# Patient Record
Sex: FEMALE | Race: WHITE | NOT HISPANIC OR LATINO | Employment: OTHER | URBAN - METROPOLITAN AREA
[De-identification: names, ages, dates, MRNs, and addresses within clinical notes are randomized per-mention and may not be internally consistent; named-entity substitution may affect disease eponyms.]

---

## 2017-01-10 ENCOUNTER — TELEPHONE (OUTPATIENT)
Dept: PHYSICAL MEDICINE AND REHAB | Facility: MEDICAL CENTER | Age: 42
End: 2017-01-10

## 2017-01-10 NOTE — TELEPHONE ENCOUNTER
"Peter left message she wants Dr. Arthur to give her different medication for constipation because she has been \"impacted\" for 2 weeks.   "

## 2017-01-10 NOTE — TELEPHONE ENCOUNTER
"Dr. Arthur wrote, \"For this severe constipation, I would recommend the patient have urgent care or primary care provider evaluation\"    I let Peter know above.   "

## 2017-01-30 ENCOUNTER — TELEPHONE (OUTPATIENT)
Dept: PHYSICAL MEDICINE AND REHAB | Facility: MEDICAL CENTER | Age: 42
End: 2017-01-30

## 2017-01-30 NOTE — TELEPHONE ENCOUNTER
Peter said she has left her fentanyl patch on for two weeks and wants to know if she should replace. She said she has not felt well for the last two months and has been not taking her medication like she should.

## 2017-01-30 NOTE — TELEPHONE ENCOUNTER
"Dr. Arthur wrote, \"If she does not need the fentanyl patch, she should not be using it. We can further discuss issues regarding pain medication at next office visit.\"   I was unable to leave message vm box full.   "

## 2017-03-03 ENCOUNTER — APPOINTMENT (OUTPATIENT)
Dept: PHYSICAL MEDICINE AND REHAB | Facility: MEDICAL CENTER | Age: 42
End: 2017-03-03
Payer: MEDICAID

## 2017-03-08 ENCOUNTER — OFFICE VISIT (OUTPATIENT)
Dept: PHYSICAL MEDICINE AND REHAB | Facility: MEDICAL CENTER | Age: 42
End: 2017-03-08
Payer: MEDICAID

## 2017-03-08 VITALS
WEIGHT: 171 LBS | BODY MASS INDEX: 23.94 KG/M2 | OXYGEN SATURATION: 96 % | HEART RATE: 76 BPM | SYSTOLIC BLOOD PRESSURE: 118 MMHG | HEIGHT: 71 IN | TEMPERATURE: 95.5 F | DIASTOLIC BLOOD PRESSURE: 78 MMHG

## 2017-03-08 DIAGNOSIS — G43.809 OTHER TYPE OF MIGRAINE: ICD-10-CM

## 2017-03-08 DIAGNOSIS — F41.9 ANXIETY: ICD-10-CM

## 2017-03-08 DIAGNOSIS — Z79.899 CONTROLLED SUBSTANCE AGREEMENT SIGNED: ICD-10-CM

## 2017-03-08 DIAGNOSIS — M25.50 ARTHRALGIA, UNSPECIFIED JOINT: ICD-10-CM

## 2017-03-08 DIAGNOSIS — M62.838 MUSCLE SPASM: ICD-10-CM

## 2017-03-08 DIAGNOSIS — R51.9 HEADACHE, UNSPECIFIED HEADACHE TYPE: ICD-10-CM

## 2017-03-08 DIAGNOSIS — M54.9 SPINAL PAIN: ICD-10-CM

## 2017-03-08 PROCEDURE — 99214 OFFICE O/P EST MOD 30 MIN: CPT | Performed by: PHYSICAL MEDICINE & REHABILITATION

## 2017-03-08 RX ORDER — BACLOFEN 10 MG/1
10 TABLET ORAL 3 TIMES DAILY PRN
Qty: 90 TAB | Refills: 1 | Status: SHIPPED | OUTPATIENT
Start: 2017-03-08 | End: 2017-05-23 | Stop reason: SDUPTHER

## 2017-03-08 RX ORDER — MORPHINE SULFATE 15 MG/1
TABLET ORAL
Qty: 90 TAB | Refills: 0 | Status: SHIPPED | OUTPATIENT
Start: 2017-03-08 | End: 2017-03-08 | Stop reason: SDUPTHER

## 2017-03-08 RX ORDER — BUTALBITAL, ACETAMINOPHEN AND CAFFEINE 50; 325; 40 MG/1; MG/1; MG/1
1 TABLET ORAL 2 TIMES DAILY PRN
Qty: 30 TAB | Refills: 0 | Status: SHIPPED | OUTPATIENT
Start: 2017-03-08 | End: 2017-05-23 | Stop reason: SDUPTHER

## 2017-03-08 RX ORDER — MORPHINE SULFATE 15 MG/1
TABLET ORAL
Qty: 90 TAB | Refills: 0 | Status: SHIPPED | OUTPATIENT
Start: 2017-03-08 | End: 2017-05-23 | Stop reason: SDUPTHER

## 2017-03-08 RX ORDER — HYDROXYZINE 50 MG/1
50 TABLET, FILM COATED ORAL 2 TIMES DAILY PRN
Qty: 60 TAB | Refills: 1 | Status: SHIPPED | OUTPATIENT
Start: 2017-03-08 | End: 2017-05-23 | Stop reason: SDUPTHER

## 2017-03-08 NOTE — MR AVS SNAPSHOT
"        Peter Trimble   3/8/2017 10:00 AM   Office Visit   MRN: [id number]    Department:  [facility]   Dept Phone:  [phone]    Description:  Female : 1975   Provider:  Daniel Arthur M.D.           Reason for Visit     Follow-Up           Allergies as of 3/8/2017     Allergen Noted Reactions    Compazine 2011       \"psychotic reaction\"    Imitrex [Sumatriptan Succinate] 2012       Had brain bleed    Inapsine [Droperidol] 2011       \"psychotic reaction\"    Maxalt [Rizatriptan Benzoate] 2012       Had brain bleed    Phenergan [Promethazine Hcl] 2011       \"psychotic reaction\"    Xanax [Alprazolam] 2014       Sores and dry mouth, many others pt cannot remember    Zantac 10/16/2013       Stomach pain    Apple Cider Vinegar 2016   Rash    Patient states she gets rash    Clarithromycin 2013   Vomiting, Palpitations    Dilaudid [Hydromorphone] 2016   Rash    Patient states she had rash, hallucinations, unable to urinate.     Doxycycline 2015       Effexor [Venlafaxine] 2014       \"Really depressed, like i wanted to hurt myself\"    Eucalyptus Oil 2015       Kiwi Extract 10/07/2011       Latex 2013   Rash    Lyrica [Pregabalin] 2011       \"depression, slept a lot\"    Minocycline 10/07/2011   Vomiting    \"any cyclines\"    Patchouli Oil 2015   Rash    Rash     Tea Tree Oil 2015   Rash    Break out in rash    Topiramate 2015   Nausea    Patient states made sick to stomach and dizzy.      You were diagnosed with     Controlled substance agreement signed   [551937]       Spinal pain   [558601]       Arthralgia, unspecified joint   [4663567]       Headache, unspecified headache type   [6316494]       Other type of migraine   [2743959]       Muscle spasm   [465423]       Anxiety   [491048]         Vital Signs     Blood Pressure Pulse Temperature Height Weight Body Mass Index    118/78 mmHg 76 35.3 °C " "(95.5 °F) 1.803 m (5' 11\") 77.565 kg (171 lb) 23.86 kg/m2    Oxygen Saturation Smoking Status                96% Never Smoker           Basic Information     Date Of Birth Sex Race Ethnicity Preferred Language    1975 Female White Non- English      Problem List              ICD-10-CM Priority Class Noted - Resolved    Depression F32.9 Medium  9/28/2011 - Present    Bipolar 2 disorder (CMS-HCC) F31.81 Medium  9/28/2011 - Present    Chronic migraine G43.709 Medium  9/28/2011 - Present    GERD (gastroesophageal reflux disease) K21.9 Low  9/28/2011 - Present    Asthma J45.909 Medium  9/28/2011 - Present    Cerebral vasculitis I67.7 High  10/4/2011 - Present    HTN (hypertension) I10 Medium  10/4/2011 - Present    Seizure disorder (CMS-HCC) G40.909 Medium  10/5/2011 - Present    Hypertrophy of breast N62   4/29/2013 - Present    AVA (obstructive sleep apnea), intolerant of CPAP G47.33 Medium  7/8/2013 - Present    Obese E66.9   7/8/2013 - Present    Oxygen dependent, 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   7/8/2013 - Present    Headache R51   2/19/2014 - Present    Neck pain M54.2   2/19/2014 - Present    Fibromyalgia M79.7   2/19/2014 - Present    Lump or mass in breast N63   5/29/2014 - Present    Hyperlipidemia E78.5 Low  8/18/2014 - Present    Chronic pain G89.29 Medium  8/18/2014 - Present    Benign hypertensive heart disease without heart failure I11.9   10/16/2014 - Present    Mixed hyperlipidemia E78.2   10/16/2014 - Present    Open scalp wound S01.00XA   10/16/2014 - Present    Morbid obesity (CMS-HCC) E66.01 Medium  6/25/2015 - Present    Closed fracture of one rib S22.39XA Medium  6/25/2015 - Present    Lumbar transverse process fracture (CMS-HCC) S32.008A Medium  6/25/2015 - Present    Rib fracture S22.39XA   6/25/2015 - Present    Controlled substance agreement signed Z79.899   2/19/2016 - Present    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   8/6/2016 - Present    Fracture of ankle, trimalleolar, closed S82.853A   8/6/2016 - " Present    Depression F32.9   8/7/2016 - Present    Chronic pain G89.29   8/7/2016 - Present    HTN (hypertension), benign I10   8/7/2016 - Present    PTSD (post-traumatic stress disorder) F43.10   10/2/2016 - Present    Chest pain R07.9   10/3/2016 - Present    Ankle fracture, right S82.891A   10/3/2016 - Present    Vaginal yeast infection B37.3   10/3/2016 - Present    Personality disorder F60.9   10/4/2016 - Present    Dystonia G24.9   10/4/2016 - Present      Health Maintenance        Date Due Completion Dates    IMM DTaP/Tdap/Td Vaccine (1 - Tdap) 4/6/1995 ---    PAP SMEAR 4/6/1997 ---    MAMMOGRAM 4/6/2016 ---            Current Immunizations     Influenza TIV (IM) 10/1/2013, 11/15/2012, 9/30/2011 12:45 PM    Influenza Vaccine Quad Inj (Pf) 10/2/2016  9:25 PM    Pneumococcal polysaccharide vaccine (PPSV-23) 9/30/2011 12:45 PM      Below and/or attached are the medications your provider expects you to take. Review all of your home medications and newly ordered medications with your provider and/or pharmacist. Follow medication instructions as directed by your provider and/or pharmacist. Please keep your medication list with you and share with your provider. Update the information when medications are discontinued, doses are changed, or new medications (including over-the-counter products) are added; and carry medication information at all times in the event of emergency situations     Allergies:  COMPAZINE - (reactions not documented)     IMITREX - (reactions not documented)     INAPSINE - (reactions not documented)     MAXALT - (reactions not documented)     PHENERGAN - (reactions not documented)     XANAX - (reactions not documented)     ZANTAC - (reactions not documented)     APPLE CIDER VINEGAR - Rash     CLARITHROMYCIN - Vomiting,Palpitations     DILAUDID - Rash     DOXYCYCLINE - (reactions not documented)     EFFEXOR - (reactions not documented)     EUCALYPTUS OIL - (reactions not documented)     KIWI  EXTRACT - (reactions not documented)     LATEX - Rash     LYRICA - (reactions not documented)     MINOCYCLINE - Vomiting     PATCHOULI OIL - Rash     TEA TREE OIL - Rash     TOPIRAMATE - Nausea               Medications  Valid as of: March 08, 2017 - 10:59 AM    Generic Name Brand Name Tablet Size Instructions for use    Albuterol Sulfate (Nebu Soln) PROVENTIL 2.5mg/0.5ml 2.5 mg by Nebulization route every 6 hours as needed for Shortness of Breath.        Baclofen (Tab) LIORESAL 10 MG Take 1 Tab by mouth 3 times a day as needed. as needed for muscle spasms        Butalbital-APAP-Caffeine (Tab) FIORICET -40 MG Take 1 Tab by mouth 2 times a day as needed for Headache.        Cetirizine HCl (Tab) ZYRTEC 10 MG Take 10 mg by mouth every day.        Divalproex Sodium (Tablet Delayed Response) DEPAKOTE 500 MG Take 2 Tabs by mouth every 12 hours.        Esomeprazole Magnesium (CAPSULE DELAYED RELEASE) NEXIUM 40 MG Take 40 mg by mouth every morning before breakfast.        Fenofibrate (Tab) TRICOR 145 MG Take 145 mg by mouth every morning.        Ferrous Sulfate (Tab) ferrous sulfate 325 (65 FE) MG Take 325 mg by mouth every day.        Fluticasone-Salmeterol (AEROSOL POWDER, BREATH ACTIVATED) ADVAIR 250-50 MCG/DOSE Inhale 1 Puff by mouth every evening.        HydrOXYzine HCl (Tab) ATARAX 50 MG Take 1 Tab by mouth 2 times a day as needed. for anxiety.        Montelukast Sodium (Tab) SINGULAIR 10 MG Take 10 mg by mouth every bedtime.        Morphine Sulfate (Tab) MS IR 15 MG Take 1/2  to 1 tablet by mouth every 8 hours as needed for pain.        PARoxetine HCl (Tab) PAXIL 30 MG Take 60 mg by mouth every day.        Propranolol HCl (Tab) INDERAL 20 MG Take 20 mg by mouth 2 times a day.        .                 Medicines prescribed today were sent to:     [facility] [place] - [address]    [address] [facility] [place] [address]    Phone: [phone] Fax: [phone]    Open 24  Hours?: No      Medication refill instructions:       If your prescription bottle indicates you have medication refills left, it is not necessary to call your provider’s office. Please contact your pharmacy and they will refill your medication.    If your prescription bottle indicates you do not have any refills left, you may request refills at any time through one of the following ways: The online [facility] system (except Urgent Care), by calling your provider’s office, or by asking your pharmacy to contact your provider’s office with a refill request. Medication refills are processed only during regular business hours and may not be available until the next business day. Your provider may request additional information or to have a follow-up visit with you prior to refilling your medication.   *Please Note: Medication refills are assigned a new Rx number when refilled electronically. Your pharmacy may indicate that no refills were authorized even though a new prescription for the same medication is available at the pharmacy. Please request the medicine by name with the pharmacy before contacting your provider for a refill.        Your To Do List     Future Labs/Procedures Complete By Expires    PAIN MANAGEMENT MARCELLO ULLOA/ RFLX TO QNT  As directed 3/8/2018    Comments:    Current Meds (name, sig, last dose):   Current outpatient prescriptions:   •  morphine, Take 1/2 to 1 tablet by mouth every 8 hours as needed for breakthrough pain., 3/8/2017  •  hydrOXYzine, 100 mg, Oral, TID, 3/8/2017  •  divalproex, 1,000 mg, Oral, Q12HRS, 3/8/2017  •  paroxetine, 60 mg, Oral, DAILY, 3/8/2017 at unknown  •  albuterol, 2.5 mg, Nebulization, Q6HRS PRN, 3/8/2017 at unknown  •  cetirizine, 10 mg, Oral, DAILY, 3/8/2017 at am  •  esomeprazole, 40 mg, Oral, QAM AC, 3/8/2017 at am  •  fenofibrate, 145 mg, Oral, QAM, 3/8/2017 at am  •  ferrous sulfate, 325 mg, Oral, DAILY, 3/8/2017 at am  •  montelukast, 10 mg, Oral, QHS, 3/8/2017 at  unknown  •  propranolol, 20 mg, Oral, BID, 12/7/2016 at am  •  fluticasone-salmeterol, 1 Puff, Inhalation, Q EVENING, prn at unknown            Referral     A referral request has been sent to our patient care coordination department. Please allow 3-5 business days for us to process this request and contact you either by phone or mail. If you do not hear from us by the 5th business day, please call us at [phone].           [employer] Access Code: [email or web address]  Expires: 3/22/2017  2:00 PM    Plaxica  A secure, online tool to manage your health information     [employer]’s Plaxica® is a secure, online tool that connects you to your personalized health information from the privacy of your home -- day or night - making it very easy for you to manage your healthcare. Once the activation process is completed, you can even access your medical information using the Plaxica mariaelena, which is available for free in the Apple Mariaelena store or Google Play store.     Plaxica provides the following levels of access (as shown below):   My Chart Features   [facility] Doctor [facility]  Specialists [facility]  Urgent  Care [facility]  Primary Care  Doctor   Email your healthcare team securely and privately 24/7 X X X    Manage appointments: schedule your next appointment; view details of past/upcoming appointments X      Request prescription refills. X      View recent personal medical records, including lab and immunizations X X X X   View health record, including health history, allergies, medications X X X X   Read reports about your outpatient visits, procedures, consult and ER notes X X X X   See your discharge summary, which is a recap of your hospital and/or ER visit that includes your diagnosis, lab results, and care plan. X X       How to register for [employer]:  1. Go to  [email or web address].  2. Click on the Sign Up Now box, which takes you to the New Member Sign Up page. You will need to provide the following  information:  a. Enter your [employer] [place] Access Code exactly as it appears at the top of this page. (You will not need to use this code after you’ve completed the sign-up process. If you do not sign up before the expiration date, you must request a new code.)   b. Enter your date of birth.   c. Enter your home email address.   d. Click Submit, and follow the next screen’s instructions.  3. Create a [employer] [place] ID. This will be your [employer] [place] login ID and cannot be changed, so think of one that is secure and easy to remember.  4. Create a [employer] [place] password. You can change your password at any time.  5. Enter your Password Reset Question and Answer. This can be used at a later time if you forget your password.   6. Enter your e-mail address. This allows you to receive e-mail notifications when new information is available in [employer] [place].  7. Click Sign Up. You can now view your health information.    For assistance activating your [employer] [place] account, call [phone]
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
## 2017-03-09 ENCOUNTER — TELEPHONE (OUTPATIENT)
Dept: PHYSICAL MEDICINE AND REHAB | Facility: MEDICAL CENTER | Age: 42
End: 2017-03-09

## 2017-03-09 NOTE — TELEPHONE ENCOUNTER
Peter left message she will not be able to get UDS done until tomorrow and hopes she is not jeopardizing her compliance with program.

## 2017-03-10 ENCOUNTER — HOSPITAL ENCOUNTER (OUTPATIENT)
Facility: MEDICAL CENTER | Age: 42
End: 2017-03-10
Attending: PHYSICAL MEDICINE & REHABILITATION
Payer: MEDICAID

## 2017-03-10 DIAGNOSIS — Z79.899 CONTROLLED SUBSTANCE AGREEMENT SIGNED: ICD-10-CM

## 2017-03-10 DIAGNOSIS — M25.50 ARTHRALGIA, UNSPECIFIED JOINT: ICD-10-CM

## 2017-03-10 DIAGNOSIS — M54.9 SPINAL PAIN: ICD-10-CM

## 2017-03-10 PROCEDURE — G0480 DRUG TEST DEF 1-7 CLASSES: HCPCS

## 2017-03-10 PROCEDURE — G0480 DRUG TEST DEF 1-7 CLASSES: HCPCS | Mod: 91

## 2017-03-10 PROCEDURE — 80307 DRUG TEST PRSMV CHEM ANLYZR: CPT

## 2017-03-12 LAB
AMPHET CTO UR CFM-MCNC: NEGATIVE NG/ML
BARBITURATES CTO UR CFM-MCNC: POSITIVE NG/ML
BENZODIAZ CTO UR CFM-MCNC: NEGATIVE NG/ML
BUPRENORPHINE UR-MCNC: NEGATIVE NG/ML
CANNABINOIDS CTO UR CFM-MCNC: NEGATIVE NG/ML
CARISOPRODOL UR-MCNC: NEGATIVE NG/ML
COCAINE CTO UR CFM-MCNC: NEGATIVE NG/ML
DRUG SCREEN COMMENT UR-IMP: NORMAL
ETHYL GLUCURONIDE UR QL SCN: NEGATIVE NG/ML
FENTANYL UR-MCNC: NEGATIVE NG/ML
MEPERIDINE CTO UR SCN-MCNC: NEGATIVE NG/ML
METHADONE CTO UR CFM-MCNC: NEGATIVE NG/ML
OPIATES UR QL SCN: POSITIVE NG/ML
OXYCDOXYM URSCRN 2005102: NEGATIVE NG/ML
PCP CTO UR CFM-MCNC: NEGATIVE NG/ML
PROPOXYPH CTO UR CFM-MCNC: NEGATIVE NG/ML
TAPENTADOL UR-MCNC: NEGATIVE NG/ML
TRAMADOL CTO UR SCN-MCNC: NEGATIVE NG/ML
ZOLPIDEM UR-MCNC: NEGATIVE NG/ML

## 2017-03-15 LAB
6MAM UR CFM-MCNC: <10 NG/ML
AMOBARBITAL UR-MCNC: <50 NG/ML
BUTALBITAL UR-MCNC: 370 NG/ML
CODEINE UR CFM-MCNC: <20 NG/ML
HYDROCODONE UR CFM-MCNC: <20 NG/ML
HYDROMORPHONE UR CFM-MCNC: <20 NG/ML
MORPHINE UR CFM-MCNC: 1226 NG/ML
NORHYDROCODONE UR CFM-MCNC: <20 NG/ML
NOROXYCODONE UR CFM-MCNC: <20 NG/ML
OPIATES UR NOROXYM Q0836: <20 NG/ML
OXYCODONE UR CFM-MCNC: <20 NG/ML
OXYMORPHONE UR CFM-MCNC: <20 NG/ML
PENTOBARB UR-MCNC: <50 NG/ML
PHENOBARB UR-MCNC: <50 NG/ML
SECOBARBITAL UR-MCNC: <50 NG/ML

## 2017-03-18 ENCOUNTER — OFFICE VISIT (OUTPATIENT)
Dept: URGENT CARE | Facility: PHYSICIAN GROUP | Age: 42
End: 2017-03-18
Payer: MEDICAID

## 2017-03-18 ENCOUNTER — APPOINTMENT (OUTPATIENT)
Dept: URGENT CARE | Facility: PHYSICIAN GROUP | Age: 42
End: 2017-03-18
Payer: MEDICAID

## 2017-03-18 VITALS
HEART RATE: 82 BPM | SYSTOLIC BLOOD PRESSURE: 122 MMHG | WEIGHT: 175 LBS | BODY MASS INDEX: 24.42 KG/M2 | RESPIRATION RATE: 16 BRPM | TEMPERATURE: 98.2 F | DIASTOLIC BLOOD PRESSURE: 74 MMHG | OXYGEN SATURATION: 96 %

## 2017-03-18 DIAGNOSIS — N39.0 ACUTE UTI (URINARY TRACT INFECTION): ICD-10-CM

## 2017-03-18 PROCEDURE — 99213 OFFICE O/P EST LOW 20 MIN: CPT | Performed by: NURSE PRACTITIONER

## 2017-03-18 RX ORDER — FLUCONAZOLE 100 MG/1
100 TABLET ORAL ONCE
Qty: 1 TAB | Refills: 0 | Status: SHIPPED | OUTPATIENT
Start: 2017-03-18 | End: 2017-03-18

## 2017-03-18 RX ORDER — LEVOFLOXACIN 250 MG/1
250 TABLET, FILM COATED ORAL DAILY
Qty: 5 TAB | Refills: 0 | Status: SHIPPED | OUTPATIENT
Start: 2017-03-18 | End: 2017-05-23

## 2017-03-18 ASSESSMENT — ENCOUNTER SYMPTOMS
BACK PAIN: 0
NAUSEA: 0
MYALGIAS: 0
CHILLS: 0
WEAKNESS: 0
ABDOMINAL PAIN: 0
SWEATS: 0
VOMITING: 0
FEVER: 0
HEADACHES: 0
FLANK PAIN: 0

## 2017-03-18 NOTE — PROGRESS NOTES
Subjective:      Peter Trimble is a 41 y.o. female who presents with Dysuria            HPI Comments: Medications, Allergies and Prior Medical Hx reviewed and updated in [facility].with patient/family today         Dysuria   This is a new problem. The current episode started in the past 7 days (5 days). The problem has been unchanged. The quality of the pain is described as burning. The pain is moderate. There has been no fever. Associated symptoms include a discharge, frequency, hematuria and urgency. Pertinent negatives include no chills, flank pain, nausea, possible pregnancy, sweats or vomiting. She has tried nothing for the symptoms.       Review of Systems   Constitutional: Negative for fever, chills and malaise/fatigue.   Gastrointestinal: Negative for nausea, vomiting and abdominal pain.   Genitourinary: Positive for dysuria, urgency, frequency and hematuria. Negative for flank pain.   Musculoskeletal: Negative for myalgias and back pain.   Neurological: Negative for weakness and headaches.          Objective:     /74 mmHg  Pulse 82  Temp(Src) 36.8 °C (98.2 °F)  Resp 16  Wt 79.379 kg (175 lb)  SpO2 96%     Physical Exam   Constitutional: She appears well-developed and well-nourished.   HENT:   Head: Normocephalic and atraumatic.   Eyes: Pupils are equal, round, and reactive to light.   Cardiovascular: Normal rate, regular rhythm and normal heart sounds.    Pulmonary/Chest: Effort normal and breath sounds normal. No respiratory distress.   Abdominal: Soft. She exhibits no distension. There is tenderness in the suprapubic area. There is CVA tenderness (bilateral).   Neurological: She is alert.   Awake, alert, answering questions appropriately, moving all extremeties   Skin: Skin is warm and dry.   Vitals reviewed.              Assessment/Plan:     1. Acute UTI (urinary tract infection)  levofloxacin (LEVAQUIN) 250 MG Tab    fluconazole (DIFLUCAN) 100 MG Tab         Poct UA positive for leuks and  blood, sx are compatible with uti  Pt states levaquin > 3 days is the only abx that works for her uti.     Drink fluids  Pt will go to the ER for worsening or changing symptoms as discusse, high fever, body aches, vomiting, flank pain  Follow-up with your primary care provider or return here if not improving in 5 days   Discharge instructions discussed with pt/family who verbalize understanding and agreement with poc

## 2017-03-18 NOTE — MR AVS SNAPSHOT
"        Peter Trimble   3/18/2017 2:45 PM   Office Visit   MRN: [id number]    Department:  [facility]   Dept Phone:  [phone]    Description:  Female : 1975   Provider:  MC Roberts           Reason for Visit     Dysuria x 1 month        Allergies as of 3/18/2017     Allergen Noted Reactions    Compazine 2011       \"psychotic reaction\"    Imitrex [Sumatriptan Succinate] 2012       Had brain bleed    Inapsine [Droperidol] 2011       \"psychotic reaction\"    Maxalt [Rizatriptan Benzoate] 2012       Had brain bleed    Phenergan [Promethazine Hcl] 2011       \"psychotic reaction\"    Xanax [Alprazolam] 2014       Sores and dry mouth, many others pt cannot remember    Zantac 10/16/2013       Stomach pain    Apple Cider Vinegar 2016   Rash    Patient states she gets rash    Clarithromycin 2013   Vomiting, Palpitations    Dilaudid [Hydromorphone] 2016   Rash    Patient states she had rash, hallucinations, unable to urinate.     Doxycycline 2015       Effexor [Venlafaxine] 2014       \"Really depressed, like i wanted to hurt myself\"    Eucalyptus Oil 2015       Kiwi Extract 10/07/2011       Latex 2013   Rash    Lyrica [Pregabalin] 2011       \"depression, slept a lot\"    Minocycline 10/07/2011   Vomiting    \"any cyclines\"    Patchouli Oil 2015   Rash    Rash     Tea Tree Oil 2015   Rash    Break out in rash    Topiramate 2015   Nausea    Patient states made sick to stomach and dizzy.      You were diagnosed with     Acute UTI (urinary tract infection)   [091446]         Vital Signs     Blood Pressure Pulse Temperature Respirations Weight Oxygen Saturation    122/74 mmHg 82 36.8 °C (98.2 °F) 16 79.379 kg (175 lb) 96%    Smoking Status                   Never Smoker            Basic Information     Date Of Birth Sex Race Ethnicity Preferred Language    1975 Female White Non- English   "      Problem List              ICD-10-CM Priority Class Noted - Resolved    Depression F32.9 Medium  9/28/2011 - Present    Bipolar 2 disorder (CMS-HCC) F31.81 Medium  9/28/2011 - Present    Chronic migraine G43.709 Medium  9/28/2011 - Present    GERD (gastroesophageal reflux disease) K21.9 Low  9/28/2011 - Present    Asthma J45.909 Medium  9/28/2011 - Present    Cerebral vasculitis I67.7 High  10/4/2011 - Present    HTN (hypertension) I10 Medium  10/4/2011 - Present    Seizure disorder (CMS-HCC) G40.909 Medium  10/5/2011 - Present    Hypertrophy of breast N62   4/29/2013 - Present    AVA (obstructive sleep apnea), intolerant of CPAP G47.33 Medium  7/8/2013 - Present    Obese E66.9   7/8/2013 - Present    Oxygen dependent, 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   7/8/2013 - Present    Headache R51   2/19/2014 - Present    Neck pain M54.2   2/19/2014 - Present    Fibromyalgia M79.7   2/19/2014 - Present    Lump or mass in breast N63   5/29/2014 - Present    Hyperlipidemia E78.5 Low  8/18/2014 - Present    Chronic pain G89.29 Medium  8/18/2014 - Present    Benign hypertensive heart disease without heart failure I11.9   10/16/2014 - Present    Mixed hyperlipidemia E78.2   10/16/2014 - Present    Open scalp wound S01.00XA   10/16/2014 - Present    Morbid obesity (CMS-HCC) E66.01 Medium  6/25/2015 - Present    Closed fracture of one rib S22.39XA Medium  6/25/2015 - Present    Lumbar transverse process fracture (CMS-HCC) S32.008A Medium  6/25/2015 - Present    Rib fracture S22.39XA   6/25/2015 - Present    Controlled substance agreement signed Z79.899   2/19/2016 - Present    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   8/6/2016 - Present    Fracture of ankle, trimalleolar, closed S82.853A   8/6/2016 - Present    Depression F32.9   8/7/2016 - Present    Chronic pain G89.29   8/7/2016 - Present    HTN (hypertension), benign I10   8/7/2016 - Present    PTSD (post-traumatic stress disorder) F43.10   10/2/2016 - Present    Chest pain R07.9   10/3/2016 - Present     Ankle fracture, right S82.891A   10/3/2016 - Present    Vaginal yeast infection B37.3   10/3/2016 - Present    Personality disorder F60.9   10/4/2016 - Present    Dystonia G24.9   10/4/2016 - Present      Health Maintenance        Date Due Completion Dates    IMM DTaP/Tdap/Td Vaccine (1 - Tdap) 4/6/1995 ---    PAP SMEAR 4/6/1997 ---    MAMMOGRAM 4/6/2016 ---            Current Immunizations     Influenza TIV (IM) 10/1/2013, 11/15/2012, 9/30/2011 12:45 PM    Influenza Vaccine Quad Inj (Pf) 10/2/2016  9:25 PM    Pneumococcal polysaccharide vaccine (PPSV-23) 9/30/2011 12:45 PM      Below and/or attached are the medications your provider expects you to take. Review all of your home medications and newly ordered medications with your provider and/or pharmacist. Follow medication instructions as directed by your provider and/or pharmacist. Please keep your medication list with you and share with your provider. Update the information when medications are discontinued, doses are changed, or new medications (including over-the-counter products) are added; and carry medication information at all times in the event of emergency situations     Allergies:  COMPAZINE - (reactions not documented)     IMITREX - (reactions not documented)     INAPSINE - (reactions not documented)     MAXALT - (reactions not documented)     PHENERGAN - (reactions not documented)     XANAX - (reactions not documented)     ZANTAC - (reactions not documented)     APPLE CIDER VINEGAR - Rash     CLARITHROMYCIN - Vomiting,Palpitations     DILAUDID - Rash     DOXYCYCLINE - (reactions not documented)     EFFEXOR - (reactions not documented)     EUCALYPTUS OIL - (reactions not documented)     KIWI EXTRACT - (reactions not documented)     LATEX - Rash     LYRICA - (reactions not documented)     MINOCYCLINE - Vomiting     PATCHOULI OIL - Rash     TEA TREE OIL - Rash     TOPIRAMATE - Nausea               Medications  Valid as of: March 18, 2017 -  4:01 PM     Generic Name Brand Name Tablet Size Instructions for use    Albuterol Sulfate (Nebu Soln) PROVENTIL 2.5mg/0.5ml 2.5 mg by Nebulization route every 6 hours as needed for Shortness of Breath.        Baclofen (Tab) LIORESAL 10 MG Take 1 Tab by mouth 3 times a day as needed. as needed for muscle spasms        Butalbital-APAP-Caffeine (Tab) FIORICET -40 MG Take 1 Tab by mouth 2 times a day as needed for Headache.        Cetirizine HCl (Tab) ZYRTEC 10 MG Take 10 mg by mouth every day.        Divalproex Sodium (Tablet Delayed Response) DEPAKOTE 500 MG Take 2 Tabs by mouth every 12 hours.        Esomeprazole Magnesium (CAPSULE DELAYED RELEASE) NEXIUM 40 MG Take 40 mg by mouth every morning before breakfast.        Fenofibrate (Tab) TRICOR 145 MG Take 145 mg by mouth every morning.        Ferrous Sulfate (Tab) ferrous sulfate 325 (65 FE) MG Take 325 mg by mouth every day.        Fluconazole (Tab) DIFLUCAN 100 MG Take 1 Tab by mouth Once for 1 dose.        Fluticasone-Salmeterol (AEROSOL POWDER, BREATH ACTIVATED) ADVAIR 250-50 MCG/DOSE Inhale 1 Puff by mouth every evening.        HydrOXYzine HCl (Tab) ATARAX 50 MG Take 1 Tab by mouth 2 times a day as needed. for anxiety.        LevoFLOXacin (Tab) LEVAQUIN 250 MG Take 1 Tab by mouth every day.        Montelukast Sodium (Tab) SINGULAIR 10 MG Take 10 mg by mouth every bedtime.        Morphine Sulfate (Tab) MS IR 15 MG Take 1/2  to 1 tablet by mouth every 8 hours as needed for pain.        PARoxetine HCl (Tab) PAXIL 30 MG Take 60 mg by mouth every day.        Propranolol HCl (Tab) INDERAL 20 MG Take 20 mg by mouth 2 times a day.        .                 Medicines prescribed today were sent to:     [facility] [place] - [address]    [address] [facility] [place] [facility] [address]    Phone: [phone] Fax: [phone]    Open 24 Hours?: No    [id number] [place] - [address]    [address] [facility] [address]    Phone:  [phone] Fax: [phone]    Open 24 Hours?: No      Medication refill instructions:       If your prescription bottle indicates you have medication refills left, it is not necessary to call your provider’s office. Please contact your pharmacy and they will refill your medication.    If your prescription bottle indicates you do not have any refills left, you may request refills at any time through one of the following ways: The online RemitPro system (except Urgent Care), by calling your provider’s office, or by asking your pharmacy to contact your provider’s office with a refill request. Medication refills are processed only during regular business hours and may not be available until the next business day. Your provider may request additional information or to have a follow-up visit with you prior to refilling your medication.   *Please Note: Medication refills are assigned a new Rx number when refilled electronically. Your pharmacy may indicate that no refills were authorized even though a new prescription for the same medication is available at the pharmacy. Please request the medicine by name with the pharmacy before contacting your provider for a refill.           RemitPro Access Code: [email or web address]  Expires: 3/22/2017  3:00 PM    RemitPro  A secure, online tool to manage your health information     [employer]’s RemitPro® is a secure, online tool that connects you to your personalized health information from the privacy of your home -- day or night - making it very easy for you to manage your healthcare. Once the activation process is completed, you can even access your medical information using the RemitPro mariaelena, which is available for free in the Apple Mariaelena store or Google Play store.     RemitPro provides the following levels of access (as shown below):   My Chart Features   [facility] Doctor [facility]  Specialists [facility]  Urgent  Care Non-[facility]  Primary Care  Doctor   Email your healthcare team  securely and privately 24/7 X X X    Manage appointments: schedule your next appointment; view details of past/upcoming appointments X      Request prescription refills. X      View recent personal medical records, including lab and immunizations X X X X   View health record, including health history, allergies, medications X X X X   Read reports about your outpatient visits, procedures, consult and ER notes X X X X   See your discharge summary, which is a recap of your hospital and/or ER visit that includes your diagnosis, lab results, and care plan. X X       How to register for [employer]:  1. Go to  [email or web address].  2. Click on the Sign Up Now box, which takes you to the New Member Sign Up page. You will need to provide the following information:  a. Enter your [employer] Access Code exactly as it appears at the top of this page. (You will not need to use this code after you’ve completed the sign-up process. If you do not sign up before the expiration date, you must request a new code.)   b. Enter your date of birth.   c. Enter your home email address.   d. Click Submit, and follow the next screen’s instructions.  3. Create a [employer] ID. This will be your [employer] login ID and cannot be changed, so think of one that is secure and easy to remember.  4. Create a [employer] password. You can change your password at any time.  5. Enter your Password Reset Question and Answer. This can be used at a later time if you forget your password.   6. Enter your e-mail address. This allows you to receive e-mail notifications when new information is available in [employer].  7. Click Sign Up. You can now view your health information.    For assistance activating your [employer] account, call [phone]

## 2017-05-05 ENCOUNTER — OFFICE VISIT (OUTPATIENT)
Dept: URGENT CARE | Facility: PHYSICIAN GROUP | Age: 42
End: 2017-05-05
Payer: MEDICAID

## 2017-05-05 VITALS
RESPIRATION RATE: 16 BRPM | OXYGEN SATURATION: 95 % | WEIGHT: 175 LBS | HEART RATE: 84 BPM | TEMPERATURE: 97.1 F | BODY MASS INDEX: 24.42 KG/M2 | SYSTOLIC BLOOD PRESSURE: 124 MMHG | DIASTOLIC BLOOD PRESSURE: 82 MMHG

## 2017-05-05 DIAGNOSIS — J30.9 ALLERGIC RHINITIS, UNSPECIFIED ALLERGIC RHINITIS TRIGGER, UNSPECIFIED RHINITIS SEASONALITY: ICD-10-CM

## 2017-05-05 PROCEDURE — 99214 OFFICE O/P EST MOD 30 MIN: CPT | Performed by: PHYSICIAN ASSISTANT

## 2017-05-05 RX ORDER — CETIRIZINE HYDROCHLORIDE, PSEUDOEPHEDRINE HYDROCHLORIDE 5; 120 MG/1; MG/1
1 TABLET, FILM COATED, EXTENDED RELEASE ORAL 2 TIMES DAILY
Qty: 60 TAB | Refills: 0 | Status: SHIPPED | OUTPATIENT
Start: 2017-05-05 | End: 2017-05-23

## 2017-05-05 RX ORDER — METHYLPREDNISOLONE 4 MG/1
4 TABLET ORAL SEE ADMIN INSTRUCTIONS
Qty: 21 TAB | Refills: 0 | Status: SHIPPED | OUTPATIENT
Start: 2017-05-05 | End: 2017-05-23

## 2017-05-05 NOTE — PROGRESS NOTES
Chief Complaint   Patient presents with   • Pharyngitis       HISTORY OF PRESENT ILLNESS: Patient is a 42 y.o. female who presents today because she has a one-week history of sore throat, bilateral ear plugging popping sensation, ear pain that radiates into her neck, nasal congestion and a cough. She states that she cannot use nasal steroid sprays because they give her nasal polyps. She has a list of allergies to medications that she states that she cannot take. She is also on chronic pain medications, among multiple other medications related to depression and bipolar, among other chronic illnesses. Nonetheless, she denies any fevers, chills, nausea, vomiting or diarrhea.    Patient Active Problem List    Diagnosis Date Noted   • Cerebral vasculitis 10/04/2011     Priority: High   • Morbid obesity (CMS-HCC) 06/25/2015     Priority: Medium   • Closed fracture of one rib 06/25/2015     Priority: Medium   • Lumbar transverse process fracture (CMS-HCC) 06/25/2015     Priority: Medium   • Chronic pain 08/18/2014     Priority: Medium   • AVA (obstructive sleep apnea), intolerant of CPAP 07/08/2013     Priority: Medium   • Seizure disorder (CMS-HCC) 10/05/2011     Priority: Medium   • HTN (hypertension) 10/04/2011     Priority: Medium   • Depression 09/28/2011     Priority: Medium   • Bipolar 2 disorder (CMS-HCC) 09/28/2011     Priority: Medium   • Chronic migraine 09/28/2011     Priority: Medium   • Asthma 09/28/2011     Priority: Medium   • Hyperlipidemia 08/18/2014     Priority: Low   • GERD (gastroesophageal reflux disease) 09/28/2011     Priority: Low   • Personality disorder 10/04/2016   • Dystonia 10/04/2016   • Chest pain 10/03/2016   • Ankle fracture, right 10/03/2016   • Vaginal yeast infection 10/03/2016   • PTSD (post-traumatic stress disorder) 10/02/2016   • Depression 08/07/2016   • Chronic pain 08/07/2016   • HTN (hypertension), benign 08/07/2016   • Pseudoseizure (HCC) 08/06/2016   • Fracture of ankle,  trimalleolar, closed 08/06/2016   • Controlled substance agreement signed 02/19/2016   • Rib fracture 06/25/2015   • Benign hypertensive heart disease without heart failure 10/16/2014   • Mixed hyperlipidemia 10/16/2014   • Open scalp wound 10/16/2014   • Lump or mass in breast 05/29/2014   • Headache 02/19/2014   • Neck pain 02/19/2014   • Fibromyalgia 02/19/2014   • Obese 07/08/2013   • Oxygen dependent, since 2011 07/08/2013   • Hypertrophy of breast 04/29/2013       Allergies:Compazine; Imitrex; Inapsine; Maxalt; Phenergan; Xanax; Zantac; Apple cider vinegar; Clarithromycin; Dilaudid; Doxycycline; Effexor; Eucalyptus oil; Kiwi extract; Latex; Lyrica; Minocycline; Patchouli oil; Tea tree oil; and Topiramate    Current Outpatient Prescriptions Ordered in [facility]   Medication Sig Dispense Refill   • MethylPREDNISolone (MEDROL DOSEPAK) 4 MG Tablet Therapy Pack Take 1 Tab by mouth See Admin Instructions. 21 Tab 0   • cetirizine-psuedoephedrine (ZYRTEC-D ALLERGY & CONGESTION) 5-120 MG per tablet Take 1 Tab by mouth 2 times a day. 60 Tab 0   • levofloxacin (LEVAQUIN) 250 MG Tab Take 1 Tab by mouth every day. 5 Tab 0   • acetaminophen/caffeine/butalbital 325-40-50 mg (FIORICET) -40 MG Tab Take 1 Tab by mouth 2 times a day as needed for Headache. 30 Tab 0   • baclofen (LIORESAL) 10 MG Tab Take 1 Tab by mouth 3 times a day as needed. as needed for muscle spasms 90 Tab 1   • hydrOXYzine (ATARAX) 50 MG Tab Take 1 Tab by mouth 2 times a day as needed. for anxiety. 60 Tab 1   • morphine (MS IR) 15 MG tablet Take 1/2  to 1 tablet by mouth every 8 hours as needed for pain. 90 Tab 0   • divalproex (DEPAKOTE) 500 MG Tablet Delayed Response Take 2 Tabs by mouth every 12 hours. 120 Tab 3   • paroxetine (PAXIL) 30 MG Tab Take 60 mg by mouth every day.     • propranolol (INDERAL) 20 MG Tab Take 20 mg by mouth 2 times a day.     • albuterol (PROVENTIL) 2.5mg/0.5ml Nebu Soln 2.5 mg by Nebulization route every 6 hours as needed for  "Shortness of Breath.     • cetirizine (ZYRTEC) 10 MG Tab Take 10 mg by mouth every day.     • esomeprazole (NEXIUM) 40 MG delayed-release capsule Take 40 mg by mouth every morning before breakfast.     • fenofibrate (TRICOR) 145 MG Tab Take 145 mg by mouth every morning.     • ferrous sulfate 325 (65 FE) MG tablet Take 325 mg by mouth every day.     • fluticasone-salmeterol (ADVAIR) 250-50 MCG/DOSE AEROSOL POWDER, BREATH ACTIVATED Inhale 1 Puff by mouth every evening.     • montelukast (SINGULAIR) 10 MG Tab Take 10 mg by mouth every bedtime.       No current Epic-ordered facility-administered medications on file.       Past Medical History   Diagnosis Date   • Hypertension    • Fibromyalgia    • PTSD (post-traumatic stress disorder)    • Migraines    • Anxiety    • Bipolar affective (CMS-HCC)    • Personality disorder    • Kidney stones    • Arthritis      osteoarthritis since 15yo.   • Seizure (CMS-HCC) 15     last    • Stroke (CMS-HCC)       reports strokes x5 , and a \"brain bleed\"   • Depression    • GERD (gastroesophageal reflux disease)    • Hyperlipidemia 14     \"off medication\"   • Environmental allergies    • ASTHMA      O2 3liters at HS and prn   • Pain 15     \"my body hurts all the time\", -6/10   • Unspecified urinary incontinence    • Unspecified hemorrhagic conditions      nose-bleeds, bruises easily   • Urinary bladder disorder    • Pneumonia    • Cold    • Breath shortness    • Sleep apnea      has had a sleep study, use O2 at HS   • Snoring    • Anemia    • Hx MRSA infection    • Heart murmur      she denies this hx   • Essential hypertension, malignant 2011   • Acute blood loss anemia 2013     -resolved, postop     • Acute renal failure (ARF) (CMS-HCC) 10/5/2011     -resolved (post op )    • Scalp wound 2014   • Subarachnoid hemorrhage (CMS-HCC) 2011     -small,  (2nd to HTN)    • History of pregnancy 10/16/2014         • " Eclampsia complicating pregnancy        Social History   Substance Use Topics   • Smoking status: Never Smoker    • Smokeless tobacco: Never Used   • Alcohol Use: No       Family Status   Relation Status Death Age   • Mother Alive    • Father Alive    • Brother Alive    • Maternal Aunt Alive    • Maternal Uncle Alive    • Paternal Aunt     • Paternal Uncle     • Maternal Grandmother Alive    • Maternal Grandfather     • Paternal Grandmother     • Paternal Grandfather       Family History   Problem Relation Age of Onset   • Hypertension Mother    • Hyperlipidemia Mother    • Hypertension Father    • Hyperlipidemia Father    • Heart Disease Father    • Psychiatry Father    • Alcohol/Drug Father    • Alcohol/Drug Brother    • Arthritis Maternal Uncle    • Psychiatry Maternal Uncle    • Heart Disease Maternal Uncle    • Hypertension Maternal Uncle    • Hyperlipidemia Maternal Uncle    • Genetic Paternal Aunt    • Psychiatry Paternal Uncle    • Arthritis Maternal Grandmother    • Heart Disease Maternal Grandmother    • Alcohol/Drug Maternal Grandfather    • Stroke Maternal Grandfather    • Psychiatry Maternal Grandfather    • Arthritis Paternal Grandmother    • Alcohol/Drug Paternal Grandfather        ROS:  Review of Systems   Constitutional: Negative for fever, chills, weight loss and malaise/fatigue.   HENT: Positive bilateral ear pain, no nosebleeds, positive for nasal and sinus congestion, sore throat and no neck pain.    Eyes: Negative for blurred vision.   Respiratory: Positive for cough, no sputum production, shortness of breath and wheezing.    Cardiovascular: Negative for chest pain, palpitations, orthopnea and leg swelling.   Gastrointestinal: Negative for heartburn, nausea, vomiting and abdominal pain.   Genitourinary: Negative for dysuria, urgency and frequency.     Exam:  Blood pressure 124/82, pulse 84, temperature 36.2 °C (97.1 °F), resp. rate 16, weight 79.379 kg  (175 lb), SpO2 95 %.  General:  Well nourished, well developed female in NAD  Head:Normocephalic, atraumatic  Eyes: PERRLA, EOM within normal limits, no conjunctival injection, no scleral icterus, visual fields and acuity grossly intact.  Ears: Normal shape and symmetry, no tenderness, no discharge. External canals are without any significant edema or erythema. Tympanic membranes are without any inflammation, no effusion. Gross auditory acuity is intact  Nose: Symmetrical without tenderness, no discharge. Nasal mucosa is pale and edematous bilaterally  Mouth: reasonable hygiene, no erythema exudates or tonsillar enlargement.  Neck: no masses, range of motion within normal limits, no tracheal deviation. No obvious thyroid enlargement.  Pulmonary: chest is symmetrical with respiration, no wheezes, crackles, or rhonchi.  Cardiovascular: regular rate and rhythm without murmurs, rubs, or gallops.  Extremities: no clubbing, cyanosis, or edema.    Please note that this dictation was created using voice recognition software. I have made every reasonable attempt to correct obvious errors, but I expect that there are errors of grammar and possibly content that I did not discover before finalizing the note.    Assessment/Plan:  1. Allergic rhinitis, unspecified allergic rhinitis trigger, unspecified rhinitis seasonality  MethylPREDNISolone (MEDROL DOSEPAK) 4 MG Tablet Therapy Pack    cetirizine-psuedoephedrine (ZYRTEC-D ALLERGY & CONGESTION) 5-120 MG per tablet     patient is quite adamant that she has a sinus infection, I reassured the patient that there is no evidence of bacterial sinus infection, follow-up with primary care.    Followup with primary care in the next 7-10 days if not significantly improving, return to the urgent care or go to the emergency room sooner for any worsening of symptoms.

## 2017-05-05 NOTE — MR AVS SNAPSHOT
"        Peter Trimble   2017 3:20 PM   Office Visit   MRN: [id number]    Department:  [facility]   Dept Phone:  [phone]    Description:  Female : 1975   Provider:  Tyrell Pacheco PA-C           Reason for Visit     Pharyngitis           Allergies as of 2017     Allergen Noted Reactions    Compazine 2011       \"psychotic reaction\"    Imitrex [Sumatriptan Succinate] 2012       Had brain bleed    Inapsine [Droperidol] 2011       \"psychotic reaction\"    Maxalt [Rizatriptan Benzoate] 2012       Had brain bleed    Phenergan [Promethazine Hcl] 2011       \"psychotic reaction\"    Xanax [Alprazolam] 2014       Sores and dry mouth, many others pt cannot remember    Zantac 10/16/2013       Stomach pain    Apple Cider Vinegar 2016   Rash    Patient states she gets rash    Clarithromycin 2013   Vomiting, Palpitations    Dilaudid [Hydromorphone] 2016   Rash    Patient states she had rash, hallucinations, unable to urinate.     Doxycycline 2015       Effexor [Venlafaxine] 2014       \"Really depressed, like i wanted to hurt myself\"    Eucalyptus Oil 2015       Kiwi Extract 10/07/2011       Latex 2013   Rash    Lyrica [Pregabalin] 2011       \"depression, slept a lot\"    Minocycline 10/07/2011   Vomiting    \"any cyclines\"    Patchouli Oil 2015   Rash    Rash     Tea Tree Oil 2015   Rash    Break out in rash    Topiramate 2015   Nausea    Patient states made sick to stomach and dizzy.      You were diagnosed with     Allergic rhinitis, unspecified allergic rhinitis trigger, unspecified rhinitis seasonality   [[id number]]         Vital Signs     Blood Pressure Pulse Temperature Respirations Weight Oxygen Saturation    124/82 mmHg 84 36.2 °C (97.1 °F) 16 79.379 kg (175 lb) 95%    Smoking Status                   Never Smoker            Basic Information     Date Of Birth Sex Race Ethnicity Preferred " Language    1975 Female White Non- English      Your appointments     May 22, 2017  2:30 PM   Follow Up Visit with Daniel Arthur M.D.   [facility] (--)    [address]  [facility] [address] [phone]           You will be receiving a confirmation call a few days before your appointment from our automated call confirmation system.              Problem List              ICD-10-CM Priority Class Noted - Resolved    Depression F32.9 Medium  9/28/2011 - Present    Bipolar 2 disorder (CMS-HCC) F31.81 Medium  9/28/2011 - Present    Chronic migraine G43.709 Medium  9/28/2011 - Present    GERD (gastroesophageal reflux disease) K21.9 Low  9/28/2011 - Present    Asthma J45.909 Medium  9/28/2011 - Present    Cerebral vasculitis I67.7 High  10/4/2011 - Present    HTN (hypertension) I10 Medium  10/4/2011 - Present    Seizure disorder (CMS-HCC) G40.909 Medium  10/5/2011 - Present    Hypertrophy of breast N62   4/29/2013 - Present    AVA (obstructive sleep apnea), intolerant of CPAP G47.33 Medium  7/8/2013 - Present    Obese E66.9   7/8/2013 - Present    Oxygen dependent, 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   7/8/2013 - Present    Headache R51   2/19/2014 - Present    Neck pain M54.2   2/19/2014 - Present    Fibromyalgia M79.7   2/19/2014 - Present    Lump or mass in breast N63   5/29/2014 - Present    Hyperlipidemia E78.5 Low  8/18/2014 - Present    Chronic pain G89.29 Medium  8/18/2014 - Present    Benign hypertensive heart disease without heart failure I11.9   10/16/2014 - Present    Mixed hyperlipidemia E78.2   10/16/2014 - Present    Open scalp wound S01.00XA   10/16/2014 - Present    Morbid obesity (CMS-HCC) E66.01 Medium  6/25/2015 - Present    Closed fracture of one rib S22.39XA Medium  6/25/2015 - Present    Lumbar transverse process fracture (CMS-HCC) S32.008A Medium  6/25/2015 - Present    Rib fracture S22.39XA   6/25/2015 - Present    Controlled substance agreement signed Z79.899    2/19/2016 - Present    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   8/6/2016 - Present    Fracture of ankle, trimalleolar, closed S82.853A   8/6/2016 - Present    Depression F32.9   8/7/2016 - Present    Chronic pain G89.29   8/7/2016 - Present    HTN (hypertension), benign I10   8/7/2016 - Present    PTSD (post-traumatic stress disorder) F43.10   10/2/2016 - Present    Chest pain R07.9   10/3/2016 - Present    Ankle fracture, right S82.891A   10/3/2016 - Present    Vaginal yeast infection B37.3   10/3/2016 - Present    Personality disorder F60.9   10/4/2016 - Present    Dystonia G24.9   10/4/2016 - Present      Health Maintenance        Date Due Completion Dates    IMM DTaP/Tdap/Td Vaccine (1 - Tdap) 4/6/1995 ---    PAP SMEAR 4/6/1997 ---    MAMMOGRAM 4/6/2016 ---            Current Immunizations     Influenza TIV (IM) 10/1/2013, 11/15/2012, 9/30/2011 12:45 PM    Influenza Vaccine Quad Inj (Pf) 10/2/2016  9:25 PM    Pneumococcal polysaccharide vaccine (PPSV-23) 9/30/2011 12:45 PM      Below and/or attached are the medications your provider expects you to take. Review all of your home medications and newly ordered medications with your provider and/or pharmacist. Follow medication instructions as directed by your provider and/or pharmacist. Please keep your medication list with you and share with your provider. Update the information when medications are discontinued, doses are changed, or new medications (including over-the-counter products) are added; and carry medication information at all times in the event of emergency situations     Allergies:  COMPAZINE - (reactions not documented)     IMITREX - (reactions not documented)     INAPSINE - (reactions not documented)     MAXALT - (reactions not documented)     PHENERGAN - (reactions not documented)     XANAX - (reactions not documented)     ZANTAC - (reactions not documented)     APPLE CIDER VINEGAR - Rash     CLARITHROMYCIN - Vomiting,Palpitations     DILAUDID - Rash      DOXYCYCLINE - (reactions not documented)     EFFEXOR - (reactions not documented)     EUCALYPTUS OIL - (reactions not documented)     KIWI EXTRACT - (reactions not documented)     LATEX - Rash     LYRICA - (reactions not documented)     MINOCYCLINE - Vomiting     PATCHOULI OIL - Rash     TEA TREE OIL - Rash     TOPIRAMATE - Nausea               Medications  Valid as of: May 05, 2017 -  3:57 PM    Generic Name Brand Name Tablet Size Instructions for use    Albuterol Sulfate (Nebu Soln) PROVENTIL 2.5mg/0.5ml 2.5 mg by Nebulization route every 6 hours as needed for Shortness of Breath.        Baclofen (Tab) LIORESAL 10 MG Take 1 Tab by mouth 3 times a day as needed. as needed for muscle spasms        Butalbital-APAP-Caffeine (Tab) FIORICET -40 MG Take 1 Tab by mouth 2 times a day as needed for Headache.        Cetirizine HCl (Tab) ZYRTEC 10 MG Take 10 mg by mouth every day.        Cetirizine-Pseudoephedrine (TABLET SR 12 HR) ZYRTEC-D 5-120 MG Take 1 Tab by mouth 2 times a day.        Divalproex Sodium (Tablet Delayed Response) DEPAKOTE 500 MG Take 2 Tabs by mouth every 12 hours.        Esomeprazole Magnesium (CAPSULE DELAYED RELEASE) NEXIUM 40 MG Take 40 mg by mouth every morning before breakfast.        Fenofibrate (Tab) TRICOR 145 MG Take 145 mg by mouth every morning.        Ferrous Sulfate (Tab) ferrous sulfate 325 (65 FE) MG Take 325 mg by mouth every day.        Fluticasone-Salmeterol (AEROSOL POWDER, BREATH ACTIVATED) ADVAIR 250-50 MCG/DOSE Inhale 1 Puff by mouth every evening.        HydrOXYzine HCl (Tab) ATARAX 50 MG Take 1 Tab by mouth 2 times a day as needed. for anxiety.        LevoFLOXacin (Tab) LEVAQUIN 250 MG Take 1 Tab by mouth every day.        MethylPREDNISolone (Tablet Therapy Pack) MEDROL DOSEPAK 4 MG Take 1 Tab by mouth See Admin Instructions.        Montelukast Sodium (Tab) SINGULAIR 10 MG Take 10 mg by mouth every bedtime.        Morphine Sulfate (Tab) MS IR 15 MG Take 1/2  to 1 tablet by  mouth every 8 hours as needed for pain.        PARoxetine HCl (Tab) PAXIL 30 MG Take 60 mg by mouth every day.        Propranolol HCl (Tab) INDERAL 20 MG Take 20 mg by mouth 2 times a day.        .                 Medicines prescribed today were sent to:     [facility], [place] - [address]    [address] [facility] [address]    Phone: [phone] Fax: [phone]    Open 24 Hours?: No    [id number] - [place], [place] - [address]    [address] [place] [address]    Phone: [phone] Fax: [phone]    Open 24 Hours?: No      Medication refill instructions:       If your prescription bottle indicates you have medication refills left, it is not necessary to call your provider’s office. Please contact your pharmacy and they will refill your medication.    If your prescription bottle indicates you do not have any refills left, you may request refills at any time through one of the following ways: The online [employer] innovate | mentor | invest system (except Urgent Care), by calling your provider’s office, or by asking your pharmacy to contact your provider’s office with a refill request. Medication refills are processed only during regular business hours and may not be available until the next business day. Your provider may request additional information or to have a follow-up visit with you prior to refilling your medication.   *Please Note: Medication refills are assigned a new Rx number when refilled electronically. Your pharmacy may indicate that no refills were authorized even though a new prescription for the same medication is available at the pharmacy. Please request the medicine by name with the pharmacy before contacting your provider for a refill.           MyChart Status: Patient Declined

## 2017-05-06 ENCOUNTER — TELEPHONE (OUTPATIENT)
Dept: URGENT CARE | Facility: PHYSICIAN GROUP | Age: 42
End: 2017-05-06

## 2017-05-06 NOTE — TELEPHONE ENCOUNTER
Called Pt and stated that she is already taking just Zyrtec and would like a different Rx called in to the [facility] pharmacy as insurance wont cover OCT. I also advised her that if she shows that she has an Rx for the medication when doing her drug screen she shouldn't have any issues.

## 2017-05-06 NOTE — TELEPHONE ENCOUNTER
1. Caller Name: Pharmacy                      Call Back Number:     2. Message: Pt's pharmacy called in yesterday stating the pt has a concern about taking Zyrtec D as she feels it may interfere with her Urine Drug screen. Please advise.    3. Patient approves office to leave a detailed voicemail/MyChart message: N\A

## 2017-05-23 ENCOUNTER — OFFICE VISIT (OUTPATIENT)
Dept: PHYSICAL MEDICINE AND REHAB | Facility: MEDICAL CENTER | Age: 42
End: 2017-05-23
Payer: MEDICAID

## 2017-05-23 VITALS
DIASTOLIC BLOOD PRESSURE: 61 MMHG | SYSTOLIC BLOOD PRESSURE: 110 MMHG | BODY MASS INDEX: 27.72 KG/M2 | TEMPERATURE: 97 F | WEIGHT: 198 LBS | HEIGHT: 71 IN | OXYGEN SATURATION: 100 % | HEART RATE: 96 BPM

## 2017-05-23 DIAGNOSIS — M79.18 MYOFASCIAL PAIN: ICD-10-CM

## 2017-05-23 DIAGNOSIS — M62.838 MUSCLE SPASM: ICD-10-CM

## 2017-05-23 DIAGNOSIS — M54.50 LUMBOSACRAL PAIN: ICD-10-CM

## 2017-05-23 DIAGNOSIS — M54.9 SPINAL PAIN: ICD-10-CM

## 2017-05-23 DIAGNOSIS — Z79.899 CONTROLLED SUBSTANCE AGREEMENT SIGNED: ICD-10-CM

## 2017-05-23 DIAGNOSIS — M79.18 MUSCULOSKELETAL PAIN: ICD-10-CM

## 2017-05-23 DIAGNOSIS — M25.50 ARTHRALGIA, UNSPECIFIED JOINT: ICD-10-CM

## 2017-05-23 DIAGNOSIS — M54.2 NECK PAIN: ICD-10-CM

## 2017-05-23 DIAGNOSIS — R51.9 HEADACHE, UNSPECIFIED HEADACHE TYPE: ICD-10-CM

## 2017-05-23 DIAGNOSIS — M47.812 SPONDYLOSIS OF CERVICAL REGION WITHOUT MYELOPATHY OR RADICULOPATHY: ICD-10-CM

## 2017-05-23 DIAGNOSIS — F41.9 ANXIETY: ICD-10-CM

## 2017-05-23 DIAGNOSIS — M50.30 DDD (DEGENERATIVE DISC DISEASE), CERVICAL: ICD-10-CM

## 2017-05-23 PROCEDURE — 99214 OFFICE O/P EST MOD 30 MIN: CPT | Performed by: PHYSICAL MEDICINE & REHABILITATION

## 2017-05-23 RX ORDER — MORPHINE SULFATE 15 MG/1
TABLET ORAL
Qty: 80 TAB | Refills: 0 | Status: SHIPPED | OUTPATIENT
Start: 2017-05-23 | End: 2017-05-23 | Stop reason: SDUPTHER

## 2017-05-23 RX ORDER — HYDROXYZINE 50 MG/1
50 TABLET, FILM COATED ORAL 2 TIMES DAILY PRN
Qty: 60 TAB | Refills: 2 | Status: SHIPPED | OUTPATIENT
Start: 2017-05-23 | End: 2019-09-10

## 2017-05-23 RX ORDER — MORPHINE SULFATE 15 MG/1
TABLET ORAL
Qty: 80 TAB | Refills: 0 | Status: SHIPPED | OUTPATIENT
Start: 2017-05-23 | End: 2018-05-12

## 2017-05-23 RX ORDER — BACLOFEN 10 MG/1
10 TABLET ORAL 3 TIMES DAILY PRN
Qty: 90 TAB | Refills: 2 | Status: SHIPPED | OUTPATIENT
Start: 2017-05-23 | End: 2019-10-24

## 2017-05-23 RX ORDER — BUTALBITAL, ACETAMINOPHEN AND CAFFEINE 50; 325; 40 MG/1; MG/1; MG/1
1 TABLET ORAL 2 TIMES DAILY PRN
Qty: 30 TAB | Refills: 0 | Status: SHIPPED | OUTPATIENT
Start: 2017-05-23 | End: 2017-06-19 | Stop reason: SDUPTHER

## 2017-05-23 NOTE — MR AVS SNAPSHOT
"        Peter Trimble   2017 10:00 AM   Office Visit   MRN: [id number]    Department:  [facility]   Dept Phone:  [phone]    Description:  Female : 1975   Provider:  Daniel Arthur M.D.           Reason for Visit     Follow-Up           Allergies as of 2017     Allergen Noted Reactions    Compazine 2011       \"psychotic reaction\"    Imitrex [Sumatriptan Succinate] 2012       Had brain bleed    Inapsine [Droperidol] 2011       \"psychotic reaction\"    Maxalt [Rizatriptan Benzoate] 2012       Had brain bleed    Phenergan [Promethazine Hcl] 2011       \"psychotic reaction\"    Xanax [Alprazolam] 2014       Sores and dry mouth, many others pt cannot remember    Zantac 10/16/2013       Stomach pain    Apple Cider Vinegar 2016   Rash    Patient states she gets rash    Clarithromycin 2013   Vomiting, Palpitations    Dilaudid [Hydromorphone] 2016   Rash    Patient states she had rash, hallucinations, unable to urinate.     Doxycycline 2015       Effexor [Venlafaxine] 2014       \"Really depressed, like i wanted to hurt myself\"    Eucalyptus Oil 2015       Kiwi Extract 10/07/2011       Latex 2013   Rash    Lyrica [Pregabalin] 2011       \"depression, slept a lot\"    Minocycline 10/07/2011   Vomiting    \"any cyclines\"    Patchouli Oil 2015   Rash    Rash     Tea Tree Oil 2015   Rash    Break out in rash    Topiramate 2015   Nausea    Patient states made sick to stomach and dizzy.      You were diagnosed with     Muscle spasm   [133075]       Anxiety   [502124]       Controlled substance agreement signed   [[id number]]       Spinal pain   [546010]       Arthralgia, unspecified joint   [3884180]       Headache, unspecified headache type   [1577331]       Musculoskeletal pain   [718275]       Lumbosacral pain   [878973]         Vital Signs     Blood Pressure Pulse Temperature Height Weight Body Mass " "Index    110/61 mmHg 96 36.1 °C (97 °F) 1.803 m (5' 11\") 89.812 kg (198 lb) 27.63 kg/m2    Oxygen Saturation Smoking Status                100% Never Smoker           Basic Information     Date Of Birth Sex Race Ethnicity Preferred Language    1975 Female White Non- English      Problem List              ICD-10-CM Priority Class Noted - Resolved    Depression F32.9 Medium  9/28/2011 - Present    Bipolar 2 disorder (CMS-HCC) F31.81 Medium  9/28/2011 - Present    Chronic migraine G43.709 Medium  9/28/2011 - Present    GERD (gastroesophageal reflux disease) K21.9 Low  9/28/2011 - Present    Asthma J45.909 Medium  9/28/2011 - Present    Cerebral vasculitis I67.7 High  10/4/2011 - Present    HTN (hypertension) I10 Medium  10/4/2011 - Present    Seizure disorder (CMS-HCC) G40.909 Medium  10/5/2011 - Present    Hypertrophy of breast N62   4/29/2013 - Present    AVA (obstructive sleep apnea), intolerant of CPAP G47.33 Medium  7/8/2013 - Present    Obese E66.9   7/8/2013 - Present    Oxygen dependent, 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   7/8/2013 - Present    Headache R51   2/19/2014 - Present    Neck pain M54.2   2/19/2014 - Present    Fibromyalgia M79.7   2/19/2014 - Present    Lump or mass in breast N63   5/29/2014 - Present    Hyperlipidemia E78.5 Low  8/18/2014 - Present    Chronic pain G89.29 Medium  8/18/2014 - Present    Benign hypertensive heart disease without heart failure I11.9   10/16/2014 - Present    Mixed hyperlipidemia E78.2   10/16/2014 - Present    Open scalp wound S01.00XA   10/16/2014 - Present    Morbid obesity (CMS-HCC) E66.01 Medium  6/25/2015 - Present    Closed fracture of one rib S22.39XA Medium  6/25/2015 - Present    Lumbar transverse process fracture (CMS-HCC) S32.008A Medium  6/25/2015 - Present    Rib fracture S22.39XA   6/25/2015 - Present    Controlled substance agreement signed Z79.899   2/19/2016 - Present    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   8/6/2016 - Present    Fracture of ankle, trimalleolar, " closed S82.853A   8/6/2016 - Present    Depression F32.9   8/7/2016 - Present    Chronic pain G89.29   8/7/2016 - Present    HTN (hypertension), benign I10   8/7/2016 - Present    PTSD (post-traumatic stress disorder) F43.10   10/2/2016 - Present    Chest pain R07.9   10/3/2016 - Present    Ankle fracture, right S82.891A   10/3/2016 - Present    Vaginal yeast infection B37.3   10/3/2016 - Present    Personality disorder F60.9   10/4/2016 - Present    Dystonia G24.9   10/4/2016 - Present      Health Maintenance        Date Due Completion Dates    IMM DTaP/Tdap/Td Vaccine (1 - Tdap) 4/6/1995 ---    PAP SMEAR 4/6/1997 ---    MAMMOGRAM 4/6/2016 ---            Current Immunizations     Influenza TIV (IM) 10/1/2013, 11/15/2012, 9/30/2011 12:45 PM    Influenza Vaccine Quad Inj (Pf) 10/2/2016  9:25 PM    Pneumococcal polysaccharide vaccine (PPSV-23) 9/30/2011 12:45 PM      Below and/or attached are the medications your provider expects you to take. Review all of your home medications and newly ordered medications with your provider and/or pharmacist. Follow medication instructions as directed by your provider and/or pharmacist. Please keep your medication list with you and share with your provider. Update the information when medications are discontinued, doses are changed, or new medications (including over-the-counter products) are added; and carry medication information at all times in the event of emergency situations     Allergies:  COMPAZINE - (reactions not documented)     IMITREX - (reactions not documented)     INAPSINE - (reactions not documented)     MAXALT - (reactions not documented)     PHENERGAN - (reactions not documented)     XANAX - (reactions not documented)     ZANTAC - (reactions not documented)     APPLE CIDER VINEGAR - Rash     CLARITHROMYCIN - Vomiting,Palpitations     DILAUDID - Rash     DOXYCYCLINE - (reactions not documented)     EFFEXOR - (reactions not documented)     EUCALYPTUS OIL - (reactions  not documented)     KIWI EXTRACT - (reactions not documented)     LATEX - Rash     LYRICA - (reactions not documented)     MINOCYCLINE - Vomiting     PATCHOULI OIL - Rash     TEA TREE OIL - Rash     TOPIRAMATE - Nausea               Medications  Valid as of: May 23, 2017 - 10:30 AM    Generic Name Brand Name Tablet Size Instructions for use    Albuterol Sulfate (Nebu Soln) PROVENTIL 2.5mg/0.5ml 2.5 mg by Nebulization route every 6 hours as needed for Shortness of Breath.        Baclofen (Tab) LIORESAL 10 MG Take 1 Tab by mouth 3 times a day as needed. as needed for muscle spasms        Butalbital-APAP-Caffeine (Tab) FIORICET -40 MG Take 1 Tab by mouth 2 times a day as needed for Headache.        Cetirizine HCl (Tab) ZYRTEC 10 MG Take 10 mg by mouth every day.        Divalproex Sodium (Tablet Delayed Response) DEPAKOTE 500 MG Take 2 Tabs by mouth every 12 hours.        Esomeprazole Magnesium (CAPSULE DELAYED RELEASE) NEXIUM 40 MG Take 40 mg by mouth every morning before breakfast.        Fenofibrate (Tab) TRICOR 145 MG Take 145 mg by mouth every morning.        Ferrous Sulfate (Tab) ferrous sulfate 325 (65 FE) MG Take 325 mg by mouth every day.        Fluticasone-Salmeterol (AEROSOL POWDER, BREATH ACTIVATED) ADVAIR 250-50 MCG/DOSE Inhale 1 Puff by mouth every evening.        HydrOXYzine HCl (Tab) ATARAX 50 MG Take 1 Tab by mouth 2 times a day as needed. for anxiety.        Montelukast Sodium (Tab) SINGULAIR 10 MG Take 10 mg by mouth every bedtime.        Morphine Sulfate (Tab) MS IR 15 MG Take 1/2  to 1 tablet by mouth every 8 hours as needed for pain. One month supply.        PARoxetine HCl (Tab) PAXIL 30 MG Take 60 mg by mouth every day.        Propranolol HCl (Tab) INDERAL 20 MG Take 20 mg by mouth 2 times a day.        .                 Medicines prescribed today were sent to:     [facility], [place] - [address]    [address] [facility] [place] [address]    Phone:  [phone] Fax: [phone]    Open 24 Hours?: No      Medication refill instructions:       If your prescription bottle indicates you have medication refills left, it is not necessary to call your provider’s office. Please contact your pharmacy and they will refill your medication.    If your prescription bottle indicates you do not have any refills left, you may request refills at any time through one of the following ways: The online [employer] system (except Urgent Care), by calling your provider’s office, or by asking your pharmacy to contact your provider’s office with a refill request. Medication refills are processed only during regular business hours and may not be available until the next business day. Your provider may request additional information or to have a follow-up visit with you prior to refilling your medication.   *Please Note: Medication refills are assigned a new Rx number when refilled electronically. Your pharmacy may indicate that no refills were authorized even though a new prescription for the same medication is available at the pharmacy. Please request the medicine by name with the pharmacy before contacting your provider for a refill.        Your To Do List     Future Labs/Procedures Complete By Expires    ANTI-NUCLEAR ANTIBODY SERUM  As directed 5/23/2018    CBC WITH DIFFERENTIAL  As directed 5/23/2018    COMP METABOLIC PANEL  As directed 5/23/2018    CREATINE KINASE  As directed 5/23/2018    CRP HIGH SENSITIVE (CARDIAC)  As directed 5/23/2018    FREE THYROXINE  As directed 5/23/2018    TSH  As directed 5/23/2018    WESTERGREN SED RATE  As directed 5/23/2018         [facility] Status: Patient Declined
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
## 2017-06-08 ENCOUNTER — TELEPHONE (OUTPATIENT)
Dept: PHYSICAL MEDICINE AND REHAB | Facility: MEDICAL CENTER | Age: 42
End: 2017-06-08

## 2017-06-08 NOTE — TELEPHONE ENCOUNTER
Peter said she is in a lot of pain and wants to know if she can take fentanyl patch again or hydromorphone that she still has on hand. I let her know she would need to be seen for evaluation for medication. She wanted me to send note anyway.

## 2017-06-08 NOTE — TELEPHONE ENCOUNTER
The patient would need an office evaluation to review adjustments with pain medication      I left message of above.

## 2017-06-19 DIAGNOSIS — Z79.899 CONTROLLED SUBSTANCE AGREEMENT SIGNED: ICD-10-CM

## 2017-06-19 DIAGNOSIS — R51.9 HEADACHE, UNSPECIFIED HEADACHE TYPE: ICD-10-CM

## 2017-06-19 RX ORDER — BUTALBITAL, ACETAMINOPHEN AND CAFFEINE 50; 325; 40 MG/1; MG/1; MG/1
1 TABLET ORAL 2 TIMES DAILY PRN
Qty: 30 TAB | Refills: 0 | Status: SHIPPED | OUTPATIENT
Start: 2017-06-19 | End: 2017-07-17 | Stop reason: SDUPTHER

## 2017-06-19 RX ORDER — BUTALBITAL, ACETAMINOPHEN AND CAFFEINE 50; 325; 40 MG/1; MG/1; MG/1
TABLET ORAL
Refills: 0 | OUTPATIENT
Start: 2017-06-19

## 2017-06-19 NOTE — PROGRESS NOTES
Received refill requests for Fioricet. The patient was last seen in 5/2017, records reviewed. Reviewed  profile 6/19/2017. New prescription for Fioricet written.

## 2017-07-17 DIAGNOSIS — R51.9 HEADACHE, UNSPECIFIED HEADACHE TYPE: ICD-10-CM

## 2017-07-17 DIAGNOSIS — Z79.899 CONTROLLED SUBSTANCE AGREEMENT SIGNED: ICD-10-CM

## 2017-07-17 RX ORDER — BUTALBITAL, ACETAMINOPHEN AND CAFFEINE 50; 325; 40 MG/1; MG/1; MG/1
1 TABLET ORAL 2 TIMES DAILY PRN
Qty: 30 TAB | Refills: 0 | Status: SHIPPED | OUTPATIENT
Start: 2017-07-17 | End: 2019-09-10

## 2017-07-17 NOTE — PROGRESS NOTES
Received refill requests for Fioricet. The patient was last seen in 5/2017, records reviewed. Reviewed  profile 7/17/2017. New prescription for Fioricet written.

## 2017-07-28 ENCOUNTER — HOSPITAL ENCOUNTER (OUTPATIENT)
Dept: RADIOLOGY | Facility: MEDICAL CENTER | Age: 42
End: 2017-07-28

## 2017-08-07 ENCOUNTER — OFFICE VISIT (OUTPATIENT)
Dept: URGENT CARE | Facility: PHYSICIAN GROUP | Age: 42
End: 2017-08-07
Payer: MEDICAID

## 2017-08-07 ENCOUNTER — HOSPITAL ENCOUNTER (OUTPATIENT)
Facility: MEDICAL CENTER | Age: 42
End: 2017-08-07
Attending: PHYSICIAN ASSISTANT
Payer: MEDICAID

## 2017-08-07 VITALS
TEMPERATURE: 99.3 F | SYSTOLIC BLOOD PRESSURE: 120 MMHG | RESPIRATION RATE: 18 BRPM | HEART RATE: 88 BPM | OXYGEN SATURATION: 97 % | WEIGHT: 197 LBS | HEIGHT: 71 IN | BODY MASS INDEX: 27.58 KG/M2 | DIASTOLIC BLOOD PRESSURE: 78 MMHG

## 2017-08-07 DIAGNOSIS — Z86.19 HISTORY OF CANDIDIASIS: ICD-10-CM

## 2017-08-07 DIAGNOSIS — N30.00 ACUTE CYSTITIS WITHOUT HEMATURIA: ICD-10-CM

## 2017-08-07 LAB
APPEARANCE UR: NORMAL
BILIRUB UR STRIP-MCNC: NORMAL MG/DL
COLOR UR AUTO: NORMAL
GLUCOSE UR STRIP.AUTO-MCNC: NORMAL MG/DL
KETONES UR STRIP.AUTO-MCNC: NORMAL MG/DL
LEUKOCYTE ESTERASE UR QL STRIP.AUTO: NORMAL
NITRITE UR QL STRIP.AUTO: NORMAL
PH UR STRIP.AUTO: 5 [PH] (ref 5–8)
PROT UR QL STRIP: NORMAL MG/DL
RBC UR QL AUTO: NORMAL
SP GR UR STRIP.AUTO: 1.02
UROBILINOGEN UR STRIP-MCNC: NORMAL MG/DL

## 2017-08-07 PROCEDURE — 99214 OFFICE O/P EST MOD 30 MIN: CPT | Mod: 25 | Performed by: PHYSICIAN ASSISTANT

## 2017-08-07 PROCEDURE — 87086 URINE CULTURE/COLONY COUNT: CPT

## 2017-08-07 PROCEDURE — 81002 URINALYSIS NONAUTO W/O SCOPE: CPT | Performed by: PHYSICIAN ASSISTANT

## 2017-08-07 RX ORDER — NITROFURANTOIN 25; 75 MG/1; MG/1
100 CAPSULE ORAL EVERY 12 HOURS
Qty: 10 CAP | Refills: 0 | Status: SHIPPED | OUTPATIENT
Start: 2017-08-07 | End: 2017-08-12

## 2017-08-07 RX ORDER — FLUCONAZOLE 150 MG/1
TABLET ORAL
Qty: 2 TAB | Refills: 1 | Status: SHIPPED | OUTPATIENT
Start: 2017-08-07 | End: 2017-11-24

## 2017-08-07 ASSESSMENT — ENCOUNTER SYMPTOMS
VOMITING: 0
NAUSEA: 0
ABDOMINAL PAIN: 0
FLANK PAIN: 0
SWEATS: 0
CHILLS: 1
FEVER: 0

## 2017-08-07 NOTE — PATIENT INSTRUCTIONS
Urinary Tract Infection  Urinary tract infections (UTIs) can develop anywhere along your urinary tract. Your urinary tract is your body's drainage system for removing wastes and extra water. Your urinary tract includes two kidneys, two ureters, a bladder, and a urethra. Your kidneys are a pair of bean-shaped organs. Each kidney is about the size of your fist. They are located below your ribs, one on each side of your spine.  CAUSES  Infections are caused by microbes, which are microscopic organisms, including fungi, viruses, and bacteria. These organisms are so small that they can only be seen through a microscope. Bacteria are the microbes that most commonly cause UTIs.  SYMPTOMS   Symptoms of UTIs may vary by age and gender of the patient and by the location of the infection. Symptoms in young women typically include a frequent and intense urge to urinate and a painful, burning feeling in the bladder or urethra during urination. Older women and men are more likely to be tired, shaky, and weak and have muscle aches and abdominal pain. A fever may mean the infection is in your kidneys. Other symptoms of a kidney infection include pain in your back or sides below the ribs, nausea, and vomiting.  DIAGNOSIS  To diagnose a UTI, your caregiver will ask you about your symptoms. Your caregiver also will ask to provide a urine sample. The urine sample will be tested for bacteria and white blood cells. White blood cells are made by your body to help fight infection.  TREATMENT   Typically, UTIs can be treated with medication. Because most UTIs are caused by a bacterial infection, they usually can be treated with the use of antibiotics. The choice of antibiotic and length of treatment depend on your symptoms and the type of bacteria causing your infection.  HOME CARE INSTRUCTIONS  · If you were prescribed antibiotics, take them exactly as your caregiver instructs you. Finish the medication even if you feel better after you  have only taken some of the medication.  · Drink enough water and fluids to keep your urine clear or pale yellow.  · Avoid caffeine, tea, and carbonated beverages. They tend to irritate your bladder.  · Empty your bladder often. Avoid holding urine for long periods of time.  · Empty your bladder before and after sexual intercourse.  · After a bowel movement, women should cleanse from front to back. Use each tissue only once.  SEEK MEDICAL CARE IF:   · You have back pain.  · You develop a fever.  · Your symptoms do not begin to resolve within 3 days.  SEEK IMMEDIATE MEDICAL CARE IF:   · You have severe back pain or lower abdominal pain.  · You develop chills.  · You have nausea or vomiting.  · You have continued burning or discomfort with urination.  MAKE SURE YOU:   · Understand these instructions.  · Will watch your condition.  · Will get help right away if you are not doing well or get worse.     This information is not intended to replace advice given to you by your health care provider. Make sure you discuss any questions you have with your health care provider.     Document Released: 09/27/2006 Document Revised: 01/08/2016 Document Reviewed: 01/25/2013  [employer] Interactive Patient Education ©2016 [employer]

## 2017-08-07 NOTE — MR AVS SNAPSHOT
"        Peter Trimble   2017 2:35 PM   Office Visit   MRN: [id number]    Department:  [facility]   Dept Phone:  [phone]    Description:  Female : 1975   Provider:  VANDA Thornton           Reason for Visit     Dysuria back pain started this morning      Allergies as of 2017     Allergen Noted Reactions    Compazine 2011       \"psychotic reaction\"    Imitrex [Sumatriptan Succinate] 2012       Had brain bleed    Inapsine [Droperidol] 2011       \"psychotic reaction\"    Maxalt [Rizatriptan Benzoate] 2012       Had brain bleed    Phenergan [Promethazine Hcl] 2011       \"psychotic reaction\"    Xanax [Alprazolam] 2014       Sores and dry mouth, many others pt cannot remember    Zantac 10/16/2013       Stomach pain    Apple Cider Vinegar 2016   Rash    Patient states she gets rash    Clarithromycin 2013   Vomiting, Palpitations    Dilaudid [Hydromorphone] 2016   Rash    Patient states she had rash, hallucinations, unable to urinate.     Doxycycline 2015       Effexor [Venlafaxine] 2014       \"Really depressed, like i wanted to hurt myself\"    Eucalyptus Oil 2015       Kiwi Extract 10/07/2011       Latex 2013   Rash    Lyrica [Pregabalin] 2011       \"depression, slept a lot\"    Minocycline 10/07/2011   Vomiting    \"any cyclines\"    Patchouli Oil 2015   Rash    Rash     Tea Tree Oil 2015   Rash    Break out in rash    Topiramate 2015   Nausea    Patient states made sick to stomach and dizzy.      You were diagnosed with     Acute cystitis without hematuria   [197103]   Urinalysis difficult to assess due to Azo use. Symptoms consistent with UTI. Start Macrobid. Culture urine. Follow-up with PCP    History of candidiasis   [804941]   History of these infections after antibiotics. Contingent prescription for Diflucan given.      Vital Signs     Blood Pressure Pulse Temperature Respirations " "Height Weight    120/78 mmHg 88 37.4 °C (99.3 °F) 18 1.803 m (5' 10.98\") 89.359 kg (197 lb)    Body Mass Index Oxygen Saturation Breastfeeding? Smoking Status          27.49 kg/m2 97% No Never Smoker         Basic Information     Date Of Birth Sex Race Ethnicity Preferred Language    1975 Female White Non- English      Problem List              ICD-10-CM Priority Class Noted - Resolved    Depression F32.9 Medium  9/28/2011 - Present    Bipolar 2 disorder (CMS-HCC) F31.81 Medium  9/28/2011 - Present    Chronic migraine G43.709 Medium  9/28/2011 - Present    GERD (gastroesophageal reflux disease) K21.9 Low  9/28/2011 - Present    Asthma J45.909 Medium  9/28/2011 - Present    Cerebral vasculitis I67.7 High  10/4/2011 - Present    HTN (hypertension) I10 Medium  10/4/2011 - Present    Seizure disorder (CMS-HCC) G40.909 Medium  10/5/2011 - Present    Hypertrophy of breast N62   4/29/2013 - Present    AVA (obstructive sleep apnea), intolerant of CPAP G47.33 Medium  7/8/2013 - Present    Obese E66.9   7/8/2013 - Present    Oxygen dependent, 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   7/8/2013 - Present    Headache R51   2/19/2014 - Present    Neck pain M54.2   2/19/2014 - Present    Fibromyalgia M79.7   2/19/2014 - Present    Lump or mass in breast N63   5/29/2014 - Present    Hyperlipidemia E78.5 Low  8/18/2014 - Present    Chronic pain G89.29 Medium  8/18/2014 - Present    Benign hypertensive heart disease without heart failure I11.9   10/16/2014 - Present    Mixed hyperlipidemia E78.2   10/16/2014 - Present    Open scalp wound S01.00XA   10/16/2014 - Present    Morbid obesity (CMS-HCC) E66.01 Medium  6/25/2015 - Present    Closed fracture of one rib S22.39XA Medium  6/25/2015 - Present    Lumbar transverse process fracture (CMS-HCC) S32.008A Medium  6/25/2015 - Present    Rib fracture S22.39XA   6/25/2015 - Present    Controlled substance agreement signed Z79.899   2/19/2016 - Present    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   8/6/2016 - " Present    Fracture of ankle, trimalleolar, closed S82.853A   8/6/2016 - Present    Depression F32.9   8/7/2016 - Present    Chronic pain G89.29   8/7/2016 - Present    HTN (hypertension), benign I10   8/7/2016 - Present    PTSD (post-traumatic stress disorder) F43.10   10/2/2016 - Present    Chest pain R07.9   10/3/2016 - Present    Ankle fracture, right S82.891A   10/3/2016 - Present    Vaginal yeast infection B37.3   10/3/2016 - Present    Personality disorder F60.9   10/4/2016 - Present    Dystonia G24.9   10/4/2016 - Present      Health Maintenance        Date Due Completion Dates    IMM DTaP/Tdap/Td Vaccine (1 - Tdap) 4/6/1995 ---    PAP SMEAR 4/6/1997 ---    MAMMOGRAM 4/6/2016 ---    IMM INFLUENZA (1) 9/1/2017 10/2/2016, 10/1/2013, 11/15/2012, 9/30/2011            Results     POCT Urinalysis      Component Value Standard Range & Units    POC Color red Negative    POC Appearance cloudy Negative    POC Leukocyte Esterase  Negative    POC Nitrites  Negative    POC Urobiligen  Negative (0.2) mg/dL    POC Protein  Negative mg/dL    POC Urine PH 5.0 5.0 - 8.0    POC Blood tr Negative    POC Specific Gravity 1.020 <1.005 - >1.030    POC Ketones  Negative mg/dL    POC Biliruben  Negative mg/dL    POC Glucose neg Negative mg/dL                        Current Immunizations     Influenza TIV (IM) 10/1/2013, 11/15/2012, 9/30/2011 12:45 PM    Influenza Vaccine Quad Inj (Pf) 10/2/2016  9:25 PM    Pneumococcal polysaccharide vaccine (PPSV-23) 9/30/2011 12:45 PM      Below and/or attached are the medications your provider expects you to take. Review all of your home medications and newly ordered medications with your provider and/or pharmacist. Follow medication instructions as directed by your provider and/or pharmacist. Please keep your medication list with you and share with your provider. Update the information when medications are discontinued, doses are changed, or new medications (including over-the-counter products)  are added; and carry medication information at all times in the event of emergency situations     Allergies:  COMPAZINE - (reactions not documented)     IMITREX - (reactions not documented)     INAPSINE - (reactions not documented)     MAXALT - (reactions not documented)     PHENERGAN - (reactions not documented)     XANAX - (reactions not documented)     ZANTAC - (reactions not documented)     APPLE CIDER VINEGAR - Rash     CLARITHROMYCIN - Vomiting,Palpitations     DILAUDID - Rash     DOXYCYCLINE - (reactions not documented)     EFFEXOR - (reactions not documented)     EUCALYPTUS OIL - (reactions not documented)     KIWI EXTRACT - (reactions not documented)     LATEX - Rash     LYRICA - (reactions not documented)     MINOCYCLINE - Vomiting     PATCHOULI OIL - Rash     TEA TREE OIL - Rash     TOPIRAMATE - Nausea               Medications  Valid as of: August 07, 2017 -  3:59 PM    Generic Name Brand Name Tablet Size Instructions for use    Albuterol Sulfate (Nebu Soln) PROVENTIL 2.5mg/0.5ml 2.5 mg by Nebulization route every 6 hours as needed for Shortness of Breath.        Baclofen (Tab) LIORESAL 10 MG Take 1 Tab by mouth 3 times a day as needed. as needed for muscle spasms        Butalbital-APAP-Caffeine (Tab) FIORICET -40 MG Take 1 Tab by mouth 2 times a day as needed. for refractory headache. Maximum use is 4 days per week. One month supply.        Cetirizine HCl (Tab) ZYRTEC 10 MG Take 10 mg by mouth every day.        Divalproex Sodium (Tablet Delayed Response) DEPAKOTE 500 MG Take 2 Tabs by mouth every 12 hours.        Esomeprazole Magnesium (CAPSULE DELAYED RELEASE) NEXIUM 40 MG Take 40 mg by mouth every morning before breakfast.        Fenofibrate (Tab) TRICOR 145 MG Take 145 mg by mouth every morning.        Ferrous Sulfate (Tab) ferrous sulfate 325 (65 FE) MG Take 325 mg by mouth every day.        Fluconazole (Tab) DIFLUCAN 150 MG Take 1 tab now and one in 72 hours if not resolved.         Fluticasone-Salmeterol (AEROSOL POWDER, BREATH ACTIVATED) ADVAIR 250-50 MCG/DOSE Inhale 1 Puff by mouth every evening.        HydrOXYzine HCl (Tab) ATARAX 50 MG Take 1 Tab by mouth 2 times a day as needed. for anxiety.        Montelukast Sodium (Tab) SINGULAIR 10 MG Take 10 mg by mouth every bedtime.        Morphine Sulfate (Tab) MS IR 15 MG Take 1/2  to 1 tablet by mouth every 8 hours as needed for pain. One month supply.        Nitrofurantoin Monohyd Macro (Cap) MACROBID 100 MG Take 1 Cap by mouth every 12 hours for 5 days.        PARoxetine HCl (Tab) PAXIL 30 MG Take 60 mg by mouth every day.        Propranolol HCl (Tab) INDERAL 20 MG Take 20 mg by mouth 2 times a day.        .                 Medicines prescribed today were sent to:     [facility] [place] - [address]    [address] [place] [id number]    Phone: [phone] Fax: [phone]    Open 24 Hours?: No      Medication refill instructions:       If your prescription bottle indicates you have medication refills left, it is not necessary to call your provider’s office. Please contact your pharmacy and they will refill your medication.    If your prescription bottle indicates you do not have any refills left, you may request refills at any time through one of the following ways: The online Fiiiling system (except Urgent Care), by calling your provider’s office, or by asking your pharmacy to contact your provider’s office with a refill request. Medication refills are processed only during regular business hours and may not be available until the next business day. Your provider may request additional information or to have a follow-up visit with you prior to refilling your medication.   *Please Note: Medication refills are assigned a new Rx number when refilled electronically. Your pharmacy may indicate that no refills were authorized even though a new prescription for the same medication is available at the  pharmacy. Please request the medicine by name with the pharmacy before contacting your provider for a refill.        Your To Do List     Future Labs/Procedures Complete By Expires    Urine Culture  As directed 8/7/2018      Instructions    Urinary Tract Infection  Urinary tract infections (UTIs) can develop anywhere along your urinary tract. Your urinary tract is your body's drainage system for removing wastes and extra water. Your urinary tract includes two kidneys, two ureters, a bladder, and a urethra. Your kidneys are a pair of bean-shaped organs. Each kidney is about the size of your fist. They are located below your ribs, one on each side of your spine.  CAUSES  Infections are caused by microbes, which are microscopic organisms, including fungi, viruses, and bacteria. These organisms are so small that they can only be seen through a microscope. Bacteria are the microbes that most commonly cause UTIs.  SYMPTOMS   Symptoms of UTIs may vary by age and gender of the patient and by the location of the infection. Symptoms in young women typically include a frequent and intense urge to urinate and a painful, burning feeling in the bladder or urethra during urination. Older women and men are more likely to be tired, shaky, and weak and have muscle aches and abdominal pain. A fever may mean the infection is in your kidneys. Other symptoms of a kidney infection include pain in your back or sides below the ribs, nausea, and vomiting.  DIAGNOSIS  To diagnose a UTI, your caregiver will ask you about your symptoms. Your caregiver also will ask to provide a urine sample. The urine sample will be tested for bacteria and white blood cells. White blood cells are made by your body to help fight infection.  TREATMENT   Typically, UTIs can be treated with medication. Because most UTIs are caused by a bacterial infection, they usually can be treated with the use of antibiotics. The choice of antibiotic and length of treatment  depend on your symptoms and the type of bacteria causing your infection.  HOME CARE INSTRUCTIONS  · If you were prescribed antibiotics, take them exactly as your caregiver instructs you. Finish the medication even if you feel better after you have only taken some of the medication.  · Drink enough water and fluids to keep your urine clear or pale yellow.  · Avoid caffeine, tea, and carbonated beverages. They tend to irritate your bladder.  · Empty your bladder often. Avoid holding urine for long periods of time.  · Empty your bladder before and after sexual intercourse.  · After a bowel movement, women should cleanse from front to back. Use each tissue only once.  SEEK MEDICAL CARE IF:   · You have back pain.  · You develop a fever.  · Your symptoms do not begin to resolve within 3 days.  SEEK IMMEDIATE MEDICAL CARE IF:   · You have severe back pain or lower abdominal pain.  · You develop chills.  · You have nausea or vomiting.  · You have continued burning or discomfort with urination.  MAKE SURE YOU:   · Understand these instructions.  · Will watch your condition.  · Will get help right away if you are not doing well or get worse.     This information is not intended to replace advice given to you by your health care provider. Make sure you discuss any questions you have with your health care provider.     Document Released: 09/27/2006 Document Revised: 01/08/2016 Document Reviewed: 01/25/2013  [employer] Interactive Patient Education ©2016 [employer]            MyChart Status: Patient Declined

## 2017-08-07 NOTE — PROGRESS NOTES
Subjective:      Peter Trimble is a 42 y.o. female who presents with Dysuria            HPI Comments: Dysuria which started this morning and seems to be worsening. History of UTIs with similar presentation. She has been taking Pyridium without a significant improvement in symptoms.    Dysuria   This is a new problem. The current episode started today. The problem occurs every urination. The problem has been gradually worsening. The quality of the pain is described as burning. The pain is moderate. She is not sexually active. There is a history of pyelonephritis. Associated symptoms include chills and urgency. Pertinent negatives include no discharge, flank pain, frequency, hematuria, hesitancy, nausea, possible pregnancy, sweats or vomiting. Treatments tried: Pyridium. The treatment provided no relief. Her past medical history is significant for recurrent UTIs.       Review of Systems   Constitutional: Positive for chills. Negative for fever.   Gastrointestinal: Negative for nausea, vomiting and abdominal pain.   Genitourinary: Positive for dysuria and urgency. Negative for hesitancy, frequency, hematuria and flank pain.     Allergies:Compazine; Imitrex; Inapsine; Maxalt; Phenergan; Xanax; Zantac; Apple cider vinegar; Clarithromycin; Dilaudid; Doxycycline; Effexor; Eucalyptus oil; Kiwi extract; Latex; Lyrica; Minocycline; Patchouli oil; Tea tree oil; and Topiramate    Current Outpatient Prescriptions Ordered in [facility]   Medication Sig Dispense Refill   • nitrofurantoin monohydr macro (MACROBID) 100 MG Cap Take 1 Cap by mouth every 12 hours for 5 days. 10 Cap 0   • fluconazole (DIFLUCAN) 150 MG tablet Take 1 tab now and one in 72 hours if not resolved. 2 Tab 1   • acetaminophen/caffeine/butalbital 325-40-50 mg (FIORICET) -40 MG Tab Take 1 Tab by mouth 2 times a day as needed. for refractory headache. Maximum use is 4 days per week. One month supply. 30 Tab 0   • baclofen (LIORESAL) 10 MG Tab Take 1 Tab by  "mouth 3 times a day as needed. as needed for muscle spasms 90 Tab 2   • hydrOXYzine (ATARAX) 50 MG Tab Take 1 Tab by mouth 2 times a day as needed. for anxiety. 60 Tab 2   • morphine (MS IR) 15 MG tablet Take 1/2  to 1 tablet by mouth every 8 hours as needed for pain. One month supply. 80 Tab 0   • divalproex (DEPAKOTE) 500 MG Tablet Delayed Response Take 2 Tabs by mouth every 12 hours. 120 Tab 3   • paroxetine (PAXIL) 30 MG Tab Take 60 mg by mouth every day.     • propranolol (INDERAL) 20 MG Tab Take 20 mg by mouth 2 times a day.     • albuterol (PROVENTIL) 2.5mg/0.5ml Nebu Soln 2.5 mg by Nebulization route every 6 hours as needed for Shortness of Breath.     • cetirizine (ZYRTEC) 10 MG Tab Take 10 mg by mouth every day.     • esomeprazole (NEXIUM) 40 MG delayed-release capsule Take 40 mg by mouth every morning before breakfast.     • fenofibrate (TRICOR) 145 MG Tab Take 145 mg by mouth every morning.     • ferrous sulfate 325 (65 FE) MG tablet Take 325 mg by mouth every day.     • fluticasone-salmeterol (ADVAIR) 250-50 MCG/DOSE AEROSOL POWDER, BREATH ACTIVATED Inhale 1 Puff by mouth every evening.     • montelukast (SINGULAIR) 10 MG Tab Take 10 mg by mouth every bedtime.       No current Epic-ordered facility-administered medications on file.       Past Medical History   Diagnosis Date   • Hypertension    • Fibromyalgia    • PTSD (post-traumatic stress disorder)    • Migraines    • Anxiety    • Bipolar affective (CMS-HCC)    • Personality disorder    • Kidney stones    • Arthritis      osteoarthritis since 17yo.   • Seizure (CMS-HCC) 05-27-14     last 2011   • Stroke (CMS-HCC) 2011      reports strokes x5 , and a \"brain bleed\"   • Depression    • GERD (gastroesophageal reflux disease)    • Hyperlipidemia 04-26-13     \"off medication\"   • Environmental allergies    • ASTHMA      O2 3liters at HS and prn   • Pain 05-27-14     \"my body hurts all the time\", 5-6/10   • Unspecified urinary incontinence    • Unspecified " hemorrhagic conditions      nose-bleeds, bruises easily   • Urinary bladder disorder    • Pneumonia    • Cold    • Breath shortness    • Sleep apnea      has had a sleep study, use O2 at HS   • Snoring    • Anemia    • Hx MRSA infection    • Heart murmur      she denies this hx   • Essential hypertension, malignant 2011   • Acute blood loss anemia 2013     -resolved, postop     • Acute renal failure (ARF) (CMS-HCC) 10/5/2011     -resolved (post op )    • Scalp wound 2014   • Subarachnoid hemorrhage (CMS-HCC) 2011     -small,  (2nd to HTN)    • History of pregnancy 10/16/2014         • Eclampsia complicating pregnancy        Social History   Substance Use Topics   • Smoking status: Never Smoker    • Smokeless tobacco: Never Used   • Alcohol Use: No       Family Status   Relation Status Death Age   • Mother Alive    • Father Alive    • Brother Alive    • Maternal Aunt Alive    • Maternal Uncle Alive    • Paternal Aunt     • Paternal Uncle     • Maternal Grandmother Alive    • Maternal Grandfather     • Paternal Grandmother     • Paternal Grandfather       Family History   Problem Relation Age of Onset   • Hypertension Mother    • Hyperlipidemia Mother    • Hypertension Father    • Hyperlipidemia Father    • Heart Disease Father    • Psychiatry Father    • Alcohol/Drug Father    • Alcohol/Drug Brother    • Arthritis Maternal Uncle    • Psychiatry Maternal Uncle    • Heart Disease Maternal Uncle    • Hypertension Maternal Uncle    • Hyperlipidemia Maternal Uncle    • Genetic Paternal Aunt    • Psychiatry Paternal Uncle    • Arthritis Maternal Grandmother    • Heart Disease Maternal Grandmother    • Alcohol/Drug Maternal Grandfather    • Stroke Maternal Grandfather    • Psychiatry Maternal Grandfather    • Arthritis Paternal Grandmother    • Alcohol/Drug Paternal Grandfather           Objective:     /78 mmHg  Pulse 88  Temp(Src)  "37.4 °C (99.3 °F)  Resp 18  Ht 1.803 m (5' 10.98\")  Wt 89.359 kg (197 lb)  BMI 27.49 kg/m2  SpO2 97%  Breastfeeding? No     Physical Exam   Constitutional: She is oriented to person, place, and time. She appears well-developed and well-nourished. No distress.   HENT:   Head: Normocephalic and atraumatic.   Eyes: Right eye exhibits no discharge. Left eye exhibits no discharge.   Neck: Normal range of motion. Neck supple.   Cardiovascular: Normal rate and regular rhythm.    Pulmonary/Chest: Effort normal and breath sounds normal. She has no wheezes. She has no rales.   Abdominal: Soft. Bowel sounds are normal. She exhibits no distension and no mass. There is tenderness (mild, suprapubic). There is no rebound and no guarding.   Neurological: She is alert and oriented to person, place, and time.   Skin: Skin is warm and dry. She is not diaphoretic.   Psychiatric: She has a normal mood and affect. Her behavior is normal. Judgment and thought content normal.   Nursing note and vitals reviewed.    Labs: Urinalysis difficult to assess due to Pyridium use. Positive for blood. Unable to assess leukocytes or nitrates. Culture pending          Assessment/Plan:     1. Acute cystitis without hematuria [N30.00]  POCT Urinalysis    Urine Culture    nitrofurantoin monohydr macro (MACROBID) 100 MG Cap    Urinalysis difficult to assess due to Azo use. Symptoms consistent with UTI. Start Macrobid. Culture urine. Follow-up with PCP   2. History of candidiasis  fluconazole (DIFLUCAN) 150 MG tablet    History of these infections after antibiotics. Contingent prescription for Diflucan given.       [employer] Interactive Patient Education given: UTI    Please note that this dictation was created using voice recognition software. I have made every reasonable attempt to correct obvious errors, but I expect that there are errors of grammar and possibly content that I did not discover before finalizing the note.      "

## 2017-08-08 DIAGNOSIS — N30.00 ACUTE CYSTITIS WITHOUT HEMATURIA: ICD-10-CM

## 2017-08-10 LAB
BACTERIA UR CULT: NORMAL
SIGNIFICANT IND 70042: NORMAL
SOURCE SOURCE: NORMAL

## 2017-09-23 ENCOUNTER — HOSPITAL ENCOUNTER (OUTPATIENT)
Facility: MEDICAL CENTER | Age: 42
End: 2017-09-23
Attending: PHYSICIAN ASSISTANT
Payer: MEDICAID

## 2017-09-23 ENCOUNTER — OFFICE VISIT (OUTPATIENT)
Dept: URGENT CARE | Facility: PHYSICIAN GROUP | Age: 42
End: 2017-09-23
Payer: MEDICAID

## 2017-09-23 VITALS
DIASTOLIC BLOOD PRESSURE: 86 MMHG | RESPIRATION RATE: 16 BRPM | HEIGHT: 71 IN | BODY MASS INDEX: 28.7 KG/M2 | TEMPERATURE: 97.6 F | HEART RATE: 84 BPM | WEIGHT: 205 LBS | OXYGEN SATURATION: 94 % | SYSTOLIC BLOOD PRESSURE: 120 MMHG

## 2017-09-23 DIAGNOSIS — R30.0 DYSURIA: ICD-10-CM

## 2017-09-23 DIAGNOSIS — N30.01 ACUTE CYSTITIS WITH HEMATURIA: ICD-10-CM

## 2017-09-23 DIAGNOSIS — B37.9 ANTIBIOTIC-INDUCED YEAST INFECTION: ICD-10-CM

## 2017-09-23 DIAGNOSIS — T36.95XA ANTIBIOTIC-INDUCED YEAST INFECTION: ICD-10-CM

## 2017-09-23 DIAGNOSIS — R35.0 FREQUENCY OF URINATION: ICD-10-CM

## 2017-09-23 LAB
APPEARANCE UR: CLEAR
BILIRUB UR STRIP-MCNC: ABNORMAL MG/DL
COLOR UR AUTO: ABNORMAL
GLUCOSE UR STRIP.AUTO-MCNC: ABNORMAL MG/DL
KETONES UR STRIP.AUTO-MCNC: ABNORMAL MG/DL
LEUKOCYTE ESTERASE UR QL STRIP.AUTO: ABNORMAL
NITRITE UR QL STRIP.AUTO: POSITIVE
PH UR STRIP.AUTO: 7.5 [PH] (ref 5–8)
PROT UR QL STRIP: 30 MG/DL
RBC UR QL AUTO: ABNORMAL
SP GR UR STRIP.AUTO: 1.01
UROBILINOGEN UR STRIP-MCNC: ABNORMAL MG/DL

## 2017-09-23 PROCEDURE — 99214 OFFICE O/P EST MOD 30 MIN: CPT | Mod: 25 | Performed by: PHYSICIAN ASSISTANT

## 2017-09-23 PROCEDURE — 87086 URINE CULTURE/COLONY COUNT: CPT

## 2017-09-23 PROCEDURE — 81002 URINALYSIS NONAUTO W/O SCOPE: CPT | Performed by: PHYSICIAN ASSISTANT

## 2017-09-23 PROCEDURE — 87186 SC STD MICRODIL/AGAR DIL: CPT

## 2017-09-23 PROCEDURE — 87077 CULTURE AEROBIC IDENTIFY: CPT

## 2017-09-23 RX ORDER — PHENAZOPYRIDINE HYDROCHLORIDE 200 MG/1
200 TABLET, FILM COATED ORAL 3 TIMES DAILY
Qty: 6 TAB | Refills: 0 | Status: SHIPPED | OUTPATIENT
Start: 2017-09-23 | End: 2017-09-25

## 2017-09-23 RX ORDER — FLUCONAZOLE 150 MG/1
150 TABLET ORAL DAILY
Qty: 1 TAB | Refills: 0 | Status: SHIPPED | OUTPATIENT
Start: 2017-09-23 | End: 2017-11-24

## 2017-09-23 RX ORDER — CIPROFLOXACIN 500 MG/1
500 TABLET, FILM COATED ORAL EVERY 12 HOURS
Qty: 14 TAB | Refills: 0 | Status: SHIPPED | OUTPATIENT
Start: 2017-09-23 | End: 2017-09-30

## 2017-09-23 RX ORDER — GUAIFENESIN 600 MG/1
600 TABLET, EXTENDED RELEASE ORAL EVERY 12 HOURS
COMMUNITY
End: 2022-04-12

## 2017-09-23 NOTE — PROGRESS NOTES
Chief Complaint   Patient presents with   • Urinary Frequency   • Tremors       HISTORY OF PRESENT ILLNESS: Patient is a 42 y.o. female who presents today because she has a one-day history of increased urinary urgency, frequency, dysuria. Denies any fevers, chills, nausea, vomiting or diarrhea. Reports that she gets yeast infections with antibiotic treatment.has not tried any medication specifically for this symptom. The    Patient Active Problem List    Diagnosis Date Noted   • Cerebral vasculitis 10/04/2011     Priority: High   • Morbid obesity (CMS-HCC) 06/25/2015     Priority: Medium   • Closed fracture of one rib 06/25/2015     Priority: Medium   • Lumbar transverse process fracture (CMS-HCC) 06/25/2015     Priority: Medium   • Chronic pain 08/18/2014     Priority: Medium   • AVA (obstructive sleep apnea), intolerant of CPAP 07/08/2013     Priority: Medium   • Seizure disorder (CMS-HCC) 10/05/2011     Priority: Medium   • HTN (hypertension) 10/04/2011     Priority: Medium   • Depression 09/28/2011     Priority: Medium   • Bipolar 2 disorder (CMS-HCC) 09/28/2011     Priority: Medium   • Chronic migraine 09/28/2011     Priority: Medium   • Asthma 09/28/2011     Priority: Medium   • Hyperlipidemia 08/18/2014     Priority: Low   • GERD (gastroesophageal reflux disease) 09/28/2011     Priority: Low   • Personality disorder 10/04/2016   • Dystonia 10/04/2016   • Chest pain 10/03/2016   • Ankle fracture, right 10/03/2016   • Vaginal yeast infection 10/03/2016   • PTSD (post-traumatic stress disorder) 10/02/2016   • Depression 08/07/2016   • Chronic pain 08/07/2016   • HTN (hypertension), benign 08/07/2016   • Pseudoseizure (HCC) 08/06/2016   • Fracture of ankle, trimalleolar, closed 08/06/2016   • Controlled substance agreement signed 02/19/2016   • Rib fracture 06/25/2015   • Benign hypertensive heart disease without heart failure 10/16/2014   • Mixed hyperlipidemia 10/16/2014   • Open scalp wound 10/16/2014   • Lump  or mass in breast 05/29/2014   • Headache 02/19/2014   • Neck pain 02/19/2014   • Fibromyalgia 02/19/2014   • Obese 07/08/2013   • Oxygen dependent, since 2011 07/08/2013   • Hypertrophy of breast 04/29/2013       Allergies:Compazine; Imitrex [sumatriptan succinate]; Inapsine [droperidol]; Maxalt [rizatriptan benzoate]; Phenergan [promethazine hcl]; Xanax [alprazolam]; Zantac; Apple cider vinegar; Clarithromycin; Dilaudid [hydromorphone]; Doxycycline; Effexor [venlafaxine]; Eucalyptus oil; Kiwi extract; Latex; Lyrica [pregabalin]; Minocycline; Patchouli oil; Tea tree oil; and Topiramate    Current Outpatient Prescriptions Ordered in [facility]   Medication Sig Dispense Refill   • BUSPIRONE HCL PO Take  by mouth.     • guaifenesin LA (MUCINEX) 600 MG TABLET SR 12 HR Take 600 mg by mouth every 12 hours.     • Ketorolac Tromethamine (TORADOL ORAL PO) Take  by mouth.     • phenazopyridine (PYRIDIUM) 200 MG Tab Take 1 Tab by mouth 3 times a day for 2 days. 6 Tab 0   • fluconazole (DIFLUCAN) 150 MG tablet Take 1 Tab by mouth every day. 1 Tab 0   • ciprofloxacin (CIPRO) 500 MG Tab Take 1 Tab by mouth every 12 hours for 7 days. 14 Tab 0   • acetaminophen/caffeine/butalbital 325-40-50 mg (FIORICET) -40 MG Tab Take 1 Tab by mouth 2 times a day as needed. for refractory headache. Maximum use is 4 days per week. One month supply. 30 Tab 0   • baclofen (LIORESAL) 10 MG Tab Take 1 Tab by mouth 3 times a day as needed. as needed for muscle spasms 90 Tab 2   • morphine (MS IR) 15 MG tablet Take 1/2  to 1 tablet by mouth every 8 hours as needed for pain. One month supply. 80 Tab 0   • divalproex (DEPAKOTE) 500 MG Tablet Delayed Response Take 2 Tabs by mouth every 12 hours. 120 Tab 3   • paroxetine (PAXIL) 30 MG Tab Take 60 mg by mouth every day.     • propranolol (INDERAL) 20 MG Tab Take 20 mg by mouth 2 times a day.     • albuterol (PROVENTIL) 2.5mg/0.5ml Nebu Soln 2.5 mg by Nebulization route every 6 hours as needed for Shortness  "of Breath.     • cetirizine (ZYRTEC) 10 MG Tab Take 10 mg by mouth every day.     • esomeprazole (NEXIUM) 40 MG delayed-release capsule Take 40 mg by mouth every morning before breakfast.     • fenofibrate (TRICOR) 145 MG Tab Take 145 mg by mouth every morning.     • ferrous sulfate 325 (65 FE) MG tablet Take 325 mg by mouth every day.     • montelukast (SINGULAIR) 10 MG Tab Take 10 mg by mouth every bedtime.     • fluconazole (DIFLUCAN) 150 MG tablet Take 1 tab now and one in 72 hours if not resolved. 2 Tab 1   • hydrOXYzine (ATARAX) 50 MG Tab Take 1 Tab by mouth 2 times a day as needed. for anxiety. 60 Tab 2   • fluticasone-salmeterol (ADVAIR) 250-50 MCG/DOSE AEROSOL POWDER, BREATH ACTIVATED Inhale 1 Puff by mouth every evening.       No current Epic-ordered facility-administered medications on file.        Past Medical History:   Diagnosis Date   • History of pregnancy 10/16/2014        • Scalp wound 2014   • Seizure (CMS-HCC) 14    last    • Pain 14    \"my body hurts all the time\", 5-6/10   • Cold    • Acute blood loss anemia 2013    -resolved, postop     • Hyperlipidemia 13    \"off medication\"   • Pneumonia    • Acute renal failure (ARF) (CMS-HCC) 10/5/2011    -resolved (post op )    • Essential hypertension, malignant 2011   • Subarachnoid hemorrhage (CMS-HCC) 2011    -small,  (2nd to HTN)    • Stroke (CMS-HCC)      reports strokes x5 , and a \"brain bleed\"   • Anemia    • Anxiety    • Arthritis     osteoarthritis since 17yo.   • ASTHMA     O2 3liters at HS and prn   • Bipolar affective (CMS-HCC)    • Breath shortness    • Depression    • Eclampsia complicating pregnancy    • Environmental allergies    • Fibromyalgia    • GERD (gastroesophageal reflux disease)    • Heart murmur     she denies this hx   • Hx MRSA infection    • Hypertension    • Kidney stones    • Migraines    • Personality disorder    • PTSD (post-traumatic stress disorder) "    • Sleep apnea     has had a sleep study, use O2 at HS   • Snoring    • Unspecified hemorrhagic conditions     nose-bleeds, bruises easily   • Unspecified urinary incontinence    • Urinary bladder disorder        Social History   Substance Use Topics   • Smoking status: Never Smoker   • Smokeless tobacco: Never Used   • Alcohol use No       Family Status   Relation Status   • Mother Alive   • Father Alive   • Brother Alive   • Maternal Aunt Alive   • Maternal Uncle Alive   • Paternal Aunt    • Paternal Uncle    • Maternal Grandmother Alive   • Maternal Grandfather    • Paternal Grandmother    • Paternal Grandfather      Family History   Problem Relation Age of Onset   • Hypertension Mother    • Hyperlipidemia Mother    • Hypertension Father    • Hyperlipidemia Father    • Heart Disease Father    • Psychiatry Father    • Alcohol/Drug Father    • Alcohol/Drug Brother    • Arthritis Maternal Uncle    • Psychiatry Maternal Uncle    • Heart Disease Maternal Uncle    • Hypertension Maternal Uncle    • Hyperlipidemia Maternal Uncle    • Genetic Paternal Aunt    • Psychiatry Paternal Uncle    • Arthritis Maternal Grandmother    • Heart Disease Maternal Grandmother    • Alcohol/Drug Maternal Grandfather    • Stroke Maternal Grandfather    • Psychiatry Maternal Grandfather    • Arthritis Paternal Grandmother    • Alcohol/Drug Paternal Grandfather        ROS:  Review of Systems   Constitutional: Negative for fever, chills, weight loss and malaise/fatigue.   HENT: Negative for ear pain, nosebleeds, congestion, sore throat and neck pain.    Eyes: Negative for blurred vision.   Respiratory: Negative for cough, sputum production, shortness of breath and wheezing.    Cardiovascular: Negative for chest pain, palpitations, orthopnea and leg swelling.   Gastrointestinal: Negative for heartburn, nausea, vomiting and abdominal pain.   Genitourinary: positive for dysuria, urgency and frequency.  "    Exam:  Blood pressure 120/86, pulse 84, temperature 36.4 °C (97.6 °F), resp. rate 16, height 1.803 m (5' 11\"), weight 93 kg (205 lb), SpO2 94 %.  General:  Well nourished, well developed female in NAD  Head:Normocephalic, atraumatic  Eyes: PERRLA, EOM within normal limits, no conjunctival injection, no scleral icterus, visual fields and acuity grossly intact.  Pulmonary: chest is symmetrical with respiration, no wheezes, crackles, or rhonchi.  Cardiovascular: regular rate and rhythm without murmurs, rubs, or gallops.  Extremities: no clubbing, cyanosis, or edema.    Please note that this dictation was created using voice recognition software. I have made every reasonable attempt to correct obvious errors, but I expect that there are errors of grammar and possibly content that I did not discover before finalizing the note.    Assessment/Plan:  1. Frequency of urination  POCT Urinalysis   2. Acute cystitis with hematuria  Urine Culture    ciprofloxacin (CIPRO) 500 MG Tab   3. Dysuria  phenazopyridine (PYRIDIUM) 200 MG Tab   4. Antibiotic-induced yeast infection  fluconazole (DIFLUCAN) 150 MG tablet       Followup with primary care in the next 7-10 days if not significantly improving, return to the urgent care or go to the emergency room sooner for any worsening of symptoms.       "

## 2017-09-25 LAB
BACTERIA UR CULT: ABNORMAL
SIGNIFICANT IND 70042: ABNORMAL
SOURCE SOURCE: ABNORMAL

## 2017-11-24 ENCOUNTER — OFFICE VISIT (OUTPATIENT)
Dept: URGENT CARE | Facility: PHYSICIAN GROUP | Age: 42
End: 2017-11-24
Payer: MEDICAID

## 2017-11-24 VITALS
DIASTOLIC BLOOD PRESSURE: 88 MMHG | BODY MASS INDEX: 28.84 KG/M2 | HEART RATE: 67 BPM | HEIGHT: 71 IN | RESPIRATION RATE: 20 BRPM | OXYGEN SATURATION: 96 % | WEIGHT: 206 LBS | SYSTOLIC BLOOD PRESSURE: 134 MMHG | TEMPERATURE: 98 F

## 2017-11-24 DIAGNOSIS — N89.8 VAGINAL IRRITATION: ICD-10-CM

## 2017-11-24 LAB
APPEARANCE UR: CLEAR
BILIRUB UR STRIP-MCNC: NEGATIVE MG/DL
COLOR UR AUTO: YELLOW
GLUCOSE UR STRIP.AUTO-MCNC: NEGATIVE MG/DL
KETONES UR STRIP.AUTO-MCNC: NEGATIVE MG/DL
LEUKOCYTE ESTERASE UR QL STRIP.AUTO: NEGATIVE
NITRITE UR QL STRIP.AUTO: NEGATIVE
PH UR STRIP.AUTO: 7 [PH] (ref 5–8)
PROT UR QL STRIP: NEGATIVE MG/DL
RBC UR QL AUTO: NORMAL
SP GR UR STRIP.AUTO: 1.01
UROBILINOGEN UR STRIP-MCNC: NEGATIVE MG/DL

## 2017-11-24 PROCEDURE — 81002 URINALYSIS NONAUTO W/O SCOPE: CPT | Performed by: PHYSICIAN ASSISTANT

## 2017-11-24 PROCEDURE — 99214 OFFICE O/P EST MOD 30 MIN: CPT | Performed by: PHYSICIAN ASSISTANT

## 2017-11-24 RX ORDER — FLUCONAZOLE 150 MG/1
TABLET ORAL
Qty: 2 TAB | Refills: 1 | Status: SHIPPED | OUTPATIENT
Start: 2017-11-24 | End: 2018-05-12

## 2017-11-24 ASSESSMENT — ENCOUNTER SYMPTOMS
CHILLS: 0
FLANK PAIN: 0
FEVER: 0
ABDOMINAL PAIN: 0

## 2017-11-24 NOTE — PATIENT INSTRUCTIONS
Monilial Vaginitis  Vaginitis in a soreness, swelling and redness (inflammation) of the vagina and vulva. Monilial vaginitis is not a sexually transmitted infection.  CAUSES   Yeast vaginitis is caused by yeast (candida) that is normally found in your vagina. With a yeast infection, the candida has overgrown in number to a point that upsets the chemical balance.  SYMPTOMS   · White, thick vaginal discharge.  · Swelling, itching, redness and irritation of the vagina and possibly the lips of the vagina (vulva).  · Burning or painful urination.  · Painful intercourse.  DIAGNOSIS   Things that may contribute to monilial vaginitis are:  · Postmenopausal and virginal states.  · Pregnancy.  · Infections.  · Being tired, sick or stressed, especially if you had monilial vaginitis in the past.  · Diabetes. Good control will help lower the chance.  · Birth control pills.  · Tight fitting garments.  · Using bubble bath, feminine sprays, douches or deodorant tampons.  · Taking certain medications that kill germs (antibiotics).  · Sporadic recurrence can occur if you become ill.  TREATMENT   Your caregiver will give you medication.  · There are several kinds of anti monilial vaginal creams and suppositories specific for monilial vaginitis. For recurrent yeast infections, use a suppository or cream in the vagina 2 times a week, or as directed.  · Anti-monilial or steroid cream for the itching or irritation of the vulva may also be used. Get your caregiver's permission.  · Painting the vagina with methylene blue solution may help if the monilial cream does not work.  · Eating yogurt may help prevent monilial vaginitis.  HOME CARE INSTRUCTIONS   · Finish all medication as prescribed.  · Do not have sex until treatment is completed or after your caregiver tells you it is okay.  · Take warm sitz baths.  · Do not douche.  · Do not use tampons, especially scented ones.  · Wear cotton underwear.  · Avoid tight pants and panty  hose.  · Tell your sexual partner that you have a yeast infection. They should go to their caregiver if they have symptoms such as mild rash or itching.  · Your sexual partner should be treated as well if your infection is difficult to eliminate.  · Practice safer sex. Use condoms.  · Some vaginal medications cause latex condoms to fail. Vaginal medications that harm condoms are:  ¨ Cleocin cream.  ¨ Butoconazole (Femstat®).  ¨ Terconazole (Terazol®) vaginal suppository.  ¨ Miconazole (Monistat®) (may be purchased over the counter).  SEEK MEDICAL CARE IF:   · You have a temperature by mouth above 102° F (38.9° C).  · The infection is getting worse after 2 days of treatment.  · The infection is not getting better after 3 days of treatment.  · You develop blisters in or around your vagina.  · You develop vaginal bleeding, and it is not your menstrual period.  · You have pain when you urinate.  · You develop intestinal problems.  · You have pain with sexual intercourse.     This information is not intended to replace advice given to you by your health care provider. Make sure you discuss any questions you have with your health care provider.     Document Released: 09/27/2006 Document Revised: 03/11/2013 Document Reviewed: 06/11/2010  [employer] Interactive Patient Education ©2016 [employer]

## 2017-11-24 NOTE — PROGRESS NOTES
Subjective:      Peter Trimble is a 42 y.o. female who presents with UTI (vaginal bleeding)            Patient presents today with vaginal irritation which she believes is a yeast infection. She has tried topical therapies, but reports that the topical treatments seemed to cause more pain and bleeding. She would like some Diflucan. She has had yeast infections in the past.      Female  Problem   The patient's primary symptoms include genital itching and vaginal discharge. This is a recurrent problem. The problem occurs constantly. The problem has been waxing and waning. The pain is mild. The problem affects both sides. She is not pregnant. Pertinent negatives include no abdominal pain, chills, dysuria, fever, flank pain, frequency, hematuria or urgency. The vaginal bleeding is lighter than menses. She has not been passing clots. She has not been passing tissue. The symptoms are aggravated by tactile pressure. She has tried antifungals for the symptoms. The treatment provided no relief.       Review of Systems   Constitutional: Negative for chills and fever.   Gastrointestinal: Negative for abdominal pain.   Genitourinary: Positive for vaginal discharge. Negative for dysuria, flank pain, frequency, hematuria and urgency.     Allergies:Compazine; Imitrex [sumatriptan succinate]; Inapsine [droperidol]; Maxalt [rizatriptan benzoate]; Phenergan [promethazine hcl]; Xanax [alprazolam]; Zantac; Apple cider vinegar; Clarithromycin; Dilaudid [hydromorphone]; Doxycycline; Effexor [venlafaxine]; Eucalyptus oil; Kiwi extract; Latex; Lyrica [pregabalin]; Minocycline; Patchouli oil; Tea tree oil; and Topiramate    Current Outpatient Prescriptions Ordered in [facility]   Medication Sig Dispense Refill   • fluconazole (DIFLUCAN) 150 MG tablet Take 1 tab now and one in 72 hours if not resolved. 2 Tab 1   • BUSPIRONE HCL PO Take  by mouth.     • guaifenesin LA (MUCINEX) 600 MG TABLET SR 12 HR Take 600 mg by mouth every 12 hours.      • Ketorolac Tromethamine (TORADOL ORAL PO) Take  by mouth.     • acetaminophen/caffeine/butalbital 325-40-50 mg (FIORICET) -40 MG Tab Take 1 Tab by mouth 2 times a day as needed. for refractory headache. Maximum use is 4 days per week. One month supply. 30 Tab 0   • baclofen (LIORESAL) 10 MG Tab Take 1 Tab by mouth 3 times a day as needed. as needed for muscle spasms 90 Tab 2   • hydrOXYzine (ATARAX) 50 MG Tab Take 1 Tab by mouth 2 times a day as needed. for anxiety. 60 Tab 2   • morphine (MS IR) 15 MG tablet Take 1/2  to 1 tablet by mouth every 8 hours as needed for pain. One month supply. 80 Tab 0   • divalproex (DEPAKOTE) 500 MG Tablet Delayed Response Take 2 Tabs by mouth every 12 hours. 120 Tab 3   • paroxetine (PAXIL) 30 MG Tab Take 60 mg by mouth every day.     • propranolol (INDERAL) 20 MG Tab Take 20 mg by mouth 2 times a day.     • albuterol (PROVENTIL) 2.5mg/0.5ml Nebu Soln 2.5 mg by Nebulization route every 6 hours as needed for Shortness of Breath.     • cetirizine (ZYRTEC) 10 MG Tab Take 10 mg by mouth every day.     • esomeprazole (NEXIUM) 40 MG delayed-release capsule Take 40 mg by mouth every morning before breakfast.     • fenofibrate (TRICOR) 145 MG Tab Take 145 mg by mouth every morning.     • ferrous sulfate 325 (65 FE) MG tablet Take 325 mg by mouth every day.     • fluticasone-salmeterol (ADVAIR) 250-50 MCG/DOSE AEROSOL POWDER, BREATH ACTIVATED Inhale 1 Puff by mouth every evening.     • montelukast (SINGULAIR) 10 MG Tab Take 10 mg by mouth every bedtime.       No current Epic-ordered facility-administered medications on file.        Past Medical History:   Diagnosis Date   • Acute blood loss anemia 5/2/2013    -resolved, postop 2013    • Acute renal failure (ARF) (CMS-[facility]) 10/5/2011    -resolved (post op 2013)    • Anemia    • Anxiety    • Arthritis     osteoarthritis since 15yo.   • ASTHMA     O2 3liters at HS and prn   • Bipolar affective (CMS-[facility])    • Breath shortness    • Cold  "   • Depression    • Eclampsia complicating pregnancy    • Environmental allergies    • Essential hypertension, malignant 2011   • Fibromyalgia    • GERD (gastroesophageal reflux disease)    • Heart murmur     she denies this hx   • History of pregnancy 10/16/2014        • Hx MRSA infection    • Hyperlipidemia 13    \"off medication\"   • Hypertension    • Kidney stones    • Migraines    • Pain 15    \"my body hurts all the time\", 5-6/10   • Personality disorder    • Pneumonia    • PTSD (post-traumatic stress disorder)    • Scalp wound 2014   • Seizure (CMS-HCC) 14    last    • Sleep apnea     has had a sleep study, use O2 at HS   • Snoring    • Stroke (CMS-HCC)      reports strokes x5 , and a \"brain bleed\"   • Subarachnoid hemorrhage (CMS-HCC) 2011    -small, 2011 (2nd to HTN)    • Unspecified hemorrhagic conditions     nose-bleeds, bruises easily   • Unspecified urinary incontinence    • Urinary bladder disorder        Social History   Substance Use Topics   • Smoking status: Never Smoker   • Smokeless tobacco: Never Used   • Alcohol use No       Family Status   Relation Status   • Mother Alive   • Father Alive   • Brother Alive   • Maternal Aunt Alive   • Maternal Uncle Alive   • Paternal Aunt    • Paternal Uncle    • Maternal Grandmother Alive   • Maternal Grandfather    • Paternal Grandmother    • Paternal Grandfather      Family History   Problem Relation Age of Onset   • Hypertension Mother    • Hyperlipidemia Mother    • Hypertension Father    • Hyperlipidemia Father    • Heart Disease Father    • Psychiatry Father    • Alcohol/Drug Father    • Alcohol/Drug Brother    • Arthritis Maternal Uncle    • Psychiatry Maternal Uncle    • Heart Disease Maternal Uncle    • Hypertension Maternal Uncle    • Hyperlipidemia Maternal Uncle    • Genetic Paternal Aunt    • Psychiatry Paternal Uncle    • Arthritis Maternal Grandmother " "   • Heart Disease Maternal Grandmother    • Alcohol/Drug Maternal Grandfather    • Stroke Maternal Grandfather    • Psychiatry Maternal Grandfather    • Arthritis Paternal Grandmother    • Alcohol/Drug Paternal Grandfather           Objective:     /88   Pulse 67   Temp 36.7 °C (98 °F)   Resp 20   Ht 1.803 m (5' 11\")   Wt 93.4 kg (206 lb)   SpO2 96%   BMI 28.73 kg/m²      Physical Exam   Constitutional: She appears well-developed and well-nourished. No distress.   HENT:   Head: Normocephalic and atraumatic.   Neck: Normal range of motion. Neck supple.   Cardiovascular: Normal rate.    Pulmonary/Chest: Effort normal.   Genitourinary:   Genitourinary Comments: Exam deferred   Skin: Skin is warm and dry. She is not diaphoretic.   Psychiatric: She has a normal mood and affect. Her behavior is normal. Judgment and thought content normal.   Nursing note and vitals reviewed.    Labs: Urinalysis positive for blood. Otherwise unremarkable          Assessment/Plan:     1. Vaginal irritation  fluconazole (DIFLUCAN) 150 MG tablet    Likely yeast infection which is not responding to OTC medications. Given Diflucan per request. Follow-up with PCP       [employer] Patient Education given: vaginal infection    Please note that this dictation was created using voice recognition software. I have made every reasonable attempt to correct obvious errors, but I expect that there are errors of grammar and possibly content that I did not discover before finalizing the note.      "

## 2018-01-05 ENCOUNTER — HOSPITAL ENCOUNTER (OUTPATIENT)
Dept: LAB | Facility: MEDICAL CENTER | Age: 43
End: 2018-01-05
Attending: INTERNAL MEDICINE
Payer: MEDICAID

## 2018-01-05 ENCOUNTER — OFFICE VISIT (OUTPATIENT)
Dept: URGENT CARE | Facility: PHYSICIAN GROUP | Age: 43
End: 2018-01-05
Payer: MEDICAID

## 2018-01-05 ENCOUNTER — HOSPITAL ENCOUNTER (OUTPATIENT)
Facility: MEDICAL CENTER | Age: 43
End: 2018-01-05
Attending: PHYSICIAN ASSISTANT
Payer: MEDICAID

## 2018-01-05 VITALS
HEART RATE: 68 BPM | BODY MASS INDEX: 31.08 KG/M2 | OXYGEN SATURATION: 96 % | RESPIRATION RATE: 16 BRPM | SYSTOLIC BLOOD PRESSURE: 132 MMHG | TEMPERATURE: 97.2 F | DIASTOLIC BLOOD PRESSURE: 94 MMHG | HEIGHT: 71 IN | WEIGHT: 222 LBS

## 2018-01-05 DIAGNOSIS — N89.8 VAGINAL IRRITATION: ICD-10-CM

## 2018-01-05 DIAGNOSIS — E66.9 OBESITY (BMI 30-39.9): ICD-10-CM

## 2018-01-05 DIAGNOSIS — B37.31 VAGINAL YEAST INFECTION: ICD-10-CM

## 2018-01-05 LAB
ALBUMIN SERPL BCP-MCNC: 3.8 G/DL (ref 3.2–4.9)
ALBUMIN/GLOB SERPL: 1.2 G/DL
ALP SERPL-CCNC: 41 U/L (ref 30–99)
ALT SERPL-CCNC: 9 U/L (ref 2–50)
ANION GAP SERPL CALC-SCNC: 7 MMOL/L (ref 0–11.9)
AST SERPL-CCNC: 21 U/L (ref 12–45)
BASOPHILS # BLD AUTO: 1 % (ref 0–1.8)
BASOPHILS # BLD: 0.05 K/UL (ref 0–0.12)
BILIRUB SERPL-MCNC: 0.3 MG/DL (ref 0.1–1.5)
BUN SERPL-MCNC: 33 MG/DL (ref 8–22)
CALCIUM SERPL-MCNC: 9.2 MG/DL (ref 8.5–10.5)
CANDIDA DNA VAG QL PROBE+SIG AMP: NEGATIVE
CHLORIDE SERPL-SCNC: 103 MMOL/L (ref 96–112)
CHOLEST SERPL-MCNC: 171 MG/DL (ref 100–199)
CO2 SERPL-SCNC: 30 MMOL/L (ref 20–33)
CREAT SERPL-MCNC: 1.04 MG/DL (ref 0.5–1.4)
EOSINOPHIL # BLD AUTO: 0.16 K/UL (ref 0–0.51)
EOSINOPHIL NFR BLD: 3.2 % (ref 0–6.9)
ERYTHROCYTE [DISTWIDTH] IN BLOOD BY AUTOMATED COUNT: 41 FL (ref 35.9–50)
G VAGINALIS DNA VAG QL PROBE+SIG AMP: NEGATIVE
GFR SERPL CREATININE-BSD FRML MDRD: 58 ML/MIN/1.73 M 2
GLOBULIN SER CALC-MCNC: 3.1 G/DL (ref 1.9–3.5)
GLUCOSE SERPL-MCNC: 80 MG/DL (ref 65–99)
HCT VFR BLD AUTO: 34.4 % (ref 37–47)
HDLC SERPL-MCNC: 22 MG/DL
HGB BLD-MCNC: 11.3 G/DL (ref 12–16)
IMM GRANULOCYTES # BLD AUTO: 0.02 K/UL (ref 0–0.11)
IMM GRANULOCYTES NFR BLD AUTO: 0.4 % (ref 0–0.9)
LDLC SERPL CALC-MCNC: 109 MG/DL
LYMPHOCYTES # BLD AUTO: 3.29 K/UL (ref 1–4.8)
LYMPHOCYTES NFR BLD: 64.9 % (ref 22–41)
MCH RBC QN AUTO: 30.7 PG (ref 27–33)
MCHC RBC AUTO-ENTMCNC: 32.8 G/DL (ref 33.6–35)
MCV RBC AUTO: 93.5 FL (ref 81.4–97.8)
MONOCYTES # BLD AUTO: 0.37 K/UL (ref 0–0.85)
MONOCYTES NFR BLD AUTO: 7.3 % (ref 0–13.4)
NEUTROPHILS # BLD AUTO: 1.18 K/UL (ref 2–7.15)
NEUTROPHILS NFR BLD: 23.2 % (ref 44–72)
NRBC # BLD AUTO: 0 K/UL
NRBC BLD-RTO: 0 /100 WBC
PLATELET # BLD AUTO: 310 K/UL (ref 164–446)
PMV BLD AUTO: 11.2 FL (ref 9–12.9)
POTASSIUM SERPL-SCNC: 4.4 MMOL/L (ref 3.6–5.5)
PROT SERPL-MCNC: 6.9 G/DL (ref 6–8.2)
RBC # BLD AUTO: 3.68 M/UL (ref 4.2–5.4)
SODIUM SERPL-SCNC: 140 MMOL/L (ref 135–145)
T VAGINALIS DNA VAG QL PROBE+SIG AMP: NEGATIVE
T4 FREE SERPL-MCNC: 0.73 NG/DL (ref 0.53–1.43)
TRIGL SERPL-MCNC: 199 MG/DL (ref 0–149)
TSH SERPL DL<=0.005 MIU/L-ACNC: 1.81 UIU/ML (ref 0.38–5.33)
WBC # BLD AUTO: 5.1 K/UL (ref 4.8–10.8)

## 2018-01-05 PROCEDURE — 87660 TRICHOMONAS VAGIN DIR PROBE: CPT

## 2018-01-05 PROCEDURE — 85025 COMPLETE CBC W/AUTO DIFF WBC: CPT

## 2018-01-05 PROCEDURE — 36415 COLL VENOUS BLD VENIPUNCTURE: CPT

## 2018-01-05 PROCEDURE — 80061 LIPID PANEL: CPT

## 2018-01-05 PROCEDURE — 99214 OFFICE O/P EST MOD 30 MIN: CPT | Performed by: PHYSICIAN ASSISTANT

## 2018-01-05 PROCEDURE — 87510 GARDNER VAG DNA DIR PROBE: CPT

## 2018-01-05 PROCEDURE — 87480 CANDIDA DNA DIR PROBE: CPT

## 2018-01-05 PROCEDURE — 84443 ASSAY THYROID STIM HORMONE: CPT

## 2018-01-05 PROCEDURE — 80053 COMPREHEN METABOLIC PANEL: CPT

## 2018-01-05 PROCEDURE — 84439 ASSAY OF FREE THYROXINE: CPT

## 2018-01-05 PROCEDURE — 82530 CORTISOL FREE: CPT

## 2018-01-05 RX ORDER — FLUCONAZOLE 150 MG/1
150 TABLET ORAL
Qty: 2 TAB | Refills: 0 | Status: SHIPPED | OUTPATIENT
Start: 2018-01-05 | End: 2018-01-09

## 2018-01-05 NOTE — PROGRESS NOTES
Chief Complaint   Patient presents with   • UTI       HISTORY OF PRESENT ILLNESS: Patient is a 42 y.o. female who presents today because She has vaginal irritation, swelling and burning sensation. She denies any drainage. She has been treated for yeast vaginitis about a week ago with Diflucan, which did not seem to help very much. She has not had any bloody discharge or drainage, denies any pain with urination, but does have pain with wiping, and a burning sensation continuously. She does have an OB/GYN, but is not particularly comfortable with him and would like another opinion    Patient Active Problem List    Diagnosis Date Noted   • Cerebral vasculitis 10/04/2011     Priority: High   • Morbid obesity (CMS-HCC) 06/25/2015     Priority: Medium   • Closed fracture of one rib 06/25/2015     Priority: Medium   • Lumbar transverse process fracture (CMS-HCC) 06/25/2015     Priority: Medium   • Chronic pain 08/18/2014     Priority: Medium   • AVA (obstructive sleep apnea), intolerant of CPAP 07/08/2013     Priority: Medium   • Seizure disorder (CMS-HCC) 10/05/2011     Priority: Medium   • HTN (hypertension) 10/04/2011     Priority: Medium   • Depression 09/28/2011     Priority: Medium   • Bipolar 2 disorder (CMS-HCC) 09/28/2011     Priority: Medium   • Chronic migraine 09/28/2011     Priority: Medium   • Asthma 09/28/2011     Priority: Medium   • Hyperlipidemia 08/18/2014     Priority: Low   • GERD (gastroesophageal reflux disease) 09/28/2011     Priority: Low   • Obesity (BMI 30-39.9) 01/05/2018   • Personality disorder 10/04/2016   • Dystonia 10/04/2016   • Chest pain 10/03/2016   • Ankle fracture, right 10/03/2016   • Vaginal yeast infection 10/03/2016   • PTSD (post-traumatic stress disorder) 10/02/2016   • Depression 08/07/2016   • Chronic pain 08/07/2016   • HTN (hypertension), benign 08/07/2016   • Pseudoseizure 08/06/2016   • Fracture of ankle, trimalleolar, closed 08/06/2016   • Controlled substance agreement  signed 02/19/2016   • Rib fracture 06/25/2015   • Benign hypertensive heart disease without heart failure 10/16/2014   • Mixed hyperlipidemia 10/16/2014   • Open scalp wound 10/16/2014   • Lump or mass in breast 05/29/2014   • Headache 02/19/2014   • Neck pain 02/19/2014   • Fibromyalgia 02/19/2014   • Obese 07/08/2013   • Oxygen dependent, since 2011 07/08/2013   • Hypertrophy of breast 04/29/2013       Allergies:Compazine; Imitrex [sumatriptan succinate]; Inapsine [droperidol]; Maxalt [rizatriptan benzoate]; Phenergan [promethazine hcl]; Xanax [alprazolam]; Zantac; Apple cider vinegar; Butrans [buprenorphine]; Clarithromycin; Dilaudid [hydromorphone]; Doxycycline; Effexor [venlafaxine]; Eucalyptus oil; Kiwi extract; Latex; Lyrica [pregabalin]; Minocycline; Patchouli oil; Tea tree oil; and Topiramate    Current Outpatient Prescriptions Ordered in [facility]   Medication Sig Dispense Refill   • fluconazole (DIFLUCAN) 150 MG tablet Take 1 Tab by mouth every 72 hours for 2 doses. 2 Tab 0   • fluconazole (DIFLUCAN) 150 MG tablet Take 1 tab now and one in 72 hours if not resolved. 2 Tab 1   • BUSPIRONE HCL PO Take  by mouth.     • guaifenesin LA (MUCINEX) 600 MG TABLET SR 12 HR Take 600 mg by mouth every 12 hours.     • Ketorolac Tromethamine (TORADOL ORAL PO) Take  by mouth.     • acetaminophen/caffeine/butalbital 325-40-50 mg (FIORICET) -40 MG Tab Take 1 Tab by mouth 2 times a day as needed. for refractory headache. Maximum use is 4 days per week. One month supply. 30 Tab 0   • baclofen (LIORESAL) 10 MG Tab Take 1 Tab by mouth 3 times a day as needed. as needed for muscle spasms 90 Tab 2   • hydrOXYzine (ATARAX) 50 MG Tab Take 1 Tab by mouth 2 times a day as needed. for anxiety. 60 Tab 2   • morphine (MS IR) 15 MG tablet Take 1/2  to 1 tablet by mouth every 8 hours as needed for pain. One month supply. 80 Tab 0   • divalproex (DEPAKOTE) 500 MG Tablet Delayed Response Take 2 Tabs by mouth every 12 hours. 120 Tab 3   •  "paroxetine (PAXIL) 30 MG Tab Take 60 mg by mouth every day.     • propranolol (INDERAL) 20 MG Tab Take 20 mg by mouth 2 times a day.     • albuterol (PROVENTIL) 2.5mg/0.5ml Nebu Soln 2.5 mg by Nebulization route every 6 hours as needed for Shortness of Breath.     • cetirizine (ZYRTEC) 10 MG Tab Take 10 mg by mouth every day.     • esomeprazole (NEXIUM) 40 MG delayed-release capsule Take 40 mg by mouth every morning before breakfast.     • fenofibrate (TRICOR) 145 MG Tab Take 145 mg by mouth every morning.     • ferrous sulfate 325 (65 FE) MG tablet Take 325 mg by mouth every day.     • fluticasone-salmeterol (ADVAIR) 250-50 MCG/DOSE AEROSOL POWDER, BREATH ACTIVATED Inhale 1 Puff by mouth every evening.     • montelukast (SINGULAIR) 10 MG Tab Take 10 mg by mouth every bedtime.       No current Epic-ordered facility-administered medications on file.        Past Medical History:   Diagnosis Date   • Acute blood loss anemia 2013    -resolved, postop     • Acute renal failure (ARF) (CMS-HCC) 10/5/2011    -resolved (post op )    • Anemia    • Anxiety    • Arthritis     osteoarthritis since 15yo.   • ASTHMA     O2 3liters at HS and prn   • Bipolar affective (CMS-HCC)    • Breath shortness    • Cold    • Depression    • Eclampsia complicating pregnancy    • Environmental allergies    • Essential hypertension, malignant 2011   • Fibromyalgia    • GERD (gastroesophageal reflux disease)    • Heart murmur     she denies this hx   • History of pregnancy 10/16/2014        • Hx MRSA infection    • Hyperlipidemia 14    \"off medication\"   • Hypertension    • Kidney stones    • Migraines    • Pain 15    \"my body hurts all the time\", 5-6/10   • Personality disorder    • Pneumonia    • PTSD (post-traumatic stress disorder)    • Scalp wound 2014   • Seizure (CMS-HCC) 14    last    • Sleep apnea     has had a sleep study, use O2 at HS   • Snoring    • Stroke (CMS-HCC)      " "reports strokes x5 , and a \"brain bleed\"   • Subarachnoid hemorrhage (CMS-HCC) 2011    -small, 2011 (2nd to HTN)    • Unspecified hemorrhagic conditions     nose-bleeds, bruises easily   • Unspecified urinary incontinence    • Urinary bladder disorder        Social History   Substance Use Topics   • Smoking status: Never Smoker   • Smokeless tobacco: Never Used   • Alcohol use No       Family Status   Relation Status   • Mother Alive   • Father Alive   • Brother Alive   • Maternal Aunt Alive   • Maternal Uncle Alive   • Paternal Aunt    • Paternal Uncle    • Maternal Grandmother Alive   • Maternal Grandfather    • Paternal Grandmother    • Paternal Grandfather      Family History   Problem Relation Age of Onset   • Hypertension Mother    • Hyperlipidemia Mother    • Hypertension Father    • Hyperlipidemia Father    • Heart Disease Father    • Psychiatry Father    • Alcohol/Drug Father    • Alcohol/Drug Brother    • Arthritis Maternal Uncle    • Psychiatry Maternal Uncle    • Heart Disease Maternal Uncle    • Hypertension Maternal Uncle    • Hyperlipidemia Maternal Uncle    • Genetic Paternal Aunt    • Psychiatry Paternal Uncle    • Arthritis Maternal Grandmother    • Heart Disease Maternal Grandmother    • Alcohol/Drug Maternal Grandfather    • Stroke Maternal Grandfather    • Psychiatry Maternal Grandfather    • Arthritis Paternal Grandmother    • Alcohol/Drug Paternal Grandfather        ROS:  Review of Systems   Constitutional: Negative for fever, chills, weight loss and malaise/fatigue.   HENT: Negative for ear pain, nosebleeds, congestion, sore throat and neck pain.    Eyes: Negative for blurred vision.   Respiratory: Negative for cough, sputum production, shortness of breath and wheezing.    Cardiovascular: Negative for chest pain, palpitations, orthopnea and leg swelling.   Gastrointestinal: Negative for heartburn, nausea, vomiting and abdominal pain. " "  Genitourinary: Negative for dysuria, urgency and frequency. Positive for vaginal irritation    Exam:  Blood pressure 132/94, pulse 68, temperature 36.2 °C (97.2 °F), resp. rate 16, height 1.803 m (5' 11\"), weight 100.7 kg (222 lb), SpO2 96 %.  General:  Well nourished, well developed female in NAD  Head:Normocephalic, atraumatic  Eyes: PERRLA, EOM within normal limits, no conjunctival injection, no scleral icterus, visual fields and acuity grossly intact.  Extremities: no clubbing, cyanosis, or edema.    Urinalysis is unremarkable other than blood    Please note that this dictation was created using voice recognition software. I have made every reasonable attempt to correct obvious errors, but I expect that there are errors of grammar and possibly content that I did not discover before finalizing the note.    Assessment/Plan:  1. Vaginal irritation  REFERRAL TO OB/GYN    VAGINAL PATHOGENS DNA PANEL   2. Obesity (BMI 30-39.9)  Patient identified as having weight management issue.  Appropriate orders and counseling given.   3. Vaginal yeast infection  fluconazole (DIFLUCAN) 150 MG tablet       Followup with primary care in the next 7-10 days if not significantly improving, return to the urgent care or go to the emergency room sooner for any worsening of symptoms.       "

## 2018-01-10 LAB
ANNOTATION COMMENT IMP: NORMAL
COLLECT DURATION TIME SPEC: NORMAL HRS
CORTIS F 24H UR HPLC-MCNC: NORMAL UG/L
CORTIS F 24H UR-MRATE: NORMAL UG/D
CORTIS F/CREAT 24H UR: NORMAL UG/G CRT
CREAT 24H UR-MCNC: 119 MG/DL
CREAT 24H UR-MRATE: NORMAL MG/D (ref 700–1600)
SPECIMEN VOL ?TM UR: NORMAL ML

## 2018-04-30 ENCOUNTER — APPOINTMENT (OUTPATIENT)
Dept: RADIOLOGY | Facility: MEDICAL CENTER | Age: 43
End: 2018-04-30
Attending: NEUROLOGICAL SURGERY
Payer: MEDICAID

## 2018-05-11 ENCOUNTER — APPOINTMENT (OUTPATIENT)
Dept: NEUROLOGY | Facility: MEDICAL CENTER | Age: 43
End: 2018-05-11
Payer: MEDICAID

## 2018-05-12 ENCOUNTER — OFFICE VISIT (OUTPATIENT)
Dept: URGENT CARE | Facility: PHYSICIAN GROUP | Age: 43
End: 2018-05-12
Payer: MEDICAID

## 2018-05-12 VITALS
BODY MASS INDEX: 38.22 KG/M2 | DIASTOLIC BLOOD PRESSURE: 82 MMHG | WEIGHT: 274 LBS | HEART RATE: 80 BPM | SYSTOLIC BLOOD PRESSURE: 120 MMHG | OXYGEN SATURATION: 92 % | TEMPERATURE: 96.9 F

## 2018-05-12 DIAGNOSIS — L03.316 CELLULITIS OF UMBILICUS: ICD-10-CM

## 2018-05-12 DIAGNOSIS — B37.9 CANDIDA INFECTION: ICD-10-CM

## 2018-05-12 DIAGNOSIS — J22 ACUTE LOWER RESPIRATORY INFECTION: ICD-10-CM

## 2018-05-12 DIAGNOSIS — S01.00XA OPEN WOUND OF SCALP, UNSPECIFIED OPEN WOUND TYPE, INITIAL ENCOUNTER: ICD-10-CM

## 2018-05-12 DIAGNOSIS — R21 RASH: ICD-10-CM

## 2018-05-12 PROCEDURE — 99215 OFFICE O/P EST HI 40 MIN: CPT | Performed by: FAMILY MEDICINE

## 2018-05-12 RX ORDER — ALPRAZOLAM 1 MG/1
1 TABLET ORAL NIGHTLY PRN
COMMUNITY
End: 2018-06-10

## 2018-05-12 RX ORDER — MORPHINE SULFATE 30 MG/1
30 TABLET ORAL EVERY 4 HOURS PRN
COMMUNITY
End: 2018-06-10

## 2018-05-12 RX ORDER — FLUCONAZOLE 150 MG/1
TABLET ORAL
Qty: 3 TAB | Refills: 0 | Status: SHIPPED | OUTPATIENT
Start: 2018-05-12 | End: 2018-06-10

## 2018-05-12 RX ORDER — SULFAMETHOXAZOLE AND TRIMETHOPRIM 800; 160 MG/1; MG/1
TABLET ORAL
Qty: 20 TAB | Refills: 0 | Status: SHIPPED | OUTPATIENT
Start: 2018-05-12 | End: 2018-05-12

## 2018-05-12 RX ORDER — CLINDAMYCIN HYDROCHLORIDE 300 MG/1
CAPSULE ORAL
Qty: 30 CAP | Refills: 0 | Status: SHIPPED | OUTPATIENT
Start: 2018-05-12 | End: 2018-06-10

## 2018-05-12 RX ORDER — BUTORPHANOL TARTRATE 10 MG/ML
1 SPRAY NASAL EVERY 4 HOURS PRN
COMMUNITY
End: 2018-06-10

## 2018-05-12 NOTE — PROGRESS NOTES
Chief Complaint:    Chief Complaint   Patient presents with   • Rash     Pt complains of a rash breakout on chest, scalp. Pt also states umbilicus discharge and cough.       History of Present Illness:    She is here for multiple issues.    For 2 weeks, she has been scratching at her scalp enough to create wounds on the scalp. She feels she has a yeast in her scalp causing the original problem.    For 2 days, she has some bumps on her chest bilaterally that she feels is shingles.    She has been coughing for few weeks. She has Singulair and MDI to use. Saw her PCP for this and it sounds like nothing was done for this. She has improved some, but still has persisting cough.    She is having foul smelling umbilicus.      Review of Systems:    Constitutional: Negative for fever, chills, and diaphoresis.   Eyes: Negative for change in vision, photophobia, pain, redness, and discharge.  ENT: Negative for ear pain, ear discharge, hearing loss, tinnitus, nasal congestion, nosebleeds, and sore throat.    Respiratory: See HPI.    Cardiovascular: Negative for chest pain, palpitations, orthopnea, claudication, leg swelling, and PND.   Gastrointestinal: Negative for abdominal pain, nausea, vomiting, diarrhea, constipation, blood in stool, and melena.   Genitourinary: Negative for dysuria, urinary urgency, urinary frequency, hematuria, and flank pain.   Musculoskeletal: No new symptoms.  Skin: See HPI.  Neurological: No new symptoms.  Endo: Negative for polydipsia.   Heme: Does not bruise/bleed easily.   Psychiatric/Behavioral: No new symptoms.       Past Medical History:    Past Medical History:   Diagnosis Date   • Acute blood loss anemia 5/2/2013    -resolved, postop 2013    • Acute renal failure (ARF) ([facility]) 10/5/2011    -resolved (post op 2013)    • Anemia    • Anxiety    • Arthritis     osteoarthritis since 15yo.   • ASTHMA     O2 3liters at HS and prn   • Bipolar affective ([facility])    • Breath shortness    • Cold    •  "Depression    • Eclampsia complicating pregnancy    • Environmental allergies    • Essential hypertension, malignant 2011   • Fibromyalgia    • GERD (gastroesophageal reflux disease)    • Heart murmur     she denies this hx   • History of pregnancy 10/16/2014        • Hx MRSA infection    • Hyperlipidemia 14    \"off medication\"   • Hypertension    • Kidney stones    • Migraines    • Pain 15    \"my body hurts all the time\", 5-6/10   • Personality disorder    • Pneumonia    • PTSD (post-traumatic stress disorder)    • Scalp wound 2014   • Seizure ([facility]) 14    last    • Sleep apnea     has had a sleep study, use O2 at HS   • Snoring    • Stroke ([facility])      reports strokes x5 , and a \"brain bleed\"   • Subarachnoid hemorrhage ([facility]) 2011    -small,  (2nd to HTN)    • Unspecified hemorrhagic conditions     nose-bleeds, bruises easily   • Unspecified urinary incontinence    • Urinary bladder disorder      Past Surgical History:    Past Surgical History:   Procedure Laterality Date   • ANKLE ORIF Right 2016    Procedure: ANKLE ORIF;  Surgeon: Blil Brambila M.D.;  Location: [facility];  Service:    • IRRIGATION & DEBRIDEMENT GENERAL  2014    Performed by Ezra Wright M.D. at [facility]   • BREAST BIOPSY  2014    Performed by Negro Hester M.D. at SURGERY SAME DAY [facility]   • EVACUATION OF HEMATOMA  2013    Performed by Lazaro Connors Jr., M.D. at [facility]   • MAMMOPLASTY REDUCTION  2013    Performed by Negro Hester M.D. at [facility]   • RECOVERY  2012    Performed by BENEDICT IR-RECOVERY at [facility] SAME DAY [facility]   • RECOVERY  10/5/2011    Performed by MARIAELENA MCMULLENONLY at [facility]   • HYSTERECTOMY LAPAROSCOPY      W BSO   • KNEE RECONSTRUCTION     • LAMINOTOMY     • OTHER ORTHOPEDIC SURGERY      maddie. knee scopes   • OTHER ORTHOPEDIC SURGERY      back " fusion then hardware removal   • PB EXPLORATION OF SPINAL FUSION     • PB REMOVAL OF OVARY(S)       Social History:    Social History     Social History   • Marital status:      Spouse name: N/A   • Number of children: N/A   • Years of education: N/A     Occupational History   • Not on file.     Social History Main Topics   • Smoking status: Never Smoker   • Smokeless tobacco: Never Used   • Alcohol use No   • Drug use: No   • Sexual activity: Not on file     Other Topics Concern   • Not on file     Social History Narrative   • No narrative on file     Family History:    Family History   Problem Relation Age of Onset   • Hypertension Mother    • Hyperlipidemia Mother    • Hypertension Father    • Hyperlipidemia Father    • Heart Disease Father    • Psychiatry Father    • Alcohol/Drug Father    • Alcohol/Drug Brother    • Arthritis Maternal Uncle    • Psychiatry Maternal Uncle    • Heart Disease Maternal Uncle    • Hypertension Maternal Uncle    • Hyperlipidemia Maternal Uncle    • Genetic Paternal Aunt    • Psychiatry Paternal Uncle    • Arthritis Maternal Grandmother    • Heart Disease Maternal Grandmother    • Alcohol/Drug Maternal Grandfather    • Stroke Maternal Grandfather    • Psychiatry Maternal Grandfather    • Arthritis Paternal Grandmother    • Alcohol/Drug Paternal Grandfather      Medications:    Current Outpatient Prescriptions on File Prior to Visit   Medication Sig Dispense Refill   • guaifenesin LA (MUCINEX) 600 MG TABLET SR 12 HR Take 600 mg by mouth every 12 hours.     • Ketorolac Tromethamine (TORADOL ORAL PO) Take  by mouth.     • acetaminophen/caffeine/butalbital 325-40-50 mg (FIORICET) -40 MG Tab Take 1 Tab by mouth 2 times a day as needed. for refractory headache. Maximum use is 4 days per week. One month supply. 30 Tab 0   • baclofen (LIORESAL) 10 MG Tab Take 1 Tab by mouth 3 times a day as needed. as needed for muscle spasms 90 Tab 2   • hydrOXYzine (ATARAX) 50 MG Tab Take 1  "Tab by mouth 2 times a day as needed. for anxiety. 60 Tab 2   • divalproex (DEPAKOTE) 500 MG Tablet Delayed Response Take 2 Tabs by mouth every 12 hours. 120 Tab 3   • paroxetine (PAXIL) 30 MG Tab Take 60 mg by mouth every day.     • propranolol (INDERAL) 20 MG Tab Take 20 mg by mouth 2 times a day.     • albuterol (PROVENTIL) 2.5mg/0.5ml Nebu Soln 2.5 mg by Nebulization route every 6 hours as needed for Shortness of Breath.     • cetirizine (ZYRTEC) 10 MG Tab Take 10 mg by mouth every day.     • esomeprazole (NEXIUM) 40 MG delayed-release capsule Take 40 mg by mouth every morning before breakfast.     • ferrous sulfate 325 (65 FE) MG tablet Take 325 mg by mouth every day.     • montelukast (SINGULAIR) 10 MG Tab Take 10 mg by mouth every bedtime.     • BUSPIRONE HCL PO Take  by mouth.     • fenofibrate (TRICOR) 145 MG Tab Take 145 mg by mouth every morning.     • fluticasone-salmeterol (ADVAIR) 250-50 MCG/DOSE AEROSOL POWDER, BREATH ACTIVATED Inhale 1 Puff by mouth every evening.       No current facility-administered medications on file prior to visit.      Alprazolam 1 mg  Butorphanol 10 mg/ml spray  Morphine IR 30 mg    Allergies:    Allergies   Allergen Reactions   • Compazine      \"psychotic reaction\"   • Imitrex [Sumatriptan Succinate]      Had brain bleed   • Inapsine [Droperidol]      \"psychotic reaction\"   • Maxalt [Rizatriptan Benzoate]      Had brain bleed   • Phenergan [Promethazine Hcl]      \"psychotic reaction\"   • Xanax [Alprazolam]      Sores and dry mouth, many others pt cannot remember   • Zantac      Stomach pain   • Apple Cider Vinegar Rash     Patient states she gets rash   • Butrans [Buprenorphine]    • Clarithromycin Vomiting and Palpitations   • Dilaudid [Hydromorphone] Rash     Patient states she had rash, hallucinations, unable to urinate.    • Doxycycline    • Effexor [Venlafaxine]      \"Really depressed, like i wanted to hurt myself\"   • Eucalyptus Oil    • Kiwi Extract    • Latex Rash " "  • Lyrica [Pregabalin]      \"depression, slept a lot\"   • Minocycline Vomiting     \"any cyclines\"   • Patchouli Oil Rash     Rash    • Tea Tree Oil Rash     Break out in rash   • Topiramate Nausea     Patient states made sick to stomach and dizzy.       Vitals:    Vitals:    05/12/18 1053   BP: 120/82   Pulse: 80   Temp: 36.1 °C (96.9 °F)   SpO2: 92%   Weight: 124.3 kg (274 lb)       Physical Exam:    Constitutional: Vital signs reviewed. Appears well-developed and well-nourished. No acute distress.   Eyes: Sclera white, conjunctivae clear.   ENT: External ears normal. Hearing normal.  Neck: Neck supple.   Cardiovascular: Regular rate and rhythm. No murmur.  Pulmonary/Chest: Respirations non-labored. Clear to auscultation bilaterally.  Lymph: Cervical nodes without tenderness or enlargement.  Musculoskeletal: Normal gait.   Neurological: Alert and oriented to person, place, and time.  Skin: Scalp has 3 approx quarter-sized wounds due to her scratching at areas; these look infected. Anterior chest, there are scattered erythematous papules bilaterally that do not look like shingles; looks like possible folliculitis. The umbilicus opening is collapsed due to body habitus. Trying to open the umbilicus, I do see some erythema in umbilicus.  Psychiatric: Normal mood and affect. Judgment and thought content normal.       Assessment / Plan:    1. Open wound of scalp, unspecified open wound type, initial encounter  - mupirocin (BACTROBAN) 2 % Ointment; APPLY TO ANY WOUND ON SCALP AND SKIN INFECTION IN [place] 3 TIMES A DAY UNTIL HEALED.  Dispense: 22 g; Refill: 3  - clindamycin (CLEOCIN) 300 MG Cap; 1 CAP BY MOUTH 3 TIMES A DAY X 10 DAYS FOR BACTERIAL SKIN AND LUNG INFECTION. THIS REPLACES BACTRIM RX.  Dispense: 30 Cap; Refill: 0    2. Acute lower respiratory infection  - clindamycin (CLEOCIN) 300 MG Cap; 1 CAP BY MOUTH 3 TIMES A DAY X 10 DAYS FOR BACTERIAL SKIN AND LUNG INFECTION. THIS REPLACES BACTRIM RX.  Dispense: " 30 Cap; Refill: 0    3. Cellulitis of umbilicus  - mupirocin (BACTROBAN) 2 % Ointment; APPLY TO ANY WOUND ON SCALP AND SKIN INFECTION IN [place] 3 TIMES A DAY UNTIL HEALED.  Dispense: 22 g; Refill: 3  - clindamycin (CLEOCIN) 300 MG Cap; 1 CAP BY MOUTH 3 TIMES A DAY X 10 DAYS FOR BACTERIAL SKIN AND LUNG INFECTION. THIS REPLACES BACTRIM RX.  Dispense: 30 Cap; Refill: 0    4. Candida infection  - fluconazole (DIFLUCAN) 150 MG tablet; 1 TAB BY MOUTH X 1 DOSE ON 5/12, 5/15, AND 5/18/18 FOR YEAST INFECTION.  Dispense: 3 Tab; Refill: 0    5. Rash - on chest, looks like folliculitis, not shingles.  - clindamycin (CLEOCIN) 300 MG Cap; 1 CAP BY MOUTH 3 TIMES A DAY X 10 DAYS FOR BACTERIAL SKIN AND LUNG INFECTION. THIS REPLACES BACTRIM RX.  Dispense: 30 Cap; Refill: 0      Discussed with her DDX and management options.    The wounds on her scalp look like secondary bacterial infection due to her scratching at scalp. The rash on chest looks like Folliculitis. She is agreeable to antibiotic treatment for these which can also cover for any lung infection and/or bacterial infection in umbilicus.    Initially I rx'd Bactrim as this was not on her allergy list, but pharmacy called and reported she is allergic to Sulfa, so I will add Sulfa to her allergy list. I replaced Bactrim with Clindamycin.    I do not feel she has a yeast infection in her scalp despite she feeling this is the case. However, she may have a yeast infection in her umbilicus due to moisture getting trapped there due to body habitus and she will take systemic antibiotic, so I will have her take Fluconazole to treat yeast infection.    Agreeable to medications prescribed.    Follow-up with PCP or urgent care if getting worse or not better with above.

## 2018-05-21 ENCOUNTER — APPOINTMENT (OUTPATIENT)
Dept: RADIOLOGY | Facility: MEDICAL CENTER | Age: 43
End: 2018-05-21
Attending: NEUROLOGICAL SURGERY
Payer: MEDICAID

## 2018-05-30 ENCOUNTER — HOSPITAL ENCOUNTER (OUTPATIENT)
Dept: RADIOLOGY | Facility: MEDICAL CENTER | Age: 43
End: 2018-05-30
Attending: NEUROLOGICAL SURGERY
Payer: MEDICAID

## 2018-06-10 ENCOUNTER — APPOINTMENT (OUTPATIENT)
Dept: RADIOLOGY | Facility: IMAGING CENTER | Age: 43
End: 2018-06-10
Attending: NEUROLOGICAL SURGERY
Payer: MEDICAID

## 2018-06-10 ENCOUNTER — APPOINTMENT (OUTPATIENT)
Dept: URGENT CARE | Facility: PHYSICIAN GROUP | Age: 43
End: 2018-06-10
Payer: MEDICAID

## 2018-06-10 ENCOUNTER — OFFICE VISIT (OUTPATIENT)
Dept: URGENT CARE | Facility: PHYSICIAN GROUP | Age: 43
End: 2018-06-10
Payer: MEDICAID

## 2018-06-10 VITALS
SYSTOLIC BLOOD PRESSURE: 122 MMHG | RESPIRATION RATE: 16 BRPM | BODY MASS INDEX: 37.66 KG/M2 | OXYGEN SATURATION: 92 % | WEIGHT: 269 LBS | TEMPERATURE: 98.3 F | HEART RATE: 82 BPM | DIASTOLIC BLOOD PRESSURE: 80 MMHG | HEIGHT: 71 IN

## 2018-06-10 DIAGNOSIS — M54.2 CERVICALGIA: ICD-10-CM

## 2018-06-10 DIAGNOSIS — J45.909 UNCOMPLICATED ASTHMA, UNSPECIFIED ASTHMA SEVERITY, UNSPECIFIED WHETHER PERSISTENT: ICD-10-CM

## 2018-06-10 DIAGNOSIS — L98.9 SCALP LESION: ICD-10-CM

## 2018-06-10 DIAGNOSIS — R06.02 SOB (SHORTNESS OF BREATH): ICD-10-CM

## 2018-06-10 DIAGNOSIS — N89.8 VAGINAL DISCHARGE: ICD-10-CM

## 2018-06-10 DIAGNOSIS — N89.8 VAGINA ITCHING: ICD-10-CM

## 2018-06-10 DIAGNOSIS — M25.559 PAIN IN JOINT INVOLVING PELVIC REGION AND THIGH, UNSPECIFIED LATERALITY: ICD-10-CM

## 2018-06-10 DIAGNOSIS — R05.3 PERSISTENT COUGH: ICD-10-CM

## 2018-06-10 PROCEDURE — 73521 X-RAY EXAM HIPS BI 2 VIEWS: CPT | Performed by: FAMILY MEDICINE

## 2018-06-10 PROCEDURE — 99214 OFFICE O/P EST MOD 30 MIN: CPT | Performed by: PHYSICIAN ASSISTANT

## 2018-06-10 PROCEDURE — 72050 X-RAY EXAM NECK SPINE 4/5VWS: CPT | Performed by: FAMILY MEDICINE

## 2018-06-10 RX ORDER — FLUCONAZOLE 150 MG/1
150 TABLET ORAL
Qty: 3 TAB | Refills: 0 | Status: SHIPPED | OUTPATIENT
Start: 2018-06-10 | End: 2019-01-05

## 2018-06-10 RX ORDER — CLOTRIMAZOLE 1 %
CREAM (GRAM) TOPICAL
Qty: 1 TUBE | Refills: 0 | Status: SHIPPED | OUTPATIENT
Start: 2018-06-10 | End: 2019-09-10

## 2018-06-10 RX ORDER — METHYLPREDNISOLONE 4 MG/1
TABLET ORAL
Qty: 21 TAB | Refills: 0 | Status: SHIPPED | OUTPATIENT
Start: 2018-06-10 | End: 2019-01-05

## 2018-06-10 NOTE — PROGRESS NOTES
Chief Complaint   Patient presents with   • Vaginitis     also in her belly button       HISTORY OF PRESENT ILLNESS: Patient is a 43 y.o. female who presents today for the following:    Cough x off and on 3 months  On prednisone  H/o asthma - SOB, out of meds; requesting steroids  PCP - Dr. Larson  Infection on head, belly button, and vaginal area x 3 months off and on  Has been given fluconazole x 3 days by PCP - would improve a little but come back  Fevers off and on x 3 months  No OTC meds today  Patient has an appointment with dermatology in approximately one month  She states dermatology has done biopsies in the past - they are unable to figure out what's causing it  Patient is requesting 6-8 weeks of antifungal pills    Patient Active Problem List    Diagnosis Date Noted   • Cerebral vasculitis 10/04/2011     Priority: High   • Morbid obesity (HCC) 06/25/2015     Priority: Medium   • Closed fracture of one rib 06/25/2015     Priority: Medium   • Lumbar transverse process fracture ([facility]) 06/25/2015     Priority: Medium   • Chronic pain 08/18/2014     Priority: Medium   • AVA (obstructive sleep apnea), intolerant of CPAP 07/08/2013     Priority: Medium   • Seizure disorder ([facility]) 10/05/2011     Priority: Medium   • HTN (hypertension) 10/04/2011     Priority: Medium   • Depression 09/28/2011     Priority: Medium   • Bipolar 2 disorder ([facility]) 09/28/2011     Priority: Medium   • Chronic migraine 09/28/2011     Priority: Medium   • Asthma 09/28/2011     Priority: Medium   • Hyperlipidemia 08/18/2014     Priority: Low   • GERD (gastroesophageal reflux disease) 09/28/2011     Priority: Low   • Obesity (BMI 30-39.9) 01/05/2018   • Personality disorder 10/04/2016   • Dystonia 10/04/2016   • Chest pain 10/03/2016   • Ankle fracture, right 10/03/2016   • Vaginal yeast infection 10/03/2016   • PTSD (post-traumatic stress disorder) 10/02/2016   • Depression 08/07/2016   • Chronic pain 08/07/2016   • HTN (hypertension), benign  08/07/2016   • Pseudoseizure 08/06/2016   • Fracture of ankle, trimalleolar, closed 08/06/2016   • Controlled substance agreement signed 02/19/2016   • Rib fracture 06/25/2015   • Benign hypertensive heart disease without heart failure 10/16/2014   • Mixed hyperlipidemia 10/16/2014   • Open scalp wound 10/16/2014   • Lump or mass in breast 05/29/2014   • Headache 02/19/2014   • Neck pain 02/19/2014   • Fibromyalgia 02/19/2014   • Obese 07/08/2013   • Oxygen dependent, since 2011 07/08/2013   • Hypertrophy of breast 04/29/2013       Allergies:Compazine; Imitrex [sumatriptan succinate]; Inapsine [droperidol]; Maxalt [rizatriptan benzoate]; Phenergan [promethazine hcl]; Xanax [alprazolam]; Zantac; Apple cider vinegar; Butrans [buprenorphine]; Clarithromycin; Dilaudid [hydromorphone]; Doxycycline; Effexor [venlafaxine]; Eucalyptus oil; Kiwi extract; Latex; Lyrica [pregabalin]; Minocycline; Patchouli oil; Sulfa drugs; Tea tree oil; and Topiramate    Current Outpatient Prescriptions Ordered in [facility]   Medication Sig Dispense Refill   • MethylPREDNISolone (MEDROL DOSEPAK) 4 MG Tablet Therapy Pack Use as package directs 21 Tab 0   • clotrimazole (LOTRIMIN) 1 % Cream Apply to affected area 3-4 times daily 1 Tube 0   • fluconazole (DIFLUCAN) 150 MG tablet Take 1 Tab by mouth every 72 hours. 3 Tab 0   • guaifenesin LA (MUCINEX) 600 MG TABLET SR 12 HR Take 600 mg by mouth every 12 hours.     • Ketorolac Tromethamine (TORADOL ORAL PO) Take  by mouth.     • acetaminophen/caffeine/butalbital 325-40-50 mg (FIORICET) -40 MG Tab Take 1 Tab by mouth 2 times a day as needed. for refractory headache. Maximum use is 4 days per week. One month supply. 30 Tab 0   • baclofen (LIORESAL) 10 MG Tab Take 1 Tab by mouth 3 times a day as needed. as needed for muscle spasms 90 Tab 2   • hydrOXYzine (ATARAX) 50 MG Tab Take 1 Tab by mouth 2 times a day as needed. for anxiety. 60 Tab 2   • divalproex (DEPAKOTE) 500 MG Tablet Delayed Response  "Take 2 Tabs by mouth every 12 hours. 120 Tab 3   • paroxetine (PAXIL) 30 MG Tab Take 60 mg by mouth every day.     • propranolol (INDERAL) 20 MG Tab Take 20 mg by mouth 2 times a day.     • albuterol (PROVENTIL) 2.5mg/0.5ml Nebu Soln 2.5 mg by Nebulization route every 6 hours as needed for Shortness of Breath.     • cetirizine (ZYRTEC) 10 MG Tab Take 10 mg by mouth every day.     • esomeprazole (NEXIUM) 40 MG delayed-release capsule Take 40 mg by mouth every morning before breakfast.     • ferrous sulfate 325 (65 FE) MG tablet Take 325 mg by mouth every day.     • montelukast (SINGULAIR) 10 MG Tab Take 10 mg by mouth every bedtime.       No current Epic-ordered facility-administered medications on file.        Past Medical History:   Diagnosis Date   • Acute blood loss anemia 2013    -resolved, postop     • Acute renal failure (ARF) ([facility]) 10/5/2011    -resolved (post op )    • Anemia    • Anxiety    • Arthritis     osteoarthritis since 17yo.   • ASTHMA     O2 3liters at HS and prn   • Bipolar affective (HCC)    • Breath shortness    • Cold    • Depression    • Eclampsia complicating pregnancy    • Environmental allergies    • Essential hypertension, malignant 2011   • Fibromyalgia    • GERD (gastroesophageal reflux disease)    • Heart murmur     she denies this hx   • History of pregnancy 10/16/2014        • Hx MRSA infection    • Hyperlipidemia 14    \"off medication\"   • Hypertension    • Kidney stones    • Migraines    • Pain 15    \"my body hurts all the time\", 5-6/10   • Personality disorder    • Pneumonia    • PTSD (post-traumatic stress disorder)    • Scalp wound 2014   • Seizure ([facility]) 14    last    • Sleep apnea     has had a sleep study, use O2 at HS   • Snoring    • Stroke ([facility])      reports strokes x5 , and a \"brain bleed\"   • Subarachnoid hemorrhage ([facility]) 2011    -small,  (2nd to HTN)    • Unspecified hemorrhagic conditions     " nose-bleeds, bruises easily   • Unspecified urinary incontinence    • Urinary bladder disorder        Social History   Substance Use Topics   • Smoking status: Never Smoker   • Smokeless tobacco: Never Used   • Alcohol use No       Family Status   Relation Status   • Mother Alive   • Father Alive   • Brother Alive   • Maternal Aunt Alive   • Maternal Uncle Alive   • Paternal Aunt    • Paternal Uncle    • Maternal Grandmother Alive   • Maternal Grandfather    • Paternal Grandmother    • Paternal Grandfather      Family History   Problem Relation Age of Onset   • Hypertension Mother    • Hyperlipidemia Mother    • Hypertension Father    • Hyperlipidemia Father    • Heart Disease Father    • Psychiatry Father    • Alcohol/Drug Father    • Alcohol/Drug Brother    • Arthritis Maternal Uncle    • Psychiatry Maternal Uncle    • Heart Disease Maternal Uncle    • Hypertension Maternal Uncle    • Hyperlipidemia Maternal Uncle    • Genetic Paternal Aunt    • Psychiatry Paternal Uncle    • Arthritis Maternal Grandmother    • Heart Disease Maternal Grandmother    • Alcohol/Drug Maternal Grandfather    • Stroke Maternal Grandfather    • Psychiatry Maternal Grandfather    • Arthritis Paternal Grandmother    • Alcohol/Drug Paternal Grandfather        Review of Systems:  Constitutional ROS: No unexpected change in weight, No weakness, No fatigue  Eye ROS: No recent significant change in vision, No eye pain, redness, discharge  Ear ROS: No drainage, No tinnitus or vertigo, No recent change in hearing  Mouth/Throat ROS: No teeth or gum problems, No bleeding gums, No tongue complaints  Neck ROS: No swollen glands, No significant pain in neck  Cardiovascular ROS: No diaphoresis, No edema, No palpitations  Gastrointestinal ROS: No change in bowel habits, No significant change in appetite, No nausea, vomiting, diarrhea, or constipation  Musculoskeletal/Extremities ROS: No peripheral edema, No pain,  "redness or swelling on the joints  Hematologic/Lymphatic ROS: No chills, No night sweats, No weight loss    Exam:  Blood pressure 122/80, pulse 82, temperature 36.8 °C (98.3 °F), resp. rate 16, height 1.803 m (5' 11\"), weight 122 kg (269 lb), SpO2 92 %.  General: Well developed, well nourished. No distress.  Eye: PERRL/EOMI; conjunctivae clear, lids normal.  ENMT: Lips without lesions, MMM. Oropharynx is clear. Bilateral TMs are within normal limits.  Neck: Trachea midline, no masses. No thyromegaly.  Pulmonary: Unlabored respiratory effort. Lungs clear to auscultation, no wheezes, no rhonchi.  Cardiovascular: Regular rate and rhythm without murmur. No edema.   Neurologic: Grossly nonfocal. No facial asymmetry noted.  Lymph: No cervical lymphadenopathy noted.   : Deferred by patient.  Skin: Patient with multiple lesions in the scalp showing hair loss and some areas weeping. There is no surrounding erythema or signs of infection noted. There is no rash noted in the umbilicus as indicated by the patient.   Psych: Normal mood. Alert and oriented x3. Judgment and insight is normal.    Assessment/Plan:  1. Vagina itching  clotrimazole (LOTRIMIN) 1 % Cream    fluconazole (DIFLUCAN) 150 MG tablet   2. Vaginal discharge  clotrimazole (LOTRIMIN) 1 % Cream    fluconazole (DIFLUCAN) 150 MG tablet    Use fluconazole as directed.   3. Uncomplicated asthma, unspecified asthma severity, unspecified whether persistent      Will refill patient's inhaler.   4. SOB (shortness of breath)  MethylPREDNISolone (MEDROL DOSEPAK) 4 MG Tablet Therapy Pack    DX-CHEST-2 VIEWS   5. Scalp lesion      Follow-up with dermatology as scheduled.   6. Persistent cough  DX-CHEST-2 VIEWS    Patient to have chest x-ray done as an outpatient if symptoms persist.       "

## 2018-06-18 ENCOUNTER — APPOINTMENT (OUTPATIENT)
Dept: RADIOLOGY | Facility: MEDICAL CENTER | Age: 43
End: 2018-06-18
Attending: NEUROLOGICAL SURGERY
Payer: MEDICAID

## 2018-09-26 ENCOUNTER — OFFICE VISIT (OUTPATIENT)
Dept: NEUROLOGY | Facility: MEDICAL CENTER | Age: 43
End: 2018-09-26
Payer: MEDICAID

## 2018-09-26 ENCOUNTER — HOSPITAL ENCOUNTER (OUTPATIENT)
Dept: LAB | Facility: MEDICAL CENTER | Age: 43
End: 2018-09-26
Attending: INTERNAL MEDICINE
Payer: MEDICAID

## 2018-09-26 ENCOUNTER — HOSPITAL ENCOUNTER (OUTPATIENT)
Dept: LAB | Facility: MEDICAL CENTER | Age: 43
End: 2018-09-26
Attending: PSYCHIATRY & NEUROLOGY
Payer: MEDICAID

## 2018-09-26 VITALS
SYSTOLIC BLOOD PRESSURE: 124 MMHG | OXYGEN SATURATION: 92 % | HEART RATE: 70 BPM | TEMPERATURE: 97.7 F | HEIGHT: 71 IN | WEIGHT: 286.6 LBS | BODY MASS INDEX: 40.12 KG/M2 | RESPIRATION RATE: 18 BRPM | DIASTOLIC BLOOD PRESSURE: 80 MMHG

## 2018-09-26 DIAGNOSIS — F44.5 PSEUDOSEIZURE: ICD-10-CM

## 2018-09-26 DIAGNOSIS — E66.01 MORBID OBESITY (HCC): ICD-10-CM

## 2018-09-26 DIAGNOSIS — I67.7 CEREBRAL VASCULITIS: ICD-10-CM

## 2018-09-26 DIAGNOSIS — E56.9 VITAMIN DEFICIENCY: ICD-10-CM

## 2018-09-26 DIAGNOSIS — G40.909 SEIZURE DISORDER (HCC): ICD-10-CM

## 2018-09-26 LAB
25(OH)D3 SERPL-MCNC: 22 NG/ML (ref 30–100)
25(OH)D3 SERPL-MCNC: 23 NG/ML (ref 30–100)
ALBUMIN SERPL BCP-MCNC: 3.9 G/DL (ref 3.2–4.9)
BASOPHILS # BLD AUTO: 0.7 % (ref 0–1.8)
BASOPHILS # BLD: 0.07 K/UL (ref 0–0.12)
BUN SERPL-MCNC: 31 MG/DL (ref 8–22)
CALCIUM SERPL-MCNC: 9.9 MG/DL (ref 8.5–10.5)
CHLORIDE SERPL-SCNC: 102 MMOL/L (ref 96–112)
CO2 SERPL-SCNC: 28 MMOL/L (ref 20–33)
CREAT SERPL-MCNC: 1.14 MG/DL (ref 0.5–1.4)
EOSINOPHIL # BLD AUTO: 0.32 K/UL (ref 0–0.51)
EOSINOPHIL NFR BLD: 3.2 % (ref 0–6.9)
ERYTHROCYTE [DISTWIDTH] IN BLOOD BY AUTOMATED COUNT: 43.3 FL (ref 35.9–50)
EST. AVERAGE GLUCOSE BLD GHB EST-MCNC: 126 MG/DL
GLUCOSE SERPL-MCNC: 91 MG/DL (ref 65–99)
HBA1C MFR BLD: 6 % (ref 0–5.6)
HCT VFR BLD AUTO: 39.8 % (ref 37–47)
HGB BLD-MCNC: 12.9 G/DL (ref 12–16)
IMM GRANULOCYTES # BLD AUTO: 0.04 K/UL (ref 0–0.11)
IMM GRANULOCYTES NFR BLD AUTO: 0.4 % (ref 0–0.9)
LYMPHOCYTES # BLD AUTO: 5.05 K/UL (ref 1–4.8)
LYMPHOCYTES NFR BLD: 50.3 % (ref 22–41)
MCH RBC QN AUTO: 28.1 PG (ref 27–33)
MCHC RBC AUTO-ENTMCNC: 32.4 G/DL (ref 33.6–35)
MCV RBC AUTO: 86.7 FL (ref 81.4–97.8)
MONOCYTES # BLD AUTO: 0.64 K/UL (ref 0–0.85)
MONOCYTES NFR BLD AUTO: 6.4 % (ref 0–13.4)
NEUTROPHILS # BLD AUTO: 3.91 K/UL (ref 2–7.15)
NEUTROPHILS NFR BLD: 39 % (ref 44–72)
NRBC # BLD AUTO: 0 K/UL
NRBC BLD-RTO: 0 /100 WBC
PHOSPHATE SERPL-MCNC: 4.4 MG/DL (ref 2.5–4.5)
PLATELET # BLD AUTO: 288 K/UL (ref 164–446)
PMV BLD AUTO: 10.2 FL (ref 9–12.9)
POTASSIUM SERPL-SCNC: 4.2 MMOL/L (ref 3.6–5.5)
PROT UR-MCNC: 16.7 MG/DL (ref 0–15)
PTH-INTACT SERPL-MCNC: 93.7 PG/ML (ref 14–72)
RBC # BLD AUTO: 4.59 M/UL (ref 4.2–5.4)
SODIUM SERPL-SCNC: 139 MMOL/L (ref 135–145)
THYROPEROXIDASE AB SERPL-ACNC: 0.7 IU/ML (ref 0–9)
URATE SERPL-MCNC: 8.5 MG/DL (ref 1.9–8.2)
VIT B12 SERPL-MCNC: 336 PG/ML (ref 211–911)
WBC # BLD AUTO: 10 K/UL (ref 4.8–10.8)

## 2018-09-26 PROCEDURE — 83036 HEMOGLOBIN GLYCOSYLATED A1C: CPT

## 2018-09-26 PROCEDURE — 82595 ASSAY OF CRYOGLOBULIN: CPT

## 2018-09-26 PROCEDURE — 82607 VITAMIN B-12: CPT

## 2018-09-26 PROCEDURE — 86038 ANTINUCLEAR ANTIBODIES: CPT

## 2018-09-26 PROCEDURE — 36415 COLL VENOUS BLD VENIPUNCTURE: CPT

## 2018-09-26 PROCEDURE — 84156 ASSAY OF PROTEIN URINE: CPT

## 2018-09-26 PROCEDURE — 82306 VITAMIN D 25 HYDROXY: CPT | Mod: 91

## 2018-09-26 PROCEDURE — 86147 CARDIOLIPIN ANTIBODY EA IG: CPT | Mod: 91

## 2018-09-26 PROCEDURE — 84207 ASSAY OF VITAMIN B-6: CPT

## 2018-09-26 PROCEDURE — 86235 NUCLEAR ANTIGEN ANTIBODY: CPT | Mod: 91

## 2018-09-26 PROCEDURE — 83970 ASSAY OF PARATHORMONE: CPT

## 2018-09-26 PROCEDURE — 99215 OFFICE O/P EST HI 40 MIN: CPT | Performed by: PSYCHIATRY & NEUROLOGY

## 2018-09-26 PROCEDURE — 84550 ASSAY OF BLOOD/URIC ACID: CPT

## 2018-09-26 PROCEDURE — 85025 COMPLETE CBC W/AUTO DIFF WBC: CPT

## 2018-09-26 PROCEDURE — 80069 RENAL FUNCTION PANEL: CPT

## 2018-09-26 PROCEDURE — 82306 VITAMIN D 25 HYDROXY: CPT

## 2018-09-26 PROCEDURE — 86376 MICROSOMAL ANTIBODY EACH: CPT

## 2018-09-26 PROCEDURE — 86800 THYROGLOBULIN ANTIBODY: CPT

## 2018-09-26 RX ORDER — ALBUTEROL SULFATE 90 UG/1
AEROSOL, METERED RESPIRATORY (INHALATION)
COMMUNITY

## 2018-09-26 RX ORDER — ZOLPIDEM TARTRATE 5 MG/1
TABLET ORAL
Refills: 0 | COMMUNITY
Start: 2018-08-22 | End: 2019-05-11

## 2018-09-26 NOTE — PATIENT INSTRUCTIONS
IMPRESSION:    1. Intractable Daily headache-- not responsive to Botox years ago, Rebound Headache  2. Hx of psychogenic seizures, seizures, Bipolar, O2 dependent ( asthma), Overweight, Steroid for asthma,  Chronic Morphine Use for Pain/Headache ( 5+ years), Hx of stroke     PLAN/RECOMMENDATIONS:      A. Advise cut down on pain medicine-- including over the counter pain pills-- the less rescue medicine, the less likely to develop rebound headache  The goal is to limit all rescue medicine to less than 2# per week. The first 2 weeks of medicine withdrawal would be tough  200+ over the counter pain pills every month---    B. Advise weight loss, exercise regularly, avoid skipping meals, avoid sleep deprivation, avoid stress...avoid too much coffee/tea/soda ( one cup per day is the upper limit)    C. Try preventive medicine, it would take two weeks( at least) to evaluate the effects of any preventive medicine-- please call us if any side effects, we could change to another preventive medicine on the phone    Continue Depakote    D. ONB- occipital nerve block and or botox injection may be an option in the future too-- it would take a couple of trials before we can conclude that these injections does not help either    ________________________________________________________________________      This time, let us try nerve blocks first  -- multiple nerves blocks-- without steroid  Also, stop taking over the counter pain medication   No change on Narcotic and Fiorcet yet  We will repeat EEG, Blood Tests  (MRI of brain needs sedation and I would hold for now),  Regarding the new medication Aimovig--I will think it over-- maybe the patient is a candidate for this new medication    I do advise the patient to write down all medical issues in a list and give it to us    ________________________________________________________________________      The [place] Food and Drug Administration today approved Aimovig ([id number]) for  the preventive treatment of migraine in adults. The treatment is given by once-monthly self-injections. Aimovig is the first FDA-approved preventive migraine treatment in a new class of drugs that work by blocking the activity of calcitonin gene-related peptide, a molecule that is involved in migraine attacks.   ________________________________________________________________________      MRI of brain 2011    1.  Thin gyriform regions of cortical acute infarction near the vertex in a parasagittal location bilaterally in the distribution of the anterior cerebral and pericallosal arteries.    2.  Small amount of subarachnoid hemorrhage noted in the sulci near the vertex bilaterally in a parasagittal distribution.    3.  Rare scattered punctate areas of increased T2 signal intensity in the periventricular white matter consistent with small areas of ischemia, demyelination, or gliosis.    4.  These findings can be seen in reversible cerebral vasoconstrictive syndrome or secondary to an intracranial vasculitis.      SIGNATURE:  Lizz Watt          CC:  Jonathan Larson M.D.

## 2018-09-26 NOTE — PROGRESS NOTES
"NEUROLOGY NOTE    Referring Physician  Jonathan Larson M.D.      CHIEF COMPLAINT:  Headache every day  Small seizures remains  Hx of brain bleeding ----- subarachnoid ICH-- since then no triptons were prescribed  The patient tried botox years ago-- 4 times-- did not notice any benefits of headache reduction    Chief Complaint   Patient presents with   • Follow-Up     Seizures       PRESENT ILLNESS:   Headache every day  Small seizures remains  Hx of brain bleeding ----- subarachnoid ICH-- since then no triptons were prescribed  The patient tried botox years ago-- 4 times-- did not notice any benefits of headache reduction  The patient is interested in the new migraine medication    fibromyalgia    Headache since childhood  1. Location--temporal  2. Characteristics--  throbbing  3. Associated--at times light, noise intolerance   4. Worsening-- N/A  5. Relieving factors--   Rescue medicine Narcotic  6. Family History-not she could recall  7. Every other day-   8. 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 Sleep-- no problem  10. Coffee intake-- not coffee addict      PAST MEDICAL HISTORY:  Past Medical History:   Diagnosis Date   • Acute blood loss anemia 2013    -resolved, postop     • Acute renal failure (ARF) (HCC) 10/5/2011    -resolved (post op )    • Anemia    • Anxiety    • Arthritis     osteoarthritis since 17yo.   • ASTHMA     O2 3liters at HS and prn   • Bipolar affective (HCC)    • Breath shortness    • Cold    • Depression    • Eclampsia complicating pregnancy    • Environmental allergies    • Essential hypertension, malignant 2011   • Fibromyalgia    • GERD (gastroesophageal reflux disease)    • Heart murmur     she denies this hx   • History of pregnancy 10/16/2014        • Hx MRSA infection    • Hyperlipidemia 14    \"off medication\"   • Hypertension    • Kidney stones    • Migraines    • Pain 15    \"my body hurts all the " "time\", 5-6/10   • Personality disorder    • Pneumonia 2012   • PTSD (post-traumatic stress disorder)    • Scalp wound 8/18/2014   • Seizure ([facility]) 05-27-14    last 2011   • Sleep apnea     has had a sleep study, use O2 at HS   • Snoring    • Stroke ([facility]) 2011     reports strokes x5 , and a \"brain bleed\"   • Subarachnoid hemorrhage ([facility]) 9/28/2011    -small, 2011 (2nd to HTN)    • Unspecified hemorrhagic conditions     nose-bleeds, bruises easily   • Unspecified urinary incontinence    • Urinary bladder disorder        PAST SURGICAL HISTORY:  Past Surgical History:   Procedure Laterality Date   • ANKLE ORIF Right 8/7/2016    Procedure: ANKLE ORIF;  Surgeon: Bill Brambila M.D.;  Location: [facility];  Service:    • IRRIGATION & DEBRIDEMENT GENERAL  8/17/2014    Performed by Ezra Wright M.D. at SURGERY [facility]   • BREAST BIOPSY  5/29/2014    Performed by Negro Hester M.D. at SURGERY SAME DAY [facility]   • EVACUATION OF HEMATOMA  5/2/2013    Performed by Lazaro Connors Jr., M.D. at SURGERY [facility]   • MAMMOPLASTY REDUCTION  4/29/2013    Performed by Negro Hester M.D. at [facility]   • RECOVERY  1/30/2012    Performed by BENEDICT IR-RECOVERY at SURGERY SAME DAY [facility]   • RECOVERY  10/5/2011    Performed by MARIAELENA MCMULLENON at [facility]   • HYSTERECTOMY LAPAROSCOPY      W BSO   • KNEE RECONSTRUCTION     • LAMINOTOMY     • OTHER ORTHOPEDIC SURGERY      maddie. knee scopes   • OTHER ORTHOPEDIC SURGERY      back fusion then hardware removal   • PB EXPLORATION OF SPINAL FUSION     • PB REMOVAL OF OVARY(S)         FAMILY HISTORY:  Family History   Problem Relation Age of Onset   • Hypertension Mother    • Hyperlipidemia Mother    • Hypertension Father    • Hyperlipidemia Father    • Heart Disease Father    • Psychiatry Father    • Alcohol/Drug Father    • Alcohol/Drug Brother    • Arthritis Maternal Uncle    • Psychiatry Maternal Uncle    • Heart " "Disease Maternal Uncle    • Hypertension Maternal Uncle    • Hyperlipidemia Maternal Uncle    • Genetic Paternal Aunt    • Psychiatry Paternal Uncle    • Arthritis Maternal Grandmother    • Heart Disease Maternal Grandmother    • Alcohol/Drug Maternal Grandfather    • Stroke Maternal Grandfather    • Psychiatry Maternal Grandfather    • Arthritis Paternal Grandmother    • Alcohol/Drug Paternal Grandfather        SOCIAL HISTORY:  Social History     Social History   • Marital status:      Spouse name: N/A   • Number of children: N/A   • Years of education: N/A     Occupational History   • Not on file.     Social History Main Topics   • Smoking status: Never Smoker   • Smokeless tobacco: Never Used   • Alcohol use No   • Drug use: No   • Sexual activity: Not on file     Other Topics Concern   • Not on file     Social History Narrative   • No narrative on file     ALLERGIES:  Allergies   Allergen Reactions   • Compazine      \"psychotic reaction\"   • Imitrex [Sumatriptan Succinate]      Had brain bleed   • Inapsine [Droperidol]      \"psychotic reaction\"   • Maxalt [Rizatriptan Benzoate]      Had brain bleed   • Phenergan [Promethazine Hcl]      \"psychotic reaction\"   • Xanax [Alprazolam]      Sores and dry mouth, many others pt cannot remember   • Zantac      Stomach pain   • Apple Cider Vinegar Rash     Patient states she gets rash   • Butrans [Buprenorphine]    • Clarithromycin Vomiting and Palpitations   • Dilaudid [Hydromorphone] Rash     Patient states she had rash, hallucinations, unable to urinate.    • Doxycycline    • Effexor [Venlafaxine]      \"Really depressed, like i wanted to hurt myself\"   • Eucalyptus Oil    • Kiwi Extract    • Latex Rash   • Lyrica [Pregabalin]      \"depression, slept a lot\"   • Minocycline Vomiting     \"any cyclines\"   • Patchouli Oil Rash     Rash    • Sulfa Drugs    • Tea Tree Oil Rash     Break out in rash   • Topiramate Nausea     Patient states made sick to stomach and " dizzy.     TOBHX  History   Smoking Status   • Never Smoker   Smokeless Tobacco   • Never Used     ALCHX  History   Alcohol Use No     DRUGHX  History   Drug Use No           MEDICATIONS:  Current Outpatient Prescriptions   Medication   • morphine (DEBI) 30 MG SR capsule   • guaifenesin LA (MUCINEX) 600 MG TABLET SR 12 HR   • baclofen (LIORESAL) 10 MG Tab   • hydrOXYzine (ATARAX) 50 MG Tab   • divalproex (DEPAKOTE) 500 MG Tablet Delayed Response   • paroxetine (PAXIL) 30 MG Tab   • propranolol (INDERAL) 20 MG Tab   • albuterol (PROVENTIL) 2.5mg/0.5ml Nebu Soln   • cetirizine (ZYRTEC) 10 MG Tab   • esomeprazole (NEXIUM) 40 MG delayed-release capsule   • ferrous sulfate 325 (65 FE) MG tablet   • montelukast (SINGULAIR) 10 MG Tab   • albuterol (PROAIR HFA) 108 (90 Base) MCG/ACT Aero Soln inhalation aerosol   • MethylPREDNISolone (MEDROL DOSEPAK) 4 MG Tablet Therapy Pack   • clotrimazole (LOTRIMIN) 1 % Cream   • fluconazole (DIFLUCAN) 150 MG tablet   • Ketorolac Tromethamine (TORADOL ORAL PO)   • acetaminophen/caffeine/butalbital 325-40-50 mg (FIORICET) -40 MG Tab     No current facility-administered medications for this visit.        REVIEW OF SYSTEM:    Constitutional: Denies fevers, Denies weight changes   Eyes: Denies changes in vision, no eye pain   Ears/Nose/Throat/Mouth: Denies nasal congestion or sore throat   Cardiovascular: Denies chest pain or palpitations   Respiratory: sleep apnea--  Gastrointestinal/Hepatic: Denies abdominal pain, nausea, vomiting, diarrhea, constipation or GI bleeding   Genitourinary: Denies bladder dysfunction, dysuria or frequency   Musculoskeletal/Rheum: Denies joint pain and swelling   Skin/Breast: Denies rash, denies breast lumps or discharge   Neurological: Denies headache, confusion, memory loss or focal weakness/parasthesias   Psychiatric: denies mood disorder   Endocrine: denies hx of diabetes or thyroid dysfunction   Heme/Oncology/Lymph Nodes: Denies enlarged lymph  "nodes, denies brusing or known bleeding disorder   Allergic/Immunologic: Denies hx of allergies   All other systems were reviewed and are negative (AMA/CMS criteria)       PHYSICAL AND NEUROLOGICAL EXMAINATIONS:  VITAL SIGNS: /80 (BP Location: Right arm, Patient Position: Sitting, BP Cuff Size: Adult)   Pulse 70   Temp 36.5 °C (97.7 °F) (Temporal)   Resp 18   Ht 1.803 m (5' 11\")   Wt (!) 130 kg (286 lb 9.6 oz)   SpO2 92%   BMI 39.97 kg/m²   CURRENT WEIGHT:   BMI: Body mass index is 39.97 kg/m².  PREVIOUS WEIGHTS:  Wt Readings from Last 25 Encounters:   09/26/18 (!) 130 kg (286 lb 9.6 oz)   06/10/18 122 kg (269 lb)   05/12/18 124.3 kg (274 lb)   01/05/18 100.7 kg (222 lb)   11/24/17 93.4 kg (206 lb)   09/23/17 93 kg (205 lb)   08/07/17 89.4 kg (197 lb)   05/23/17 89.8 kg (198 lb)   05/05/17 79.4 kg (175 lb)   03/18/17 79.4 kg (175 lb)   03/08/17 77.6 kg (171 lb)   12/07/16 80.3 kg (177 lb)   12/07/16 80 kg (176 lb 5.9 oz)   10/19/16 94.3 kg (208 lb)   10/02/16 93 kg (205 lb 0.4 oz)   09/30/16 98 kg (216 lb)   09/02/16 98 kg (216 lb)   08/06/16 102.1 kg (225 lb)   07/25/16 111.1 kg (245 lb)   06/13/16 106.1 kg (234 lb)   06/03/16 110 kg (242 lb 8.1 oz)   05/02/16 113.4 kg (250 lb)   03/29/16 113.4 kg (250 lb)   03/18/16 113.4 kg (250 lb)   02/19/16 111.1 kg (245 lb)       General appearance of patient: WDWN(+) NAD(+)    EYES  o Fundus : Papilledem(-) Exudates(-) Hemorrhage(-)  Nervous System  Orientation to time, place and person(+)  Memory normal(+)  Language: aphasia(-)  Knowledge: past(+) Current(+)  Attention(+)  Cranial Nerves  • Nerve 2: intact  • Nerve 3,4,6: intact  • Nerve 5 : intact  • Nerve 7: intact  • Nerve 8: intact  • Nerve 9 & 10: intact  • Nerve 11: intact  • Nerve 12: intact  Muscle Power and muscle tone: symmetric, normal in upper and lower  Sensory System: Pin sensation intact(+)  Reflexes: symmetric throughout  Cerebellar Function FNP normal   Gait : Steady(+) TandemGait " steady(+)  Heart and Vascular  Peripheral Vasucular system : Edema (-) Swelling(-)  RHB, Breathing sound clear  abdomen bowel sound normoactive  Extremities freely moveable  Joints no contracture       NEUROIMAGING: I reviewed the MRI/CT of brain       LAB:      Seeing pain specialist      IMPRESSION:    1. Intractable Daily headache-- not responsive to Botox years ago, Rebound Headache  2. Hx of psychogenic seizures, seizures, Bipolar, O2 dependent ( asthma), Overweight, Steroid for asthma,  Chronic Morphine Use for Pain/Headache ( 5+ years), Hx of stroke     PLAN/RECOMMENDATIONS:      A. Advise cut down on pain medicine-- including over the counter pain pills-- the less rescue medicine, the less likely to develop rebound headache  The goal is to limit all rescue medicine to less than 2# per week. The first 2 weeks of medicine withdrawal would be tough  200+ over the counter pain pills every month---    B. Advise weight loss, exercise regularly, avoid skipping meals, avoid sleep deprivation, avoid stress...avoid too much coffee/tea/soda ( one cup per day is the upper limit)    C. Try preventive medicine, it would take two weeks( at least) to evaluate the effects of any preventive medicine-- please call us if any side effects, we could change to another preventive medicine on the phone    Continue Elio SPEAR ONB- occipital nerve block and or botox injection may be an option in the future too-- it would take a couple of trials before we can conclude that these injections does not help either    ________________________________________________________________________      This time, let us try nerve blocks first  -- multiple nerves blocks-- without steroid  Also, stop taking over the counter pain medication   No change on Narcotic and Fiorcet yet  We will repeat EEG, Blood Tests  (MRI of brain needs sedation and I would hold for now),  Regarding the new medication Aimovig--I will think it over-- maybe the patient  is a candidate for this new medication    I do advise the patient to write down all medical issues in a list and give it to us    ________________________________________________________________________      The [place]. Food and Drug Administration today approved Aimovig ([id number]) for the preventive treatment of migraine in adults. The treatment is given by once-monthly self-injections. Aimovig is the first FDA-approved preventive migraine treatment in a new class of drugs that work by blocking the activity of calcitonin gene-related peptide, a molecule that is involved in migraine attacks.   ________________________________________________________________________      MRI of brain 2011    1.  Thin gyriform regions of cortical acute infarction near the vertex in a parasagittal location bilaterally in the distribution of the anterior cerebral and pericallosal arteries.    2.  Small amount of subarachnoid hemorrhage noted in the sulci near the vertex bilaterally in a parasagittal distribution.    3.  Rare scattered punctate areas of increased T2 signal intensity in the periventricular white matter consistent with small areas of ischemia, demyelination, or gliosis.    4.  These findings can be seen in reversible cerebral vasoconstrictive syndrome or secondary to an intracranial vasculitis.      SIGNATURE:  Lizz Watt          CC:  Jonathan Larson M.D.

## 2018-09-27 ENCOUNTER — TELEPHONE (OUTPATIENT)
Dept: NEUROLOGY | Facility: MEDICAL CENTER | Age: 43
End: 2018-09-27

## 2018-09-27 NOTE — TELEPHONE ENCOUNTER
Results for LEE SCHNEIDER (MRN [id number]) as of 9/27/2018 15:59   Ref. Range 9/26/2018 11:32   25-Hydroxy   Vitamin D 25 Latest Ref Range: 30 - 100 ng/mL 23 (L)   Pth, Intact Latest Ref Range: 14.0 - 72.0 pg/mL 93.7 (H)       Due to Vit D deficiency, please take vit D-3   4000unit    daily

## 2018-09-28 LAB
CARDIOLIPIN IGA SER IA-ACNC: 3 APL (ref 0–11)
CARDIOLIPIN IGG SER IA-ACNC: 3 GPL (ref 0–14)
CARDIOLIPIN IGM SER IA-ACNC: 9 MPL (ref 0–12)
ENA SS-B IGG SER IA-ACNC: 0 AU/ML (ref 0–40)
NUCLEAR IGG SER QL IA: NORMAL
SSA52 R0ENA AB IGG Q0420: 3 AU/ML (ref 0–40)
SSA60 R0ENA AB IGG Q0419: 2 AU/ML (ref 0–40)
THYROGLOB AB SERPL-ACNC: <0.9 IU/ML (ref 0–4)

## 2018-09-29 LAB — VIT B6 SERPL-MCNC: 199.4 NMOL/L (ref 20–125)

## 2018-10-01 LAB — CRYOGLOB SER QL 3D COLD INC: NORMAL

## 2018-10-04 ENCOUNTER — APPOINTMENT (OUTPATIENT)
Dept: NEUROLOGY | Facility: MEDICAL CENTER | Age: 43
End: 2018-10-04
Payer: MEDICAID

## 2018-10-30 ENCOUNTER — OFFICE VISIT (OUTPATIENT)
Dept: NEUROLOGY | Facility: MEDICAL CENTER | Age: 43
End: 2018-10-30
Payer: MEDICAID

## 2018-10-30 VITALS
SYSTOLIC BLOOD PRESSURE: 134 MMHG | TEMPERATURE: 97.2 F | OXYGEN SATURATION: 93 % | DIASTOLIC BLOOD PRESSURE: 80 MMHG | HEART RATE: 84 BPM | HEIGHT: 71 IN | WEIGHT: 288 LBS | BODY MASS INDEX: 40.32 KG/M2

## 2018-10-30 DIAGNOSIS — G44.40 MEDICATION OVERUSE HEADACHE: ICD-10-CM

## 2018-10-30 PROCEDURE — 64450 NJX AA&/STRD OTHER PN/BRANCH: CPT | Mod: 50 | Performed by: PHYSICIAN ASSISTANT

## 2018-10-30 PROCEDURE — 64405 NJX AA&/STRD GR OCPL NRV: CPT | Mod: 50 | Performed by: PHYSICIAN ASSISTANT

## 2018-10-30 PROCEDURE — S0020 INJECTION, BUPIVICAINE HYDRO: HCPCS | Performed by: PHYSICIAN ASSISTANT

## 2018-10-30 RX ORDER — BUPIVACAINE HYDROCHLORIDE 5 MG/ML
10 INJECTION, SOLUTION EPIDURAL; INTRACAUDAL ONCE
Status: COMPLETED | OUTPATIENT
Start: 2018-10-30 | End: 2018-10-30

## 2018-10-30 RX ORDER — TRIAMCINOLONE ACETONIDE 40 MG/ML
80 INJECTION, SUSPENSION INTRA-ARTICULAR; INTRAMUSCULAR ONCE
Status: COMPLETED | OUTPATIENT
Start: 2018-10-30 | End: 2018-10-30

## 2018-10-30 RX ADMIN — TRIAMCINOLONE ACETONIDE 80 MG: 40 INJECTION, SUSPENSION INTRA-ARTICULAR; INTRAMUSCULAR at 15:52

## 2018-10-30 RX ADMIN — BUPIVACAINE HYDROCHLORIDE 10 ML: 5 INJECTION, SOLUTION EPIDURAL; INTRACAUDAL at 15:52

## 2018-10-30 NOTE — PROGRESS NOTES
KARLIB procedure in est patient of Dr. Shukri DE LA ROSA performed a bilateral occipital nerve block in clinic today, injecting bupivacaine [5] cc and triamcinolone [40] mg in both sides.    Prior to performing the procedure, the risks and side-effects were discussed with patient including risk for infection, pain, loss of hair, worsening of symptoms.    The patient tolerated the procedure well; there were no complications.    Pt was encouraged to schedule a follow up with their regular neurologist.

## 2018-12-24 ENCOUNTER — APPOINTMENT (OUTPATIENT)
Dept: NEUROLOGY | Facility: MEDICAL CENTER | Age: 43
End: 2018-12-24
Payer: MEDICAID

## 2019-01-03 ENCOUNTER — APPOINTMENT (OUTPATIENT)
Dept: NEUROLOGY | Facility: MEDICAL CENTER | Age: 44
End: 2019-01-03
Payer: MEDICAID

## 2019-01-05 ENCOUNTER — OFFICE VISIT (OUTPATIENT)
Dept: URGENT CARE | Facility: PHYSICIAN GROUP | Age: 44
End: 2019-01-05
Payer: MEDICAID

## 2019-01-05 VITALS
RESPIRATION RATE: 16 BRPM | TEMPERATURE: 98 F | DIASTOLIC BLOOD PRESSURE: 88 MMHG | SYSTOLIC BLOOD PRESSURE: 138 MMHG | WEIGHT: 289 LBS | BODY MASS INDEX: 40.31 KG/M2 | HEART RATE: 106 BPM | OXYGEN SATURATION: 91 %

## 2019-01-05 DIAGNOSIS — B37.9 ANTIBIOTIC-INDUCED YEAST INFECTION: ICD-10-CM

## 2019-01-05 DIAGNOSIS — J22 LOWER RESPIRATORY INFECTION: ICD-10-CM

## 2019-01-05 DIAGNOSIS — T36.95XA ANTIBIOTIC-INDUCED YEAST INFECTION: ICD-10-CM

## 2019-01-05 DIAGNOSIS — B02.9 HERPES ZOSTER WITHOUT COMPLICATION: ICD-10-CM

## 2019-01-05 DIAGNOSIS — N39.0 ACUTE URINARY TRACT INFECTION: ICD-10-CM

## 2019-01-05 LAB
APPEARANCE UR: NORMAL
BILIRUB UR STRIP-MCNC: NORMAL MG/DL
COLOR UR AUTO: NORMAL
GLUCOSE UR STRIP.AUTO-MCNC: NORMAL MG/DL
KETONES UR STRIP.AUTO-MCNC: NORMAL MG/DL
LEUKOCYTE ESTERASE UR QL STRIP.AUTO: NORMAL
NITRITE UR QL STRIP.AUTO: NORMAL
PH UR STRIP.AUTO: 6 [PH] (ref 5–8)
PROT UR QL STRIP: NORMAL MG/DL
RBC UR QL AUTO: NORMAL
SP GR UR STRIP.AUTO: 1.02
UROBILINOGEN UR STRIP-MCNC: 0.2 MG/DL

## 2019-01-05 PROCEDURE — 81002 URINALYSIS NONAUTO W/O SCOPE: CPT | Performed by: FAMILY MEDICINE

## 2019-01-05 PROCEDURE — 99214 OFFICE O/P EST MOD 30 MIN: CPT | Mod: 25 | Performed by: FAMILY MEDICINE

## 2019-01-05 RX ORDER — VALACYCLOVIR HYDROCHLORIDE 1 G/1
TABLET, FILM COATED ORAL
Qty: 21 TAB | Refills: 0 | Status: SHIPPED | OUTPATIENT
Start: 2019-01-05 | End: 2019-05-11

## 2019-01-05 RX ORDER — MORPHINE SULFATE 15 MG/1
7.5 TABLET ORAL 3 TIMES DAILY
COMMUNITY
End: 2021-03-19

## 2019-01-05 RX ORDER — FLUCONAZOLE 150 MG/1
TABLET ORAL
Qty: 2 TAB | Refills: 0 | Status: SHIPPED | OUTPATIENT
Start: 2019-01-05 | End: 2019-03-15 | Stop reason: SDUPTHER

## 2019-01-05 RX ORDER — LEVOFLOXACIN 500 MG/1
TABLET, FILM COATED ORAL
Qty: 10 TAB | Refills: 0 | Status: SHIPPED | OUTPATIENT
Start: 2019-01-05 | End: 2019-05-11

## 2019-01-05 NOTE — PROGRESS NOTES
Chief Complaint:    Chief Complaint   Patient presents with   • Rash     started this AM/ face/ B arms/    • UTI     burning       History of Present Illness:    She is here for multiple issues.    She has dysuria for less than a week. She feels she could have UTI.    She has recently coughed up some bloody and purulent mucus and feels she likely has a lung infection.    She has lesions on her right scalp she feels is shingles outbreak.    She reports she has had fever. She can tolerate Levaquin and Valtrex.    She may get vaginal yeast infection with antibiotic use. She can tolerate Diflucan.      Review of Systems:    Constitutional: See HPI.  Eyes: Negative for change in vision, photophobia, pain, redness, and discharge.  ENT: Negative for ear pain, ear discharge, hearing loss, tinnitus, nasal congestion, nosebleeds, and sore throat.    Respiratory: See HPI.  Cardiovascular: Negative for chest pain, palpitations, orthopnea, claudication, leg swelling, and PND.   Gastrointestinal: Negative for abdominal pain, nausea, vomiting, diarrhea, constipation, blood in stool, and melena.   Genitourinary: See HPI.   Musculoskeletal: No new symptoms.  Skin: See HPI.  Neurological: No new symptoms.  Endo: Negative for polydipsia.   Heme: Does not bruise/bleed easily.   Psychiatric/Behavioral: No new symptoms.      Past Medical History:    Past Medical History:   Diagnosis Date   • Acute blood loss anemia 5/2/2013    -resolved, postop 2013    • Acute renal failure (ARF) (HCC) 10/5/2011    -resolved (post op 2013)    • Anemia    • Anxiety    • Arthritis     osteoarthritis since 17yo.   • ASTHMA     O2 3liters at HS and prn   • Bipolar affective (HCC)    • Breath shortness    • Cold    • Depression    • Eclampsia complicating pregnancy    • Environmental allergies    • Essential hypertension, malignant 9/28/2011   • Fibromyalgia    • GERD (gastroesophageal reflux disease)    • Heart murmur     she denies this hx   •  "History of pregnancy 10/16/2014        • Hx MRSA infection    • Hyperlipidemia 14    \"off medication\"   • Hypertension    • Kidney stones    • Migraines    • Pain 15    \"my body hurts all the time\", 5-6/10   • Personality disorder ([facility])    • Pneumonia    • PTSD (post-traumatic stress disorder)    • Scalp wound 2014   • Seizure ([facility]) 14    last    • Sleep apnea     has had a sleep study, use O2 at HS   • Snoring    • Stroke ([facility])      reports strokes x5 , and a \"brain bleed\"   • Subarachnoid hemorrhage ([facility]) 2011    -small,  (2nd to HTN)    • Unspecified hemorrhagic conditions     nose-bleeds, bruises easily   • Unspecified urinary incontinence    • Urinary bladder disorder      Past Surgical History:    Past Surgical History:   Procedure Laterality Date   • ANKLE ORIF Right 2016    Procedure: ANKLE ORIF;  Surgeon: Bill Brambila M.D.;  Location: [facility];  Service:    • IRRIGATION & DEBRIDEMENT GENERAL  2014    Performed by Ezra Wright M.D. at [facility]   • BREAST BIOPSY  2014    Performed by Negro Hester M.D. at SURGERY SAME DAY [facility]   • EVACUATION OF HEMATOMA  2013    Performed by Lazaro Connors Jr., M.D. at [facility]   • MAMMOPLASTY REDUCTION  2013    Performed by Negro Hester M.D. at [facility]   • RECOVERY  2012    Performed by BENEDICT IR-RECOVERY at [facility]   • RECOVERY  10/5/2011    Performed by MARIAELENA MCMULLENON at [facility]   • HYSTERECTOMY LAPAROSCOPY      W BSO   • KNEE RECONSTRUCTION     • LAMINOTOMY     • OTHER ORTHOPEDIC SURGERY      maddie. knee scopes   • OTHER ORTHOPEDIC SURGERY      back fusion then hardware removal   • PB EXPLORATION OF SPINAL FUSION     • PB REMOVAL OF OVARY(S)       Social History:    Social History     Social History   • Marital status:      Spouse name: N/A   • Number of children: " N/A   • Years of education: N/A     Occupational History   • Not on file.     Social History Main Topics   • Smoking status: Never Smoker   • Smokeless tobacco: Never Used   • Alcohol use No   • Drug use: No   • Sexual activity: Not on file     Other Topics Concern   • Not on file     Social History Narrative   • No narrative on file     Family History:    Family History   Problem Relation Age of Onset   • Hypertension Mother    • Hyperlipidemia Mother    • Hypertension Father    • Hyperlipidemia Father    • Heart Disease Father    • Psychiatry Father    • Alcohol/Drug Father    • Alcohol/Drug Brother    • Arthritis Maternal Uncle    • Psychiatry Maternal Uncle    • Heart Disease Maternal Uncle    • Hypertension Maternal Uncle    • Hyperlipidemia Maternal Uncle    • Genetic Paternal Aunt    • Psychiatry Paternal Uncle    • Arthritis Maternal Grandmother    • Heart Disease Maternal Grandmother    • Alcohol/Drug Maternal Grandfather    • Stroke Maternal Grandfather    • Psychiatry Maternal Grandfather    • Arthritis Paternal Grandmother    • Alcohol/Drug Paternal Grandfather      Medications:    Current Outpatient Prescriptions on File Prior to Visit   Medication Sig Dispense Refill   • albuterol (PROAIR HFA) 108 (90 Base) MCG/ACT Aero Soln inhalation aerosol ProAir HFA 90 mcg/actuation aerosol inhaler     • morphine (DEBI) 30 MG SR capsule Take 30 mg by mouth 3 times a day.     • zolpidem (AMBIEN) 5 MG Tab TAKE ONE TABLET BY MOUTH EVERY NIGHT AT BEDTIME FOR 30 DAYS  0   • clotrimazole (LOTRIMIN) 1 % Cream Apply to affected area 3-4 times daily 1 Tube 0   • guaifenesin LA (MUCINEX) 600 MG TABLET SR 12 HR Take 600 mg by mouth every 12 hours.     • Ketorolac Tromethamine (TORADOL ORAL PO) Take  by mouth.     • acetaminophen/caffeine/butalbital 325-40-50 mg (FIORICET) -40 MG Tab Take 1 Tab by mouth 2 times a day as needed. for refractory headache. Maximum use is 4 days per week. One month supply. 30 Tab 0   •  "baclofen (LIORESAL) 10 MG Tab Take 1 Tab by mouth 3 times a day as needed. as needed for muscle spasms 90 Tab 2   • hydrOXYzine (ATARAX) 50 MG Tab Take 1 Tab by mouth 2 times a day as needed. for anxiety. 60 Tab 2   • divalproex (DEPAKOTE) 500 MG Tablet Delayed Response Take 2 Tabs by mouth every 12 hours. (Patient taking differently: Take 1,500 mg by mouth every day.) 120 Tab 3   • paroxetine (PAXIL) 30 MG Tab Take 30 mg by mouth every day.     • propranolol (INDERAL) 20 MG Tab Take 20 mg by mouth 2 times a day.     • albuterol (PROVENTIL) 2.5mg/0.5ml Nebu Soln 2.5 mg by Nebulization route every 6 hours as needed for Shortness of Breath.     • cetirizine (ZYRTEC) 10 MG Tab Take 10 mg by mouth every day.     • esomeprazole (NEXIUM) 40 MG delayed-release capsule Take 40 mg by mouth every morning before breakfast.     • ferrous sulfate 325 (65 FE) MG tablet Take 325 mg by mouth every day.     • montelukast (SINGULAIR) 10 MG Tab Take 10 mg by mouth every bedtime.       No current facility-administered medications on file prior to visit.      Morphine 15 mg IR    Allergies:    Allergies   Allergen Reactions   • Compazine      \"psychotic reaction\"   • Imitrex [Sumatriptan Succinate]      Had brain bleed   • Inapsine [Droperidol]      \"psychotic reaction\"   • Maxalt [Rizatriptan Benzoate]      Had brain bleed   • Phenergan [Promethazine Hcl]      \"psychotic reaction\"   • Xanax [Alprazolam]      Sores and dry mouth, many others pt cannot remember   • Zantac      Stomach pain   • Apple Cider Vinegar Rash     Patient states she gets rash   • Butrans [Buprenorphine]    • Clarithromycin Vomiting and Palpitations   • Dilaudid [Hydromorphone] Rash     Patient states she had rash, hallucinations, unable to urinate.    • Doxycycline    • Effexor [Venlafaxine]      \"Really depressed, like i wanted to hurt myself\"   • Eucalyptus Oil    • Kiwi Extract    • Latex Rash   • Lyrica [Pregabalin]      \"depression, slept a lot\"   • " "Minocycline Vomiting     \"any cyclines\"   • Patchouli Oil Rash     Rash    • Sulfa Drugs    • Tea Tree Oil Rash     Break out in rash   • Topiramate Nausea     Patient states made sick to stomach and dizzy.       Vitals:    Vitals:    01/05/19 1103   BP: 138/88   Pulse: (!) 106   Resp: 16   Temp: 36.7 °C (98 °F)   TempSrc: Temporal   SpO2: 91%   Weight: (!) 131.1 kg (289 lb)       Physical Exam:    Constitutional: Vital signs reviewed. Appears well-developed and well-nourished. No acute distress.   Eyes: Sclera white, conjunctivae clear.  ENT: External ears normal. Hearing normal. Nasal mucosa pink. Lips/teeth are normal. Oral mucosa pink and moist. Posterior pharynx: WNL.  Neck: Neck supple.   Cardiovascular: Tachycardic. Regular rhythm. No murmur.  Pulmonary/Chest: Respirations non-labored. Clear to auscultation bilaterally.  Abdomen: Bowel sounds are normal active. Soft, non-distended, and non-tender to palpation.  Neurological: Alert and oriented to person, place, and time. Muscle tone normal. Coordination normal.   Skin: Multiple focal, what looks like skin wounds, right scalp in a linear distribution. No lesions on left scalp.  Psychiatric: Normal mood and affect. Behavior is normal. Judgment and thought content normal.     Diagnostics:    POCT URINALYSIS (Order #[id number]) on 1/5/19   Component Results     Component Value Ref Range & Units Status   POC Color Dark yellow  Negative Final   POC Appearance slightly cloudy  Negative Final   POC Leukocyte Esterase trace  Negative Final   POC Nitrites neg  Negative Final   POC Urobiligen 0.2  Negative (0.2) mg/dL Final   POC Protein trace  Negative mg/dL Final   POC Urine PH 6.0  5.0 - 8.0 Final   POC Blood neg  Negative Final   POC Specific Gravity 1.025  <1.005 - >1.030 Final   POC Ketones neg  Negative mg/dL Final   POC Bilirubin neg  Negative mg/dL Final   POC Glucose neg  Negative mg/dL Final   Last Resulted Time   Sat Jan 5, 2019 12:02 PM       Assessment / " Plan:    1. Acute urinary tract infection  - POCT Urinalysis  - levoFLOXacin (LEVAQUIN) 500 MG tablet; 1 TAB ONCE A DAY X 7 DAYS.  Dispense: 10 Tab; Refill: 0    2. Lower respiratory infection  - levoFLOXacin (LEVAQUIN) 500 MG tablet; 1 TAB ONCE A DAY X 7 DAYS.  Dispense: 10 Tab; Refill: 0    3. Herpes zoster without complication  - valacyclovir (VALTREX) 1 GM Tab; 1 TAB BY MOUTH 3 TIMES A DAY X 7 DAYS FOR SHINGLES.  Dispense: 21 Tab; Refill: 0    4. Antibiotic-induced yeast infection  - fluconazole (DIFLUCAN) 150 MG tablet; 1 TAB BY MOUTH X 1 DOSE. MAY REPEAT IN 3 DAYS IF NEEDED FOR YEAST INFECTION.  Dispense: 2 Tab; Refill: 0      Discussed with her DDX, management options, and risks, benefits, and alternatives to treatment plan agreed upon.    We do not have imaging here.    She wants to treat for UTI, lung infection, and shingles.    Agreeable to medications prescribed.    Discussed expected course of duration, time for improvement, and to seek follow-up in Emergency Room, urgent care, or with PCP if getting worse at any time or not improving within expected time frame.

## 2019-03-15 ENCOUNTER — OFFICE VISIT (OUTPATIENT)
Dept: URGENT CARE | Facility: PHYSICIAN GROUP | Age: 44
End: 2019-03-15
Payer: MEDICAID

## 2019-03-15 ENCOUNTER — HOSPITAL ENCOUNTER (OUTPATIENT)
Facility: MEDICAL CENTER | Age: 44
End: 2019-03-15
Attending: PHYSICIAN ASSISTANT
Payer: MEDICAID

## 2019-03-15 VITALS
WEIGHT: 289 LBS | SYSTOLIC BLOOD PRESSURE: 138 MMHG | TEMPERATURE: 97 F | HEART RATE: 92 BPM | BODY MASS INDEX: 40.46 KG/M2 | OXYGEN SATURATION: 93 % | HEIGHT: 71 IN | DIASTOLIC BLOOD PRESSURE: 92 MMHG | RESPIRATION RATE: 16 BRPM

## 2019-03-15 DIAGNOSIS — B37.9 ANTIBIOTIC-INDUCED YEAST INFECTION: ICD-10-CM

## 2019-03-15 DIAGNOSIS — N30.01 ACUTE CYSTITIS WITH HEMATURIA: ICD-10-CM

## 2019-03-15 DIAGNOSIS — R30.0 DYSURIA: ICD-10-CM

## 2019-03-15 DIAGNOSIS — T36.95XA ANTIBIOTIC-INDUCED YEAST INFECTION: ICD-10-CM

## 2019-03-15 LAB
APPEARANCE UR: NORMAL
BILIRUB UR STRIP-MCNC: NORMAL MG/DL
COLOR UR AUTO: NORMAL
GLUCOSE UR STRIP.AUTO-MCNC: NORMAL MG/DL
KETONES UR STRIP.AUTO-MCNC: NORMAL MG/DL
LEUKOCYTE ESTERASE UR QL STRIP.AUTO: NORMAL
NITRITE UR QL STRIP.AUTO: NORMAL
PH UR STRIP.AUTO: 7 [PH] (ref 5–8)
PROT UR QL STRIP: NORMAL MG/DL
RBC UR QL AUTO: NORMAL
SP GR UR STRIP.AUTO: 1.02
UROBILINOGEN UR STRIP-MCNC: NORMAL MG/DL

## 2019-03-15 PROCEDURE — 99214 OFFICE O/P EST MOD 30 MIN: CPT | Mod: 25 | Performed by: PHYSICIAN ASSISTANT

## 2019-03-15 PROCEDURE — 87086 URINE CULTURE/COLONY COUNT: CPT

## 2019-03-15 PROCEDURE — 81002 URINALYSIS NONAUTO W/O SCOPE: CPT | Performed by: PHYSICIAN ASSISTANT

## 2019-03-15 RX ORDER — NITROFURANTOIN 25; 75 MG/1; MG/1
100 CAPSULE ORAL EVERY 12 HOURS
Qty: 10 CAP | Refills: 0 | Status: SHIPPED | OUTPATIENT
Start: 2019-03-15 | End: 2019-03-20

## 2019-03-15 RX ORDER — FLUCONAZOLE 150 MG/1
TABLET ORAL
Qty: 2 TAB | Refills: 1 | Status: SHIPPED | OUTPATIENT
Start: 2019-03-15 | End: 2019-05-11

## 2019-03-15 RX ORDER — PHENAZOPYRIDINE HYDROCHLORIDE 200 MG/1
200 TABLET, FILM COATED ORAL 3 TIMES DAILY
Qty: 6 TAB | Refills: 0 | Status: SHIPPED | OUTPATIENT
Start: 2019-03-15 | End: 2019-03-17

## 2019-03-15 NOTE — PROGRESS NOTES
Chief Complaint   Patient presents with   • UTI       HISTORY OF PRESENT ILLNESS: Patient is a 43 y.o. female who presents today because she has a 3-5-day history of multiple urinary symptoms including pain with urination, lower abdominal discomfort and foul odor to her urine.  She also has vaginal itching and burning sensation.  She has not been taking any medications for her current symptoms.    Patient Active Problem List    Diagnosis Date Noted   • Morbid obesity ([facility]) 06/25/2015     Priority: Medium   • Closed fracture of one rib 06/25/2015     Priority: Medium   • Lumbar transverse process fracture ([facility]) 06/25/2015     Priority: Medium   • Chronic pain 08/18/2014     Priority: Medium   • AVA (obstructive sleep apnea), intolerant of CPAP 07/08/2013     Priority: Medium   • Seizure disorder ([facility]) 10/05/2011     Priority: Medium   • HTN (hypertension) 10/04/2011     Priority: Medium   • Depression 09/28/2011     Priority: Medium   • Bipolar 2 disorder ([facility]) 09/28/2011     Priority: Medium   • Chronic migraine 09/28/2011     Priority: Medium   • Asthma 09/28/2011     Priority: Medium   • Hyperlipidemia 08/18/2014     Priority: Low   • GERD (gastroesophageal reflux disease) 09/28/2011     Priority: Low   • Medication overuse headache 10/30/2018   • Vitamin deficiency 09/26/2018   • Obesity (BMI 30-39.9) 01/05/2018   • Personality disorder 10/04/2016   • Dystonia 10/04/2016   • Chest pain 10/03/2016   • Ankle fracture, right 10/03/2016   • Vaginal yeast infection 10/03/2016   • PTSD (post-traumatic stress disorder) 10/02/2016   • Depression 08/07/2016   • Chronic pain 08/07/2016   • HTN (hypertension), benign 08/07/2016   • Pseudoseizure 08/06/2016   • Fracture of ankle, trimalleolar, closed 08/06/2016   • Controlled substance agreement signed 02/19/2016   • Rib fracture 06/25/2015   • Benign hypertensive heart disease without heart failure 10/16/2014   • Mixed hyperlipidemia 10/16/2014   • Open scalp wound  10/16/2014   • Lump or mass in breast 05/29/2014   • Headache 02/19/2014   • Neck pain 02/19/2014   • Fibromyalgia 02/19/2014   • Obese 07/08/2013   • Oxygen dependent, since 2011 07/08/2013   • Hypertrophy of breast 04/29/2013       Allergies:Compazine; Imitrex [sumatriptan succinate]; Inapsine [droperidol]; Maxalt [rizatriptan benzoate]; Phenergan [promethazine hcl]; Xanax [alprazolam]; Zantac; Apple cider vinegar; Butrans [buprenorphine]; Clarithromycin; Dilaudid [hydromorphone]; Doxycycline; Effexor [venlafaxine]; Eucalyptus oil; Kiwi extract; Latex; Lyrica [pregabalin]; Minocycline; Patchouli oil; Sulfa drugs; Tea tree oil; and Topiramate    Current Outpatient Prescriptions Ordered in [facility]   Medication Sig Dispense Refill   • nitrofurantoin monohydr macro (MACROBID) 100 MG Cap Take 1 Cap by mouth every 12 hours for 5 days. 10 Cap 0   • phenazopyridine (PYRIDIUM) 200 MG Tab Take 1 Tab by mouth 3 times a day for 2 days. 6 Tab 0   • fluconazole (DIFLUCAN) 150 MG tablet 1 TAB BY MOUTH X 1 DOSE. MAY REPEAT IN 3 DAYS IF NEEDED FOR YEAST INFECTION. 2 Tab 1   • levoFLOXacin (LEVAQUIN) 500 MG tablet 1 TAB ONCE A DAY X 7 DAYS. 10 Tab 0   • valacyclovir (VALTREX) 1 GM Tab 1 TAB BY MOUTH 3 TIMES A DAY X 7 DAYS FOR SHINGLES. 21 Tab 0   • morphine (MS IR) 15 MG tablet Take 15 mg by mouth every four hours as needed for Severe Pain.     • albuterol (PROAIR HFA) 108 (90 Base) MCG/ACT Aero Soln inhalation aerosol ProAir HFA 90 mcg/actuation aerosol inhaler     • morphine (DEBI) 30 MG SR capsule Take 30 mg by mouth 3 times a day.     • zolpidem (AMBIEN) 5 MG Tab TAKE ONE TABLET BY MOUTH EVERY NIGHT AT BEDTIME FOR 30 DAYS  0   • clotrimazole (LOTRIMIN) 1 % Cream Apply to affected area 3-4 times daily 1 Tube 0   • guaifenesin LA (MUCINEX) 600 MG TABLET SR 12 HR Take 600 mg by mouth every 12 hours.     • Ketorolac Tromethamine (TORADOL ORAL PO) Take  by mouth.     • acetaminophen/caffeine/butalbital 325-40-50 mg (FIORICET)  "-40 MG Tab Take 1 Tab by mouth 2 times a day as needed. for refractory headache. Maximum use is 4 days per week. One month supply. 30 Tab 0   • baclofen (LIORESAL) 10 MG Tab Take 1 Tab by mouth 3 times a day as needed. as needed for muscle spasms 90 Tab 2   • hydrOXYzine (ATARAX) 50 MG Tab Take 1 Tab by mouth 2 times a day as needed. for anxiety. 60 Tab 2   • divalproex (DEPAKOTE) 500 MG Tablet Delayed Response Take 2 Tabs by mouth every 12 hours. (Patient taking differently: Take 1,500 mg by mouth every day.) 120 Tab 3   • paroxetine (PAXIL) 30 MG Tab Take 30 mg by mouth every day.     • propranolol (INDERAL) 20 MG Tab Take 20 mg by mouth 2 times a day.     • albuterol (PROVENTIL) 2.5mg/0.5ml Nebu Soln 2.5 mg by Nebulization route every 6 hours as needed for Shortness of Breath.     • cetirizine (ZYRTEC) 10 MG Tab Take 10 mg by mouth every day.     • esomeprazole (NEXIUM) 40 MG delayed-release capsule Take 40 mg by mouth every morning before breakfast.     • ferrous sulfate 325 (65 FE) MG tablet Take 325 mg by mouth every day.     • montelukast (SINGULAIR) 10 MG Tab Take 10 mg by mouth every bedtime.       No current Epic-ordered facility-administered medications on file.        Past Medical History:   Diagnosis Date   • Acute blood loss anemia 2013    -resolved, postop     • Acute renal failure (ARF) (HCC) 10/5/2011    -resolved (post op )    • Anemia    • Anxiety    • Arthritis     osteoarthritis since 17yo.   • ASTHMA     O2 3liters at HS and prn   • Bipolar affective (HCC)    • Breath shortness    • Cold    • Depression    • Eclampsia complicating pregnancy    • Environmental allergies    • Essential hypertension, malignant 2011   • Fibromyalgia    • GERD (gastroesophageal reflux disease)    • Heart murmur     she denies this hx   • History of pregnancy 10/16/2014        • Hx MRSA infection    • Hyperlipidemia 13    \"off medication\"   • Hypertension    • Kidney stones  " "  • Migraines    • Pain 14    \"my body hurts all the time\", -6/10   • Personality disorder ([facility])    • Pneumonia    • PTSD (post-traumatic stress disorder)    • Scalp wound 2014   • Seizure ([facility]) 14    last    • Sleep apnea     has had a sleep study, use O2 at HS   • Snoring    • Stroke ([facility])      reports strokes x5 , and a \"brain bleed\"   • Subarachnoid hemorrhage ([facility]) 2011    -small,  (2nd to HTN)    • Unspecified hemorrhagic conditions     nose-bleeds, bruises easily   • Unspecified urinary incontinence    • Urinary bladder disorder        Social History   Substance Use Topics   • Smoking status: Never Smoker   • Smokeless tobacco: Never Used   • Alcohol use No       Family Status   Relation Status   • Mo Alive   • Fa Alive   • Bro Alive   • MAunt Alive   • MUnc Alive   • PAunt    • PUnc    • MGMo Alive   • MGFa    • PGMo    • PGFa      Family History   Problem Relation Age of Onset   • Hypertension Mother    • Hyperlipidemia Mother    • Hypertension Father    • Hyperlipidemia Father    • Heart Disease Father    • Psychiatry Father    • Alcohol/Drug Father    • Alcohol/Drug Brother    • Arthritis Maternal Uncle    • Psychiatry Maternal Uncle    • Heart Disease Maternal Uncle    • Hypertension Maternal Uncle    • Hyperlipidemia Maternal Uncle    • Genetic Paternal Aunt    • Psychiatry Paternal Uncle    • Arthritis Maternal Grandmother    • Heart Disease Maternal Grandmother    • Alcohol/Drug Maternal Grandfather    • Stroke Maternal Grandfather    • Psychiatry Maternal Grandfather    • Arthritis Paternal Grandmother    • Alcohol/Drug Paternal Grandfather        ROS:  Review of Systems   Constitutional: Negative for fever, chills, weight loss and malaise/fatigue.   HENT: Negative for ear pain, nosebleeds, congestion, sore throat and neck pain.    Eyes: Negative for blurred vision.   Respiratory: Negative for cough, sputum production, " "shortness of breath and wheezing.    Cardiovascular: Negative for chest pain, palpitations, orthopnea and leg swelling.   Gastrointestinal: Negative for heartburn, nausea, vomiting and abdominal pain.   Genitourinary: Positive for dysuria, urgency and frequency.  Positive for vaginal itching and burning    Exam:  Blood pressure 138/92, pulse 92, temperature 36.1 °C (97 °F), temperature source Temporal, resp. rate 16, height 1.803 m (5' 11\"), weight (!) 131.1 kg (289 lb), SpO2 93 %, not currently breastfeeding.  General:  Well nourished, well developed female in NAD  Head:Normocephalic, atraumatic  Eyes: PERRLA, EOM within normal limits, no conjunctival injection, no scleral icterus, visual fields and acuity grossly intact.  Extremities: no clubbing, cyanosis, or edema.    Urinalysis has leukocyte esterase, protein and blood.    Please note that this dictation was created using voice recognition software. I have made every reasonable attempt to correct obvious errors, but I expect that there are errors of grammar and possibly content that I did not discover before finalizing the note.    Assessment/Plan:  1. Acute cystitis with hematuria  Urine Culture    nitrofurantoin monohydr macro (MACROBID) 100 MG Cap   2. Dysuria  POCT Urinalysis    phenazopyridine (PYRIDIUM) 200 MG Tab   3. Antibiotic-induced yeast infection  fluconazole (DIFLUCAN) 150 MG tablet       Followup with primary care in the next 7-10 days if not significantly improving, return to the urgent care or go to the emergency room sooner for any worsening of symptoms.       "

## 2019-03-17 LAB
BACTERIA UR CULT: NORMAL
SIGNIFICANT IND 70042: NORMAL
SITE SITE: NORMAL
SOURCE SOURCE: NORMAL

## 2019-03-20 ENCOUNTER — TELEPHONE (OUTPATIENT)
Dept: URGENT CARE | Facility: PHYSICIAN GROUP | Age: 44
End: 2019-03-20

## 2019-05-11 ENCOUNTER — OFFICE VISIT (OUTPATIENT)
Dept: URGENT CARE | Facility: PHYSICIAN GROUP | Age: 44
End: 2019-05-11
Payer: MEDICAID

## 2019-05-11 VITALS
SYSTOLIC BLOOD PRESSURE: 124 MMHG | DIASTOLIC BLOOD PRESSURE: 80 MMHG | WEIGHT: 289 LBS | OXYGEN SATURATION: 95 % | TEMPERATURE: 97.4 F | HEIGHT: 71 IN | HEART RATE: 92 BPM | BODY MASS INDEX: 40.46 KG/M2 | RESPIRATION RATE: 16 BRPM

## 2019-05-11 DIAGNOSIS — B02.9 HERPES ZOSTER WITHOUT COMPLICATION: ICD-10-CM

## 2019-05-11 DIAGNOSIS — T36.95XA ANTIBIOTIC-INDUCED YEAST INFECTION: ICD-10-CM

## 2019-05-11 DIAGNOSIS — B37.9 ANTIBIOTIC-INDUCED YEAST INFECTION: ICD-10-CM

## 2019-05-11 PROCEDURE — 99214 OFFICE O/P EST MOD 30 MIN: CPT | Performed by: FAMILY MEDICINE

## 2019-05-11 RX ORDER — VALACYCLOVIR HYDROCHLORIDE 1 G/1
TABLET, FILM COATED ORAL
Qty: 21 TAB | Refills: 0 | Status: SHIPPED | OUTPATIENT
Start: 2019-05-11 | End: 2019-05-24

## 2019-05-11 RX ORDER — FLUCONAZOLE 150 MG/1
TABLET ORAL
Qty: 2 TAB | Refills: 0 | Status: SHIPPED | OUTPATIENT
Start: 2019-05-11 | End: 2019-09-10

## 2019-05-11 RX ORDER — ACYCLOVIR 50 MG/G
OINTMENT TOPICAL
Qty: 30 G | Refills: 1 | Status: SHIPPED | OUTPATIENT
Start: 2019-05-11 | End: 2019-09-10

## 2019-05-11 NOTE — PROGRESS NOTES
Chief Complaint:    Chief Complaint   Patient presents with   • Herpes Zoster       History of Present Illness:    This is a new problem. She has moderately-severely painful skin areas on posterior midline and left scalp for few days. She feels it is shingles causing her symptoms. Valtrex worked/tolerated for similar problem in past. She also is asking for topical antiviral medication to rub on this. She reports her Dermatologist started her on Amoxil yesterday and she will take x 7 days. She is requesting Rx for Fluconazole as she gets vaginal yeast infections with antibiotic use.      Review of Systems:    Constitutional: Negative for fever, chills, and diaphoresis.   Eyes: Negative for change in vision, photophobia, pain, redness, and discharge.  ENT: Negative for ear pain, ear discharge, hearing loss, tinnitus, nasal congestion, nosebleeds, and sore throat.    Respiratory: No new symptoms.  Cardiovascular: Negative for chest pain, palpitations, orthopnea, claudication, leg swelling, and PND.   Gastrointestinal: Negative for abdominal pain, nausea, vomiting, diarrhea, constipation, blood in stool, and melena.   Genitourinary: Negative for dysuria, urinary urgency, urinary frequency, hematuria, and flank pain.   Musculoskeletal: No new symptoms.  Skin: See HPI.  Neurological: No new symptoms.  Endo: Negative for polydipsia.   Heme: Does not bruise/bleed easily.   Psychiatric/Behavioral: No new symptoms.        Past Medical History:    Past Medical History:   Diagnosis Date   • Acute blood loss anemia 5/2/2013    -resolved, postop 2013    • Acute renal failure (ARF) ([facility]) 10/5/2011    -resolved (post op 2013)    • Anemia    • Anxiety    • Arthritis     osteoarthritis since 17yo.   • ASTHMA     O2 3liters at HS and prn   • Bipolar affective ([facility])    • Breath shortness    • Cold    • Depression    • Eclampsia complicating pregnancy    • Environmental allergies    • Essential hypertension, malignant 9/28/2011  "  • Fibromyalgia    • GERD (gastroesophageal reflux disease)    • Heart murmur     she denies this hx   • History of pregnancy 10/16/2014        • Hx MRSA infection    • Hyperlipidemia 14    \"off medication\"   • Hypertension    • Kidney stones    • Migraines    • Pain 15    \"my body hurts all the time\", 5-6/10   • Personality disorder ([facility])    • Pneumonia    • PTSD (post-traumatic stress disorder)    • Scalp wound 2014   • Seizure ([facility]) 14    last    • Sleep apnea     has had a sleep study, use O2 at HS   • Snoring    • Stroke ([facility])      reports strokes x5 , and a \"brain bleed\"   • Subarachnoid hemorrhage ([facility]) 2011    -small,  (2nd to HTN)    • Unspecified hemorrhagic conditions     nose-bleeds, bruises easily   • Unspecified urinary incontinence    • Urinary bladder disorder      Past Surgical History:    Past Surgical History:   Procedure Laterality Date   • ANKLE ORIF Right 2016    Procedure: ANKLE ORIF;  Surgeon: Bill Brambila M.D.;  Location: [facility];  Service:    • IRRIGATION & DEBRIDEMENT GENERAL  2014    Performed by Ezra Wright M.D. at [facility]   • BREAST BIOPSY  2014    Performed by Negro Hester M.D. at SURGERY SAME DAY [facility]   • EVACUATION OF HEMATOMA  2013    Performed by Lazaro Connors Jr., M.D. at [facility]   • MAMMOPLASTY REDUCTION  2013    Performed by Negro Hester M.D. at [facility]   • RECOVERY  2012    Performed by SURGERY, IR-RECOVERY at [facility] SAME DAY [facility]   • RECOVERY  10/5/2011    Performed by MARIAELENA MCMULLENGoodridge at [facility]   • HYSTERECTOMY LAPAROSCOPY      W BSO   • KNEE RECONSTRUCTION     • LAMINOTOMY     • OTHER ORTHOPEDIC SURGERY      maddie. knee scopes   • OTHER ORTHOPEDIC SURGERY      back fusion then hardware removal   • PB EXPLORATION OF SPINAL FUSION     • PB REMOVAL OF OVARY(S)       Social " History:    Social History     Social History   • Marital status:      Spouse name: N/A   • Number of children: N/A   • Years of education: N/A     Occupational History   • Not on file.     Social History Main Topics   • Smoking status: Never Smoker   • Smokeless tobacco: Never Used   • Alcohol use No   • Drug use: No   • Sexual activity: Not on file     Other Topics Concern   • Not on file     Social History Narrative   • No narrative on file     Family History:    Family History   Problem Relation Age of Onset   • Hypertension Mother    • Hyperlipidemia Mother    • Hypertension Father    • Hyperlipidemia Father    • Heart Disease Father    • Psychiatry Father    • Alcohol/Drug Father    • Alcohol/Drug Brother    • Arthritis Maternal Uncle    • Psychiatry Maternal Uncle    • Heart Disease Maternal Uncle    • Hypertension Maternal Uncle    • Hyperlipidemia Maternal Uncle    • Genetic Paternal Aunt    • Psychiatry Paternal Uncle    • Arthritis Maternal Grandmother    • Heart Disease Maternal Grandmother    • Alcohol/Drug Maternal Grandfather    • Stroke Maternal Grandfather    • Psychiatry Maternal Grandfather    • Arthritis Paternal Grandmother    • Alcohol/Drug Paternal Grandfather      Medications:    Current Outpatient Prescriptions on File Prior to Visit   Medication Sig Dispense Refill   • morphine (MS IR) 15 MG tablet Take 15 mg by mouth every four hours as needed for Severe Pain.     • albuterol (PROAIR HFA) 108 (90 Base) MCG/ACT Aero Soln inhalation aerosol ProAir HFA 90 mcg/actuation aerosol inhaler     • morphine (DEBI) 30 MG SR capsule Take 30 mg by mouth 3 times a day.     • clotrimazole (LOTRIMIN) 1 % Cream Apply to affected area 3-4 times daily 1 Tube 0   • guaifenesin LA (MUCINEX) 600 MG TABLET SR 12 HR Take 600 mg by mouth every 12 hours.     • acetaminophen/caffeine/butalbital 325-40-50 mg (FIORICET) -40 MG Tab Take 1 Tab by mouth 2 times a day as needed. for refractory headache.  "Maximum use is 4 days per week. One month supply. 30 Tab 0   • baclofen (LIORESAL) 10 MG Tab Take 1 Tab by mouth 3 times a day as needed. as needed for muscle spasms 90 Tab 2   • hydrOXYzine (ATARAX) 50 MG Tab Take 1 Tab by mouth 2 times a day as needed. for anxiety. 60 Tab 2   • divalproex (DEPAKOTE) 500 MG Tablet Delayed Response Take 2 Tabs by mouth every 12 hours. (Patient taking differently: Take 1,500 mg by mouth every day.) 120 Tab 3   • paroxetine (PAXIL) 30 MG Tab Take 30 mg by mouth every day.     • propranolol (INDERAL) 20 MG Tab Take 20 mg by mouth 2 times a day.     • albuterol (PROVENTIL) 2.5mg/0.5ml Nebu Soln 2.5 mg by Nebulization route every 6 hours as needed for Shortness of Breath.     • cetirizine (ZYRTEC) 10 MG Tab Take 10 mg by mouth every day.     • esomeprazole (NEXIUM) 40 MG delayed-release capsule Take 40 mg by mouth every morning before breakfast.     • ferrous sulfate 325 (65 FE) MG tablet Take 325 mg by mouth every day.     • montelukast (SINGULAIR) 10 MG Tab Take 10 mg by mouth every bedtime.       No current facility-administered medications on file prior to visit.      Allergies:    Allergies   Allergen Reactions   • Compazine      \"psychotic reaction\"   • Imitrex [Sumatriptan Succinate]      Had brain bleed   • Inapsine [Droperidol]      \"psychotic reaction\"   • Maxalt [Rizatriptan Benzoate]      Had brain bleed   • Phenergan [Promethazine Hcl]      \"psychotic reaction\"   • Xanax [Alprazolam]      Sores and dry mouth, many others pt cannot remember   • Zantac      Stomach pain   • Apple Cider Vinegar Rash     Patient states she gets rash   • Butrans [Buprenorphine]    • Clarithromycin Vomiting and Palpitations   • Dilaudid [Hydromorphone] Rash     Patient states she had rash, hallucinations, unable to urinate.    • Doxycycline    • Effexor [Venlafaxine]      \"Really depressed, like i wanted to hurt myself\"   • Eucalyptus Oil    • Kiwi Extract    • Latex Rash   • Lyrica [Pregabalin]  " "    \"depression, slept a lot\"   • Minocycline Vomiting     \"any cyclines\"   • Patchouli Oil Rash     Rash    • Sulfa Drugs    • Tea Tree Oil Rash     Break out in rash   • Topiramate Nausea     Patient states made sick to stomach and dizzy.       Vitals:    Vitals:    05/11/19 1319   BP: 124/80   BP Location: Left arm   Patient Position: Sitting   BP Cuff Size: Large adult   Pulse: 92   Resp: 16   Temp: 36.3 °C (97.4 °F)   TempSrc: Temporal   SpO2: 95%   Weight: (!) 131.1 kg (289 lb)   Height: 1.803 m (5' 11\")       Physical Exam:    Constitutional: Vital signs reviewed. Appears well-developed and well-nourished. On NC O2. No acute distress.   Eyes: Sclera white, conjunctivae clear.   ENT: External ears normal. Hearing normal.   Neck: Neck supple.   Pulmonary/Chest: Respirations non-labored.   Musculoskeletal: Normal gait. Normal range of motion. No muscular atrophy or weakness.  Neurological: Alert and oriented to person, place, and time. Muscle tone normal. Coordination normal.   Skin: Scalp: posterior midline and left parietal scalp regions are focal erythematous and tender sores.  Psychiatric: Normal mood and affect. Behavior is normal. Judgment and thought content normal.       Assessment / Plan:    1. Herpes zoster without complication  - valacyclovir (VALTREX) 1 GM Tab; 1 TAB BY MOUTH 3 TIMES A DAY X 7 DAYS FOR SHINGLES.  Dispense: 21 Tab; Refill: 0  - acyclovir (ZOVIRAX) 5 % Ointment; APPLY TO RASH UP TO EVERY 3 HOURS ONLY IF NEEDED.  Dispense: 30 g; Refill: 1    2. Antibiotic-induced yeast infection  - fluconazole (DIFLUCAN) 150 MG tablet; 1 TAB BY MOUTH X 1 DOSE. MAY REPEAT IN 3 DAYS IF NEEDED FOR YEAST INFECTION.  Dispense: 2 Tab; Refill: 0      Discussed with her DDX, management options, and risks, benefits, and alternatives to treatment plan agreed upon.    Agreeable to medications prescribed.    Discussed expected course of duration, time for improvement, and to seek follow-up in Emergency Room, urgent " care, or with PCP if getting worse at any time or not improving within expected time frame.

## 2019-05-24 ENCOUNTER — OFFICE VISIT (OUTPATIENT)
Dept: URGENT CARE | Facility: PHYSICIAN GROUP | Age: 44
End: 2019-05-24
Payer: MEDICAID

## 2019-05-24 VITALS
HEIGHT: 71 IN | WEIGHT: 289 LBS | HEART RATE: 80 BPM | OXYGEN SATURATION: 97 % | RESPIRATION RATE: 16 BRPM | BODY MASS INDEX: 40.46 KG/M2 | TEMPERATURE: 97.6 F | SYSTOLIC BLOOD PRESSURE: 130 MMHG | DIASTOLIC BLOOD PRESSURE: 92 MMHG

## 2019-05-24 DIAGNOSIS — B02.9 HERPES ZOSTER WITHOUT COMPLICATION: ICD-10-CM

## 2019-05-24 DIAGNOSIS — L08.9 SOFT TISSUE INFECTION: ICD-10-CM

## 2019-05-24 PROCEDURE — 99214 OFFICE O/P EST MOD 30 MIN: CPT | Performed by: PHYSICIAN ASSISTANT

## 2019-05-24 RX ORDER — VALACYCLOVIR HYDROCHLORIDE 1 G/1
1000 TABLET, FILM COATED ORAL 3 TIMES DAILY
Qty: 21 TAB | Refills: 0 | Status: SHIPPED | OUTPATIENT
Start: 2019-05-24 | End: 2019-05-31

## 2019-05-24 RX ORDER — AMOXICILLIN AND CLAVULANATE POTASSIUM 875; 125 MG/1; MG/1
1 TABLET, FILM COATED ORAL 2 TIMES DAILY
Qty: 14 TAB | Refills: 0 | Status: SHIPPED | OUTPATIENT
Start: 2019-05-24 | End: 2019-05-31

## 2019-05-24 ASSESSMENT — ENCOUNTER SYMPTOMS
FEVER: 0
CHILLS: 0
VOMITING: 0
NAUSEA: 0

## 2019-05-24 NOTE — PROGRESS NOTES
Subjective:   Peter Trimble is a 44 y.o. female who presents for Nodule (top of head)        Patient complains of painful nodules on the top of her head X 2 weeks.  She states that she was treated for shingles approximately 10 days ago.  She has had recurrent shingles.  She was treating with oral Valtrex and topical acyclovir.  This greatly improved her symptoms but lesions fail to fully resolve.  She ran out of the topical acyclovir and Valtrex.  Since stopping these medications lesions have worsened.  She states that they are very painful as well as itchy.  Her insurance does not cover the topical acyclovir and she has been buying it online.  Patient is established with dermatology.      Review of Systems   Constitutional: Positive for malaise/fatigue. Negative for chills and fever.   Gastrointestinal: Negative for nausea and vomiting.   Skin: Positive for itching and rash.       PMH:  has a past medical history of Acute blood loss anemia (5/2/2013); Acute renal failure (ARF) ([facility]) (10/5/2011); Anemia; Anxiety; Arthritis; ASTHMA; Bipolar affective ([facility]); Breath shortness; Cold (); Depression; Eclampsia complicating pregnancy; Environmental allergies; Essential hypertension, malignant (9/28/2011); Fibromyalgia; GERD (gastroesophageal reflux disease); Heart murmur; History of pregnancy (10/16/2014); MRSA infection; Hyperlipidemia (04-26-13); Hypertension; Kidney stones; Migraines; Pain (05-27-14); Personality disorder ([facility]); Pneumonia (2012); PTSD (post-traumatic stress disorder); Scalp wound (8/18/2014); Seizure ([facility]) (05-27-14); Sleep apnea; Snoring; Stroke ([facility]) (2011); Subarachnoid hemorrhage ([facility]) (9/28/2011); Unspecified hemorrhagic conditions; Unspecified urinary incontinence; and Urinary bladder disorder. She also has no past medical history of COPD.  MEDS:   Current Outpatient Prescriptions:   •  valacyclovir (VALTREX) 1 GM Tab, Take 1 Tab by mouth 3 times a day for 7 days., Disp: 21 Tab, Rfl:  0  •  amoxicillin-clavulanate (AUGMENTIN) 875-125 MG Tab, Take 1 Tab by mouth 2 times a day for 7 days., Disp: 14 Tab, Rfl: 0  •  acyclovir (ZOVIRAX) 5 % Ointment, APPLY TO RASH UP TO EVERY 3 HOURS ONLY IF NEEDED., Disp: 30 g, Rfl: 1  •  fluconazole (DIFLUCAN) 150 MG tablet, 1 TAB BY MOUTH X 1 DOSE. MAY REPEAT IN 3 DAYS IF NEEDED FOR YEAST INFECTION., Disp: 2 Tab, Rfl: 0  •  morphine (MS IR) 15 MG tablet, Take 15 mg by mouth every four hours as needed for Severe Pain., Disp: , Rfl:   •  albuterol (PROAIR HFA) 108 (90 Base) MCG/ACT Aero Soln inhalation aerosol, ProAir HFA 90 mcg/actuation aerosol inhaler, Disp: , Rfl:   •  morphine (DEBI) 30 MG SR capsule, Take 30 mg by mouth 3 times a day., Disp: , Rfl:   •  clotrimazole (LOTRIMIN) 1 % Cream, Apply to affected area 3-4 times daily, Disp: 1 Tube, Rfl: 0  •  guaifenesin LA (MUCINEX) 600 MG TABLET SR 12 HR, Take 600 mg by mouth every 12 hours., Disp: , Rfl:   •  acetaminophen/caffeine/butalbital 325-40-50 mg (FIORICET) -40 MG Tab, Take 1 Tab by mouth 2 times a day as needed. for refractory headache. Maximum use is 4 days per week. One month supply., Disp: 30 Tab, Rfl: 0  •  baclofen (LIORESAL) 10 MG Tab, Take 1 Tab by mouth 3 times a day as needed. as needed for muscle spasms, Disp: 90 Tab, Rfl: 2  •  hydrOXYzine (ATARAX) 50 MG Tab, Take 1 Tab by mouth 2 times a day as needed. for anxiety., Disp: 60 Tab, Rfl: 2  •  divalproex (DEPAKOTE) 500 MG Tablet Delayed Response, Take 2 Tabs by mouth every 12 hours. (Patient taking differently: Take 1,500 mg by mouth every day.), Disp: 120 Tab, Rfl: 3  •  paroxetine (PAXIL) 30 MG Tab, Take 30 mg by mouth every day., Disp: , Rfl:   •  propranolol (INDERAL) 20 MG Tab, Take 20 mg by mouth 2 times a day., Disp: , Rfl:   •  albuterol (PROVENTIL) 2.5mg/0.5ml Nebu Soln, 2.5 mg by Nebulization route every 6 hours as needed for Shortness of Breath., Disp: , Rfl:   •  cetirizine (ZYRTEC) 10 MG Tab, Take 10 mg by mouth every day.,  "Disp: , Rfl:   •  esomeprazole (NEXIUM) 40 MG delayed-release capsule, Take 40 mg by mouth every morning before breakfast., Disp: , Rfl:   •  ferrous sulfate 325 (65 FE) MG tablet, Take 325 mg by mouth every day., Disp: , Rfl:   •  montelukast (SINGULAIR) 10 MG Tab, Take 10 mg by mouth every bedtime., Disp: , Rfl:   ALLERGIES:   Allergies   Allergen Reactions   • Compazine      \"psychotic reaction\"   • Imitrex [Sumatriptan Succinate]      Had brain bleed   • Inapsine [Droperidol]      \"psychotic reaction\"   • Maxalt [Rizatriptan Benzoate]      Had brain bleed   • Phenergan [Promethazine Hcl]      \"psychotic reaction\"   • Xanax [Alprazolam]      Sores and dry mouth, many others pt cannot remember   • Zantac      Stomach pain   • Apple Cider Vinegar Rash     Patient states she gets rash   • Butrans [Buprenorphine]    • Clarithromycin Vomiting and Palpitations   • Dilaudid [Hydromorphone] Rash     Patient states she had rash, hallucinations, unable to urinate.    • Doxycycline    • Effexor [Venlafaxine]      \"Really depressed, like i wanted to hurt myself\"   • Eucalyptus Oil    • Kiwi Extract    • Latex Rash   • Lyrica [Pregabalin]      \"depression, slept a lot\"   • Minocycline Vomiting     \"any cyclines\"   • Patchouli Oil Rash     Rash    • Sulfa Drugs    • Tea Tree Oil Rash     Break out in rash   • Topiramate Nausea     Patient states made sick to stomach and dizzy.     SURGHX:   Past Surgical History:   Procedure Laterality Date   • ANKLE ORIF Right 8/7/2016    Procedure: ANKLE ORIF;  Surgeon: Bill Brambila M.D.;  Location: [facility];  Service:    • IRRIGATION & DEBRIDEMENT GENERAL  8/17/2014    Performed by Ezra Wright M.D. at SURGERY [facility]   • BREAST BIOPSY  5/29/2014    Performed by Negro Hester M.D. at SURGERY SAME DAY [facility]   • EVACUATION OF HEMATOMA  5/2/2013    Performed by Lazaro Connors Jr., M.D. at SURGERY [facility]   • MAMMOPLASTY REDUCTION  4/29/2013    " "Performed by Negro Hester M.D. at SURGERY [facility]   • RECOVERY  1/30/2012    Performed by SURGERY, IR-RECOVERY at SURGERY SAME DAY [facility]   • RECOVERY  10/5/2011    Performed by SURGERYMARIAELENAONADELITA at SURGERY [facility]S   • HYSTERECTOMY LAPAROSCOPY      W BSO   • KNEE RECONSTRUCTION     • LAMINOTOMY     • OTHER ORTHOPEDIC SURGERY      maddie. knee scopes   • OTHER ORTHOPEDIC SURGERY      back fusion then hardware removal   • PB EXPLORATION OF SPINAL FUSION     • PB REMOVAL OF OVARY(S)       SOCHX:  reports that she has never smoked. She has never used smokeless tobacco. She reports that she does not drink alcohol or use drugs.  FH: Family history was reviewed, no pertinent findings to report   Objective:   /92 (BP Location: Right arm, Patient Position: Sitting, BP Cuff Size: Large adult)   Pulse 80   Temp 36.4 °C (97.6 °F) (Temporal)   Resp 16   Ht 1.803 m (5' 11\")   Wt (!) 131.1 kg (289 lb)   SpO2 97%   BMI 40.31 kg/m²   Physical Exam   Constitutional: She appears well-developed and well-nourished.  Non-toxic appearance. No distress.   HENT:   Head: Normocephalic and atraumatic.   Right Ear: External ear normal.   Left Ear: External ear normal.   Nose: Nose normal.   Numerous erythematous lesions on left side of scalp.  The anterior lesions are pink and nonvesicular.  No hair visible centrally within the lesions however there are surrounding shortened hair follicles.  The 2 posterior lesions are erythematous, mildly edematous with yellow crusting and discharge.  These are also very tender to palpation.   Neck: Neck supple.   Pulmonary/Chest: Effort normal. No respiratory distress.   Neurological: She is alert.   Skin: Skin is warm and dry. Capillary refill takes less than 2 seconds.   Psychiatric: She has a normal mood and affect. Her speech is normal and behavior is normal. Judgment and thought content normal. Cognition and memory are normal.   Vitals reviewed.      "   Assessment/Plan:   1. Soft tissue infection  - amoxicillin-clavulanate (AUGMENTIN) 875-125 MG Tab; Take 1 Tab by mouth 2 times a day for 7 days.  Dispense: 14 Tab; Refill: 0    2. Herpes zoster without complication  - valacyclovir (VALTREX) 1 GM Tab; Take 1 Tab by mouth 3 times a day for 7 days.  Dispense: 21 Tab; Refill: 0    1.  This is a new problem.  Posterior lesions are concerning for soft tissue infection.  Patient has extensive antibiotic allergies and intolerances.  Patient states that Keflex has not worked well for her in the past.  However she states that she does tolerate penicillin-based medications.  Patient started on Augmentin.  I would like her to follow-up with dermatology.    2.  As patient can not afford topical acyclovir and her insurance will not cover this medication patient was started on a second course of Valtrex.  If symptoms worsen or fail to resolve she was instructed to follow-up with her PCP.    Differential diagnosis, natural history, supportive care, and indications for immediate follow-up discussed.

## 2019-06-20 ENCOUNTER — HOSPITAL ENCOUNTER (OUTPATIENT)
Facility: MEDICAL CENTER | Age: 44
End: 2019-06-20
Attending: PHYSICIAN ASSISTANT
Payer: MEDICAID

## 2019-06-20 ENCOUNTER — OFFICE VISIT (OUTPATIENT)
Dept: URGENT CARE | Facility: PHYSICIAN GROUP | Age: 44
End: 2019-06-20
Payer: MEDICAID

## 2019-06-20 VITALS
HEART RATE: 92 BPM | RESPIRATION RATE: 16 BRPM | WEIGHT: 289 LBS | DIASTOLIC BLOOD PRESSURE: 88 MMHG | OXYGEN SATURATION: 92 % | TEMPERATURE: 96.3 F | HEIGHT: 71 IN | BODY MASS INDEX: 40.46 KG/M2 | SYSTOLIC BLOOD PRESSURE: 126 MMHG

## 2019-06-20 DIAGNOSIS — N94.9 VAGINAL SYMPTOM: ICD-10-CM

## 2019-06-20 DIAGNOSIS — E66.01 MORBID OBESITY WITH BMI OF 40.0-44.9, ADULT (HCC): ICD-10-CM

## 2019-06-20 DIAGNOSIS — S00.01XA ABRASION OF SCALP WITH INFECTION, INITIAL ENCOUNTER: ICD-10-CM

## 2019-06-20 DIAGNOSIS — L08.9 ABRASION OF SCALP WITH INFECTION, INITIAL ENCOUNTER: ICD-10-CM

## 2019-06-20 LAB
APPEARANCE UR: NORMAL
BILIRUB UR STRIP-MCNC: NORMAL MG/DL
CANDIDA DNA VAG QL PROBE+SIG AMP: NEGATIVE
COLOR UR AUTO: NORMAL
G VAGINALIS DNA VAG QL PROBE+SIG AMP: NEGATIVE
GLUCOSE UR STRIP.AUTO-MCNC: NORMAL MG/DL
KETONES UR STRIP.AUTO-MCNC: NORMAL MG/DL
LEUKOCYTE ESTERASE UR QL STRIP.AUTO: NORMAL
NITRITE UR QL STRIP.AUTO: NORMAL
PH UR STRIP.AUTO: 5.5 [PH] (ref 5–8)
PROT UR QL STRIP: NORMAL MG/DL
RBC UR QL AUTO: NORMAL
SP GR UR STRIP.AUTO: 1.02
T VAGINALIS DNA VAG QL PROBE+SIG AMP: NEGATIVE
UROBILINOGEN UR STRIP-MCNC: NORMAL MG/DL

## 2019-06-20 PROCEDURE — 87510 GARDNER VAG DNA DIR PROBE: CPT

## 2019-06-20 PROCEDURE — 87660 TRICHOMONAS VAGIN DIR PROBE: CPT

## 2019-06-20 PROCEDURE — 87480 CANDIDA DNA DIR PROBE: CPT

## 2019-06-20 PROCEDURE — 81002 URINALYSIS NONAUTO W/O SCOPE: CPT | Performed by: PHYSICIAN ASSISTANT

## 2019-06-20 PROCEDURE — 99214 OFFICE O/P EST MOD 30 MIN: CPT | Mod: 25 | Performed by: PHYSICIAN ASSISTANT

## 2019-06-20 RX ORDER — CEPHALEXIN 500 MG/1
500 CAPSULE ORAL 3 TIMES DAILY
Qty: 30 CAP | Refills: 0 | Status: SHIPPED | OUTPATIENT
Start: 2019-06-20 | End: 2019-06-30

## 2019-06-20 NOTE — PROGRESS NOTES
Chief Complaint   Patient presents with   • Cramps       HISTORY OF PRESENT ILLNESS: Patient is a 44 y.o. female who presents today because she has 2 different complaints.  She has been battling a lesion on her scalp just left of midline for the last several months.  However over the last week or so it has gotten red, tender, she has a small nodule just below it as well.  She has not been putting anything on it or taking anything for it recently but has been diagnosed with shingles, as well as a scalp infection and put on antibiotics which seemed to help up until recently.    2.  She has tannish-yellow vaginal discharge, minimal itching or burning.  This is been going on for a week and she has not taken anything specific for that symptom either.    Patient Active Problem List    Diagnosis Date Noted   • Morbid obesity (formerly [facility]) 06/25/2015     Priority: Medium   • Closed fracture of one rib 06/25/2015     Priority: Medium   • Lumbar transverse process fracture (formerly [facility]) 06/25/2015     Priority: Medium   • Chronic pain 08/18/2014     Priority: Medium   • AVA (obstructive sleep apnea), intolerant of CPAP 07/08/2013     Priority: Medium   • Seizure disorder (formerly [facility]) 10/05/2011     Priority: Medium   • HTN (hypertension) 10/04/2011     Priority: Medium   • Depression 09/28/2011     Priority: Medium   • Bipolar 2 disorder (formerly [facility]) 09/28/2011     Priority: Medium   • Chronic migraine 09/28/2011     Priority: Medium   • Asthma 09/28/2011     Priority: Medium   • Hyperlipidemia 08/18/2014     Priority: Low   • GERD (gastroesophageal reflux disease) 09/28/2011     Priority: Low   • Morbid obesity with BMI of 40.0-44.9, adult (formerly [facility]) 06/20/2019   • Medication overuse headache 10/30/2018   • Vitamin deficiency 09/26/2018   • Obesity (BMI 30-39.9) 01/05/2018   • Personality disorder 10/04/2016   • Dystonia 10/04/2016   • Chest pain 10/03/2016   • Ankle fracture, right 10/03/2016   • Vaginal yeast infection 10/03/2016   • PTSD (post-traumatic  stress disorder) 10/02/2016   • Depression 08/07/2016   • Chronic pain 08/07/2016   • HTN (hypertension), benign 08/07/2016   • Pseudoseizure 08/06/2016   • Fracture of ankle, trimalleolar, closed 08/06/2016   • Controlled substance agreement signed 02/19/2016   • Rib fracture 06/25/2015   • Benign hypertensive heart disease without heart failure 10/16/2014   • Mixed hyperlipidemia 10/16/2014   • Open scalp wound 10/16/2014   • Lump or mass in breast 05/29/2014   • Headache 02/19/2014   • Neck pain 02/19/2014   • Fibromyalgia 02/19/2014   • Obese 07/08/2013   • Oxygen dependent, since 2011 07/08/2013   • Hypertrophy of breast 04/29/2013       Allergies:Compazine; Imitrex [sumatriptan succinate]; Inapsine [droperidol]; Maxalt [rizatriptan benzoate]; Phenergan [promethazine hcl]; Xanax [alprazolam]; Zantac; Apple cider vinegar; Butrans [buprenorphine]; Clarithromycin; Dilaudid [hydromorphone]; Doxycycline; Effexor [venlafaxine]; Eucalyptus oil; Kiwi extract; Latex; Lyrica [pregabalin]; Minocycline; Patchouli oil; Sulfa drugs; Tea tree oil; and Topiramate    Current Outpatient Prescriptions Ordered in [facility]   Medication Sig Dispense Refill   • cephALEXin (KEFLEX) 500 MG Cap Take 1 Cap by mouth 3 times a day for 10 days. 30 Cap 0   • acyclovir (ZOVIRAX) 5 % Ointment APPLY TO RASH UP TO EVERY 3 HOURS ONLY IF NEEDED. 30 g 1   • fluconazole (DIFLUCAN) 150 MG tablet 1 TAB BY MOUTH X 1 DOSE. MAY REPEAT IN 3 DAYS IF NEEDED FOR YEAST INFECTION. 2 Tab 0   • morphine (MS IR) 15 MG tablet Take 15 mg by mouth every four hours as needed for Severe Pain.     • albuterol (PROAIR HFA) 108 (90 Base) MCG/ACT Aero Soln inhalation aerosol ProAir HFA 90 mcg/actuation aerosol inhaler     • morphine (DEBI) 30 MG SR capsule Take 30 mg by mouth 3 times a day.     • clotrimazole (LOTRIMIN) 1 % Cream Apply to affected area 3-4 times daily 1 Tube 0   • guaifenesin LA (MUCINEX) 600 MG TABLET SR 12 HR Take 600 mg by mouth every 12 hours.     •  acetaminophen/caffeine/butalbital 325-40-50 mg (FIORICET) -40 MG Tab Take 1 Tab by mouth 2 times a day as needed. for refractory headache. Maximum use is 4 days per week. One month supply. 30 Tab 0   • baclofen (LIORESAL) 10 MG Tab Take 1 Tab by mouth 3 times a day as needed. as needed for muscle spasms 90 Tab 2   • hydrOXYzine (ATARAX) 50 MG Tab Take 1 Tab by mouth 2 times a day as needed. for anxiety. 60 Tab 2   • divalproex (DEPAKOTE) 500 MG Tablet Delayed Response Take 2 Tabs by mouth every 12 hours. (Patient taking differently: Take 1,500 mg by mouth every day.) 120 Tab 3   • paroxetine (PAXIL) 30 MG Tab Take 30 mg by mouth every day.     • propranolol (INDERAL) 20 MG Tab Take 20 mg by mouth 2 times a day.     • albuterol (PROVENTIL) 2.5mg/0.5ml Nebu Soln 2.5 mg by Nebulization route every 6 hours as needed for Shortness of Breath.     • cetirizine (ZYRTEC) 10 MG Tab Take 10 mg by mouth every day.     • esomeprazole (NEXIUM) 40 MG delayed-release capsule Take 40 mg by mouth every morning before breakfast.     • ferrous sulfate 325 (65 FE) MG tablet Take 325 mg by mouth every day.     • montelukast (SINGULAIR) 10 MG Tab Take 10 mg by mouth every bedtime.       No current Epic-ordered facility-administered medications on file.        Past Medical History:   Diagnosis Date   • Acute blood loss anemia 2013    -resolved, postop     • Acute renal failure (ARF) (HCC) 10/5/2011    -resolved (post op )    • Anemia    • Anxiety    • Arthritis     osteoarthritis since 17yo.   • ASTHMA     O2 3liters at HS and prn   • Bipolar affective (HCC)    • Breath shortness    • Cold    • Depression    • Eclampsia complicating pregnancy    • Environmental allergies    • Essential hypertension, malignant 2011   • Fibromyalgia    • GERD (gastroesophageal reflux disease)    • Heart murmur     she denies this hx   • History of pregnancy 10/16/2014        • Hx MRSA infection    • Hyperlipidemia 13  "   \"off medication\"   • Hypertension    • Kidney stones    • Migraines    • Pain 15    \"my body hurts all the time\", 5-6/10   • Personality disorder ([facility])    • Pneumonia    • PTSD (post-traumatic stress disorder)    • Scalp wound 2014   • Seizure ([facility]) 14    last    • Sleep apnea     has had a sleep study, use O2 at HS   • Snoring    • Stroke ([facility])      reports strokes x5 , and a \"brain bleed\"   • Subarachnoid hemorrhage ([facility]) 2011    -small,  (2nd to HTN)    • Unspecified hemorrhagic conditions     nose-bleeds, bruises easily   • Unspecified urinary incontinence    • Urinary bladder disorder        Social History   Substance Use Topics   • Smoking status: Never Smoker   • Smokeless tobacco: Never Used   • Alcohol use No       Family Status   Relation Status   • Mo Alive   • Fa Alive   • Bro Alive   • MAunt Alive   • MUnc Alive   • PAunt    • PUnc    • MGMo Alive   • MGFa    • PGMo    • PGFa      Family History   Problem Relation Age of Onset   • Hypertension Mother    • Hyperlipidemia Mother    • Hypertension Father    • Hyperlipidemia Father    • Heart Disease Father    • Psychiatry Father    • Alcohol/Drug Father    • Alcohol/Drug Brother    • Arthritis Maternal Uncle    • Psychiatry Maternal Uncle    • Heart Disease Maternal Uncle    • Hypertension Maternal Uncle    • Hyperlipidemia Maternal Uncle    • Genetic Paternal Aunt    • Psychiatry Paternal Uncle    • Arthritis Maternal Grandmother    • Heart Disease Maternal Grandmother    • Alcohol/Drug Maternal Grandfather    • Stroke Maternal Grandfather    • Psychiatry Maternal Grandfather    • Arthritis Paternal Grandmother    • Alcohol/Drug Paternal Grandfather        ROS:  Review of Systems   Constitutional: Negative for fever, chills, weight loss and malaise/fatigue.   HENT: Negative for ear pain, nosebleeds, congestion, sore throat and neck pain.    Eyes: Negative for blurred vision. " "  Respiratory: Negative for cough, sputum production, shortness of breath and wheezing.    Cardiovascular: Negative for chest pain, palpitations, orthopnea and leg swelling.   Gastrointestinal: Negative for heartburn, nausea, vomiting and abdominal pain.   Genitourinary: Negative for dysuria, urgency and frequency.  Positive for vaginal complaints as in HPI    Exam:  /88 (BP Location: Right arm, Patient Position: Sitting, BP Cuff Size: Large adult)   Pulse 92   Temp (!) 35.7 °C (96.3 °F) (Temporal)   Resp 16   Ht 1.803 m (5' 11\")   Wt (!) 131.1 kg (289 lb)   SpO2 92%   General:  Well nourished, well developed female in NAD  Head:Normocephalic, atraumatic.  Just left of midline on the back of her scalp there is a 2 cm diameter ulcerated lesion with surrounding erythema, there is a small semi-mobile tender nodule just inferior to that consistent with an inflamed lymph node.  Eyes: PERRLA, EOM within normal limits, no conjunctival injection, no scleral icterus, visual fields and acuity grossly intact.  Nose: Symmetrical without tenderness, no discharge.  Mouth: reasonable hygiene, no erythema exudates or tonsillar enlargement.  Neck: no masses, range of motion within normal limits, no tracheal deviation. No obvious thyroid enlargement.  Extremities: no clubbing, cyanosis, or edema.    Urinalysis shows trace leuks, otherwise unremarkable    Please note that this dictation was created using voice recognition software. I have made every reasonable attempt to correct obvious errors, but I expect that there are errors of grammar and possibly content that I did not discover before finalizing the note.    Assessment/Plan:  1. Vaginal symptom  POCT Urinalysis    VAGINAL PATHOGENS DNA PANEL   2. Abrasion of scalp with infection, initial encounter  cephALEXin (KEFLEX) 500 MG Cap   3. Morbid obesity with BMI of 40.0-44.9, adult (HCC)  Patient identified as having weight management issue.  Appropriate orders and " counseling given.   Will notify patient with her vaginal swab results when I get them.    Followup with primary care in the next 7-10 days if not significantly improving, return to the urgent care or go to the emergency room sooner for any worsening of symptoms.

## 2019-06-20 NOTE — PATIENT INSTRUCTIONS
Steps to Quit Smoking  Smoking tobacco can be harmful to your health and can affect almost every organ in your body. Smoking puts you, and those around you, at risk for developing many serious chronic diseases. Quitting smoking is difficult, but it is one of the best things that you can do for your health. It is never too late to quit.  What are the benefits of quitting smoking?  When you quit smoking, you lower your risk of developing serious diseases and conditions, such as:  · Lung cancer or lung disease, such as COPD.  · Heart disease.  · Stroke.  · Heart attack.  · Infertility.  · Osteoporosis and bone fractures.  Additionally, symptoms such as coughing, wheezing, and shortness of breath may get better when you quit. You may also find that you get sick less often because your body is stronger at fighting off colds and infections. If you are pregnant, quitting smoking can help to reduce your chances of having a baby of low birth weight.  How do I get ready to quit?  When you decide to quit smoking, create a plan to make sure that you are successful. Before you quit:  · Pick a date to quit. Set a date within the next two weeks to give you time to prepare.  · Write down the reasons why you are quitting. Keep this list in places where you will see it often, such as on your bathroom mirror or in your car or wallet.  · Identify the people, places, things, and activities that make you want to smoke (triggers) and avoid them. Make sure to take these actions:  ¨ Throw away all cigarettes at home, at work, and in your car.  ¨ Throw away smoking accessories, such as ashtrays and lighters.  ¨ Clean your car and make sure to empty the ashtray.  ¨ Clean your home, including curtains and carpets.  · Tell your family, friends, and coworkers that you are quitting. Support from your loved ones can make quitting easier.  · Talk with your health care provider about your options for quitting smoking.  · Find out what treatment  options are covered by your health insurance.  What strategies can I use to quit smoking?  Talk with your healthcare provider about different strategies to quit smoking. Some strategies include:  · Quitting smoking altogether instead of gradually lessening how much you smoke over a period of time. Research shows that quitting “cold turkey” is more successful than gradually quitting.  · Attending in-person counseling to help you build problem-solving skills. You are more likely to have success in quitting if you attend several counseling sessions. Even short sessions of 10 minutes can be effective.  · Finding resources and support systems that can help you to quit smoking and remain smoke-free after you quit. These resources are most helpful when you use them often. They can include:  ¨ Online chats with a counselor.  ¨ Telephone quitlines.  ¨ Printed self-help materials.  ¨ Support groups or group counseling.  ¨ Text messaging programs.  ¨ Mobile phone applications.  · Taking medicines to help you quit smoking. (If you are pregnant or breastfeeding, talk with your health care provider first.) Some medicines contain nicotine and some do not. Both types of medicines help with cravings, but the medicines that include nicotine help to relieve withdrawal symptoms. Your health care provider may recommend:  ¨ Nicotine patches, gum, or lozenges.  ¨ Nicotine inhalers or sprays.  ¨ Non-nicotine medicine that is taken by mouth.  Talk with your health care provider about combining strategies, such as taking medicines while you are also receiving in-person counseling. Using these two strategies together makes you more likely to succeed in quitting than if you used either strategy on its own.  If you are pregnant or breastfeeding, talk with your health care provider about finding counseling or other support strategies to quit smoking. Do not take medicine to help you quit smoking unless told to do so by your health care  provider.  What things can I do to make it easier to quit?  Quitting smoking might feel overwhelming at first, but there is a lot that you can do to make it easier. Take these important actions:  · Reach out to your family and friends and ask that they support and encourage you during this time. Call telephone quitlines, reach out to support groups, or work with a counselor for support.  · Ask people who smoke to avoid smoking around you.  · Avoid places that trigger you to smoke, such as bars, parties, or smoke-break areas at work.  · Spend time around people who do not smoke.  · Lessen stress in your life, because stress can be a smoking trigger for some people. To lessen stress, try:  ¨ Exercising regularly.  ¨ Deep-breathing exercises.  ¨ Yoga.  ¨ Meditating.  ¨ Performing a body scan. This involves closing your eyes, scanning your body from head to toe, and noticing which parts of your body are particularly tense. Purposefully relax the muscles in those areas.  · Download or purchase mobile phone or tablet apps (applications) that can help you stick to your quit plan by providing reminders, tips, and encouragement. There are many free apps, such as QuitGuide from the CDC (Centers for Disease Control and Prevention). You can find other support for quitting smoking (smoking cessation) through smokefree.gov and other websites.  How will I feel when I quit smoking?  Within the first 24 hours of quitting smoking, you may start to feel some withdrawal symptoms. These symptoms are usually most noticeable 2-3 days after quitting, but they usually do not last beyond 2-3 weeks. Changes or symptoms that you might experience include:  · Mood swings.  · Restlessness, anxiety, or irritation.  · Difficulty concentrating.  · Dizziness.  · Strong cravings for sugary foods in addition to nicotine.  · Mild weight gain.  · Constipation.  · Nausea.  · Coughing or a sore throat.  · Changes in how your medicines work in your  body.  · A depressed mood.  · Difficulty sleeping (insomnia).  After the first 2-3 weeks of quitting, you may start to notice more positive results, such as:  · Improved sense of smell and taste.  · Decreased coughing and sore throat.  · Slower heart rate.  · Lower blood pressure.  · Clearer skin.  · The ability to breathe more easily.  · Fewer sick days.  Quitting smoking is very challenging for most people. Do not get discouraged if you are not successful the first time. Some people need to make many attempts to quit before they achieve long-term success. Do your best to stick to your quit plan, and talk with your health care provider if you have any questions or concerns.  This information is not intended to replace advice given to you by your health care provider. Make sure you discuss any questions you have with your health care provider.  Document Released: 12/12/2002 Document Revised: 08/15/2017 Document Reviewed: 05/03/2016  [employer] Interactive Patient Education © 2017 [employer]

## 2019-06-24 ENCOUNTER — TELEPHONE (OUTPATIENT)
Dept: URGENT CARE | Facility: PHYSICIAN GROUP | Age: 44
End: 2019-06-24

## 2019-09-04 ENCOUNTER — OFFICE VISIT (OUTPATIENT)
Dept: NEUROLOGY | Facility: MEDICAL CENTER | Age: 44
End: 2019-09-04
Payer: MEDICAID

## 2019-09-04 VITALS
OXYGEN SATURATION: 91 % | SYSTOLIC BLOOD PRESSURE: 130 MMHG | BODY MASS INDEX: 38.22 KG/M2 | TEMPERATURE: 96.8 F | HEART RATE: 94 BPM | RESPIRATION RATE: 16 BRPM | HEIGHT: 71 IN | WEIGHT: 273 LBS | DIASTOLIC BLOOD PRESSURE: 82 MMHG

## 2019-09-04 DIAGNOSIS — F43.10 PTSD (POST-TRAUMATIC STRESS DISORDER): ICD-10-CM

## 2019-09-04 DIAGNOSIS — R40.4 NONSPECIFIC PAROXYSMAL SPELL: ICD-10-CM

## 2019-09-04 DIAGNOSIS — E66.01 MORBID OBESITY WITH BMI OF 40.0-44.9, ADULT (HCC): ICD-10-CM

## 2019-09-04 DIAGNOSIS — G47.33 OSA (OBSTRUCTIVE SLEEP APNEA): ICD-10-CM

## 2019-09-04 DIAGNOSIS — F34.1 DYSTHYMIA: ICD-10-CM

## 2019-09-04 DIAGNOSIS — G89.29 OTHER CHRONIC PAIN: ICD-10-CM

## 2019-09-04 DIAGNOSIS — G43.719 INTRACTABLE CHRONIC MIGRAINE WITHOUT AURA AND WITHOUT STATUS MIGRAINOSUS: ICD-10-CM

## 2019-09-04 PROCEDURE — 99215 OFFICE O/P EST HI 40 MIN: CPT | Performed by: PSYCHIATRY & NEUROLOGY

## 2019-09-04 RX ORDER — DIPHENHYDRAMINE HCL 25 MG
25 TABLET ORAL EVERY 6 HOURS PRN
COMMUNITY
End: 2019-10-24

## 2019-09-04 RX ORDER — KETOROLAC TROMETHAMINE 10 MG/1
10 TABLET, FILM COATED ORAL EVERY 6 HOURS PRN
COMMUNITY
End: 2019-10-24

## 2019-09-04 RX ORDER — CHOLECALCIFEROL (VITAMIN D3) 50 MCG
4000 TABLET ORAL DAILY
COMMUNITY
End: 2023-09-12

## 2019-09-04 RX ORDER — FUROSEMIDE 20 MG/1
20 TABLET ORAL 2 TIMES DAILY
Status: ON HOLD | COMMUNITY
End: 2019-12-09 | Stop reason: SDUPTHER

## 2019-09-04 ASSESSMENT — ENCOUNTER SYMPTOMS
ABDOMINAL PAIN: 0
FEVER: 0
SORE THROAT: 0
BRUISES/BLEEDS EASILY: 1
WEIGHT LOSS: 0
HALLUCINATIONS: 0
EYE DISCHARGE: 0
FALLS: 1
SHORTNESS OF BREATH: 0

## 2019-09-04 NOTE — PROGRESS NOTES
"Chief Complaint   Patient presents with   • Follow-Up     Patient is referred by Dr. Watt for initial consult.    History of present illness:  Peter Trimble 44 y.o. female presents today for headache.   History is obtained from patient.  and Patient is accompanied by self. Disabled for pain, psychiatric reasons, etc.     Duration/timing: see below  Context:   Headache: hx migraines, \"pain every day in my head\"; posterior head and bitemporal; \"my head changes shape\"; tight squeezing feeling; light/noise sensitivity; nausea without vomiting, improved with activity improved with rest; present all the time, severe   Hx SAH: Occurred in 2011 secondary to hypertension, she does have a history of eclampsia; thought to be secondary to HTN; reports speech difficulties; took triptan that day   Spells: onset since 2011, she will get up and feel like she has too much medicine in her system and she will get wobbly and fall over; states she thinks she loses consciousness for 2-3 minutes and then she wakes up and feels pain everywhere that is more from her baseline; no incontinence/tongue biting; when waking she feels out of it and has trembling in the hands; occurring two times a day but can up to five times a day; she is does drive in [place]   Location: as above  Quality: as above  Severity: as above  Modifying factors: as above  Associated signs/symptoms: Depression/personality disorder/PTSD - doesn't have psychiatrist \"I got pushed to the side\" - \"doing ok\" right now; weakness all over; feet fall asleep when she sits on the toilet and falls asleep; generalized fatigue  Denies: bladder incontinence, bowel incontinence, other weakness, other numbness/tingling, history of intracranial infections, hallucinations, family hx seizure and febrile seizure as child     Patient has tried:  -Occipital nerve block, last in October 30, 2018 performed by Sun More  -Botbret for chronic headaches-8 years ago tried for rounds without any " "benefit  -Triptans- cannot take due to history of subarachnoid ICH  -Topiramate-causes her to be sick to her stomach  -Lyrica-causes depression  -Effexor-causes depression and thoughts of self injury  -Opioids- morphine  -Depakote 1000 mill grams twice daily  -Propranolol -20 mg twice daily  -Paxil  -Fioricet - used to take, taken off by pain management   -Pain management - Dr. Rigoberto Luciano; \"I am just padding his bank account\"; has tried nasal sprays     Jackson Gamez - psychiatrist, pending late fall appointment  Last episode of LOC was 8/15/19    Past medical history:   Past Medical History:   Diagnosis Date   • Acute blood loss anemia 2013    -resolved, postop     • Acute renal failure (ARF) ([facility]) 10/5/2011    -resolved (post op )    • Anemia    • Anxiety    • Arthritis     osteoarthritis since 17yo.   • ASTHMA     O2 3liters at HS and prn   • Bipolar affective ([facility])    • Breath shortness    • Cold    • Depression    • Eclampsia complicating pregnancy    • Environmental allergies    • Essential hypertension, malignant 2011   • Fibromyalgia    • GERD (gastroesophageal reflux disease)    • Heart murmur     she denies this hx   • History of pregnancy 10/16/2014        • Hx MRSA infection    • Hyperlipidemia 14    \"off medication\"   • Hypertension    • Kidney stones    • Migraines    • Pain 15    \"my body hurts all the time\", 5-6/10   • Personality disorder ([facility])    • Pneumonia    • PTSD (post-traumatic stress disorder)    • Scalp wound 2014   • Seizure ([facility]) 14    last    • Sleep apnea     has had a sleep study, use O2 at HS   • Snoring    • Stroke ([facility])      reports strokes x5 , and a \"brain bleed\"   • Subarachnoid hemorrhage ([facility]) 2011    -small, 2011 (2nd to HTN)    • Unspecified hemorrhagic conditions     nose-bleeds, bruises easily   • Unspecified urinary incontinence    • Urinary bladder disorder        Past surgical history:   Past Surgical " History:   Procedure Laterality Date   • ANKLE ORIF Right 8/7/2016    Procedure: ANKLE ORIF;  Surgeon: Bill Brambila M.D.;  Location: SURGERY [facility];  Service:    • IRRIGATION & DEBRIDEMENT GENERAL  8/17/2014    Performed by Ezra Wright M.D. at SURGERY [facility]   • BREAST BIOPSY  5/29/2014    Performed by Negro Hester M.D. at SURGERY SAME DAY [facility]   • EVACUATION OF HEMATOMA  5/2/2013    Performed by Lazaro Connors Jr., M.D. at SURGERY [facility]   • MAMMOPLASTY REDUCTION  4/29/2013    Performed by Negro Hester M.D. at SURGERY [facility]   • RECOVERY  1/30/2012    Performed by SURGERY, IR-RECOVERY at SURGERY SAME DAY [facility]   • RECOVERY  10/5/2011    Performed by MARIAELENA MCMULLENONLY at SURGERY [facility]   • HYSTERECTOMY LAPAROSCOPY      W BSO   • KNEE RECONSTRUCTION     • LAMINOTOMY     • OTHER ORTHOPEDIC SURGERY      maddie. knee scopes   • OTHER ORTHOPEDIC SURGERY      back fusion then hardware removal   • PB EXPLORATION OF SPINAL FUSION     • PB REMOVAL OF OVARY(S)         Family history:   Family History   Problem Relation Age of Onset   • Hypertension Mother    • Hyperlipidemia Mother    • Hypertension Father    • Hyperlipidemia Father    • Heart Disease Father    • Psychiatric Illness Father    • Alcohol/Drug Father    • Alcohol/Drug Brother    • Arthritis Maternal Uncle    • Psychiatric Illness Maternal Uncle    • Heart Disease Maternal Uncle    • Hypertension Maternal Uncle    • Hyperlipidemia Maternal Uncle    • Genetic Disorder Paternal Aunt    • Psychiatric Illness Paternal Uncle    • Arthritis Maternal Grandmother    • Heart Disease Maternal Grandmother    • Alcohol/Drug Maternal Grandfather    • Stroke Maternal Grandfather    • Psychiatric Illness Maternal Grandfather    • Arthritis Paternal Grandmother    • Alcohol/Drug Paternal Grandfather        Social history:   Tobacco Use   • Smoking status: Never Smoker   • Smokeless tobacco: Never Used  "  Substance and Sexual Activity   • Alcohol use: No   • Drug use: No     Current medications:   Current Outpatient Medications   Medication   • vitamin D (CHOLECALCIFEROL) 1000 UNIT Tab   • ketorolac (TORADOL) 10 MG Tab   • diphenhydrAMINE (BENADRYL) 25 MG Tab   • furosemide (LASIX) 20 MG Tab   • Erenumab (AIMOVIG 140 DOSE) 70 MG/ML Solution Auto-injector   • acyclovir (ZOVIRAX) 5 % Ointment   • fluconazole (DIFLUCAN) 150 MG tablet   • morphine (MS IR) 15 MG tablet   • albuterol (PROAIR HFA) 108 (90 Base) MCG/ACT Aero Soln inhalation aerosol   • morphine (DEBI) 30 MG SR capsule   • clotrimazole (LOTRIMIN) 1 % Cream   • guaifenesin LA (MUCINEX) 600 MG TABLET SR 12 HR   • baclofen (LIORESAL) 10 MG Tab   • hydrOXYzine (ATARAX) 50 MG Tab   • divalproex (DEPAKOTE) 500 MG Tablet Delayed Response   • paroxetine (PAXIL) 30 MG Tab   • propranolol (INDERAL) 20 MG Tab   • albuterol (PROVENTIL) 2.5mg/0.5ml Nebu Soln   • cetirizine (ZYRTEC) 10 MG Tab   • esomeprazole (NEXIUM) 40 MG delayed-release capsule   • ferrous sulfate 325 (65 FE) MG tablet   • montelukast (SINGULAIR) 10 MG Tab   • acetaminophen/caffeine/butalbital 325-40-50 mg (FIORICET) -40 MG Tab     No current facility-administered medications for this visit.        Medication Allergy:  Allergies   Allergen Reactions   • Compazine      \"psychotic reaction\"   • Imitrex [Sumatriptan Succinate]      Had brain bleed   • Inapsine [Droperidol]      \"psychotic reaction\"   • Maxalt [Rizatriptan Benzoate]      Had brain bleed   • Phenergan [Promethazine Hcl]      \"psychotic reaction\"   • Xanax [Alprazolam]      Sores and dry mouth, many others pt cannot remember   • Zantac      Stomach pain   • Apple Cider Vinegar Rash     Patient states she gets rash   • Butrans [Buprenorphine]    • Clarithromycin Vomiting and Palpitations   • Dilaudid [Hydromorphone] Rash     Patient states she had rash, hallucinations, unable to urinate.    • Doxycycline    • Effexor [Venlafaxine]  " "    \"Really depressed, like i wanted to hurt myself\"   • Eucalyptus Oil    • Kiwi Extract    • Latex Rash   • Lyrica [Pregabalin]      \"depression, slept a lot\"   • Minocycline Vomiting     \"any cyclines\"   • Patchouli Oil Rash     Rash    • Sulfa Drugs    • Tea Tree Oil Rash     Break out in rash   • Topiramate Nausea     Patient states made sick to stomach and dizzy.       Review of Systems   Constitutional: Negative for fever and weight loss.   HENT: Negative for sore throat.    Eyes: Negative for discharge.   Respiratory: Negative for shortness of breath.    Cardiovascular: Negative for leg swelling.   Gastrointestinal: Negative for abdominal pain.   Genitourinary: Negative for dysuria.   Musculoskeletal: Positive for falls.   Skin: Positive for rash.        Redness on arms   Neurological:        As per HPI   Endo/Heme/Allergies: Bruises/bleeds easily.   Psychiatric/Behavioral: Negative for hallucinations and suicidal ideas.       Physical examination:   Vitals:    09/04/19 0758   BP: 130/82   BP Location: Left arm   Patient Position: Sitting   Pulse: 94   Resp: 16   Temp: 36 °C (96.8 °F)   TempSrc: Temporal   SpO2: 91%   Weight: 123.8 kg (273 lb)   Height: 1.803 m (5' 10.98\")     General: Patient in well nourished in no apparent distress.  Elevated BMI.  Eyes: Ophthalmoscopic examination of the optic discs and posterior elements reveals sharp disk margins, normal vessels and pigmentation on right. Cannot visualize left well enough to characterize  HENT: Normocephalic, atraumatic. No tenderness to palpation of the DIVYA/JC and Mallapatic score 2  Cardiovascular: Very mild lower extremity edema on the foot in the right.  Respiratory: Mild shortness of breath with excessive movements, she is on oxygen nasal cannula.  Skin: Dry reddish skin on the extremities.  Musculoskeletal: No signs of joint or muscle swelling.   Psychiatric: Pleasant.     NEUROLOGICAL EXAM:   Mental status: Awake, alert and fully oriented to " person, place, time and situation. Normal attention and concentration.   Speech and language: Speech is fluent without errors and clear.  Mild voice tremor.  Cranial nerve exam:  II: Pupils are equally round and reactive to light. Visual fields are intact by confrontation.  III, IV, VI: EOMI, no diplopia, no ptosis.  V: Sensation to light touch is normal over V1-3 distributions bilaterally.  .  VII: Facial movements are symmetrical. There is no facial droop. .  VIII: Hearing intact to soft speech and finger rub bilaterally  IX: Palate elevates symmetrically, uvula is midline. Dysarthria is not present.  XI: Shoulder shrug are symmetrical and strong.   XII: Tongue protrudes midline.    Motor exam:  Muscle tone is normal in all 4 limbs. and No abnormal movements appreciated.    Muscle strength: Patient is short of breath with strength testing.  She is at least 4-5 proximal upper and lower extremity.    Sensory exam:  Intact to Light touch, Temperature and Vibration in bilateral upper and lower extremity.    Deep tendon reflexes:       Right  Left  Biceps   0/4  0/4  Triceps  0/4  0/4  Brachioradialis 0/4  0/4  Knee jerk  0/4  0/4  Ankle jerk  0/4  0/4   bilateral toes are downgoing to plantar stimulation..    Coordination: shows a normal finger-nose-finger   Gait: Moderately wide based with right foot slightly out turned (chronic), 2.5 step turn      ANCILLARY DATA REVIEWED:   Lab Data Review:  Lab Results   Component Value Date/Time    WBC 10.0 09/26/2018 11:22 AM    RBC 4.59 09/26/2018 11:22 AM    HEMOGLOBIN 12.9 09/26/2018 11:22 AM    HEMATOCRIT 39.8 09/26/2018 11:22 AM    MCV 86.7 09/26/2018 11:22 AM    MCH 28.1 09/26/2018 11:22 AM    MCHC 32.4 (L) 09/26/2018 11:22 AM    MPV 10.2 09/26/2018 11:22 AM    NEUTSPOLYS 39.00 (L) 09/26/2018 11:22 AM    LYMPHOCYTES 50.30 (H) 09/26/2018 11:22 AM    MONOCYTES 6.40 09/26/2018 11:22 AM    EOSINOPHILS 3.20 09/26/2018 11:22 AM    BASOPHILS 0.70 09/26/2018 11:22 AM     HYPOCHROMIA 1+ 05/15/2013 02:05 AM    ANISOCYTOSIS 1+ 05/05/2013 02:35 AM      Lab Results   Component Value Date/Time    SODIUM 139 09/26/2018 11:22 AM    POTASSIUM 4.2 09/26/2018 11:22 AM    CHLORIDE 102 09/26/2018 11:22 AM    CO2 28 09/26/2018 11:22 AM    GLUCOSE 91 09/26/2018 11:22 AM    BUN 31 (H) 09/26/2018 11:22 AM    CREATININE 1.14 09/26/2018 11:22 AM     Lab Results   Component Value Date/Time    ASTSGOT 21 01/05/2018 0843    ALTSGPT 9 01/05/2018 0843    ALKPHOSPHAT 41 01/05/2018 0843    ALBUMIN 3.9 09/26/2018 1122     Lab Results   Component Value Date/Time    HBA1C 6.0 (H) 09/26/2018 11:22 AM      EEG 2016 interpreted by Dr. Timi Richmond:   This is a minimally abnormal EEG for a patient of this age.  No   seizure activity is seen, spontaneously or in association with the involuntary   movements.  Clinical correlation is suggested.  Of note there were rhythmic right upper extremity movements noted by tach without any EEG correlate.    EEG 2012 interpreted by Shaylee Galindo:  Normal electroencephalogram for age in the awake and  drowsy state.  Clinical correlation is suggested.  The presence of a  normal awake electroencephalogram does not preclude a diagnosis of  epilepsy.    Imaging:   MRI C-spine without contrast June 2016:  1.  C3-C4 negligible disc bulging. No central or foraminal stenosis.  2.  C6-C7 degenerative disc space narrowing, right paramedian disc-osteophyte complex with partial effacement of ventral subarachnoid space. No cord impingement or shana central stenosis. No significant change from the previous exam. No foraminal   stenosis.  3.  No myelopathic cord signal abnormality at any cervical level.  4.  Overall, no significant change since 8/5/2015.    CT head 2011:  1. Findings compatible with subarachnoid hemorrhage along the midline sulci of bilateral frontal lobes.    MRI brain without contrast 2014:  1.  Unchanged rare scattered nonspecific periventricular foci of T2 and FLAIR signal  "hyperintensity consistent with microangiopathic ischemic change versus demyelination or gliosis.  2.  No evidence of acute territorial infarct, intracranial hemorrhage or mass lesion.    Records reviewed: Patient has seen both Sun More and Dr. Watt, last seen Dr. Watt in September 2018.  Per Dr. Watt note there was a history of \"small seizures\".  There are psychogenic spells and possible epileptic seizures.  She is been using morphine for headache for 5 years.  At least.  She was taking over 200 over-the-counter pain pills per month.  Behavioral techniques and OMB with cutting down on OTC abortives were recommended.  Aimovig was a consideration.  She has also seen Dr. Timi Richmond in [facility].    Neurosurgery note in 2011 reviewed.  She presented with abrupt onset frontal headaches found to have subarachnoid hemorrhage.  CT angiogram without any aneurysm.  Discharge summary reviewed.  She was taking Imitrex at that time.  Cerebral angiography revealed vasoconstriction syndrome, primary angitis of systems CNS.  She was placed on steroids.  ESR, rheumatoid factor, YANE, ANCA and so drain antibody was negative.  CSF micro biology negative.  She was tapered off of Decadron.    ASSESSMENT AND PLAN:    1. Nonspecific paroxysmal spell: Differential includes epileptic < nonepileptic spells.  On chart review there seems to be conflicting evidence as to the description of her symptoms despite onset in 2011.  She has described per other neurologist that there are motor symptoms of shaking and tonic posturing with occasional fall to ground however with me she reports loss of consciousness.  There has been a high level suspicion that these episodes are nonepileptic spells with prior neurologist.  Episodes do not sound orthostatic or cardiac.  Patient has past medical history of PTSD, personality disorder, depression.  MRI of the brain without contrast in 2014 with nonspecific FLAIR abnormalities without any other focal " lesions.  Prior EEGs interpreted by Dr. Lara were either unremarkable or caught some mild right upper extremity twitching without EEG correlate.  Patient has a hysterectomy.  - REFERRAL TO NEURODIAGNOSTICS (EEG,EP,EMG/NCS/DBS) Modality Requested: Ambulatory EEG  -Possible epilepsy monitoring units to help confirm the recurrent spells that she is having   -Recommend reestablishing care with psych psychiatry  -DMV form filled out and faxed today, recommend no driving due to loss of consciousness  -We will defer cardiac work-up if deemed clinically appropriate to primary care    2. Intractable chronic migraine without aura and without status migrainosus: Super refractory with multiple side effects to medications.  Failed second line therapy including but not limited to Botox.  Patient on opioids and was previously on Fioricet.  She has not tried CGRP antagonist.  We discussed the risks/benefits/side effects/alternatives including but not limited to worsening fatigue and constipation.  She would be high risk for medication interactions, worsening of her underlying depression and borderline low blood pressure.  She has a hysterectomy.  If there is no benefit from Aimovig or retrial of Botox that I am very limited in terms of my treatment options for her.  I may ultimately defer to pain management for further treatment.  -Continue Depakote, used for psychiatric purposes as well  -Continue propranolol, no adjustment to dose due to blood pressure and loss of consciousness  -Trial of Erenumab (AIMOVIG 140 DOSE) 70 MG/ML Solution Auto-injector; Inject 2 mL as instructed Q 4 Weeks.  Dispense: 2 PEN; Refill: 3  -Consider retrial of Botox  -She cannot take triptan's due to history of subarachnoid hemorrhage and hypertension    3. AVA (obstructive sleep apnea), intolerant of CPAP: On oxygen, may contribute to headaches as well    4. PTSD (post-traumatic stress disorder): By patient history, pending psychiatry    5.  Morbid obesity with BMI of 40.0-44.9, adult ([facility]): Monitor    7. Other chronic pain: Patient has pain all over including her head and body.  She is established with her pain management physician who seems to have tried to address most of her chronic pain issues.    FOLLOW-UP: Return for 4 months for med follow up/EEG f/u.  EDUCATION AND COUNSELING:  -I personally discussed the following with the patient:   Epileptic versus nonepileptic spells, [place] driving law and mandatory reporting, reasons for further work-up and management    The patient understands and agrees that due to the complexity of his/her diagnosis, results of any testing and further recommendations will typically be discussed/made during a face to face encounter in my office. The patient and/or family further understands it is their responsibility to keep proper follow up.     Disclaimer  This dictation was created using voice recognition software. I have made every reasonable attempt to avoid dictation errors, but this document may contain an error not identified before finalizing. If the error changes the accuracy of the document, I would appreciate it being brought to my attention. Thank you very much.     Ginette Aguilar MD  Neurology, Neurophysiology  [facility]

## 2019-09-10 ENCOUNTER — APPOINTMENT (OUTPATIENT)
Dept: RADIOLOGY | Facility: MEDICAL CENTER | Age: 44
End: 2019-09-10
Attending: EMERGENCY MEDICINE
Payer: MEDICAID

## 2019-09-10 ENCOUNTER — HOSPITAL ENCOUNTER (OUTPATIENT)
Facility: MEDICAL CENTER | Age: 44
End: 2019-09-13
Attending: EMERGENCY MEDICINE | Admitting: INTERNAL MEDICINE
Payer: MEDICAID

## 2019-09-10 DIAGNOSIS — R55 SYNCOPE, UNSPECIFIED SYNCOPE TYPE: ICD-10-CM

## 2019-09-10 DIAGNOSIS — R79.89 ELEVATED TROPONIN: ICD-10-CM

## 2019-09-10 DIAGNOSIS — R53.81 PHYSICAL DECONDITIONING: ICD-10-CM

## 2019-09-10 LAB
ALBUMIN SERPL BCP-MCNC: 4 G/DL (ref 3.2–4.9)
ALBUMIN/GLOB SERPL: 1.3 G/DL
ALP SERPL-CCNC: 39 U/L (ref 30–99)
ALT SERPL-CCNC: 14 U/L (ref 2–50)
AMMONIA PLAS-SCNC: 21 UMOL/L (ref 11–45)
ANION GAP SERPL CALC-SCNC: 10 MMOL/L (ref 0–11.9)
APPEARANCE UR: CLEAR
AST SERPL-CCNC: 19 U/L (ref 12–45)
BASOPHILS # BLD AUTO: 0.4 % (ref 0–1.8)
BASOPHILS # BLD: 0.03 K/UL (ref 0–0.12)
BILIRUB SERPL-MCNC: 0.6 MG/DL (ref 0.1–1.5)
BILIRUB UR QL STRIP.AUTO: NEGATIVE
BUN SERPL-MCNC: 30 MG/DL (ref 8–22)
CALCIUM SERPL-MCNC: 9.7 MG/DL (ref 8.5–10.5)
CHLORIDE SERPL-SCNC: 99 MMOL/L (ref 96–112)
CO2 SERPL-SCNC: 30 MMOL/L (ref 20–33)
COLOR UR: ABNORMAL
CREAT SERPL-MCNC: 1.11 MG/DL (ref 0.5–1.4)
EKG IMPRESSION: NORMAL
EOSINOPHIL # BLD AUTO: 0.05 K/UL (ref 0–0.51)
EOSINOPHIL NFR BLD: 0.7 % (ref 0–6.9)
ERYTHROCYTE [DISTWIDTH] IN BLOOD BY AUTOMATED COUNT: 42.3 FL (ref 35.9–50)
GLOBULIN SER CALC-MCNC: 3.1 G/DL (ref 1.9–3.5)
GLUCOSE SERPL-MCNC: 91 MG/DL (ref 65–99)
GLUCOSE UR STRIP.AUTO-MCNC: NEGATIVE MG/DL
HCT VFR BLD AUTO: 41.4 % (ref 37–47)
HGB BLD-MCNC: 13.1 G/DL (ref 12–16)
IMM GRANULOCYTES # BLD AUTO: 0.02 K/UL (ref 0–0.11)
IMM GRANULOCYTES NFR BLD AUTO: 0.3 % (ref 0–0.9)
KETONES UR STRIP.AUTO-MCNC: 15 MG/DL
LEUKOCYTE ESTERASE UR QL STRIP.AUTO: NEGATIVE
LIPASE SERPL-CCNC: 23 U/L (ref 11–82)
LYMPHOCYTES # BLD AUTO: 3.05 K/UL (ref 1–4.8)
LYMPHOCYTES NFR BLD: 45.5 % (ref 22–41)
MAGNESIUM SERPL-MCNC: 1.6 MG/DL (ref 1.5–2.5)
MCH RBC QN AUTO: 28.4 PG (ref 27–33)
MCHC RBC AUTO-ENTMCNC: 31.6 G/DL (ref 33.6–35)
MCV RBC AUTO: 89.8 FL (ref 81.4–97.8)
MICRO URNS: ABNORMAL
MONOCYTES # BLD AUTO: 0.56 K/UL (ref 0–0.85)
MONOCYTES NFR BLD AUTO: 8.3 % (ref 0–13.4)
NEUTROPHILS # BLD AUTO: 3 K/UL (ref 2–7.15)
NEUTROPHILS NFR BLD: 44.8 % (ref 44–72)
NITRITE UR QL STRIP.AUTO: NEGATIVE
NRBC # BLD AUTO: 0 K/UL
NRBC BLD-RTO: 0 /100 WBC
PH UR STRIP.AUTO: 6 [PH] (ref 5–8)
PLATELET # BLD AUTO: 251 K/UL (ref 164–446)
PMV BLD AUTO: 10.4 FL (ref 9–12.9)
POTASSIUM SERPL-SCNC: 3.8 MMOL/L (ref 3.6–5.5)
PROT SERPL-MCNC: 7.1 G/DL (ref 6–8.2)
PROT UR QL STRIP: NEGATIVE MG/DL
RBC # BLD AUTO: 4.61 M/UL (ref 4.2–5.4)
RBC UR QL AUTO: NEGATIVE
SODIUM SERPL-SCNC: 139 MMOL/L (ref 135–145)
SP GR UR STRIP.AUTO: 1.03
TROPONIN T SERPL-MCNC: 20 NG/L (ref 6–19)
TROPONIN T SERPL-MCNC: 23 NG/L (ref 6–19)
UROBILINOGEN UR STRIP.AUTO-MCNC: 1 MG/DL
VALPROATE SERPL-MCNC: 98.4 UG/ML (ref 50–100)
WBC # BLD AUTO: 6.7 K/UL (ref 4.8–10.8)

## 2019-09-10 PROCEDURE — 85610 PROTHROMBIN TIME: CPT

## 2019-09-10 PROCEDURE — 93005 ELECTROCARDIOGRAM TRACING: CPT | Performed by: EMERGENCY MEDICINE

## 2019-09-10 PROCEDURE — 36415 COLL VENOUS BLD VENIPUNCTURE: CPT

## 2019-09-10 PROCEDURE — 80164 ASSAY DIPROPYLACETIC ACD TOT: CPT

## 2019-09-10 PROCEDURE — 85025 COMPLETE CBC W/AUTO DIFF WBC: CPT

## 2019-09-10 PROCEDURE — 82140 ASSAY OF AMMONIA: CPT

## 2019-09-10 PROCEDURE — 93970 EXTREMITY STUDY: CPT

## 2019-09-10 PROCEDURE — 70498 CT ANGIOGRAPHY NECK: CPT

## 2019-09-10 PROCEDURE — 700101 HCHG RX REV CODE 250: Performed by: EMERGENCY MEDICINE

## 2019-09-10 PROCEDURE — G0378 HOSPITAL OBSERVATION PER HR: HCPCS

## 2019-09-10 PROCEDURE — 70496 CT ANGIOGRAPHY HEAD: CPT

## 2019-09-10 PROCEDURE — 81003 URINALYSIS AUTO W/O SCOPE: CPT

## 2019-09-10 PROCEDURE — 94640 AIRWAY INHALATION TREATMENT: CPT

## 2019-09-10 PROCEDURE — 99285 EMERGENCY DEPT VISIT HI MDM: CPT

## 2019-09-10 PROCEDURE — 71045 X-RAY EXAM CHEST 1 VIEW: CPT

## 2019-09-10 PROCEDURE — 700117 HCHG RX CONTRAST REV CODE 255: Performed by: EMERGENCY MEDICINE

## 2019-09-10 PROCEDURE — 99220 PR INITIAL OBSERVATION CARE,LEVL III: CPT | Performed by: INTERNAL MEDICINE

## 2019-09-10 PROCEDURE — 80053 COMPREHEN METABOLIC PANEL: CPT

## 2019-09-10 PROCEDURE — 93005 ELECTROCARDIOGRAM TRACING: CPT

## 2019-09-10 PROCEDURE — 84484 ASSAY OF TROPONIN QUANT: CPT | Mod: 91

## 2019-09-10 PROCEDURE — 85730 THROMBOPLASTIN TIME PARTIAL: CPT

## 2019-09-10 PROCEDURE — 83735 ASSAY OF MAGNESIUM: CPT

## 2019-09-10 PROCEDURE — 83690 ASSAY OF LIPASE: CPT

## 2019-09-10 RX ORDER — BISACODYL 10 MG
10 SUPPOSITORY, RECTAL RECTAL
Status: DISCONTINUED | OUTPATIENT
Start: 2019-09-10 | End: 2019-09-13 | Stop reason: HOSPADM

## 2019-09-10 RX ORDER — CLONAZEPAM 2 MG/1
1 TABLET ORAL 2 TIMES DAILY PRN
COMMUNITY
End: 2019-10-24

## 2019-09-10 RX ORDER — ACETAMINOPHEN 325 MG/1
650 TABLET ORAL EVERY 6 HOURS PRN
Status: DISCONTINUED | OUTPATIENT
Start: 2019-09-10 | End: 2019-09-11

## 2019-09-10 RX ORDER — OMEPRAZOLE 20 MG/1
20 CAPSULE, DELAYED RELEASE ORAL 2 TIMES DAILY
Status: DISCONTINUED | OUTPATIENT
Start: 2019-09-11 | End: 2019-09-13 | Stop reason: HOSPADM

## 2019-09-10 RX ORDER — PAROXETINE HYDROCHLORIDE 20 MG/1
30 TABLET, FILM COATED ORAL DAILY
Status: DISCONTINUED | OUTPATIENT
Start: 2019-09-11 | End: 2019-09-13 | Stop reason: HOSPADM

## 2019-09-10 RX ORDER — KETOROLAC TROMETHAMINE 10 MG/1
10 TABLET, FILM COATED ORAL EVERY 6 HOURS PRN
Status: DISCONTINUED | OUTPATIENT
Start: 2019-09-10 | End: 2019-09-11

## 2019-09-10 RX ORDER — PROPRANOLOL HYDROCHLORIDE 40 MG/1
20 TABLET ORAL 2 TIMES DAILY
COMMUNITY
End: 2019-10-24

## 2019-09-10 RX ORDER — POLYETHYLENE GLYCOL 3350 17 G/17G
1 POWDER, FOR SOLUTION ORAL
Status: DISCONTINUED | OUTPATIENT
Start: 2019-09-10 | End: 2019-09-13 | Stop reason: HOSPADM

## 2019-09-10 RX ORDER — PROPRANOLOL HYDROCHLORIDE 10 MG/1
20 TABLET ORAL 2 TIMES DAILY
Status: DISCONTINUED | OUTPATIENT
Start: 2019-09-11 | End: 2019-09-13 | Stop reason: HOSPADM

## 2019-09-10 RX ORDER — MONTELUKAST SODIUM 10 MG/1
10 TABLET ORAL
Status: DISCONTINUED | OUTPATIENT
Start: 2019-09-11 | End: 2019-09-13 | Stop reason: HOSPADM

## 2019-09-10 RX ORDER — ALBUTEROL SULFATE 90 UG/1
2 AEROSOL, METERED RESPIRATORY (INHALATION)
Status: DISCONTINUED | OUTPATIENT
Start: 2019-09-10 | End: 2019-09-13 | Stop reason: HOSPADM

## 2019-09-10 RX ORDER — HYDROXYZINE HYDROCHLORIDE 25 MG/1
50 TABLET, FILM COATED ORAL 3 TIMES DAILY
Status: ON HOLD | COMMUNITY
End: 2019-12-09 | Stop reason: SDUPTHER

## 2019-09-10 RX ORDER — GUAIFENESIN 600 MG/1
600 TABLET, EXTENDED RELEASE ORAL EVERY 12 HOURS
Status: DISCONTINUED | OUTPATIENT
Start: 2019-09-11 | End: 2019-09-13 | Stop reason: HOSPADM

## 2019-09-10 RX ORDER — AMOXICILLIN 250 MG
2 CAPSULE ORAL 2 TIMES DAILY
Status: DISCONTINUED | OUTPATIENT
Start: 2019-09-10 | End: 2019-09-13 | Stop reason: HOSPADM

## 2019-09-10 RX ORDER — DIVALPROEX SODIUM 500 MG/1
500-1000 TABLET, DELAYED RELEASE ORAL 2 TIMES DAILY
COMMUNITY
End: 2019-09-25 | Stop reason: SDUPTHER

## 2019-09-10 RX ORDER — MORPHINE SULFATE 15 MG/1
30 TABLET, FILM COATED, EXTENDED RELEASE ORAL EVERY 12 HOURS
Status: DISCONTINUED | OUTPATIENT
Start: 2019-09-11 | End: 2019-09-13 | Stop reason: HOSPADM

## 2019-09-10 RX ORDER — DIPHENHYDRAMINE HCL 25 MG
25 TABLET ORAL EVERY 6 HOURS PRN
Status: DISCONTINUED | OUTPATIENT
Start: 2019-09-10 | End: 2019-09-13 | Stop reason: HOSPADM

## 2019-09-10 RX ORDER — FUROSEMIDE 20 MG/1
20 TABLET ORAL 2 TIMES DAILY
Status: DISCONTINUED | OUTPATIENT
Start: 2019-09-11 | End: 2019-09-13 | Stop reason: HOSPADM

## 2019-09-10 RX ORDER — BACLOFEN 10 MG/1
10 TABLET ORAL 3 TIMES DAILY PRN
Status: DISCONTINUED | OUTPATIENT
Start: 2019-09-10 | End: 2019-09-13 | Stop reason: HOSPADM

## 2019-09-10 RX ORDER — DIVALPROEX SODIUM 500 MG/1
500-1000 TABLET, DELAYED RELEASE ORAL 2 TIMES DAILY
Status: DISCONTINUED | OUTPATIENT
Start: 2019-09-11 | End: 2019-09-11

## 2019-09-10 RX ORDER — HYDROXYZINE HYDROCHLORIDE 25 MG/1
50 TABLET, FILM COATED ORAL 3 TIMES DAILY
Status: DISCONTINUED | OUTPATIENT
Start: 2019-09-11 | End: 2019-09-13 | Stop reason: HOSPADM

## 2019-09-10 RX ORDER — MORPHINE SULFATE 30 MG/1
15 TABLET ORAL EVERY 12 HOURS PRN
Status: DISCONTINUED | OUTPATIENT
Start: 2019-09-10 | End: 2019-09-13 | Stop reason: HOSPADM

## 2019-09-10 RX ADMIN — ALBUTEROL SULFATE 2.5 MG: 2.5 SOLUTION RESPIRATORY (INHALATION) at 21:46

## 2019-09-10 RX ADMIN — IOHEXOL 80 ML: 350 INJECTION, SOLUTION INTRAVENOUS at 21:51

## 2019-09-11 ENCOUNTER — APPOINTMENT (OUTPATIENT)
Dept: CARDIOLOGY | Facility: MEDICAL CENTER | Age: 44
End: 2019-09-11
Attending: STUDENT IN AN ORGANIZED HEALTH CARE EDUCATION/TRAINING PROGRAM
Payer: MEDICAID

## 2019-09-11 PROBLEM — F44.5 PSYCHOGENIC NONEPILEPTIC SEIZURE: Status: ACTIVE | Noted: 2019-09-11

## 2019-09-11 PROBLEM — F41.9 ANXIETY: Status: ACTIVE | Noted: 2019-09-11

## 2019-09-11 PROBLEM — L98.9 SKIN LESION OF SCALP: Status: ACTIVE | Noted: 2019-09-11

## 2019-09-11 PROBLEM — R55 SYNCOPE: Status: ACTIVE | Noted: 2019-09-11

## 2019-09-11 LAB
ALBUMIN SERPL BCP-MCNC: 3.8 G/DL (ref 3.2–4.9)
ALBUMIN/GLOB SERPL: 1.4 G/DL
ALP SERPL-CCNC: 38 U/L (ref 30–99)
ALT SERPL-CCNC: 13 U/L (ref 2–50)
ANION GAP SERPL CALC-SCNC: 8 MMOL/L (ref 0–11.9)
APTT PPP: 30.7 SEC (ref 24.7–36)
APTT PPP: 31 SEC (ref 24.7–36)
AST SERPL-CCNC: 17 U/L (ref 12–45)
BASOPHILS # BLD AUTO: 0.7 % (ref 0–1.8)
BASOPHILS # BLD: 0.04 K/UL (ref 0–0.12)
BILIRUB SERPL-MCNC: 0.6 MG/DL (ref 0.1–1.5)
BUN SERPL-MCNC: 28 MG/DL (ref 8–22)
CALCIUM SERPL-MCNC: 9.3 MG/DL (ref 8.5–10.5)
CHLORIDE SERPL-SCNC: 99 MMOL/L (ref 96–112)
CO2 SERPL-SCNC: 31 MMOL/L (ref 20–33)
CREAT SERPL-MCNC: 1.06 MG/DL (ref 0.5–1.4)
EOSINOPHIL # BLD AUTO: 0.05 K/UL (ref 0–0.51)
EOSINOPHIL NFR BLD: 0.8 % (ref 0–6.9)
ERYTHROCYTE [DISTWIDTH] IN BLOOD BY AUTOMATED COUNT: 42.1 FL (ref 35.9–50)
GLOBULIN SER CALC-MCNC: 2.7 G/DL (ref 1.9–3.5)
GLUCOSE SERPL-MCNC: 90 MG/DL (ref 65–99)
HCT VFR BLD AUTO: 39.4 % (ref 37–47)
HGB BLD-MCNC: 12.4 G/DL (ref 12–16)
IMM GRANULOCYTES # BLD AUTO: 0.02 K/UL (ref 0–0.11)
IMM GRANULOCYTES NFR BLD AUTO: 0.3 % (ref 0–0.9)
INR PPP: 1.03 (ref 0.87–1.13)
INR PPP: 1.04 (ref 0.87–1.13)
LV EJECT FRACT  99904: 70
LV EJECT FRACT MOD 2C 99903: 73.51
LV EJECT FRACT MOD 4C 99902: 77.35
LV EJECT FRACT MOD BP 99901: 77.08
LYMPHOCYTES # BLD AUTO: 3.08 K/UL (ref 1–4.8)
LYMPHOCYTES NFR BLD: 50.7 % (ref 22–41)
MCH RBC QN AUTO: 28.3 PG (ref 27–33)
MCHC RBC AUTO-ENTMCNC: 31.5 G/DL (ref 33.6–35)
MCV RBC AUTO: 90 FL (ref 81.4–97.8)
MONOCYTES # BLD AUTO: 0.53 K/UL (ref 0–0.85)
MONOCYTES NFR BLD AUTO: 8.7 % (ref 0–13.4)
NEUTROPHILS # BLD AUTO: 2.35 K/UL (ref 2–7.15)
NEUTROPHILS NFR BLD: 38.8 % (ref 44–72)
NRBC # BLD AUTO: 0 K/UL
NRBC BLD-RTO: 0 /100 WBC
PLATELET # BLD AUTO: 215 K/UL (ref 164–446)
PMV BLD AUTO: 10.2 FL (ref 9–12.9)
POTASSIUM SERPL-SCNC: 3.7 MMOL/L (ref 3.6–5.5)
PROT SERPL-MCNC: 6.5 G/DL (ref 6–8.2)
PROTHROMBIN TIME: 13.8 SEC (ref 12–14.6)
PROTHROMBIN TIME: 13.8 SEC (ref 12–14.6)
RBC # BLD AUTO: 4.38 M/UL (ref 4.2–5.4)
SODIUM SERPL-SCNC: 138 MMOL/L (ref 135–145)
WBC # BLD AUTO: 6.1 K/UL (ref 4.8–10.8)

## 2019-09-11 PROCEDURE — 90686 IIV4 VACC NO PRSV 0.5 ML IM: CPT | Performed by: INTERNAL MEDICINE

## 2019-09-11 PROCEDURE — 700111 HCHG RX REV CODE 636 W/ 250 OVERRIDE (IP): Performed by: INTERNAL MEDICINE

## 2019-09-11 PROCEDURE — 85025 COMPLETE CBC W/AUTO DIFF WBC: CPT

## 2019-09-11 PROCEDURE — G0378 HOSPITAL OBSERVATION PER HR: HCPCS

## 2019-09-11 PROCEDURE — A9270 NON-COVERED ITEM OR SERVICE: HCPCS | Performed by: STUDENT IN AN ORGANIZED HEALTH CARE EDUCATION/TRAINING PROGRAM

## 2019-09-11 PROCEDURE — 700111 HCHG RX REV CODE 636 W/ 250 OVERRIDE (IP): Performed by: STUDENT IN AN ORGANIZED HEALTH CARE EDUCATION/TRAINING PROGRAM

## 2019-09-11 PROCEDURE — 85610 PROTHROMBIN TIME: CPT

## 2019-09-11 PROCEDURE — 99244 OFF/OP CNSLTJ NEW/EST MOD 40: CPT | Mod: 25 | Performed by: PSYCHIATRY & NEUROLOGY

## 2019-09-11 PROCEDURE — 90471 IMMUNIZATION ADMIN: CPT

## 2019-09-11 PROCEDURE — 85730 THROMBOPLASTIN TIME PARTIAL: CPT

## 2019-09-11 PROCEDURE — 93306 TTE W/DOPPLER COMPLETE: CPT | Mod: 26 | Performed by: INTERNAL MEDICINE

## 2019-09-11 PROCEDURE — 700102 HCHG RX REV CODE 250 W/ 637 OVERRIDE(OP): Performed by: STUDENT IN AN ORGANIZED HEALTH CARE EDUCATION/TRAINING PROGRAM

## 2019-09-11 PROCEDURE — 99225 PR SUBSEQUENT OBSERVATION CARE,LEVEL II: CPT | Mod: GC | Performed by: INTERNAL MEDICINE

## 2019-09-11 PROCEDURE — 93306 TTE W/DOPPLER COMPLETE: CPT

## 2019-09-11 PROCEDURE — 95951 EEG: CPT | Mod: 26 | Performed by: PSYCHIATRY & NEUROLOGY

## 2019-09-11 PROCEDURE — 80053 COMPREHEN METABOLIC PANEL: CPT

## 2019-09-11 PROCEDURE — 36415 COLL VENOUS BLD VENIPUNCTURE: CPT

## 2019-09-11 RX ORDER — MAGNESIUM SULFATE HEPTAHYDRATE 40 MG/ML
2 INJECTION, SOLUTION INTRAVENOUS ONCE
Status: COMPLETED | OUTPATIENT
Start: 2019-09-11 | End: 2019-09-11

## 2019-09-11 RX ORDER — BACITRACIN ZINC 500 [USP'U]/G
OINTMENT TOPICAL 2 TIMES DAILY PRN
Status: DISCONTINUED | OUTPATIENT
Start: 2019-09-11 | End: 2019-09-13 | Stop reason: HOSPADM

## 2019-09-11 RX ORDER — ACETAMINOPHEN 325 MG/1
650 TABLET ORAL EVERY 6 HOURS PRN
Status: DISCONTINUED | OUTPATIENT
Start: 2019-09-11 | End: 2019-09-13 | Stop reason: HOSPADM

## 2019-09-11 RX ORDER — IBUPROFEN 800 MG/1
400 TABLET ORAL EVERY 6 HOURS PRN
Status: DISCONTINUED | OUTPATIENT
Start: 2019-09-11 | End: 2019-09-13 | Stop reason: HOSPADM

## 2019-09-11 RX ORDER — DIVALPROEX SODIUM 500 MG/1
1000 TABLET, DELAYED RELEASE ORAL EVERY MORNING
Status: DISCONTINUED | OUTPATIENT
Start: 2019-09-11 | End: 2019-09-13 | Stop reason: HOSPADM

## 2019-09-11 RX ORDER — POTASSIUM CHLORIDE 20 MEQ/1
20 TABLET, EXTENDED RELEASE ORAL ONCE
Status: COMPLETED | OUTPATIENT
Start: 2019-09-11 | End: 2019-09-11

## 2019-09-11 RX ORDER — DIVALPROEX SODIUM 500 MG/1
500 TABLET, DELAYED RELEASE ORAL EVERY EVENING
Status: DISCONTINUED | OUTPATIENT
Start: 2019-09-11 | End: 2019-09-13 | Stop reason: HOSPADM

## 2019-09-11 RX ORDER — BENZOCAINE/MENTHOL 6 MG-10 MG
LOZENGE MUCOUS MEMBRANE 3 TIMES DAILY
Status: DISCONTINUED | OUTPATIENT
Start: 2019-09-11 | End: 2019-09-13 | Stop reason: HOSPADM

## 2019-09-11 RX ADMIN — PROPRANOLOL HYDROCHLORIDE 20 MG: 10 TABLET ORAL at 05:20

## 2019-09-11 RX ADMIN — HYDROXYZINE HYDROCHLORIDE 50 MG: 25 TABLET, FILM COATED ORAL at 16:52

## 2019-09-11 RX ADMIN — HYDROXYZINE HYDROCHLORIDE 50 MG: 25 TABLET, FILM COATED ORAL at 05:20

## 2019-09-11 RX ADMIN — MONTELUKAST 10 MG: 10 TABLET, FILM COATED ORAL at 21:12

## 2019-09-11 RX ADMIN — DIVALPROEX SODIUM 500 MG: 500 TABLET, DELAYED RELEASE ORAL at 16:52

## 2019-09-11 RX ADMIN — GUAIFENESIN 600 MG: 600 TABLET, EXTENDED RELEASE ORAL at 05:21

## 2019-09-11 RX ADMIN — MORPHINE SULFATE 15 MG: 30 TABLET ORAL at 01:49

## 2019-09-11 RX ADMIN — BACLOFEN 10 MG: 10 TABLET ORAL at 21:16

## 2019-09-11 RX ADMIN — HYDROXYZINE HYDROCHLORIDE 50 MG: 25 TABLET, FILM COATED ORAL at 10:59

## 2019-09-11 RX ADMIN — FUROSEMIDE 20 MG: 20 TABLET ORAL at 16:51

## 2019-09-11 RX ADMIN — DIVALPROEX SODIUM 1000 MG: 500 TABLET, DELAYED RELEASE ORAL at 05:21

## 2019-09-11 RX ADMIN — INFLUENZA A VIRUS A/BRISBANE/02/2018 IVR-190 (H1N1) ANTIGEN (FORMALDEHYDE INACTIVATED), INFLUENZA A VIRUS A/KANSAS/14/2017 X-327 (H3N2) ANTIGEN (FORMALDEHYDE INACTIVATED), INFLUENZA B VIRUS B/PHUKET/3073/2013 ANTIGEN (FORMALDEHYDE INACTIVATED), AND INFLUENZA B VIRUS B/MARYLAND/15/2016 BX-69A ANTIGEN (FORMALDEHYDE INACTIVATED) 0.5 ML: 15; 15; 15; 15 INJECTION, SUSPENSION INTRAMUSCULAR at 13:36

## 2019-09-11 RX ADMIN — DIPHENHYDRAMINE HCL 25 MG: 25 TABLET ORAL at 23:40

## 2019-09-11 RX ADMIN — SENNOSIDES, DOCUSATE SODIUM 2 TABLET: 50; 8.6 TABLET, FILM COATED ORAL at 05:22

## 2019-09-11 RX ADMIN — OMEPRAZOLE 20 MG: 20 CAPSULE, DELAYED RELEASE ORAL at 16:55

## 2019-09-11 RX ADMIN — HYDROCORTISONE: 1 CREAM TOPICAL at 13:36

## 2019-09-11 RX ADMIN — PROPRANOLOL HYDROCHLORIDE 20 MG: 10 TABLET ORAL at 16:55

## 2019-09-11 RX ADMIN — BACITRACIN ZINC: 500 OINTMENT TOPICAL at 11:31

## 2019-09-11 RX ADMIN — HYDROCORTISONE: 1 CREAM TOPICAL at 18:00

## 2019-09-11 RX ADMIN — ENOXAPARIN SODIUM 40 MG: 100 INJECTION SUBCUTANEOUS at 05:19

## 2019-09-11 RX ADMIN — GUAIFENESIN 600 MG: 600 TABLET, EXTENDED RELEASE ORAL at 16:52

## 2019-09-11 RX ADMIN — POTASSIUM CHLORIDE 20 MEQ: 1500 TABLET, EXTENDED RELEASE ORAL at 16:55

## 2019-09-11 RX ADMIN — MAGNESIUM SULFATE 2 G: 2 INJECTION INTRAVENOUS at 10:59

## 2019-09-11 RX ADMIN — MORPHINE SULFATE 30 MG: 15 TABLET, EXTENDED RELEASE ORAL at 05:20

## 2019-09-11 RX ADMIN — VITAMIN D, TAB 1000IU (100/BT) 4000 UNITS: 25 TAB at 05:19

## 2019-09-11 RX ADMIN — ACETAMINOPHEN 650 MG: 325 TABLET, FILM COATED ORAL at 09:02

## 2019-09-11 RX ADMIN — PAROXETINE HYDROCHLORIDE 30 MG: 20 TABLET, FILM COATED ORAL at 05:19

## 2019-09-11 RX ADMIN — SENNOSIDES, DOCUSATE SODIUM 2 TABLET: 50; 8.6 TABLET, FILM COATED ORAL at 16:53

## 2019-09-11 RX ADMIN — OMEPRAZOLE 20 MG: 20 CAPSULE, DELAYED RELEASE ORAL at 05:19

## 2019-09-11 RX ADMIN — MORPHINE SULFATE 30 MG: 15 TABLET, EXTENDED RELEASE ORAL at 16:52

## 2019-09-11 RX ADMIN — IBUPROFEN 400 MG: 800 TABLET, FILM COATED ORAL at 17:28

## 2019-09-11 RX ADMIN — FUROSEMIDE 20 MG: 20 TABLET ORAL at 05:21

## 2019-09-11 RX ADMIN — MORPHINE SULFATE 15 MG: 30 TABLET ORAL at 23:43

## 2019-09-11 ASSESSMENT — ENCOUNTER SYMPTOMS
BLURRED VISION: 0
HEMOPTYSIS: 0
SPUTUM PRODUCTION: 0
CONSTIPATION: 1
SENSORY CHANGE: 0
MYALGIAS: 1
HEARTBURN: 0
NAUSEA: 1
DEPRESSION: 0
FOCAL WEAKNESS: 0
ORTHOPNEA: 0
BLOOD IN STOOL: 0
LOSS OF CONSCIOUSNESS: 1
PHOTOPHOBIA: 0
TREMORS: 0
NECK PAIN: 1
CLAUDICATION: 0
EYE PAIN: 0
DIARRHEA: 0
FALLS: 1
DOUBLE VISION: 1
TINGLING: 0
COUGH: 0
CHILLS: 0
FEVER: 0
BACK PAIN: 0
DIZZINESS: 1
ABDOMINAL PAIN: 0
VOMITING: 1
PALPITATIONS: 0
HEADACHES: 1

## 2019-09-11 ASSESSMENT — COGNITIVE AND FUNCTIONAL STATUS - GENERAL
CLIMB 3 TO 5 STEPS WITH RAILING: A LOT
MOVING FROM LYING ON BACK TO SITTING ON SIDE OF FLAT BED: A LOT
STANDING UP FROM CHAIR USING ARMS: A LOT
PERSONAL GROOMING: A LITTLE
TOILETING: A LOT
TURNING FROM BACK TO SIDE WHILE IN FLAT BAD: A LITTLE
DRESSING REGULAR UPPER BODY CLOTHING: A LITTLE
MOBILITY SCORE: 14
HELP NEEDED FOR BATHING: A LITTLE
WALKING IN HOSPITAL ROOM: A LOT
SUGGESTED CMS G CODE MODIFIER MOBILITY: CL
MOVING TO AND FROM BED TO CHAIR: A LITTLE

## 2019-09-11 ASSESSMENT — LIFESTYLE VARIABLES
EVER_SMOKED: NEVER
SUBSTANCE_ABUSE: 0

## 2019-09-11 ASSESSMENT — PATIENT HEALTH QUESTIONNAIRE - PHQ9
2. FEELING DOWN, DEPRESSED, IRRITABLE, OR HOPELESS: NOT AT ALL
SUM OF ALL RESPONSES TO PHQ9 QUESTIONS 1 AND 2: 0
1. LITTLE INTEREST OR PLEASURE IN DOING THINGS: NOT AT ALL

## 2019-09-11 NOTE — ED NOTES
Received report from EDWIN Cazares. Upon shift change pt is resting in bed with even and unlabored breaths, in no apparent distress. Will continue to monitor.

## 2019-09-11 NOTE — ED NOTES
Pt being taken upstairs at this time by EDWIN Roe, via lacho on cardiac monitor. Pt is awake, talking to staff, in no apparent distress at time of transfer. Pt's paperwork and belongings sent upstairs with pt and RN.

## 2019-09-11 NOTE — ED NOTES
3 unsuccessful attempts at IV access. Another RN to attempt. Phlebotomy called to try to obtain blood.

## 2019-09-11 NOTE — ASSESSMENT & PLAN NOTE
Multiple skin lesions and excoriations noticed on scalp, difficult to assess cause due to pt scratching. Pt has tried topical and oral antifungals, antibacterials, and shampoos. Sees dermatologist in [place].  Fungal or bacterial infection less likely, eczema vs dermatillomania  Plan:  -bacitracin ointment  -hydrocortisone ointment for itching  -nursing consulted wound care  -F/u with dermatology

## 2019-09-11 NOTE — DISCHARGE SUMMARY
Internal Medicine Discharge Summary  Note Author: Shwetha Nelson M.D.       Name Peter Trimble 1975   Age/Sex 44 y.o. female   MRN [id number]         Admit Date:  9/10/2019       Discharge Date:   2019    Service:   [facility] Internal Medicine Green Team  Attending Physician(s):   Dr. España   Senior Resident(s):   Dr. Nelson  Omid Resident(s):   Dr. Freeman  PCP: Dr. Norton      Primary Diagnosis:   Psychogenic nonepileptic seizure    Secondary Diagnoses:                Principal Problem:    Psychogenic nonepileptic seizure POA: Yes  Active Problems:    Depression POA: Yes    Bipolar 2 disorder (HCC) POA: Yes    Anxiety POA: Unknown    Intractable migraine without aura and without status migrainosus POA: Yes    AVA (obstructive sleep apnea), intolerant of CPAP POA: Yes    Skin lesion of scalp POA: Unknown    Electrolyte abnormality POA: Unknown      Overview:          Resolved Problems:    * No resolved hospital problems. *      Hospital Summary (Brief Narrative):       44-year-old woman with complex past medical history including multiple psychiatric disorders including PTSD, borderline personality disorder, bipolar disorder, anxiety, chronic pain, chronic intractable migraine, history of subarachnoid hemorrhage thought to be secondary to hypertension and triptan use in , asthma, obstructive sleep apnea, hypertension, chronic home oxygen use 2L who presented to the ED with complaints of frequent episodes of passing out over the last 2 months.  Patient had recently seen outpatient neurology on 2019 with planned repeat outpatient EEG (multiple unremarkable EEGs in the past, most recent ,  MRI brain with nonspecific FLAIR abnormalities), suspension of  license had been filed with [employer] at that time.  Exam unremarkable, no significant derangement on labs, normal sinus rhythm with nonspecific T wave changes on EKG, old small right basal ganglia lacunar infarct on CTA head-neck,  echo normal.  No events on telemetry.  Neurology consulted, 24-hour video EEG performed 9/11/2019, which showed no focal or generalized epileptiform activity throughout, with normal awake background with movement artifact persistent through multiple patient-reported spells.  Repeat 1-hour EEG on 9/12/2019 again did not capture epileptiform activity.  Neurology felt that patient's symptoms were psychogenic in nature and recommended cognitive behavioral therapy for conversion disorder.  Extensive discussion with patient regarding further management with outpatient PCP, psychologist (cognitive behavior therapy), and psychiatry.  Physical therapy recommended home health, but unable to approve due to insurance; patient agreeable to discussing further with PCP to arrange for outpatient physical therapy in addition to existing home aide, front wheel walker provided upon discharge.  PCP appointment scheduled for 9/16/2019, patient states cognitive behavioral therapy session scheduled for next week, psychiatry for next month.  Patient discharged in stable condition.    Consultants:     Neurology    Procedures:        9/11/2019 - 24-hour video EEG  9/12/2019 - 1-hour video EEG    Imaging/ Testing:      EC-ECHOCARDIOGRAM COMPLETE W/O CONT   Final Result      CT-CTA HEAD WITH & W/O-POST PROCESS   Final Result   Addendum 1 of 1      9/10/2019 9:30 PM      HISTORY/REASON FOR EXAM:  Syncope hx of SAH         TECHNIQUE/EXAM DESCRIPTION:   CT angiogram of the [place] without and with contrast.  Initial    precontrast images were obtained of the head from the skull base through    the vertex.  Postcontrast images were obtained of the [place]    following the power injection of    nonionic contrast at 5.0 mL/sec. Thin-section helical images were obtained    with overlapping reconstruction interval. Coronal and sagittal multiplanar    volume reformats were generated.  3D angiographic images were reviewed on    PACS.   Maximum intensity    projection (MIP) images were generated and reviewed.      80 mL of Omnipaque 350 nonionic contrast was injected intravenously.      Low dose optimization technique was utilized for this CT exam including    automated exposure control and adjustment of the mA and/or kV according to    patient size.      COMPARISON:  Noncontrast head CT 6/25/2015      FINDINGS:   Petrocavernous and supraclinoid ICA segments are patent.  MCA and GEORGIE    branches are patent. Patent anterior communicating and right posterior    communicating artery. The intradural vertebral, basilar and posterior    cerebral arteries are patent. No occlusion    or hemodynamically significant stenosis.  No aneurysm or high flow    vascular malformation.      There is a age indeterminate probably old small lacunar infarct near the    anterior limb of the right internal capsule.      3D angiographic/MIP images of the vasculature confirm the vascular    findings as described above.         CT angiogram of the [place] within normal limits.      Probably old small right basal ganglia lacunar infarct.      Final      CT-CTA NECK WITH & W/O-POST PROCESSING   Final Result   Addendum 1 of 1      9/10/2019 9:30 PM      HISTORY/REASON FOR EXAM:  Syncope         TECHNIQUE/EXAM DESCRIPTION:   CT angiogram of the neck with contrast.      Postcontrast images were obtained of the neck from the great vessels    through the skull base following the power injection of nonionic contrast    at 5.0 mL/sec. Thin-section helical images were obtained with overlapping    reconstruction interval. Coronal and    oblique multiplanar volume reformats were generated.      Cervical internal carotid artery percent stenosis is calculated using the    standard method according to the NASCET criteria wherein a segment of    uniform caliber mid or distal cervical internal carotid is used as the    reference denominator.      3D angiographic images were reviewed  on PACS.  Maximum intensity    projection (MIP) images were generated and reviewed      80 mL of Omnipaque 350 nonionic contrast was injected intravenously.      Low dose optimization technique was utilized for this CT exam including    automated exposure control and adjustment of the mA and/or kV according to    patient size.      COMPARISON:  MRA of the neck 10/5/2011      FINDINGS:   There is a three-vessel aortic arch.  Origin of the supra aortic arteries    are patent.  Bilateral common and internal carotid arteries are patent    without significant plaque, stenosis by NASCET criteria, or dissection.     Vertebral arteries are patent from    their origins to the vertebrobasilar junction.      Small nodule in the left suboccipital superficial soft tissues is    nonspecific, probably a mildly prominent lymph node.         3D angiographic/MIP images of the vasculature confirm the vascular    findings as described above.         CT angiogram of the neck within normal limits.      Final      US-EXTREMITY VENOUS LOWER BILAT   Final Result      DX-CHEST-PORTABLE (1 VIEW)   Final Result   Addendum 1 of 1      9/10/2019 7:22 PM      HISTORY/REASON FOR EXAM:  Chest Pain.   Generalized weakness for      TECHNIQUE/EXAM DESCRIPTION AND NUMBER OF VIEWS:   Single portable view of the chest.      COMPARISON: 9/2/2016      FINDINGS:   Cardiomediastinal contour is within normal limits.   Lungs show hypoinflation.   Minimal linear opacities at the lung bases.   No pleural fluid collection or pneumothorax.   No major bony abnormality is seen.            Hypoinflation with minimal bibasilar atelectasis.      Final          Discharge Medications:         Medication Reconciliation: Completed       Medication List      CONTINUE taking these medications      Instructions   * albuterol 2.5mg/0.5ml Nebu  Commonly known as:  PROVENTIL   2.5 mg by Nebulization route every 6 hours as needed for Shortness of Breath.  Dose:  2.5 mg     *  PROAIR  (90 Base) MCG/ACT Aers inhalation aerosol  Generic drug:  albuterol   ProAir HFA 90 mcg/actuation aerosol inhaler     baclofen 10 MG Tabs  Commonly known as:  LIARIANNAAL   Doctor's comments:  Fill at monthly intervals or greater.  Take 1 Tab by mouth 3 times a day as needed. as needed for muscle spasms  Dose:  10 mg     cetirizine 10 MG Tabs  Commonly known as:  ZYRTEC   Take 10 mg by mouth every day.  Dose:  10 mg     clonazepam 2 MG tablet  Commonly known as:  KLONOPIN   Take 1 mg by mouth 2 times a day as needed.  Dose:  1 mg     diphenhydrAMINE 25 MG Tabs  Commonly known as:  BENADRYL   Take 25 mg by mouth every 6 hours as needed for Sleep. Indications: Allergic Conjunctivitis  Dose:  25 mg     divalproex 500 MG Tbec  Commonly known as:  DEPAKOTE   Take 500-1,000 mg by mouth 2 Times a Day. Patient takes 1,000 mg QAM and 500 mg QPM  Dose:  500-1,000 mg     esomeprazole 40 MG delayed-release capsule  Commonly known as:  NEXIUM   Take 40 mg by mouth 2 Times a Day.  Dose:  40 mg     ferrous sulfate 325 (65 Fe) MG tablet   Take 325 mg by mouth every day.  Dose:  325 mg     furosemide 20 MG Tabs  Commonly known as:  LASIX   Take 20 mg by mouth 2 times a day.  Dose:  20 mg     guaiFENesin  MG Tb12  Commonly known as:  MUCINEX   Take 600 mg by mouth every 12 hours.  Dose:  600 mg     hydrOXYzine HCl 50 MG Tabs  Commonly known as:  ATARAX   Take 50 mg by mouth 3 times a day.  Dose:  50 mg     ketorolac 10 MG Tabs  Commonly known as:  TORADOL   Take 10 mg by mouth every 6 hours as needed for Mild Pain.  Dose:  10 mg     montelukast 10 MG Tabs  Commonly known as:  SINGULAIR   Take 10 mg by mouth every bedtime.  Dose:  10 mg     * morphine 30 MG SR capsule  Commonly known as:  DEBI   Take 30 mg by mouth 3 times a day.  Dose:  30 mg     * morphine 15 MG tablet  Commonly known as:  MS IR   Take 15 mg by mouth every 6 hours as needed for Severe Pain (breakthorugh pain).  Dose:  15 mg     PARoxetine 30 MG  Tabs  Commonly known as:  PAXIL   Take 1 Tab by mouth every day.  Dose:  30 mg     propranolol 40 MG Tabs  Commonly known as:  INDERAL   Take 20 mg by mouth 2 times a day.  Dose:  20 mg     vitamin D 1000 UNIT Tabs  Commonly known as:  cholecalciferol   Take 4,000 Units by mouth every day.  Dose:  4,000 Units         * This list has 4 medication(s) that are the same as other medications prescribed for you. Read the directions carefully, and ask your doctor or other care provider to review them with you.                Disposition:   Discharge home    Diet:   Healthy    Activity:   As tolerated    Instructions:        The patient was instructed to return to the ER in the event of worsening symptoms. I have counseled the patient on the importance of compliance and the patient has agreed to proceed with all medical recommendations and follow up plan indicated above.   The patient understands that all medications come with benefits and risks. Risks may include permanent injury or death and these risks can be minimized with close reassessment and monitoring.        Primary Care Provider:    Dr. Norton      Follow up appointment details :      Future Appointments   Date Time Provider Department Center   12[facility]2019 10:30 [facility]   12/17/2019  2:00 PM [facility] None     Vika Norton M.D.  [address] [facility] [address]  [phone]    Go on 9/16/2019  Please arrive at 1 pm for your appointment at 1:30 pm . Thank you       Pending Studies:          Time spent on discharge day patient visit, preparing discharge paperwork and arranging for patient follow up.    Summary of follow up issues:   Multidisciplinary management of psychiatric conditions  Arrangement of outpatient physical therapy    Discharge Time (Minutes) :    50 minutes  Hospital Course Type: Observation Stay      Condition on Discharge   Stable  ______________________________________________________________________    Interval history/exam for day of discharge:    Constitutional: Obese, somewhat anxious, in no acute distress.  Frequently scratching at scalp.  Head: Normocephalic.  Multiple superficial excoriation on scalp, no significant erythema, drainage.  Neck: Supple, normal active ROM.  Cardiovascular: Normal rate, regular rhythm. No murmur heard.  Abdominal: Abdominal obesity.  No rash or skin breakdown.  Soft.  No significant tenderness upon palpation.  Musculoskeletal: Normal range of motion. Freely moving all extremities.  She exhibits no edema or tenderness.   Neurological: Alert, oriented.  Some fine tremors of upper extremities, no other focal deficits apparent.  Skin: Warm, dry.  Psychiatric: Somewhat anxious, no significant agitation.    Most Recent Labs:    Lab Results   Component Value Date/Time    WBC 5.3 09/13/2019 10:31 AM    RBC 4.41 09/13/2019 10:31 AM    HEMOGLOBIN 12.3 09/13/2019 10:31 AM    HEMATOCRIT 39.5 09/13/2019 10:31 AM    MCV 89.6 09/13/2019 10:31 AM    MCH 27.9 09/13/2019 10:31 AM    MCHC 31.1 (L) 09/13/2019 10:31 AM    MPV 10.4 09/13/2019 10:31 AM    NEUTSPOLYS 34.30 (L) 09/13/2019 10:31 AM    LYMPHOCYTES 49.60 (H) 09/13/2019 10:31 AM    MONOCYTES 13.80 (H) 09/13/2019 10:31 AM    EOSINOPHILS 1.30 09/13/2019 10:31 AM    BASOPHILS 0.80 09/13/2019 10:31 AM    HYPOCHROMIA 1+ 05/15/2013 02:05 AM    ANISOCYTOSIS 1+ 05/05/2013 02:35 AM      Lab Results   Component Value Date/Time    SODIUM 139 09/13/2019 10:31 AM    POTASSIUM 3.9 09/13/2019 10:31 AM    CHLORIDE 99 09/13/2019 10:31 AM    CO2 31 09/13/2019 10:31 AM    GLUCOSE 81 09/13/2019 10:31 AM    BUN 25 (H) 09/13/2019 10:31 AM    CREATININE 1.38 09/13/2019 10:31 AM      Lab Results   Component Value Date/Time    ALTSGPT 13 09/11/2019 01:22 AM    ASTSGOT 17 09/11/2019 01:22 AM    ALKPHOSPHAT 38 09/11/2019 01:22 AM    TBILIRUBIN 0.6 09/11/2019 01:22 AM    LIPASE 23  09/10/2019 07:56 PM    ALBUMIN 3.8 09/11/2019 01:22 AM    GLOBULIN 2.7 09/11/2019 01:22 AM    PREALBUMIN 31.1 10/08/2011 06:13 AM    INR 1.04 09/11/2019 01:22 AM     Lab Results   Component Value Date/Time    PROTHROMBTM 13.8 09/11/2019 01:22 AM    INR 1.04 09/11/2019 01:22 AM

## 2019-09-11 NOTE — PROGRESS NOTES
Monitor summary: SR 66-70, OR 0.14, QRS 0.10, QT 0.40, with occassional PACS, per strip from monitor room.

## 2019-09-11 NOTE — ED NOTES
Pt sleeping in bed with even and unlabored breaths, in no apparent distress. Waiting for pharmacy to approve pt's medications so they can be administered. Will continue to monitor pt.

## 2019-09-11 NOTE — ED NOTES
Pt back from CT and resting in bed with even and unlabored breaths. RT at bedside administering breathing treatment, pt tolerating well. Will continue to monitor.

## 2019-09-11 NOTE — ED TRIAGE NOTES
"Patient c/o generalized weakness, predominantly in her legs & states she has been \"blacking out\" lately. Patient went to primary care provider today and on the way home from the doctor's office, stopped to get gas and called EMS from gas station.   "

## 2019-09-11 NOTE — H&P
Internal Medicine Admitting History and Physical    Note Author: Leonardo Ty M.D.       Name Peter Trimble 1975   Age/Sex 44 y.o. female   MRN [id number]   Code Status Full code     After 5PM or if no immediate response to page, please call for cross-coverage  Attending/Team: Dr. España/Fred See Patient List for primary contact information  Call [phone] to page    1st Call - Day Intern (R1):   Dr. Freeman 2nd Call - Day Sr. Resident (R2/R3):   Dr. Nelson       Chief Complaint:   Syncope, nausea, vomiting, dizziness, headache.     HPI:  Ms. 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 She mentioned episodes of blacking out resulting in falls. She also noticed the events while resting in the bed. No associated prodromal symptoms except for nausea and dizziness but also mentioned they are present all the time. Episodes last for 1-2 min as per pt with no loss of bowel or bladder control. No jerking activity but patient mentioned associated tremors.  c/o headache- diffuse more in the occipital region radiating to neck but no neck rigidity, she has h/o migraine. Associated with nausea and vomiting.  C/o scalp lesions which pt believes is fungal infection, tried antibiotics and antifungal medications without resolution, lesions are itchy with patchy loss of hair at the lesion site.  Mentioned c/o of developing double vision recently. Also mentioned c/o of constipation.  She denied burning micturition, dysuria and diarrhea.  She was seen by neurologist recently and was planned to get an EEG study but is yet to be scheduled.    ED course:  Labs- Hb:13.1, Na-139, K-3.8, BUN-30, Cr-1.1  CTA neck- WNL  EKG- NSR,  "Non specific T wave changes.  ECHO pending.  CXR- minimal linear opacities at lung base, lung hypoinflation  US extremity- No evidence of DVT.  Review of Systems   Constitutional: Negative for chills and fever.   HENT: Negative for ear pain, hearing loss and tinnitus.    Eyes: Positive for double vision. Negative for blurred vision, photophobia and pain.   Respiratory: Negative for cough, hemoptysis and sputum production.    Cardiovascular: Positive for leg swelling. Negative for chest pain, palpitations, orthopnea and claudication.   Gastrointestinal: Positive for constipation, nausea and vomiting. Negative for abdominal pain, blood in stool, diarrhea and heartburn.   Genitourinary: Positive for urgency. Negative for dysuria.   Musculoskeletal: Positive for falls, myalgias and neck pain. Negative for back pain and joint pain.   Skin: Negative for rash.        Scalp lesions, mild erythema, no discharge   Neurological: Positive for dizziness, loss of consciousness and headaches. Negative for tingling, tremors, sensory change and focal weakness.   Psychiatric/Behavioral: Negative for depression, substance abuse and suicidal ideas.             Past Medical History (Chronic medical problem, known complications and current treatment)    Past Medical History:   Diagnosis Date   • Acute blood loss anemia 2013    -resolved, postop     • Acute renal failure (ARF) (HCC) 10/5/2011    -resolved (post op )    • Anemia    • Anxiety    • Arthritis     osteoarthritis since 17yo.   • ASTHMA     O2 3liters at HS and prn   • Bipolar affective (HCC)    • Breath shortness    • Cold    • Depression    • Eclampsia complicating pregnancy    • Environmental allergies    • Essential hypertension, malignant 2011   • Fibromyalgia    • GERD (gastroesophageal reflux disease)    • Heart murmur     she denies this hx   • History of pregnancy 10/16/2014        • Hx MRSA infection    • Hyperlipidemia 13    \"off " "medication\"   • Hypertension    • Kidney stones    • Migraines    • Pain 05-27-14    \"my body hurts all the time\", 5-6/10   • Personality disorder (HCC)    • Pneumonia 2012   • PTSD (post-traumatic stress disorder)    • Scalp wound 8/18/2014   • Seizure ([facility]) 05-27-14    last 2011   • Sleep apnea     has had a sleep study, use O2 at HS   • Snoring    • Stroke ([facility]) 2011     reports strokes x5 , and a \"brain bleed\"   • Subarachnoid hemorrhage ([facility]) 9/28/2011    -small, 2011 (2nd to HTN)    • Unspecified hemorrhagic conditions     nose-bleeds, bruises easily   • Unspecified urinary incontinence    • Urinary bladder disorder           Past Surgical History:  Past Surgical History:   Procedure Laterality Date   • ANKLE ORIF Right 8/7/2016    Procedure: ANKLE ORIF;  Surgeon: Bill Brambila M.D.;  Location: [facility];  Service:    • IRRIGATION & DEBRIDEMENT GENERAL  8/17/2014    Performed by Ezra Wright M.D. at [facility]   • BREAST BIOPSY  5/29/2014    Performed by Negro Hester M.D. at SURGERY SAME DAY [facility]   • EVACUATION OF HEMATOMA  5/2/2013    Performed by Lazaro Connors Jr., M.D. at [facility]   • MAMMOPLASTY REDUCTION  4/29/2013    Performed by Negro Hester M.D. at [facility]   • RECOVERY  1/30/2012    Performed by BENEDICT IR-RECOVERY at [facility] SAME DAY [facility]   • RECOVERY  10/5/2011    Performed by MARIAELENA [place]ULLEN[place] at [facility]   • HYSTERECTOMY LAPAROSCOPY      W BSO   • KNEE RECONSTRUCTION     • LAMINOTOMY     • OTHER ORTHOPEDIC SURGERY      maddie. knee scopes   • OTHER ORTHOPEDIC SURGERY      back fusion then hardware removal   • PB EXPLORATION OF SPINAL FUSION     • PB REMOVAL OF OVARY(S)         Current Outpatient Medications:  Home Medications     Reviewed by Alice Palm (Pharmacy Tech) on 09/10/19 at 1956  Med List Status: Complete   Medication Last Dose Status   albuterol (PROAIR HFA) 108 (90 Base) MCG/ACT " "Aero Soln inhalation aerosol 9/10/2019 Active   albuterol (PROVENTIL) 2.5mg/0.5ml Nebu Soln 9/10/2019 Active   baclofen (LIORESAL) 10 MG Tab 9/10/2019 Active   cetirizine (ZYRTEC) 10 MG Tab 9/10/2019 Active   diphenhydrAMINE (BENADRYL) 25 MG Tab 9/9/2019 Active   divalproex (DEPAKOTE) 500 MG Tablet Delayed Response 9/10/2019 Active   esomeprazole (NEXIUM) 40 MG delayed-release capsule 9/10/2019 Active   ferrous sulfate 325 (65 FE) MG tablet 9/10/2019 Active   furosemide (LASIX) 20 MG Tab 9/10/2019 Active   guaifenesin LA (MUCINEX) 600 MG TABLET SR 12 HR 9/10/2019 Active   hydrOXYzine HCl (ATARAX) 50 MG Tab 9/10/2019 Active   ketorolac (TORADOL) 10 MG Tab 9/9/2019 Active   montelukast (SINGULAIR) 10 MG Tab 9/9/2019 Active   morphine (DEBI) 30 MG SR capsule 9/10/2019 Active   morphine (MS IR) 15 MG tablet 9/10/2019 Active   paroxetine (PAXIL) 30 MG Tab 9/10/2019 Active   propranolol (INDERAL) 40 MG Tab 9/10/2019 Active   vitamin D (CHOLECALCIFEROL) 1000 UNIT Tab 9/10/2019 Active                Medication Allergy/Sensitivities:  Allergies   Allergen Reactions   • Compazine      \"psychotic reaction\"   • Imitrex [Sumatriptan Succinate]      Had brain bleed   • Inapsine [Droperidol]      \"psychotic reaction\"   • Maxalt [Rizatriptan Benzoate]      Had brain bleed   • Phenergan [Promethazine Hcl]      \"psychotic reaction\"   • Xanax [Alprazolam]      Sores and dry mouth, many others pt cannot remember   • Zantac      Stomach pain   • Apple Cider Vinegar Rash     Patient states she gets rash   • Butrans [Buprenorphine]    • Clarithromycin Vomiting and Palpitations   • Dilaudid [Hydromorphone] Rash     Patient states she had rash, hallucinations, unable to urinate.    • Doxycycline    • Effexor [Venlafaxine]      \"Really depressed, like i wanted to hurt myself\"   • Eucalyptus Oil    • Kiwi Extract    • Latex Rash   • Lyrica [Pregabalin]      \"depression, slept a lot\"   • Minocycline Vomiting     \"any cyclines\"   • Patchouli " Oil Rash     Rash    • Sulfa Drugs    • Tea Tree Oil Rash     Break out in rash   • Topiramate Nausea     Patient states made sick to stomach and dizzy.         Family History (mandatory)   Family History   Problem Relation Age of Onset   • Hypertension Mother    • Hyperlipidemia Mother    • Hypertension Father    • Hyperlipidemia Father    • Heart Disease Father    • Psychiatric Illness Father    • Alcohol/Drug Father    • Alcohol/Drug Brother    • Arthritis Maternal Uncle    • Psychiatric Illness Maternal Uncle    • Heart Disease Maternal Uncle    • Hypertension Maternal Uncle    • Hyperlipidemia Maternal Uncle    • Genetic Disorder Paternal Aunt    • Psychiatric Illness Paternal Uncle    • Arthritis Maternal Grandmother    • Heart Disease Maternal Grandmother    • Alcohol/Drug Maternal Grandfather    • Stroke Maternal Grandfather    • Psychiatric Illness Maternal Grandfather    • Arthritis Paternal Grandmother    • Alcohol/Drug Paternal Grandfather        Social History (mandatory)   Social History     Socioeconomic History   • Marital status:      Spouse name: Not on file   • Number of children: Not on file   • Years of education: Not on file   • Highest education level: Not on file   Occupational History   • Not on file   Social Needs   • Financial resource strain: Not on file   • Food insecurity:     Worry: Not on file     Inability: Not on file   • Transportation needs:     Medical: Not on file     Non-medical: Not on file   Tobacco Use   • Smoking status: Never Smoker   • Smokeless tobacco: Never Used   Substance and Sexual Activity   • Alcohol use: No   • Drug use: No   • Sexual activity: Not on file   Lifestyle   • Physical activity:     Days per week: Not on file     Minutes per session: Not on file   • Stress: Not on file   Relationships   • Social connections:     Talks on phone: Not on file     Gets together: Not on file     Attends [employer] service: Not on file     Active member of club or  organization: Not on file     Attends meetings of clubs or organizations: Not on file     Relationship status: Not on file   • Intimate partner violence:     Fear of current or ex partner: Not on file     Emotionally abused: Not on file     Physically abused: Not on file     Forced sexual activity: Not on file   Other Topics Concern   • Not on file   Social History Narrative   • Not on file     Living situation:   PCP : No primary care provider on file.    Physical Exam     Vitals:    09/10/19 2230 09/10/19 2246 09/10/19 2248 09/10/19 2250   BP: 134/76 143/73 138/90 135/53   Pulse: 74 74 80 84   Resp: 18  18 18   Temp:       TempSrc:       SpO2: 98% 100% 100% 99%   Weight:       Height:         Body mass index is 38.35 kg/m².  O2 therapy: Pulse Oximetry: 99 %, O2 (LPM): 2, O2 Delivery: Nasal Cannula    Physical Exam   Constitutional: She is oriented to person, place, and time and well-developed, well-nourished, and in no distress.   HENT:   Head: Normocephalic and atraumatic.   Eyes: Pupils are equal, round, and reactive to light. Conjunctivae and EOM are normal.   Neck: Normal range of motion. Neck supple. No thyromegaly present.   Cardiovascular: Normal rate, regular rhythm and normal heart sounds.   Pulmonary/Chest: Effort normal and breath sounds normal. No respiratory distress. She has no wheezes.   Abdominal: Soft. Bowel sounds are normal. She exhibits no distension. There is no tenderness.   Musculoskeletal: Normal range of motion. She exhibits edema and deformity.   High arched right foot   Neurological: She is alert and oriented to person, place, and time. Gait normal.   Skin: Skin is dry. No rash noted. No erythema.   Psychiatric: Mood, memory, affect and judgment normal.         Data Review       Old Records Request:   Completed  Current Records review/summary: Completed    Lab Data Review:  Recent Results (from the past 24 hour(s))   URINALYSIS,CULTURE IF INDICATED    Collection Time: 09/10/19  6:00 PM    Result Value Ref Range    Color DK Yellow     Character Clear     Specific Gravity 1.033 <1.035    Ph 6.0 5.0 - 8.0    Glucose Negative Negative mg/dL    Ketones 15 (A) Negative mg/dL    Protein Negative Negative mg/dL    Bilirubin Negative Negative    Urobilinogen, Urine 1.0 Negative    Nitrite Negative Negative    Leukocyte Esterase Negative Negative    Occult Blood Negative Negative    Micro Urine Req see below    EKG    Collection Time: 09/10/19  6:05 PM   Result Value Ref Range    Report       [facility]    Test Date:  2019-09-10  Pt Name:    LEE SCHNEIDER                Department: ER  MRN:        [id number]                      Room:       [facility]  Gender:     Female                       Technician: [id number]  :        1975                   Requested By:ER TRIAGE PROTOCOL  Order #:    [id number]                    Reading MD: Lori Soliz    Measurements  Intervals                                Axis  Rate:       69                           P:          53  LA:         164                          QRS:        -3  QRSD:       105                          T:          -5  QT:         423  QTc:        454    Interpretive Statements  Sinus rhythm  Nonspecific T abnormalities, anterior leads  No ST elevation or depression noted.  Compared to ECG 10/03/2016 14:09:30  No significant changes    Electronically Signed On 9- 21:15:42 PDT by Lori Soilz     CBC with Differential    Collection Time: 09/10/19  7:56 PM   Result Value Ref Range    WBC 6.7 4.8 - 10.8 K/uL    RBC 4.61 4.20 - 5.40 M/uL    Hemoglobin 13.1 12.0 - 16.0 g/dL    Hematocrit 41.4 37.0 - 47.0 %    MCV 89.8 81.4 - 97.8 fL    MCH 28.4 27.0 - 33.0 pg    MCHC 31.6 (L) 33.6 - 35.0 g/dL    RDW 42.3 35.9 - 50.0 fL    Platelet Count 251 164 - 446 K/uL    MPV 10.4 9.0 - 12.9 fL    Neutrophils-Polys 44.80 44.00 - 72.00 %    Lymphocytes 45.50 (H) 22.00 - 41.00 %    Monocytes 8.30 0.00 - 13.40 %    Eosinophils 0.70 0.00 -  6.90 %    Basophils 0.40 0.00 - 1.80 %    Immature Granulocytes 0.30 0.00 - 0.90 %    Nucleated RBC 0.00 /100 WBC    Neutrophils (Absolute) 3.00 2.00 - 7.15 K/uL    Lymphs (Absolute) 3.05 1.00 - 4.80 K/uL    Monos (Absolute) 0.56 0.00 - 0.85 K/uL    Eos (Absolute) 0.05 0.00 - 0.51 K/uL    Baso (Absolute) 0.03 0.00 - 0.12 K/uL    Immature Granulocytes (abs) 0.02 0.00 - 0.11 K/uL    NRBC (Absolute) 0.00 K/uL   Complete Metabolic Panel (CMP)    Collection Time: 09/10/19  7:56 PM   Result Value Ref Range    Sodium 139 135 - 145 mmol/L    Potassium 3.8 3.6 - 5.5 mmol/L    Chloride 99 96 - 112 mmol/L    Co2 30 20 - 33 mmol/L    Anion Gap 10.0 0.0 - 11.9    Glucose 91 65 - 99 mg/dL    Bun 30 (H) 8 - 22 mg/dL    Creatinine 1.11 0.50 - 1.40 mg/dL    Calcium 9.7 8.5 - 10.5 mg/dL    AST(SGOT) 19 12 - 45 U/L    ALT(SGPT) 14 2 - 50 U/L    Alkaline Phosphatase 39 30 - 99 U/L    Total Bilirubin 0.6 0.1 - 1.5 mg/dL    Albumin 4.0 3.2 - 4.9 g/dL    Total Protein 7.1 6.0 - 8.2 g/dL    Globulin 3.1 1.9 - 3.5 g/dL    A-G Ratio 1.3 g/dL   Troponin    Collection Time: 09/10/19  7:56 PM   Result Value Ref Range    Troponin T 23 (H) 6 - 19 ng/L   LIPASE    Collection Time: 09/10/19  7:56 PM   Result Value Ref Range    Lipase 23 11 - 82 U/L   MAGNESIUM    Collection Time: 09/10/19  7:56 PM   Result Value Ref Range    Magnesium 1.6 1.5 - 2.5 mg/dL   VALPROIC ACID    Collection Time: 09/10/19  7:56 PM   Result Value Ref Range    Valproic Acid 98.4 50.0 - 100.0 ug/mL   ESTIMATED GFR    Collection Time: 09/10/19  7:56 PM   Result Value Ref Range    GFR If African American >60 >60 mL/min/1.73 m 2    GFR If Non African American 54 (A) >60 mL/min/1.73 m 2   AMMONIA    Collection Time: 09/10/19  8:45 PM   Result Value Ref Range    Ammonia 21 11 - 45 umol/L   Troponin in two (2) hours    Collection Time: 09/10/19 10:09 PM   Result Value Ref Range    Troponin T 20 (H) 6 - 19 ng/L       Imaging/Procedures Review:    Independant Imaging Review:  Completed  CT-CTA HEAD WITH & W/O-POST PROCESS   Final Result      CT angiogram of the [place] within normal limits.      Probably old small right basal ganglia lacunar infarct.      CT-CTA NECK WITH & W/O-POST PROCESSING   Final Result      CT angiogram of the neck within normal limits.      DX-CHEST-PORTABLE (1 VIEW)   Final Result      Hypoinflation with minimal bibasilar atelectasis.      US-EXTREMITY VENOUS LOWER BILAT    (Results Pending)            EKG:   EKG Independent Review: Completed  QTc:,454 HR: 69, Normal Sinus Rhythm, no ST/T changes     Records reviewed and summarized in current documentation :  Yes  [employer] teaching service handout given to patient:  No         Assessment/Plan     Syncope  Assessment & Plan  C/o brief loss of consciousness with no relation to physical activity, no prodromal symptoms and no associated seizure like activity.   Cardiogenic vs vasovagal vs seizures as she had significant past history of seizure like activity.  -IV fluids  -admit to Neuro with telemetry monitoring  -Q4 neuro checks  -orthostatic vitals  -evaluation for EEG study  -resume home medication- withhold clonazepam and other antihistamine agents.    Skin lesion of scalp  Assessment & Plan  Multiple skin lesions noticed on scalp, mild erythema with no discharge  Eczema vs fungal infection vs trichotillomania  -Bactrim.    Seizure disorder (HCC)- (present on admission)  Assessment & Plan  Past medical history of seizure like activity, however no seizure like activity now.  To be scheduled for EEG study    Depression- (present on admission)  Assessment & Plan  Past medical history of depression on paroxetine and sodium valproate.  Valproate is therapeutic range.  Will resume valproate and paroxetine.  No symptoms of depression or suicidal ideation now.    Headache- (present on admission)  Assessment & Plan  Diffuse headache but worse in the occipital region radiating to neck, associated with nausea and  vomiting.  Pt has history of migraine, no neck rigidity noticed, likely headache due to component of migraine  -tylenol  -morphine prn      Anticipated Hospital stay: Observation admit        Quality Measures  Quality-Core Measures   Reviewed items::  EKG reviewed, Labs reviewed, Medications reviewed and Radiology images reviewed  Jj catheter::  No Jj  DVT prophylaxis pharmacological::  Enoxaparin (Lovenox)    PCP: No primary care provider on file.

## 2019-09-11 NOTE — PROCEDURES
VIDEO ELECTROENCEPHALOGRAM REPORT      Referring provider: Dr. Stein.     DOS:  9/11/2019 (total recording of 15 hours and 16 minutes).     INDICATION:  Peter Trimble 44 y.o. female presenting with recurrent syncope, h/o SAH.     CURRENT ANTIEPILEPTIC REGIMEN: Valproic Acid.     TECHNIQUE: 30 channel 24 hrs video electroencephalogram (EEG) was performed in accordance with the international 10-20 system. The study was reviewed in bipolar and referential montages. The recording examined the patient during wakeful and drowsy/sleep state(s).     DESCRIPTION OF THE RECORD:  During the wakefulness, the background showed a symmetrical 9 Hz alpha activity posteriorly with amplitude of 70 mV.  There was reactivity to eye closure/opening.  A normal anterior-posterior gradient was noted with faster beta frequencies seen anteriorly.  During drowsiness, theta/delta frequencies were seen.    During the sleep state, background shows diffuse high-amplitude 4-5 Hz delta activity.  Symmetrical high-amplitude sleep spindles and vertex sharps were seen in the leads over the central regions.     ACTIVATION PROCEDURES:   Not performed.     ICTAL AND/OR INTERICTAL FINDINGS:   No focal or generalized epileptiform activity noted. No regional slowing was seen during this routine study.  No seizures were reported or recorded during the study.     EKG: sampling of the EKG recording demonstrated sinus rhythm.     EVENTS:  Three events reported by patient by pushing event button. During the three spells she remained conscious and appeared aware. After pushing event button, mild rocking movements of the trunk, pelvis and lower extremities were noted. The spells lasted from 1-3 minutes at a time. There does not appear to be a post ictal state as patient is able to resume activities, including looking at her cell phone right after the spell. Review of the eeg before, during and after the spells demonstrated a normal awake background with  movement artifact. The spells reported were not epileptic and were psychogenic in nature.     INTERPRETATION:  This is a normal 24 hrs video EEG recording in the awake, drowsy, and sleep state(s).  Three spells reported by patient. The spells consisted of body rocking movements, appearing jittery, without loss of consciousness. The spells captured were psychogenic and non epileptic in nature. Clinical correlation is recommended.    Note: A normal EEG does not rule out epilepsy.  If the clinical suspicion remains high for seizures, a prolonged recording to capture clinical or subclinical events may be helpful.        Thad Emmanuel MD   Epilepsy and Neurodiagnostics.   Clinical  of Neurology [employer].   Diplomate in Neurology, Epilepsy, and Electrodiagnostic Medicine.   Office: [phone]  Fax: [phone]

## 2019-09-11 NOTE — ASSESSMENT & PLAN NOTE
It is possible current episodes are psychogenic and related to her PTSD, bipolar disorder.  Has not had psychiatry follow-up in several years.  Per last neurology note 9/4/2019, she has psychiatry follow-up scheduled but it will be later this fall.   Plan:  -Continue home Depakote  - Encourage patient to follow-up with psychiatry sooner

## 2019-09-11 NOTE — ASSESSMENT & PLAN NOTE
Being evaluated for neurology for seizure versus PNES  Plan:  - Neurology consulted, agree to video EEG continuous as inpatient

## 2019-09-11 NOTE — ASSESSMENT & PLAN NOTE
C/o brief loss of consciousness/awareness with no relation to physical activity. Occasional prodromal symptoms and sporadic loss of postural tone but not other types (lying position changes in bed during course of one episode).  The fact that she has these episodes in all situations even lying in bed, with no provoking factors other than anxiety makes syncope much less likely, patient also mentions she has significant panic attacks. Cardiogenic ruled out (echo 70% EF no valvular abnormalities).   DDx: seizures vs PNES. CTA 9/10/19 normal with no perfusion defects, old infarct seen. MRI in 2014 was negative for any lesions, only signs of microangiopathic ischemic change. 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 TSH normal. Neurology consulted, EEG nonepileptiform and likely psychogenic in nature. Feel that MRI is not indicated. Explained that benzodiazepines are only short-term and habit forming as she has been on them in the past.   Plan:  -reassurance for patient that there are no indications of stroke or brain bleed that could be causing cause current symptoms. Underlying psych/behavioral issues should to be addressed.   -Pt has psychiatry followup scheduled in October/November and weekly CBT  -continue home Depakote  -nursing instructed to ambulate pt to prove to herself that she can be steady without falling. PT consult also ordered for possible walker

## 2019-09-11 NOTE — CONSULTS
Neurolgy Consult    CC: Syncopal Episodes     HPI:  A 45 yo female with complex PMHx including subarachnoid hemorrhage in 2011( related to uncontrolled HTN & Triptan use ), Asthma, AVA, HTN, chronic intractable migraine, multiple psychiatric disorders including PTSD, personality disorder and bipolar disorder as well as chronic pain who presented with c/o of frequent episodes of passing out over the last 2 months. She described the episodes as blacking put, lasted pr 2-3 min,s , at least 10-12 times /day. Associated with Nausea & Dizziness. Not associated with jerking movements, bowel/bladder incontinence or tongue biting. Pt was seen by outpatient neurology last week & plan to get outpatient ambulatory EEG.   She has EEG done on 2012 & 2016: No epileptic activities noted.       MEDICATIONS:    Current Facility-Administered Medications:   •  bacitracin ointment, , Topical, BID PRN, Chantel Ruiz M.D.  •  divalproex (DEPAKOTE) delayed-release tablet 1,000 mg, 1,000 mg, Oral, QAM, 1,000 mg at 09/11/19 0521 **AND** divalproex (DEPAKOTE) delayed-release tablet 500 mg, 500 mg, Oral, Q EVENING, Leonardo Ty M.D.  •  hydrocortisone 1 % cream, , Topical, TID, Dom Freeman M.D.  •  acetaminophen (TYLENOL) tablet 650 mg, 650 mg, Oral, Q6HRS PRN, Dom Freeman M.D.  •  senna-docusate (PERICOLACE or SENOKOT S) 8.6-50 MG per tablet 2 Tab, 2 Tab, Oral, BID, 2 Tab at 09/11/19 0522 **AND** polyethylene glycol/lytes (MIRALAX) PACKET 1 Packet, 1 Packet, Oral, QDAY PRN **AND** magnesium hydroxide (MILK OF MAGNESIA) suspension 30 mL, 30 mL, Oral, QDAY PRN **AND** bisacodyl (DULCOLAX) suppository 10 mg, 10 mg, Rectal, QDAY PRN, Leonardo Ty M.D.  •  enoxaparin (LOVENOX) inj 40 mg, 40 mg, Subcutaneous, DAILY, Leonardo Ty M.D., 40 mg at 09/11/19 0519  •  albuterol inhaler 2 Puff, 2 Puff, Inhalation, Q4H PRN (RT), Leonardo Ty M.D.  •  albuterol (PROVENTIL)  2.5mg/0.5ml nebulizer solution 2.5 mg, 2.5 mg, Nebulization, Q6HRS PRN, Leonardo Ty M.D.  •  baclofen (LIORESAL) tablet 10 mg, 10 mg, Oral, TID PRN, Leonardo Ty M.D.  •  omeprazole (PRILOSEC) capsule 20 mg, 20 mg, Oral, BID, Leonardo Ty M.D., 20 mg at 09/11/19 0519  •  furosemide (LASIX) tablet 20 mg, 20 mg, Oral, BID, Leonardo Ty M.D., 20 mg at 09/11/19 0521  •  guaiFENesin LA (MUCINEX) tablet 600 mg, 600 mg, Oral, Q12HRS, Leonardo Ty M.D., 600 mg at 09/11/19 0521  •  hydrOXYzine HCl (ATARAX) tablet 50 mg, 50 mg, Oral, TID, Leonardo Ty M.D., 50 mg at 09/11/19 1059  •  montelukast (SINGULAIR) tablet 10 mg, 10 mg, Oral, QHS, Leonardo Ty M.D.  •  PARoxetine (PAXIL) tablet 30 mg, 30 mg, Oral, DAILY, Leonardo Ty M.D., 30 mg at 09/11/19 0519  •  propranolol (INDERAL) tablet 20 mg, 20 mg, Oral, BID, Leonardo Ty M.D., 20 mg at 09/11/19 0520  •  diphenhydrAMINE (BENADRYL) tablet/capsule 25 mg, 25 mg, Oral, Q6HRS PRN, Leonardo Ty M.D.  •  morphine ER (MS CONTIN) tablet 30 mg, 30 mg, Oral, Q12HRS, Chantel Ruiz M.D., 30 mg at 09/11/19 0520  •  morphine (MS IR) tablet 15 mg, 15 mg, Oral, Q12HRS PRN, Chantel Ruiz M.D., 15 mg at 09/11/19 0149  •  vitamin D (cholecalciferol) tablet 4,000 Units, 4,000 Units, Oral, DAILY, Chantel Ruiz M.D., 4,000 Units at 09/11/19 0519    ALLERGIES:   Ms. Trimble  is allergic to compazine; imitrex [sumatriptan succinate]; inapsine [droperidol]; maxalt [rizatriptan benzoate]; phenergan [promethazine hcl]; xanax [alprazolam]; zantac; apple cider vinegar; butrans [buprenorphine]; clarithromycin; dilaudid [hydromorphone]; doxycycline; effexor [venlafaxine]; eucalyptus oil; gabapentin; kiwi extract; latex; lyrica [pregabalin]; minocycline; morphabond er [renny]; patchouli oil; sulfa drugs; tea tree oil; and topiramate.     SURGERIES:  Ms.  Atilio   has a past surgical history that includes pr removal of ovary(s); pr exploration of spinal fusion; recovery (10/5/2011); knee reconstruction; other orthopedic surgery; other orthopedic surgery; recovery (1/30/2012); laminotomy; mammoplasty reduction (4/29/2013); hysterectomy laparoscopy; evacuation of hematoma (5/2/2013); breast biopsy (5/29/2014); irrigation & debridement general (8/17/2014); and ankle orif (Right, 8/7/2016).     MEDICAL ILLNESSES:  Ms. Trimble   has a past medical history of Acute blood loss anemia (5/2/2013), Acute renal failure (ARF) ([facility]) (10/5/2011), Anemia, Anxiety, Arthritis, ASTHMA, Bipolar affective ([facility]), Breath shortness, Cold (), Depression, Eclampsia complicating pregnancy, Environmental allergies, Essential hypertension, malignant (9/28/2011), Fibromyalgia, GERD (gastroesophageal reflux disease), Heart murmur, History of pregnancy (10/16/2014), MRSA infection, Hyperlipidemia (04-26-13), Hypertension, Kidney stones, Migraines, Pain (05-27-14), Personality disorder ([facility]), Pneumonia (2012), PTSD (post-traumatic stress disorder), Scalp wound (8/18/2014), Seizure ([facility]) (05-27-14), Sleep apnea, Snoring, Stroke ([facility]) (2011), Subarachnoid hemorrhage ([facility]) (9/28/2011), Unspecified hemorrhagic conditions, Unspecified urinary incontinence, and Urinary bladder disorder. She also has no past medical history of COPD.     SOCIAL HISTORY:  Ms. Trimble   reports that she has never smoked. She has never used smokeless tobacco. She reports that she does not drink alcohol or use drugs.     FAMILY HISTORY:  Ms.'s Trimble  family history includes Alcohol/Drug in her brother, father, maternal grandfather, and paternal grandfather; Arthritis in her maternal grandmother, maternal uncle, and paternal grandmother; Genetic Disorder in her paternal aunt; Heart Disease in her father, maternal grandmother, and maternal uncle; Hyperlipidemia in her father, maternal uncle, and mother; Hypertension in her  "father, maternal uncle, and mother; Psychiatric Illness in her father, maternal grandfather, maternal uncle, and paternal uncle; Stroke in her maternal grandfather.      ROS:  Constitutional: Negative for chills and fever.   HENT: Negative for ear pain, hearing loss and tinnitus.    Eyes: Positive for double vision. Negative for blurred vision, photophobia and pain.   Respiratory: Negative for cough, hemoptysis and sputum production.    Cardiovascular: Positive for leg swelling. Negative for chest pain, palpitations, orthopnea and claudication.   Gastrointestinal: Positive for constipation, nausea and vomiting. Negative for abdominal pain, blood in stool, diarrhea and heartburn.   Genitourinary: Positive for urgency. Negative for dysuria.   Musculoskeletal: Positive for falls, myalgias and neck pain. Negative for back pain and joint pain.   Skin: Negative for rash.        Scalp lesions, mild erythema, no discharge   Neurological: Positive for dizziness, loss of consciousness and headaches. Negative for tingling, tremors, sensory change and focal weakness.   Psychiatric/Behavioral: Negative for depression, substance abuse and suicidal ideas.        PHYSICAL EXAM:    /87   Pulse 73   Temp 37.1 °C (98.7 °F) (Temporal)   Resp 20   Ht 1.803 m (5' 11\")   Wt 122.9 kg (270 lb 15.1 oz)   SpO2 97%   BMI 37.79 kg/m²      Physical Exam   Constitutional: She is oriented to person, place, and time.   Overweight female, in no acute distress    HENT:   Head: Normocephalic.   Mouth/Throat: Oropharynx is clear and moist.   Several small scattered 0.5-1 cm scabbed scalp lesions without drainage or surrounding erythema, there are also some patches of broken hair follicles    Eyes: Pupils are equal, round, and reactive to light. Conjunctivae and EOM are normal. No scleral icterus.   Neck: Normal range of motion. Neck supple. No JVD present.   Cardiovascular: Normal rate, regular rhythm, normal heart sounds and intact distal " pulses. Exam reveals no friction rub.   No murmur heard.  Pulmonary/Chest: Effort normal and breath sounds normal. No stridor. No respiratory distress. She has no wheezes.   Abdominal: Soft. Bowel sounds are normal. She exhibits no distension. There is no tenderness.   Musculoskeletal: Normal range of motion. She exhibits edema.   Rt foot with high arch and mild dorsal swelling    Neurological: She is alert and oriented to person, place, and time. No cranial nerve deficit or sensory deficit. She exhibits normal muscle tone. Coordination normal.   Fine bilateral tremor    Skin: Skin is warm and dry. Capillary refill takes 2 to 3 seconds. No rash noted. No erythema.   Dry skin    Psychiatric: Anxious.     Lab Results   Component Value Date/Time    CHOLSTRLTOT 171 01/05/2018 08:43 AM     (H) 01/05/2018 08:43 AM    HDL 22 (A) 01/05/2018 08:43 AM    TRIGLYCERIDE 199 (H) 01/05/2018 08:43 AM       Lab Results   Component Value Date/Time    SODIUM 138 09/11/2019 01:22 AM    POTASSIUM 3.7 09/11/2019 01:22 AM    CHLORIDE 99 09/11/2019 01:22 AM    CO2 31 09/11/2019 01:22 AM    GLUCOSE 90 09/11/2019 01:22 AM    BUN 28 (H) 09/11/2019 01:22 AM    CREATININE 1.06 09/11/2019 01:22 AM     Lab Results   Component Value Date/Time    ALKPHOSPHAT 38 09/11/2019 01:22 AM    ASTSGOT 17 09/11/2019 01:22 AM    ALTSGPT 13 09/11/2019 01:22 AM    TBILIRUBIN 0.6 09/11/2019 01:22 AM      Lab Results   Component Value Date/Time    BNPBTYPENAT 16 12/24/2014 09:30 AM       Patient Active Problem List    Diagnosis Date Noted   • Psychogenic nonepileptic seizure 08/06/2016     Priority: High   • Morbid obesity (HCC) 06/25/2015     Priority: Medium   • Closed fracture of one rib 06/25/2015     Priority: Medium   • Lumbar transverse process fracture (HCC) 06/25/2015     Priority: Medium   • Chronic pain 08/18/2014     Priority: Medium   • HTN (hypertension) 10/04/2011     Priority: Medium   • Depression 09/28/2011     Priority: Medium   • Bipolar  2 disorder (HCC) 09/28/2011     Priority: Medium   • Chronic migraine 09/28/2011     Priority: Medium   • Asthma 09/28/2011     Priority: Medium   • Skin lesion of scalp 09/11/2019     Priority: Low   • Hyperlipidemia 08/18/2014     Priority: Low   • AVA (obstructive sleep apnea), intolerant of CPAP 07/08/2013     Priority: Low   • GERD (gastroesophageal reflux disease) 09/28/2011     Priority: Low   • Anxiety 09/11/2019   • Morbid obesity with BMI of 40.0-44.9, adult ([facility]) 06/20/2019   • Medication overuse headache 10/30/2018   • Vitamin deficiency 09/26/2018   • Obesity (BMI 30-39.9) 01/05/2018   • Personality disorder 10/04/2016   • Dystonia 10/04/2016   • Chest pain 10/03/2016   • Ankle fracture, right 10/03/2016   • Vaginal yeast infection 10/03/2016   • PTSD (post-traumatic stress disorder) 10/02/2016   • Depression 08/07/2016   • Chronic pain 08/07/2016   • HTN (hypertension), benign 08/07/2016   • Fracture of ankle, trimalleolar, closed 08/06/2016   • Controlled substance agreement signed 02/19/2016   • Rib fracture 06/25/2015   • Benign hypertensive heart disease without heart failure 10/16/2014   • Mixed hyperlipidemia 10/16/2014   • Open scalp wound 10/16/2014   • Lump or mass in breast 05/29/2014   • Intractable migraine with aura without status migrainosus 02/19/2014   • Neck pain 02/19/2014   • Fibromyalgia 02/19/2014   • Obese 07/08/2013   • Oxygen dependent, since 2011 07/08/2013   • Hypertrophy of breast 04/29/2013         ASSESSMENT & PLAN:  # A 43 yo female with complex PMHx including subarachnoid hemorrhage in 2011( related to uncontrolled HTN & Triptan use ), Asthma, AVA, HTN, chronic intractable migraine, multiple psychiatric disorders including PTSD, personality disorder and bipolar disorder as well as chronic pain who presented with c/o of frequent episodes of passing out over the last 2 months.  - pt's symptoms can be explained by psychogenic seizure or epileptic seizure, can also be cardiac  reason but bases on the history, cardiac cause is less likely   - On Neuro floor,Tele Monitoring   - CTA head showed possible old infarct in right basal ganglia  - CTA head showed no significant occulusion  - No A fib on EKG , no pauses on Tele    - ECHO pending   - US L/E : No DVT   - ordered 24 hr video EEG   - no seizure medications needed at this point , will wait for EEG result   - Pt will need MRI Brain but she is claustrophobic & will need IV sedation for MRI, So will order the MRI after The EEG  - Fall /seizure/ aspiration precautions   - Thanks for the consult. Neurology will follow the pt.     ------------------------------    The patient was seen and examined, I agree with plan above, SEE ANY CORRECTIONS BELOW.    I saw and evaluated the patient and discussed the management with the resident staff. I reviewed Dr. Mallory's note and agree with the resident's findings and plan as documented in the note except as documented below. The chart was reviewed and summarized.  As listed in reviewed note any/all available labs, imaging, vitals were reviewed and neuroimaging visualized. Available nursing, consultant, and resident notes were reviewed.    44-year-old woman with atypical events of unclear etiology at this point.  Differential includes psychogenic non-epileptogenic seizures, versus seizures, versus syncope.  Echo completed as well as EKG.  Recommend video EEG in an effort to record events to identify if there is any electrographic correlate.  Also recommend MRI brain to look for any structural etiology.  Treat the patient's anxiety.  Remainder of plan as above.    Ricky Stein MD  Director, [facility], [facility]  Neurohospitalist, [facility]  Clinical  of Neurology, [employer]  t) [phone] (f) [phone]    Referring Provider-  Anselmo España M.D.

## 2019-09-11 NOTE — PROGRESS NOTES
"       Internal Medicine Interval Note  Note Author: Dom Freeman M.D.     Name Peter Trimble 1975   Age/Sex 44 y.o. female   MRN [id number]   Code Status Full     After 5PM or if no immediate response to page, please call for cross-coverage  Attending/Team: Taco/Fred See Patient List for primary contact information  Call [phone] to page    1st Call - Day Intern (R1):   RACHEL Freeman 2nd Call - Day Sr. Resident (R2/R3):   SCOT Nelson         Reason for interval visit  (Principal Problem)   Transient loss of consciousness      Interval Problem Daily Status Update  (24 hours, problem oriented, brief subjective history, new lab/imaging data pertinent to that problem)   -Admitted overnight with complaints of loss of consciousness episodes with nausea/vomiting worsening over the past 2 months, no provoking factors but some prodromal symptoms, can occur at rest, standing, lying, and sitting.  Patient has not had any documented episodes of \"blacking out\" since being admitted or any nausea/vomiting, more than 12 hours, but attributes this to taking an extra 1000 mg of Depakote last night.  Feels like she may have more with the increasing anxiety of being in the hospital.  Is very anxious and adamant about having them investigated, feels she was like this when she had her stroke and SAH in . Scared about leaving bed or going out of her house for fear of falls.  -Scalp abrasions: Has tried antifungal, antibiotic creams per dermatologist, convinced it is \"bacterial this time\" asking for oral antibiotics. Counseled that symptomatic treatment to not scratch these lesions is best first step.     Review of Systems   Constitutional: Positive for malaise/fatigue.   HENT: Negative.    Eyes: Negative.  Respiratory: Negative for shortness of breath and wheezing.    Cardiovascular: Negative for chest pain and palpitations.   Gastrointestinal: Positive for abdominal pain (diffuse), nausea and vomiting. " Negative for constipation and diarrhea.   Genitourinary: Negative for dysuria, flank pain and urgency.   Musculoskeletal: Positive for falls, joint pain, myalgias and neck pain (anterior). Negative for back pain.   Skin: Positive for itching (scalp).   Neurological: Positive for dizziness, tremors, sensory change, loss of consciousness, weakness and headaches.   Psychiatric/Behavioral: Positive for memory loss. Negative for depression, substance abuse and suicidal ideas. The patient is nervous/anxious.       Disposition/Barriers to discharge:   Inpatient    Consultants/Specialty  Neurology  PCP: No primary care provider on file.      Quality Measures  Quality-Core Measures   Reviewed items::  EKG reviewed, Labs reviewed, Medications reviewed and Radiology images reviewed  Jj catheter::  No Jj  DVT prophylaxis pharmacological::  Enoxaparin (Lovenox)          Physical Exam       Vitals:    09/11/19 0000 09/11/19 0031 09/11/19 0115 09/11/19 0400   BP: 138/70 145/73 141/78 127/76   Pulse: 67 68 70 68   Resp: 17 17 16 16   Temp:   36.2 °C (97.2 °F) 36.3 °C (97.3 °F)   TempSrc:   Temporal Temporal   SpO2: 100% 100% 98% 100%   Weight:   122.9 kg (270 lb 15.1 oz)    Height:         Body mass index is 37.79 kg/m². Weight: 122.9 kg (270 lb 15.1 oz)  Oxygen Therapy:  Pulse Oximetry: 100 %, O2 (LPM): 2.5, O2 Delivery: Nasal Cannula    Physical Exam   Constitutional: She is oriented to person, place, and time. Vital signs are normal. She appears distressed.   Overweight female, anxious   HENT:   Mouth/Throat: Uvula is midline. No oropharyngeal exudate.   Neck: No spinous process tenderness and no muscular tenderness present.   Cardiovascular: Normal rate, regular rhythm and intact distal pulses. Exam reveals no gallop and no friction rub.   No murmur heard.  Abdominal: Soft. She exhibits no distension and no mass. There is tenderness (diffuse to mild palpation). There is no rebound and no guarding.   Musculoskeletal:  Normal range of motion. She exhibits tenderness (anterior cervical TTP ). She exhibits no edema.   Neurological: She is alert and oriented to person, place, and time. She displays normal speech. No cranial nerve deficit.   Spontaneously moving all 4 extremities, but tremulous   Skin: Skin is warm and dry. Abrasion and bruising noted.        Due to areas of excoriation, ulceration on posterior scalp unable to see clear rash, some spots of hair loss    Psychiatric: Her mood appears anxious. She expresses no suicidal plans and no homicidal plans.             Assessment/Plan     * Atypical syncope- (present on admission)  Assessment & Plan  C/o brief loss of consciousness with no relation to physical activity. Occasional prodromal symptoms and sporadic loss of postural tone but not other types (lying position changes in bed during course of one episode).  The fact that she has these episodes in all situations even lying in bed, with no provoking factors other than anxiety makes syncope much less likely, patient also mentions she has significant panic attacks. Cardiogenic ruled out (echo 70% EF no valvular abnormalities).   DDx: Vasovagal vs seizures vs PNES (psychogenic nonepileptiform seizure activity) activity. CTA 9/10/19 normal with no perfusion defects, old infarct seen. MRI in 2014 was negative for any lesions, only signs of microangiopathic ischemic change. 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  Plan:  -reassurance for patient that there are no indications of stroke or brain bleed that could cause current symptoms   -neurology consulted, agree with continuous video EEG to rule out epileptiform seizures  -will defer to neurology recommendations if repeat MRI is warranted  -Q4 neuro checks  -continue home Depakote  -recheck TSH, last 1.8 in 9/2018    Anxiety  Assessment & Plan  Per patient, anxiety appears to precipitate LOC/possible seizure episodes.  States  that she has frequent panic attacks.  Was prescribed Atarax which she said does not help the symptoms.  Has not had psychiatric follow-up in many years. Per chart review has been given Ativan in the past but would avoid benzodiazepines at this point. Was tapered off clonazepam and per chart review has not been prescribed any since 2016.  Plan:  - Continue Atarax 50 mg as needed currently  -We will need closer outpatient psych follow-up for medication management    Bipolar 2 disorder (HCC)- (present on admission)  Assessment & Plan  It is possible current episodes are psychogenic and related to her PTSD, bipolar disorder.  Has not had psychiatry follow-up in several years.  Per last neurology note 9/4/2019, she has psychiatry follow-up scheduled but it will be later this fall.   Plan:  -Continue home Depakote  - Encourage patient to follow-up with psychiatry sooner    Depression- (present on admission)  Assessment & Plan  -continue home paxil    Intractable migraine without aura and without status migrainosus- (present on admission)  Assessment & Plan  Chronic, diffuse headache but worse in the occipital region radiating to neck, associated with nausea and vomiting.  Neurology outpt note of 9/4/19 to multiple medications (and others contraindicated) started Aimovig, injectable anti-CGRP for intractable migraine, but has not started this medication yet.  Plan:  -tylenol PRN  -morphine prn as per home medications    Electrolyte abnormality  Assessment & Plan  -monitoring Mg, repleting as necessary to maintain 2.0 or greater  -monitoring K, repleting as required to maintain 4.0 or greater    Skin lesion of scalp  Assessment & Plan  Multiple skin lesions and excoriations noticed on scalp, difficult to assess cause due to pt scratching. Pt has tried topical and oral antifungals, antibacterials, and shampoos. Sees dermatologist in [place].  Fungal or bacterial infection less likely, eczema vs  dermatillomania  Plan:  -bacitracin ointment  -hydrocortisone ointment for itching  -nursing consulted wound care  -F/u with dermatology    AVA (obstructive sleep apnea), intolerant of CPAP- (present on admission)  Assessment & Plan  Uses 3 L home O2 at night instead of CPAP.  Plan:  - Continue O2 per guideline

## 2019-09-11 NOTE — ED NOTES
Met with pt at bedside and dicussed current medications and last doses taken. Pt currently has  Her medications at bedside. Reports not taking all her medications as prescribed on the bottles. Pt  Takes them in accordance to the way she is feeling on a day to day basis.  Bottles labeled as controled substances present. One bottle  label not present . pt stated it is her clonazepam.   Explained process to pt if she is unable to have medications picked up.  verbalized understanding.   No antibiotic use in last 14 days.   home pharmacy [facility].

## 2019-09-11 NOTE — ASSESSMENT & PLAN NOTE
Per patient, anxiety appears to precipitate LOC/possible seizure episodes.  States that she has frequent panic attacks.  Was prescribed Atarax which she said does not help the symptoms.  Has not had psychiatric follow-up in many years. Per chart review has been given Ativan in the past but would avoid benzodiazepines at this point. Was tapered off clonazepam and per chart review has not been prescribed any since 2016.  Plan:  - Continue Atarax 50 mg as needed currently  -We will need closer outpatient psych follow-up for medication management

## 2019-09-11 NOTE — PROGRESS NOTES
"2 RN skin check:    Performed with: EDWIN Valerio    Surgical scars present to bilateral knees and lower mid back, CDI. Bilateral feet dry and calloused, R>L. L foot red, erythema present, edematous, and warm to touch, small scattered abrasions present clean and dry, no drainage. Pt states she has a \"yeast thing under my belly\", observed skin clean and intact, moist, interdry placed. Education provided on activity and mobilization encouraged.  "

## 2019-09-11 NOTE — ASSESSMENT & PLAN NOTE
Chronic, diffuse headache but worse in the occipital region radiating to neck, associated with nausea and vomiting.  Neurology outpt note of 9/4/19 to multiple medications (and others contraindicated) started Aimovig, injectable anti-CGRP for intractable migraine, but has not started this medication yet.  Plan:  -ibuprofen and tylenol PRN  -morphine prn as per home medications

## 2019-09-11 NOTE — ASSESSMENT & PLAN NOTE
Uses 3 L home O2 at night instead of CPAP. This would really not help ameliorate any obstruction.  Plan:  - Continue O2 per guideline, prn at home, defer to PCP

## 2019-09-11 NOTE — ED PROVIDER NOTES
ED Provider Note    Scribed for Lori Soliz D.O. by Reynold King. 9/10/2019, 6:11 PM.    Primary care provider: PCP states none  Means of arrival: Ambulance  History obtained from: Patient  History limited by: None    CHIEF COMPLAINT  Chief Complaint   Patient presents with   • Syncope       HPI  Peter Trimble is a 44 y.o. Female with history of subarachnoid hemorrhage and multiple psychiatric disorders who presents to the Emergency Department for evaluation of syncope onset 2-3 weeks ago. Patient reports she experiences 10 episode of unwitnessed syncope every day. She state that these episodes occur while she is standing, sitting, or laying down to rest. She reports hitting herself on multiple parts of her body, sometimes her head on some occasions. After her episodes, she wake up feeling confused, but denies urinary incontinence.  She also admits to associated symptoms of generalized weakness, predominately in her legs, chills,chronic nausea, chronic headaches, chest pain, shortness of breath, palpitations, dysuria, increased urinary frequency and urgency, left neck pain which radiated into her left head. Denies any emesis, or fevers. Patient uses oxygen at home.    Patient was seen by her neurologist approximately 1 week ago where she was told her symptoms are most likely of a seizure etiology. Plan of care was to conduct a 72 hour EEG which has yet to be conducted. She was seen by primary care and there were not changes documented in that time. Patient denies any recent hospitalization, recent travels, or blood clot.       REVIEW OF SYSTEMS  See HPI for further details. All other systems are negative.     PAST MEDICAL HISTORY   has a past medical history of Acute blood loss anemia (5/2/2013), Acute renal failure (ARF) ([facility]) (10/5/2011), Anemia, Anxiety, Arthritis, ASTHMA, Bipolar affective ([facility]), Breath shortness, Cold (), Depression, Eclampsia complicating pregnancy, Environmental allergies,  Essential hypertension, malignant (9/28/2011), Fibromyalgia, GERD (gastroesophageal reflux disease), Heart murmur, History of pregnancy (10/16/2014), MRSA infection, Hyperlipidemia (04-26-13), Hypertension, Kidney stones, Migraines, Pain (05-27-14), Personality disorder ([facility]), Pneumonia (2012), PTSD (post-traumatic stress disorder), Scalp wound (8/18/2014), Seizure ([facility]) (05-27-14), Sleep apnea, Snoring, Stroke ([facility]) (2011), Subarachnoid hemorrhage ([facility]) (9/28/2011), Unspecified hemorrhagic conditions, Unspecified urinary incontinence, and Urinary bladder disorder.    SURGICAL HISTORY   has a past surgical history that includes removal of ovary(s); exploration of spinal fusion; recovery (10/5/2011); knee reconstruction; other orthopedic surgery; other orthopedic surgery; recovery (1/30/2012); laminotomy; mammoplasty reduction (4/29/2013); hysterectomy laparoscopy; evacuation of hematoma (5/2/2013); breast biopsy (5/29/2014); irrigation & debridement general (8/17/2014); and ankle orif (Right, 8/7/2016).    SOCIAL HISTORY  Social History     Tobacco Use   • Smoking status: Never Smoker   • Smokeless tobacco: Never Used   Substance Use Topics   • Alcohol use: No   • Drug use: No      Social History     Substance and Sexual Activity   Drug Use No       FAMILY HISTORY  Family History   Problem Relation Age of Onset   • Hypertension Mother    • Hyperlipidemia Mother    • Hypertension Father    • Hyperlipidemia Father    • Heart Disease Father    • Psychiatric Illness Father    • Alcohol/Drug Father    • Alcohol/Drug Brother    • Arthritis Maternal Uncle    • Psychiatric Illness Maternal Uncle    • Heart Disease Maternal Uncle    • Hypertension Maternal Uncle    • Hyperlipidemia Maternal Uncle    • Genetic Disorder Paternal Aunt    • Psychiatric Illness Paternal Uncle    • Arthritis Maternal Grandmother    • Heart Disease Maternal Grandmother    • Alcohol/Drug Maternal Grandfather    • Stroke Maternal Grandfather    •  "Psychiatric Illness Maternal Grandfather    • Arthritis Paternal Grandmother    • Alcohol/Drug Paternal Grandfather        CURRENT MEDICATIONS  Reviewed.  See Encounter Summary.     ALLERGIES  Allergies   Allergen Reactions   • Compazine      \"psychotic reaction\"   • Imitrex [Sumatriptan Succinate]      Had brain bleed   • Inapsine [Droperidol]      \"psychotic reaction\"   • Maxalt [Rizatriptan Benzoate]      Had brain bleed   • Phenergan [Promethazine Hcl]      \"psychotic reaction\"   • Xanax [Alprazolam]      Sores and dry mouth, many others pt cannot remember   • Zantac      Stomach pain   • Apple Cider Vinegar Rash     Patient states she gets rash   • Butrans [Buprenorphine]    • Clarithromycin Vomiting and Palpitations   • Dilaudid [Hydromorphone] Rash     Patient states she had rash, hallucinations, unable to urinate.    • Doxycycline    • Effexor [Venlafaxine]      \"Really depressed, like i wanted to hurt myself\"   • Eucalyptus Oil    • Kiwi Extract    • Latex Rash   • Lyrica [Pregabalin]      \"depression, slept a lot\"   • Minocycline Vomiting     \"any cyclines\"   • Patchouli Oil Rash     Rash    • Sulfa Drugs    • Tea Tree Oil Rash     Break out in rash   • Topiramate Nausea     Patient states made sick to stomach and dizzy.       PHYSICAL EXAM  VITAL SIGNS: Pulse 72   Temp 36.7 °C (98.1 °F) (Temporal)   Resp 20   Ht 1.803 m (5' 11\")   Wt 124.7 kg (275 lb)   SpO2 100%   BMI 38.35 kg/m²     Constitutional: Obese, Alert and in no apparent distress. Generalized tremors.   HENT: No nasal cannula present. Normocephalic atraumatic. Bilateral external ears normal. Nose normal. Mucous membranes are moist.  Eyes: No focal tremor. Pupils are equal and reactive. Conjunctiva normal. Non-icteric sclera.   Neck: Normal range of motion without tenderness. Supple. No meningeal signs.  Cardiovascular: Regular rate and rhythm. No murmurs, gallops or rubs.  Thorax & Lungs: Breath sounds are clear to auscultation " bilaterally. No wheezing, rhonchi or rales.  Abdomen: Soft, nontender and nondistended. No peritoneal signs noted.  Skin: Warm and dry. No rashes are noted.  Back: No bony tenderness, No CVA tenderness.   Extremities: 2+ peripheral pulses. Cap refill is less than 2 seconds. No edema, cyanosis, or clubbing.  Musculoskeletal: Good range of motion in all major joints. No tenderness to palpation or major deformities noted.   Neurologic: CN II-XII intact, sensation grossly intact, 5/5 muscle strength bilateral upper and lower extremities, no pronator drift.  Normal finger-to-nose. Alert and oriented ×3.   Psychiatric: Affect is anxious. Judgment appears to be intact.    DIAGNOSTIC STUDIES / PROCEDURES     LABS  Results for orders placed or performed during the hospital encounter of 09/10/19   CBC with Differential   Result Value Ref Range    WBC 6.7 4.8 - 10.8 K/uL    RBC 4.61 4.20 - 5.40 M/uL    Hemoglobin 13.1 12.0 - 16.0 g/dL    Hematocrit 41.4 37.0 - 47.0 %    MCV 89.8 81.4 - 97.8 fL    MCH 28.4 27.0 - 33.0 pg    MCHC 31.6 (L) 33.6 - 35.0 g/dL    RDW 42.3 35.9 - 50.0 fL    Platelet Count 251 164 - 446 K/uL    MPV 10.4 9.0 - 12.9 fL    Neutrophils-Polys 44.80 44.00 - 72.00 %    Lymphocytes 45.50 (H) 22.00 - 41.00 %    Monocytes 8.30 0.00 - 13.40 %    Eosinophils 0.70 0.00 - 6.90 %    Basophils 0.40 0.00 - 1.80 %    Immature Granulocytes 0.30 0.00 - 0.90 %    Nucleated RBC 0.00 /100 WBC    Neutrophils (Absolute) 3.00 2.00 - 7.15 K/uL    Lymphs (Absolute) 3.05 1.00 - 4.80 K/uL    Monos (Absolute) 0.56 0.00 - 0.85 K/uL    Eos (Absolute) 0.05 0.00 - 0.51 K/uL    Baso (Absolute) 0.03 0.00 - 0.12 K/uL    Immature Granulocytes (abs) 0.02 0.00 - 0.11 K/uL    NRBC (Absolute) 0.00 K/uL   Complete Metabolic Panel (CMP)   Result Value Ref Range    Sodium 139 135 - 145 mmol/L    Potassium 3.8 3.6 - 5.5 mmol/L    Chloride 99 96 - 112 mmol/L    Co2 30 20 - 33 mmol/L    Anion Gap 10.0 0.0 - 11.9    Glucose 91 65 - 99 mg/dL    Bun 30  (H) 8 - 22 mg/dL    Creatinine 1.11 0.50 - 1.40 mg/dL    Calcium 9.7 8.5 - 10.5 mg/dL    AST(SGOT) 19 12 - 45 U/L    ALT(SGPT) 14 2 - 50 U/L    Alkaline Phosphatase 39 30 - 99 U/L    Total Bilirubin 0.6 0.1 - 1.5 mg/dL    Albumin 4.0 3.2 - 4.9 g/dL    Total Protein 7.1 6.0 - 8.2 g/dL    Globulin 3.1 1.9 - 3.5 g/dL    A-G Ratio 1.3 g/dL   Troponin   Result Value Ref Range    Troponin T 23 (H) 6 - 19 ng/L   URINALYSIS,CULTURE IF INDICATED   Result Value Ref Range    Color DK Yellow     Character Clear     Specific Gravity 1.033 <1.035    Ph 6.0 5.0 - 8.0    Glucose Negative Negative mg/dL    Ketones 15 (A) Negative mg/dL    Protein Negative Negative mg/dL    Bilirubin Negative Negative    Urobilinogen, Urine 1.0 Negative    Nitrite Negative Negative    Leukocyte Esterase Negative Negative    Occult Blood Negative Negative    Micro Urine Req see below    Troponin in two (2) hours   Result Value Ref Range    Troponin T 20 (H) 6 - 19 ng/L   AMMONIA   Result Value Ref Range    Ammonia 21 11 - 45 umol/L   LIPASE   Result Value Ref Range    Lipase 23 11 - 82 U/L   MAGNESIUM   Result Value Ref Range    Magnesium 1.6 1.5 - 2.5 mg/dL   VALPROIC ACID   Result Value Ref Range    Valproic Acid 98.4 50.0 - 100.0 ug/mL   ESTIMATED GFR   Result Value Ref Range    GFR If African American >60 >60 mL/min/1.73 m 2    GFR If Non African American 54 (A) >60 mL/min/1.73 m 2   EKG   Result Value Ref Range    Report       [facility] Emergency Dept.    Test Date:  2019-09-10  Pt Name:    LEE SCHNEIDER                Department: ER  MRN:        [id number]                      Room:       RD 08  Gender:     Female                       Technician: [id number]  :        [id number]                   Requested By:ER TRIAGE PROTOCOL  Order #:    [id number]                    Leoncio MD: Lori Soliz    Measurements  Intervals                                Axis  Rate:       69                           P:          53  OH:         164                           QRS:        -3  QRSD:       105                          T:          -5  QT:         423  QTc:        454    Interpretive Statements  Sinus rhythm  Nonspecific T abnormalities, anterior leads  No ST elevation or depression noted.  Compared to ECG 10/03/2016 14:09:30  No significant changes    Electronically Signed On 9- 21:15:42 PDT by Lori Soliz       All labs were reviewed by me.    EKG  EKG interpreted by me as shown above    RADIOLOGY  CT-CTA HEAD WITH & W/O-POST PROCESS   Final Result      CT angiogram of the [place] within normal limits.      Probably old small right basal ganglia lacunar infarct.      CT-CTA NECK WITH & W/O-POST PROCESSING   Final Result      CT angiogram of the neck within normal limits.      US-EXTREMITY VENOUS LOWER BILAT   Final Result      DX-CHEST-PORTABLE (1 VIEW)   Final Result      Hypoinflation with minimal bibasilar atelectasis.      EC-ECHOCARDIOGRAM COMPLETE W/O CONT    (Results Pending)     The radiologist's interpretation of all radiological studies have been reviewed by me.    COURSE & MEDICAL DECISION MAKING  Pertinent Labs & Imaging studies reviewed. (See chart for details)    6:11 PM - Patient seen and examined at bedside. Ordered DX-Chest, urinalysis culture, CBC with diff., CMP, troponin, lipase, PT/INR, Magnesium, HCG Qual. serum, Valproic Acid,  and EKG to evaluate her symptoms.     6:22 PM- Patient will be treated with Tropin every 2 hours from now. HCG qual. canceled at this time. New EKG ordered.    8:10 PM- Ordered US-extremity venous lower bilaterally, CT-CTA neck with and without post processing, due to the patient's history of SAH and possible head traumas.    10:28PM - Paged [facility]     10:33PM - Consulted with [facility] about patient's case. [facility] agrees to admit patient to their care.     11:18PM- Patient was informed about palm of admittance due to her deteriorating health. Pateint understands and agrees to plan of care.      DISPOSITION:  Patient will be admitted to [facility] in guarded condition.    Decision Making:  This is a 44 y.o. year old female who presents with a 3 week h/o multiple syncopal episodes.  On initial evaluation, she did not appear to be in any acute distress but was noted to be somewhat tremulous throughout.  She had no focal neuro deficits on exam.  The remainder of her exam was overall reassuring.  An IV was established and labs were sent.  EKG was performed and did not reveal any evidence of acute ischemia or arrhythmia.  Her first troponin was indeterminate at 23.  Second troponin was 20 and improved. I am less concerned for ACS as an etiology of her symptoms at this point.  Her elect lites were completely normal with no evidence of hyperkalemia.  H&H were normal and I have low clinical suspicion for anemia or GI bleeding.  CT of the head as well as CTA of the head and neck were performed.  No acute bleeding or aneurysms were noted.  I have much less clinical suspicion for subarachnoid hemorrhage given that the patient states that her headache is at baseline and unchanged.  A probable old small right ganglia infarct was noted.  I did consider pulmonary embolism given her history of chronic chest pain and shortness of breath although I suspect this is less likely given her normal vital signs.  I did obtain a Doppler of bilateral lower extremities which was negative for DVTs. I have less concern for PE at this time. Orthostatics were also performed in the ED and negative.  The plan was made for admission for MRI and EEG as well as potential neurology consultation.  I called and discussed the case with [facility] who agreed with the plan accepted the patient.  The patient did remain stable here in the emergency department.    FINAL IMPRESSION  1. Syncope, unspecified syncope type    2. Elevated troponin          I, Reynold King am (Scribe) scribing for, and in the presence of, Lori Soliz D.O..    Electronically  signed by: Reynold King (Scribe), 9/10/2019    ILori D.O. personally performed the services described in this documentation, as scribed by Reynold King in my presence, and it is both accurate and complete.    C    The note accurately reflects work and decisions made by me.  Lori Soliz  9/10/2019  10:45 PM

## 2019-09-11 NOTE — ASSESSMENT & PLAN NOTE
C/o brief loss of consciousness with no relation to physical activity. Occasional prodromal symptoms and sporadic loss of postural tone but not other types (lying position changes in bed during course of episode).  The fact that she has these episodes in all situations even lying in bed, with no provoking factors other than anxiety makes syncope much less likely, patient also mentions she has significant panic attacks.  Less likely Cardiogenic (no risk factors or cardiac history) vs vasovagal vs seizures vs PNES (psychogenic nonepileptiform seizure activity) activity.  Plan:  -neurology consulted, agree with continuous video EEG  -Q4 neuro checks  -continue home medications

## 2019-09-11 NOTE — ED NOTES
IV started but unable to draw labs from line.  at bedside drawing blood at this time. Will continue to monitor.

## 2019-09-11 NOTE — SENIOR ADMIT NOTE
"Senior Admit Note     CC: \" I keep passing out\"    HPI:  Ms. Trimble is a 43 yo female with complex PMHx including subarachnoid hemorrhage in 2011, Asthma, AVA, HTN, chronic intractable migraine, multiple psychiatric disorders including PTSD, personality disorder and bipolar disorder as well as chronic pain who presented to the ED with complaints of frequent episodes of passing out over the last 2 months. She states that they are happening more frequently and now are occurring multiple times per day. She is scared to even get out of bed now and limits her activity due to fear of injury. Episodes occur at random, she denies any known trigger and they occur even when lying down, she states she will find herself on the floor from bed without knowing how long she has been there. She has some associated double vision but denies any prodrome or warning they they are going to happen. She denies any urinary or bowel incontinence or tongue biting. 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  She has been seen by neurology recently and EEG was ordered but she has yet to get that scheduled, there was also some discussion of possible getting epilepsy monitoring units to help confirm her spells.     In addition, pt complains of ongoing scalp lesions for several years. She has been given anti-virals and antibiotics without resolution, she has tried multiple creams as well. Lesions are itchy, she believes they may be fungal despite trying fungal medication.     ROS of symptoms is positive for chronic headache, intermittent double vision, dizziness, loss of consciousness, neck pain, palpitations associated with anxiety, nausea, constipation, urinary frequency, diffuse myalgias, swelling of the right foot, scalp lesions, shortness of breath associated with anxiety     On Admit, Temp 98.1, HR 70-80, RR 12-20, -150/70's, >85% on 2L. Labs within normal limits aside from mildly elevated BUN at 30, Trop negative. CTA " head and neck neg for occlusion, stenosis or aneurysm, probable old small lacunar infarct noted.      EKG - NSR, non-specific T wave changes     Physical Exam   Constitutional: She is oriented to person, place, and time.   Overweight female, appears stated age, lying in bed comfortably in no acute distress    HENT:   Head: Normocephalic.   Mouth/Throat: Oropharynx is clear and moist.   Several small scattered 0.5-1 cm scabbed scalp lesions without drainage or surrounding erythema, there are also some patches of broken hair follicles    Eyes: Pupils are equal, round, and reactive to light. Conjunctivae and EOM are normal. No scleral icterus.   Neck: Normal range of motion. Neck supple. No JVD present.   Cardiovascular: Normal rate, regular rhythm, normal heart sounds and intact distal pulses. Exam reveals no friction rub.   No murmur heard.  Pulmonary/Chest: Effort normal and breath sounds normal. No stridor. No respiratory distress. She has no wheezes.   Abdominal: Soft. Bowel sounds are normal. She exhibits no distension. There is no tenderness.   Musculoskeletal: Normal range of motion. She exhibits edema.   Rt foot with high arch and mild dorsal swelling    Neurological: She is alert and oriented to person, place, and time. No cranial nerve deficit or sensory deficit. She exhibits normal muscle tone. Coordination normal.   Fine bilateral tremor    Skin: Skin is warm and dry. Capillary refill takes 2 to 3 seconds. No rash noted. No erythema.   Dry skin    Psychiatric: She has a normal mood and affect. Her behavior is normal. Thought content normal.     Assessment and Plan:    47 yo female with hx of subarachnoid hemorrhage, obesity, hx of possible pseudoseizure and multiple psychiatric disorders presents with increasing number of spells leading to LOC. Suspect that these spells may be secondary to pseudoseizure vs seizure, previous EEG non-specific and she is planned to have another. Do not suspect orthostatic  hypotension as they are not only occurring with position change. Arrhythmia or other cardiac etiology is also possible and has yet to be explored.     - admit to neuro with telemetry monitoring   - orthostatic vitals pending   - Q4H neuro checks   - echocardiogram pending   - consider neurology consult for inpatient EEG if warranted  - may benefit from PT evaluation as well    - bacitracin for scalp lesions, consider biopsy definitive dx   - resume home medications, pt is on multiple medications that may increase her risk for falls this should be re-evaluated prior to discharge     Chantel Ruiz MD

## 2019-09-12 PROBLEM — E87.8 ELECTROLYTE ABNORMALITY: Status: ACTIVE | Noted: 2019-09-12

## 2019-09-12 LAB
ANION GAP SERPL CALC-SCNC: 8 MMOL/L (ref 0–11.9)
BASOPHILS # BLD AUTO: 0.6 % (ref 0–1.8)
BASOPHILS # BLD: 0.03 K/UL (ref 0–0.12)
BUN SERPL-MCNC: 27 MG/DL (ref 8–22)
CALCIUM SERPL-MCNC: 8.7 MG/DL (ref 8.5–10.5)
CHLORIDE SERPL-SCNC: 101 MMOL/L (ref 96–112)
CHOLEST SERPL-MCNC: 179 MG/DL (ref 100–199)
CO2 SERPL-SCNC: 31 MMOL/L (ref 20–33)
CREAT SERPL-MCNC: 1.17 MG/DL (ref 0.5–1.4)
EOSINOPHIL # BLD AUTO: 0.05 K/UL (ref 0–0.51)
EOSINOPHIL NFR BLD: 1 % (ref 0–6.9)
ERYTHROCYTE [DISTWIDTH] IN BLOOD BY AUTOMATED COUNT: 42.6 FL (ref 35.9–50)
GLUCOSE SERPL-MCNC: 109 MG/DL (ref 65–99)
HCT VFR BLD AUTO: 37.2 % (ref 37–47)
HDLC SERPL-MCNC: 45 MG/DL
HGB BLD-MCNC: 12 G/DL (ref 12–16)
IMM GRANULOCYTES # BLD AUTO: 0.01 K/UL (ref 0–0.11)
IMM GRANULOCYTES NFR BLD AUTO: 0.2 % (ref 0–0.9)
LDLC SERPL CALC-MCNC: 100 MG/DL
LYMPHOCYTES # BLD AUTO: 2.29 K/UL (ref 1–4.8)
LYMPHOCYTES NFR BLD: 43.9 % (ref 22–41)
MAGNESIUM SERPL-MCNC: 1.7 MG/DL (ref 1.5–2.5)
MCH RBC QN AUTO: 29 PG (ref 27–33)
MCHC RBC AUTO-ENTMCNC: 32.3 G/DL (ref 33.6–35)
MCV RBC AUTO: 89.9 FL (ref 81.4–97.8)
MONOCYTES # BLD AUTO: 0.54 K/UL (ref 0–0.85)
MONOCYTES NFR BLD AUTO: 10.3 % (ref 0–13.4)
NEUTROPHILS # BLD AUTO: 2.3 K/UL (ref 2–7.15)
NEUTROPHILS NFR BLD: 44 % (ref 44–72)
NRBC # BLD AUTO: 0 K/UL
NRBC BLD-RTO: 0 /100 WBC
PLATELET # BLD AUTO: 196 K/UL (ref 164–446)
PMV BLD AUTO: 10.2 FL (ref 9–12.9)
POTASSIUM SERPL-SCNC: 3.9 MMOL/L (ref 3.6–5.5)
RBC # BLD AUTO: 4.14 M/UL (ref 4.2–5.4)
SODIUM SERPL-SCNC: 140 MMOL/L (ref 135–145)
TRIGL SERPL-MCNC: 172 MG/DL (ref 0–149)
TSH SERPL DL<=0.005 MIU/L-ACNC: 3.33 UIU/ML (ref 0.38–5.33)
WBC # BLD AUTO: 5.2 K/UL (ref 4.8–10.8)

## 2019-09-12 PROCEDURE — 95951 EEG: CPT | Performed by: PSYCHIATRY & NEUROLOGY

## 2019-09-12 PROCEDURE — 83735 ASSAY OF MAGNESIUM: CPT

## 2019-09-12 PROCEDURE — 94760 N-INVAS EAR/PLS OXIMETRY 1: CPT

## 2019-09-12 PROCEDURE — 80048 BASIC METABOLIC PNL TOTAL CA: CPT

## 2019-09-12 PROCEDURE — 99225 PR SUBSEQUENT OBSERVATION CARE,LEVEL II: CPT | Mod: GC | Performed by: INTERNAL MEDICINE

## 2019-09-12 PROCEDURE — 99214 OFFICE O/P EST MOD 30 MIN: CPT | Performed by: PSYCHIATRY & NEUROLOGY

## 2019-09-12 PROCEDURE — 94640 AIRWAY INHALATION TREATMENT: CPT

## 2019-09-12 PROCEDURE — 700101 HCHG RX REV CODE 250: Performed by: STUDENT IN AN ORGANIZED HEALTH CARE EDUCATION/TRAINING PROGRAM

## 2019-09-12 PROCEDURE — 84443 ASSAY THYROID STIM HORMONE: CPT

## 2019-09-12 PROCEDURE — 36415 COLL VENOUS BLD VENIPUNCTURE: CPT

## 2019-09-12 PROCEDURE — A9270 NON-COVERED ITEM OR SERVICE: HCPCS | Performed by: STUDENT IN AN ORGANIZED HEALTH CARE EDUCATION/TRAINING PROGRAM

## 2019-09-12 PROCEDURE — 700102 HCHG RX REV CODE 250 W/ 637 OVERRIDE(OP): Performed by: STUDENT IN AN ORGANIZED HEALTH CARE EDUCATION/TRAINING PROGRAM

## 2019-09-12 PROCEDURE — 95951 EEG: CPT | Mod: 52 | Performed by: PSYCHIATRY & NEUROLOGY

## 2019-09-12 PROCEDURE — 95951 EEG: CPT | Mod: 26,52 | Performed by: PSYCHIATRY & NEUROLOGY

## 2019-09-12 PROCEDURE — G0378 HOSPITAL OBSERVATION PER HR: HCPCS

## 2019-09-12 PROCEDURE — 700111 HCHG RX REV CODE 636 W/ 250 OVERRIDE (IP): Performed by: STUDENT IN AN ORGANIZED HEALTH CARE EDUCATION/TRAINING PROGRAM

## 2019-09-12 PROCEDURE — 85025 COMPLETE CBC W/AUTO DIFF WBC: CPT

## 2019-09-12 PROCEDURE — 80061 LIPID PANEL: CPT

## 2019-09-12 RX ORDER — LORAZEPAM 2 MG/ML
1 INJECTION INTRAMUSCULAR ONCE
Status: DISCONTINUED | OUTPATIENT
Start: 2019-09-12 | End: 2019-09-12

## 2019-09-12 RX ORDER — MAGNESIUM SULFATE HEPTAHYDRATE 40 MG/ML
2 INJECTION, SOLUTION INTRAVENOUS ONCE
Status: COMPLETED | OUTPATIENT
Start: 2019-09-12 | End: 2019-09-12

## 2019-09-12 RX ORDER — CALCIUM CARBONATE 500 MG/1
500 TABLET, CHEWABLE ORAL DAILY
Status: DISCONTINUED | OUTPATIENT
Start: 2019-09-12 | End: 2019-09-13 | Stop reason: HOSPADM

## 2019-09-12 RX ORDER — ONDANSETRON 4 MG/1
4 TABLET, ORALLY DISINTEGRATING ORAL EVERY 4 HOURS PRN
Status: DISCONTINUED | OUTPATIENT
Start: 2019-09-12 | End: 2019-09-13 | Stop reason: HOSPADM

## 2019-09-12 RX ORDER — FLUTICASONE PROPIONATE 110 UG/1
2 AEROSOL, METERED RESPIRATORY (INHALATION) EVERY 12 HOURS
Status: DISCONTINUED | OUTPATIENT
Start: 2019-09-12 | End: 2019-09-13 | Stop reason: HOSPADM

## 2019-09-12 RX ORDER — POTASSIUM CHLORIDE 20 MEQ/1
20 TABLET, EXTENDED RELEASE ORAL ONCE
Status: COMPLETED | OUTPATIENT
Start: 2019-09-12 | End: 2019-09-12

## 2019-09-12 RX ORDER — FLUTICASONE PROPIONATE 110 UG/1
2 AEROSOL, METERED RESPIRATORY (INHALATION)
Status: DISCONTINUED | OUTPATIENT
Start: 2019-09-12 | End: 2019-09-12

## 2019-09-12 RX ORDER — FLUTICASONE PROPIONATE 50 MCG
2 SPRAY, SUSPENSION (ML) NASAL DAILY
Status: DISCONTINUED | OUTPATIENT
Start: 2019-09-12 | End: 2019-09-13 | Stop reason: HOSPADM

## 2019-09-12 RX ADMIN — PAROXETINE HYDROCHLORIDE 30 MG: 20 TABLET, FILM COATED ORAL at 04:41

## 2019-09-12 RX ADMIN — FLUTICASONE PROPIONATE 100 MCG: 50 SPRAY, METERED NASAL at 12:25

## 2019-09-12 RX ADMIN — ANTACID TABLETS 500 MG: 500 TABLET, CHEWABLE ORAL at 09:22

## 2019-09-12 RX ADMIN — DIVALPROEX SODIUM 500 MG: 500 TABLET, DELAYED RELEASE ORAL at 17:58

## 2019-09-12 RX ADMIN — PROPRANOLOL HYDROCHLORIDE 20 MG: 10 TABLET ORAL at 17:58

## 2019-09-12 RX ADMIN — MONTELUKAST 10 MG: 10 TABLET, FILM COATED ORAL at 20:12

## 2019-09-12 RX ADMIN — SENNOSIDES, DOCUSATE SODIUM 2 TABLET: 50; 8.6 TABLET, FILM COATED ORAL at 04:43

## 2019-09-12 RX ADMIN — HYDROXYZINE HYDROCHLORIDE 50 MG: 25 TABLET, FILM COATED ORAL at 12:26

## 2019-09-12 RX ADMIN — DIVALPROEX SODIUM 1000 MG: 500 TABLET, DELAYED RELEASE ORAL at 04:42

## 2019-09-12 RX ADMIN — HYDROXYZINE HYDROCHLORIDE 50 MG: 25 TABLET, FILM COATED ORAL at 17:58

## 2019-09-12 RX ADMIN — GUAIFENESIN 600 MG: 600 TABLET, EXTENDED RELEASE ORAL at 04:42

## 2019-09-12 RX ADMIN — VITAMIN D, TAB 1000IU (100/BT) 4000 UNITS: 25 TAB at 04:43

## 2019-09-12 RX ADMIN — PROPRANOLOL HYDROCHLORIDE 20 MG: 10 TABLET ORAL at 04:42

## 2019-09-12 RX ADMIN — MAGNESIUM SULFATE 2 G: 2 INJECTION INTRAVENOUS at 08:29

## 2019-09-12 RX ADMIN — POTASSIUM CHLORIDE 20 MEQ: 20 TABLET, EXTENDED RELEASE ORAL at 08:29

## 2019-09-12 RX ADMIN — OMEPRAZOLE 20 MG: 20 CAPSULE, DELAYED RELEASE ORAL at 04:43

## 2019-09-12 RX ADMIN — GUAIFENESIN 600 MG: 600 TABLET, EXTENDED RELEASE ORAL at 17:58

## 2019-09-12 RX ADMIN — HYDROCORTISONE: 1 CREAM TOPICAL at 18:01

## 2019-09-12 RX ADMIN — ALBUTEROL SULFATE 2.5 MG: 2.5 SOLUTION RESPIRATORY (INHALATION) at 21:43

## 2019-09-12 RX ADMIN — SENNOSIDES, DOCUSATE SODIUM 2 TABLET: 50; 8.6 TABLET, FILM COATED ORAL at 17:58

## 2019-09-12 RX ADMIN — HYDROXYZINE HYDROCHLORIDE 50 MG: 25 TABLET, FILM COATED ORAL at 04:43

## 2019-09-12 RX ADMIN — FUROSEMIDE 20 MG: 20 TABLET ORAL at 04:43

## 2019-09-12 RX ADMIN — OMEPRAZOLE 20 MG: 20 CAPSULE, DELAYED RELEASE ORAL at 17:58

## 2019-09-12 RX ADMIN — FLUTICASONE PROPIONATE 220 MCG: 110 AEROSOL, METERED RESPIRATORY (INHALATION) at 21:43

## 2019-09-12 RX ADMIN — FUROSEMIDE 20 MG: 20 TABLET ORAL at 17:58

## 2019-09-12 RX ADMIN — HYDROCORTISONE: 1 CREAM TOPICAL at 12:53

## 2019-09-12 RX ADMIN — MORPHINE SULFATE 30 MG: 15 TABLET, EXTENDED RELEASE ORAL at 04:42

## 2019-09-12 RX ADMIN — MORPHINE SULFATE 30 MG: 15 TABLET, EXTENDED RELEASE ORAL at 17:58

## 2019-09-12 RX ADMIN — FLUTICASONE PROPIONATE 100 MCG: 50 SPRAY, METERED NASAL at 20:12

## 2019-09-12 RX ADMIN — ENOXAPARIN SODIUM 40 MG: 100 INJECTION SUBCUTANEOUS at 04:41

## 2019-09-12 ASSESSMENT — ENCOUNTER SYMPTOMS
INSOMNIA: 1
WEAKNESS: 1
HEADACHES: 1
SHORTNESS OF BREATH: 1
ABDOMINAL PAIN: 0
CONSTIPATION: 0
EYES NEGATIVE: 1
NAUSEA: 1
MYALGIAS: 1
SENSORY CHANGE: 1
WHEEZING: 0
PALPITATIONS: 0
NERVOUS/ANXIOUS: 1
DIARRHEA: 0

## 2019-09-12 ASSESSMENT — LIFESTYLE VARIABLES
HOW MANY TIMES IN THE PAST YEAR HAVE YOU HAD 5 OR MORE DRINKS IN A DAY: 0
HAVE PEOPLE ANNOYED YOU BY CRITICIZING YOUR DRINKING: NO
ON A TYPICAL DAY WHEN YOU DRINK ALCOHOL HOW MANY DRINKS DO YOU HAVE: 0
TOTAL SCORE: 0
CONSUMPTION TOTAL: NEGATIVE
TOTAL SCORE: 0
ALCOHOL_USE: NO
HAVE YOU EVER FELT YOU SHOULD CUT DOWN ON YOUR DRINKING: NO
EVER HAD A DRINK FIRST THING IN THE MORNING TO STEADY YOUR NERVES TO GET RID OF A HANGOVER: NO
AVERAGE NUMBER OF DAYS PER WEEK YOU HAVE A DRINK CONTAINING ALCOHOL: 0
EVER FELT BAD OR GUILTY ABOUT YOUR DRINKING: NO
TOTAL SCORE: 0

## 2019-09-12 ASSESSMENT — COGNITIVE AND FUNCTIONAL STATUS - GENERAL
SUGGESTED CMS G CODE MODIFIER DAILY ACTIVITY: CK
PERSONAL GROOMING: A LITTLE
MOVING FROM LYING ON BACK TO SITTING ON SIDE OF FLAT BED: A LITTLE
STANDING UP FROM CHAIR USING ARMS: A LOT
TOILETING: A LOT
MOVING TO AND FROM BED TO CHAIR: A LITTLE
MOBILITY SCORE: 15
DRESSING REGULAR UPPER BODY CLOTHING: A LITTLE
WALKING IN HOSPITAL ROOM: A LOT
DRESSING REGULAR LOWER BODY CLOTHING: A LOT
DAILY ACTIVITIY SCORE: 17
SUGGESTED CMS G CODE MODIFIER MOBILITY: CK
TURNING FROM BACK TO SIDE WHILE IN FLAT BAD: A LITTLE
CLIMB 3 TO 5 STEPS WITH RAILING: A LOT
HELP NEEDED FOR BATHING: A LITTLE

## 2019-09-12 NOTE — DISCHARGE PLANNING
Patient is eligible for Medicaid Meds to Beds at discharge Monday-Friday 8 a.m. - 4 p.m. Preferred pharmacy changed to [facility]. Please call [phone] prior to discharge.

## 2019-09-12 NOTE — PROGRESS NOTES
Aojada. TODD. Reports LE n/t. Pain 7/10, declines need for pain medication. Voiding to [facility]. Able to make needs known.

## 2019-09-12 NOTE — PROGRESS NOTES
Internal Medicine Interval Note  Note Author: Dom Freeman M.D.     Name Peter Trimble 1975   Age/Sex 44 y.o. female   MRN [id number]   Code Status Full     After 5PM or if no immediate response to page, please call for cross-coverage  Attending/Team: Taco/Fred See Patient List for primary contact information  Call [phone] to page    1st Call - Day Intern (R1):   RACHEL Freeman 2nd Call - Day Sr. Resident (R2/R3):   SCOT Nelson         Reason for interval visit  (Principal Problem)   Transient loss of consciousness/awareness      Interval Problem Daily Status Update  (24 hours, problem oriented, brief subjective history, new lab/imaging data pertinent to that problem)   -Syncopal/seizure episodes - started on video EEG. Says she had a few minor episodes last 24hrs, one of which happened while getting up to use bedside commode. She was able to finish doing so however.   -stays in bed at home most of day because of anxiety  -Has trouble emptying bladder despite urgency, feels residual pressure. Becomes confused during interview occasionally. Becomes nauseated and asking for zofran.  -Anxiety - not improved with atarax, worsened in past several weeks. Says she has gotten short courses of benzodiazipines at [facility] after she falls. Discouraged use of benzodiazepines currently as they would affect EEG.  -Scalp abrasions - c/o similar infection to that of scalp on vaginal area, however no rash or lesions noted.  -electrolytes: 2g Mag given for level of 1.7 and 20meQ K for level of 3.7    Review of Systems   Constitutional: Positive for malaise/fatigue.   Eyes: Negative.    Respiratory: Positive for shortness of breath. Negative for wheezing.    Cardiovascular: Negative for chest pain, palpitations and leg swelling.   Gastrointestinal: Positive for nausea. Negative for abdominal pain, constipation and diarrhea.   Genitourinary: Positive for urgency. Negative for dysuria.        Vaginal  discharge   Musculoskeletal: Positive for myalgias.   Skin: Positive for rash.   Neurological: Positive for sensory change, weakness and headaches.   Psychiatric/Behavioral: Negative for suicidal ideas. The patient is nervous/anxious and has insomnia.        Disposition/Barriers to discharge:   Inpatient    Consultants/Specialty  Neurology  PCP: No primary care provider on file.      Quality Measures  Quality-Core Measures   Reviewed items::  EKG reviewed, Labs reviewed, Medications reviewed and Radiology images reviewed  Jj catheter::  No Jj  DVT prophylaxis pharmacological::  Enoxaparin (Lovenox)          Physical Exam       Vitals:    09/11/19 1600 09/11/19 2000 09/12/19 0000 09/12/19 0400   BP: 133/82 125/70 130/73 108/60   Pulse: 69 69 71 66   Resp: 18 16 16 16   Temp: 37 °C (98.6 °F) 36.6 °C (97.8 °F) 37 °C (98.6 °F) 36.4 °C (97.5 °F)   TempSrc: Temporal Temporal Oral Temporal   SpO2: 95% 99% 97% 97%   Weight:       Height:         Body mass index is 37.79 kg/m².    Oxygen Therapy:  Pulse Oximetry: 97 %, O2 (LPM): 2.5, O2 Delivery: Silicone Nasal Cannula    Physical Exam   Constitutional: She is oriented to person, place, and time. Vital signs are normal. She appears distressed.   Overweight female, anxious   HENT:   Mouth/Throat: Uvula is midline. No oropharyngeal exudate.   Neck: No spinous process tenderness and no muscular tenderness present.   Cardiovascular: Normal rate, regular rhythm and intact distal pulses. Exam reveals no gallop and no friction rub.   No murmur heard.  Abdominal: Soft. She exhibits no distension and no mass. There is tenderness (diffuse to mild palpation). There is no rebound and no guarding.   Genitourinary: Vagina normal. Vagina exhibits normal mucosa. No vaginal discharge found.   Musculoskeletal: Normal range of motion. She exhibits no edema or tenderness.   Neurological: She is alert and oriented to person, place, and time. She displays normal speech. No cranial nerve  deficit.   Spontaneously moving all 4 extremities, but tremulous   Skin: Skin is warm and dry. Abrasion, bruising and rash noted.        Due to areas of excoriation, ulceration on posterior scalp unable to see clear rash, some spots of hair loss    Psychiatric: Her mood appears anxious. She expresses no suicidal plans and no homicidal plans.             Assessment/Plan     * Psychogenic nonepileptic seizure- (present on admission)  Assessment & Plan  C/o brief loss of consciousness/awareness with no relation to physical activity. Occasional prodromal symptoms and sporadic loss of postural tone but not other types (lying position changes in bed during course of one episode).  The fact that she has these episodes in all situations even lying in bed, with no provoking factors other than anxiety makes syncope much less likely, patient also mentions she has significant panic attacks. Cardiogenic ruled out (echo 70% EF no valvular abnormalities).   DDx: seizures vs PNES. CTA 9/10/19 normal with no perfusion defects, old infarct seen. MRI in 2014 was negative for any lesions, only signs of microangiopathic ischemic change. 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 TSH normal. Neurology consulted, EEG nonepileptiform and likely psychogenic in nature. Feel that MRI is not indicated. Explained that benzodiazepines are only short-term and habit forming as she has been on them in the past.   Plan:  -reassurance for patient that there are no indications of stroke or brain bleed that could be causing cause current symptoms. Underlying psych/behavioral issues should to be addressed.   -Pt has psychiatry followup scheduled in October/November and weekly CBT  -continue home Depakote  -nursing instructed to ambulate pt to prove to herself that she can be steady without falling. PT consult also ordered for possible walker    Anxiety  Assessment & Plan  Per patient, anxiety appears to  precipitate LOC/possible seizure episodes.  States that she has frequent panic attacks.  Was prescribed Atarax which she said does not help the symptoms.  Has not had psychiatric follow-up in many years. Per chart review has been given Ativan in the past but would avoid benzodiazepines at this point. Was tapered off clonazepam and per chart review has not been prescribed any since 2016.  Plan:  - Continue Atarax 50 mg as needed currently  -We will need closer outpatient psych follow-up for medication management    Bipolar 2 disorder (HCC)- (present on admission)  Assessment & Plan  It is possible current episodes are psychogenic and related to her PTSD, bipolar disorder.  Has not had psychiatry follow-up in several years.  Per last neurology note 9/4/2019, she has psychiatry follow-up scheduled but it will be later this fall.   Plan:  -Continue home Depakote  - Encourage patient to follow-up with psychiatry sooner    Depression- (present on admission)  Assessment & Plan  -continue home paxil    Intractable migraine without aura and without status migrainosus- (present on admission)  Assessment & Plan  Chronic, diffuse headache but worse in the occipital region radiating to neck, associated with nausea and vomiting.  Neurology outpt note of 9/4/19 to multiple medications (and others contraindicated) started Aimovig, injectable anti-CGRP for intractable migraine, but has not started this medication yet.  Plan:  -ibuprofen and tylenol PRN  -morphine prn as per home medications    Electrolyte abnormality  Assessment & Plan  -monitoring Mg, repleting as necessary to maintain 2.0 or greater  -monitoring K, repleting as required to maintain 4.0 or greater  -would recommend mg supplement & low dose K supplement at discharge    Skin lesion of scalp  Assessment & Plan  Multiple skin lesions and excoriations noticed on scalp, difficult to assess cause due to pt scratching. Pt has tried topical and oral antifungals,  antibacterials, and shampoos. Sees dermatologist in [place].  Fungal or bacterial infection less likely, eczema vs dermatillomania  Plan:  -bacitracin ointment  -hydrocortisone ointment for itching  -nursing consulted wound care  -F/u with dermatology    AVA (obstructive sleep apnea), intolerant of CPAP- (present on admission)  Assessment & Plan  Uses 3 L home O2 at night instead of CPAP. This would really not help ameliorate any obstruction.  Plan:  - Continue O2 per guideline, prn at home, defer to PCP

## 2019-09-12 NOTE — NON-PROVIDER
Internal Medicine Medical Student Note  Note Author: Gracy Concepcion, Student    Name Peter Trimble 1975   Age/Sex 44 y.o. female   MRN [id number]   Code Status FULL       Reason for interval visit  (Principal Problem)   Psychogenic nonepileptic seizure    Interval Problem Daily Status Update  (problem status, last 24 hours, new history, new data )     Patient is feeling very tired this morning and reports having 2-3 episodes of blackouts during the night and 1 significant blackout yesterday afternoon. She reports that after blackouts in the night she woke up and played on her phone, but after backout yesterday she felt very confused. Did not sleep well last night is feeling very weak. She feels dizzy, weak, and sweaty whenever she stands and reports that she cannot ambulate. She reports almost passing out when getting up to use bedside commode. Reports increased shortness of breath, which she believes is due to her asthma. She reports taking 4 puffs twice alfaro of Flovent and albuterol for asthma. Reports that she is pushing very hard to pee and feels like she is not getting everything out. Reports headache is not improved on tylenol and takes Zofran and Fioricet at home. Reporting an itchy yellow and very smelly vaginal discharge today, ongoing for months. Patient says it has been treated multiple times but has not improved at all. Reports having a very itchy rash in abdominal skin folds. Believes the magnesium helped with her muscle tension.     Patient received video EEG which showed no evidence of epileptic activity, despite multiple reported spells by patient. Likely psychogenic. Normal TSH. Echocardiogram normal. Vitals normal and stable. Patient states that she has planned with her  to start CBT therapy soon.       Physical Exam       Vitals:    19 0000 19 0400 19 0800 19 1200   BP: 130/73 108/60 146/99 146/76   Pulse: 71 66 71 72   Resp: 16 16 18 18    Temp: 37 °C (98.6 °F) 36.4 °C (97.5 °F) 36.1 °C (97 °F) 36.1 °C (97 °F)   TempSrc: Oral Temporal Temporal Temporal   SpO2: 97% 97% 93% 95%   Weight:       Height:         Body mass index is 37.79 kg/m².    Oxygen Therapy:  Pulse Oximetry: 95 %, O2 (LPM): 2, O2 Delivery: Silicone Nasal Cannula    Physical Exam   Constitutional: She is oriented to person, place, and time and well-developed, well-nourished, and in no distress. No distress.   HENT:   Head: Normocephalic.   Mouth/Throat: Oropharynx is clear and moist.   Two excoriated lesions on scalp   Eyes: Pupils are equal, round, and reactive to light. Conjunctivae are normal.   Cardiovascular: Normal rate, regular rhythm and normal heart sounds.   Pulmonary/Chest: Effort normal and breath sounds normal. No respiratory distress. She has no wheezes. She has no rales. She exhibits no tenderness.   Abdominal: Soft. Bowel sounds are normal. There is no tenderness. There is no rebound and no guarding.   Genitourinary: No vaginal discharge found.   Genitourinary Comments: No vaginal discharge seen on exam. No smell noticeable.    Musculoskeletal: She exhibits no edema or deformity.   Akithisia    Neurological: She is alert and oriented to person, place, and time.   Skin: Skin is warm and dry. No rash noted. She is not diaphoretic. No erythema.   Psychiatric: Memory and judgment normal.   Patient became very agitated and anxious when we discussed the possibility of her discharging   Nursing note and vitals reviewed.      Assessment/Plan     #Nonepileptic seizure disorder (psychogenic seizures)  Patient has no cardiac or neurologic cause of blackout spells, given normal ECG, echocardiogram, CTA of head and neck, and video EEG. With extensive psychiatric history, most likely psychiatric cause. Patient states that she is working with  to start CBT therapy. Patient was very accepting of this diagnosis, but when it was mentioned that it was not an indication to keep  her in the hospital, she became very upset. She is afraid that she will fall and hurt herself at home. She has a home health nurse and her father will be staying with her after she discharges. She would like to be discharged tomorrow.     -PT consult to assess strength and need for walking assistance. Patient is likely deconditioned, as she says she does not get out of bed at home  -Social work consult to determine home health needs  -Patient safe for discharge  -Outpatient CBT follow up   -Outpatient psychiatry follow up to adjust anxiety medications     #Dermatillomania  Patient is constantly itching scalp whenever we are in the room and now has multiple ulcers on her head. Complains of frequent and constant itching of her skin folds and genitals. No evidence of infection in either area. To erythema of skin or vagina, no vaginal discharge or smell. Likely a manifestation of anxiety.     -Barrier creams   -Psychiatry follow up    #Anxiety  Patient is anxious on every exam with notable akithisia. Reported shortness of breath likely secondary to anxiety, not asthma as patient believes. Lung sounds were clear, respiratory rate has been consistently normal, and oxygenation has been normal on patients home O2. Unclear what home O2 if for, as patient reports if was started for her AVA. Patient is requesting Ativan to take at home as needed.     -Outpatient follow up with primary care for workup of asthma. Patient is using abundant inhaled steroids at home (4 puffs twice daily of Flovent), but was not started on these medications until today.   -Outpatient assessment of O2 needs. Patient is using O2 all day for diagnosis of AVA, which would be inappropriate. It seems that her oxygen is one of her many unhealthy coping mechanisms for anxiety.  -Outpatient management of anxiety medications. We will not restart patient on benzodiazepines in the hospital, as she was previously tapered off.

## 2019-09-12 NOTE — CARE PLAN
Problem: Mobility  Goal: Risk for activity intolerance will decrease  Outcome: PROGRESSING AS EXPECTED   Pt mobilizing up to commode to urinate.    Problem: Psychosocial Needs:  Goal: Level of anxiety will decrease  Outcome: PROGRESSING SLOWER THAN EXPECTED   Pt frequently appears anxious and states to this RN that she is anxious. Requesting medication for anxiety control. MD paged, no new orders received.

## 2019-09-12 NOTE — PROGRESS NOTES
"Pt complaining of increased anxiety and requesting \"something for my anxiety.\" MELO Ibarra on call resident paged per pt request and updated on pt status. No new orders received at this time.  "

## 2019-09-12 NOTE — PROGRESS NOTES
Attempted to ambulate pt. Pt refused stating she wants to rest and is too tired. Verbalizes anxiety after talking with physicians.

## 2019-09-12 NOTE — PROCEDURES
VIDEO ELECTROENCEPHALOGRAM REPORT        Referring provider: Dr. Stein.      DOS:  9/12/2019 (total recording of 53 minutes).      INDICATION:  Peter Trimble 44 y.o. female presenting with recurrent syncope, h/o SAH.      CURRENT ANTIEPILEPTIC REGIMEN: Valproic Acid.      TECHNIQUE: 30 channel video electroencephalogram (EEG) was performed in accordance with the international 10-20 system. The study was reviewed in bipolar and referential montages. The recording examined the patient during wakeful and drowsy/sleep state(s).      DESCRIPTION OF THE RECORD:  During the wakefulness, the background showed a symmetrical 9 Hz alpha activity posteriorly with amplitude of 70 mV.  There was reactivity to eye closure/opening.  A normal anterior-posterior gradient was noted with faster beta frequencies seen anteriorly.  During drowsiness, theta/delta frequencies were seen.     During the sleep state, background shows diffuse high-amplitude 4-5 Hz delta activity.  Symmetrical high-amplitude sleep spindles and vertex sharps were seen in the leads over the central regions.      ACTIVATION PROCEDURES:   Not performed.      ICTAL AND/OR INTERICTAL FINDINGS:   No focal or generalized epileptiform activity noted. No regional slowing was seen during this routine study.  No seizures or events were reported or recorded during the study.      EKG: sampling of the EKG recording demonstrated sinus rhythm.      EVENTS:  None.      INTERPRETATION:  This is a normal video EEG recording in the awake, drowsy, and sleep state(s).  No spells or seizures captured during this study. Please refer to prior day's recording for nature of spells captured. Clinical correlation is recommended.     Note: A normal EEG does not rule out epilepsy.  If the clinical suspicion remains high for seizures, a prolonged recording to capture clinical or subclinical events may be helpful.           Thad Emmanuel MD   Epilepsy and Neurodiagnostics.    Clinical  of Neurology [facility].   Diplomate in Neurology, Epilepsy, and Electrodiagnostic Medicine.   Office: [phone]  Fax: [phone]

## 2019-09-12 NOTE — ASSESSMENT & PLAN NOTE
-monitoring Mg, repleting as necessary to maintain 2.0 or greater  -monitoring K, repleting as required to maintain 4.0 or greater  -would recommend mg supplement & low dose K supplement at discharge

## 2019-09-12 NOTE — PROGRESS NOTES
Pt demonstrated brief episodes of arm-jerking movements/twitching of the upper and lower extremities, each episode lasting approximately 30 seconds, no post-ictal phase observed, pt alert and oriented x 4 following each episode, continuous 24hr EEG monitoring in place.

## 2019-09-12 NOTE — PROGRESS NOTES
"     Neurology Consult    CC: Syncopal Episodes     HPI:  A 43 yo female with complex PMHx including subarachnoid hemorrhage in 2011( related to uncontrolled HTN & Triptan use ), Asthma, AVA, HTN, chronic intractable migraine, multiple psychiatric disorders including PTSD, personality disorder and bipolar disorder as well as chronic pain who presented with c/o of frequent episodes of passing out over the last 2 months. She described the episodes as blacking put, lasted pr 2-3 min,s , at least 10-12 times /day. Associated with Nausea & Dizziness. Not associated with jerking movements, bowel/bladder incontinence or tongue biting. Pt was seen by outpatient neurology last week & plan to get outpatient ambulatory EEG.   She has EEG done on 2012 & 2016: No epileptic activities noted.     Interval Update: EEG 9/11 showed \"Three spells reported by patient. The spells consisted of body rocking movements, appearing jittery, without loss of consciousness. The spells captured were psychogenic and non epileptic in nature.\"      MEDICATIONS:    Current Facility-Administered Medications:   •  magnesium sulfate IVPB premix 2 g, 2 g, Intravenous, Once, Dom Freeman M.D., Last Rate: 25 mL/hr at 09/12/19 0829, 2 g at 09/12/19 0829  •  ondansetron (ZOFRAN ODT) dispertab 4 mg, 4 mg, Oral, Q4HRS PRN, Dom Freeman M.D.  •  fluticasone (FLOVENT HFA) 110 MCG/ACT inhaler 220 mcg, 2 Puff, Inhalation, Q12HRS (RT), Dom Freeman M.D.  •  calcium carbonate (TUMS) chewable tab 500 mg, 500 mg, Oral, DAILY, Dom Freeman M.D., 500 mg at 09/12/19 0922  •  fluticasone (FLONASE) nasal spray 100 mcg, 2 Spray, Nasal, DAILY, Dom Freeman M.D.  •  bacitracin ointment, , Topical, BID PRN, Chantel Ruiz M.D.  •  divalproex (DEPAKOTE) delayed-release tablet 1,000 mg, 1,000 mg, Oral, QAM, 1,000 mg at 09/12/19 0442 **AND** divalproex (DEPAKOTE) delayed-release tablet 500 mg, 500 mg, Oral, Q EVENING, Leonardo Fernandez " PAOLA Ty, 500 mg at 09/11/19 1652  •  hydrocortisone 1 % cream, , Topical, TID, Dom Freeman M.D., Stopped at 09/12/19 0600  •  acetaminophen (TYLENOL) tablet 650 mg, 650 mg, Oral, Q6HRS PRN, Dom Freeman M.D.  •  ibuprofen (MOTRIN) tablet 400 mg, 400 mg, Oral, Q6HRS PRN, Dom Freeman M.D., 400 mg at 09/11/19 1728  •  senna-docusate (PERICOLACE or SENOKOT S) 8.6-50 MG per tablet 2 Tab, 2 Tab, Oral, BID, 2 Tab at 09/12/19 0443 **AND** polyethylene glycol/lytes (MIRALAX) PACKET 1 Packet, 1 Packet, Oral, QDAY PRN **AND** magnesium hydroxide (MILK OF MAGNESIA) suspension 30 mL, 30 mL, Oral, QDAY PRN **AND** bisacodyl (DULCOLAX) suppository 10 mg, 10 mg, Rectal, QDAY PRN, Leonardo Ty M.D.  •  enoxaparin (LOVENOX) inj 40 mg, 40 mg, Subcutaneous, DAILY, Leonardo Ty M.D., 40 mg at 09/12/19 0441  •  albuterol inhaler 2 Puff, 2 Puff, Inhalation, Q4H PRN (RT), Leonardo Ty M.D.  •  albuterol (PROVENTIL) 2.5mg/0.5ml nebulizer solution 2.5 mg, 2.5 mg, Nebulization, Q6HRS PRN, Leonardo Ty M.D.  •  baclofen (LIORESAL) tablet 10 mg, 10 mg, Oral, TID PRN, Leonardo Ty M.D., 10 mg at 09/11/19 2116  •  omeprazole (PRILOSEC) capsule 20 mg, 20 mg, Oral, BID, Leonardo Ty M.D., 20 mg at 09/12/19 0443  •  furosemide (LASIX) tablet 20 mg, 20 mg, Oral, BID, Leonardo Ty M.D., 20 mg at 09/12/19 0443  •  guaiFENesin LA (MUCINEX) tablet 600 mg, 600 mg, Oral, Q12HRS, Leonardo Ty M.D., 600 mg at 09/12/19 0442  •  hydrOXYzine HCl (ATARAX) tablet 50 mg, 50 mg, Oral, TID, Leonardo Ty M.D., 50 mg at 09/12/19 0443  •  montelukast (SINGULAIR) tablet 10 mg, 10 mg, Oral, QHS, Leonardo Ty M.D., 10 mg at 09/11/19 2112  •  PARoxetine (PAXIL) tablet 30 mg, 30 mg, Oral, DAILY, Leonardo Ty M.D., 30 mg at 09/12/19 0441  •  propranolol (INDERAL) tablet 20 mg, 20 mg,  Oral, BID, Leonardo Ty M.D., 20 mg at 09/12/19 0442  •  diphenhydrAMINE (BENADRYL) tablet/capsule 25 mg, 25 mg, Oral, Q6HRS PRN, Leonardo Ty M.D., 25 mg at 09/11/19 2340  •  morphine ER (MS CONTIN) tablet 30 mg, 30 mg, Oral, Q12HRS, Chantel Ruiz M.D., 30 mg at 09/12/19 0442  •  morphine (MS IR) tablet 15 mg, 15 mg, Oral, Q12HRS PRN, Chantel Ruiz M.D., 15 mg at 09/11/19 2343  •  vitamin D (cholecalciferol) tablet 4,000 Units, 4,000 Units, Oral, DAILY, Chantel Ruiz M.D., 4,000 Units at 09/12/19 0443    ALLERGIES:   Ms. Trimble  is allergic to compazine; imitrex [sumatriptan succinate]; inapsine [droperidol]; maxalt [rizatriptan benzoate]; phenergan [promethazine hcl]; xanax [alprazolam]; zantac; apple cider vinegar; butrans [buprenorphine]; clarithromycin; dilaudid [hydromorphone]; doxycycline; effexor [venlafaxine]; eucalyptus oil; gabapentin; kiwi extract; latex; lyrica [pregabalin]; minocycline; morphabond er [renny]; patchouli oil; sulfa drugs; tea tree oil; and topiramate.     SURGERIES:  Ms. Trimble   has a past surgical history that includes pr removal of ovary(s); pr exploration of spinal fusion; recovery (10/5/2011); knee reconstruction; other orthopedic surgery; other orthopedic surgery; recovery (1/30/2012); laminotomy; mammoplasty reduction (4/29/2013); hysterectomy laparoscopy; evacuation of hematoma (5/2/2013); breast biopsy (5/29/2014); irrigation & debridement general (8/17/2014); and ankle orif (Right, 8/7/2016).     MEDICAL ILLNESSES:  Ms. Trimble   has a past medical history of Acute blood loss anemia (5/2/2013), Acute renal failure (ARF) ([facility]) (10/5/2011), Anemia, Anxiety, Arthritis, ASTHMA, Bipolar affective ([facility]), Breath shortness, Cold (), Depression, Eclampsia complicating pregnancy, Environmental allergies, Essential hypertension, malignant (9/28/2011), Fibromyalgia, GERD (gastroesophageal reflux disease), Heart murmur, History of pregnancy  (10/16/2014), MRSA infection, Hyperlipidemia (04-26-13), Hypertension, Kidney stones, Migraines, Pain (05-27-14), Personality disorder ([facility]), Pneumonia (2012), PTSD (post-traumatic stress disorder), Scalp wound (8/18/2014), Seizure ([facility]) (05-27-14), Sleep apnea, Snoring, Stroke ([facility]) (2011), Subarachnoid hemorrhage ([facility]) (9/28/2011), Unspecified hemorrhagic conditions, Unspecified urinary incontinence, and Urinary bladder disorder. She also has no past medical history of COPD.     SOCIAL HISTORY:  Ms. Trimble   reports that she has never smoked. She has never used smokeless tobacco. She reports that she does not drink alcohol or use drugs.     FAMILY HISTORY:  Ms.'s Trimble  family history includes Alcohol/Drug in her brother, father, maternal grandfather, and paternal grandfather; Arthritis in her maternal grandmother, maternal uncle, and paternal grandmother; Genetic Disorder in her paternal aunt; Heart Disease in her father, maternal grandmother, and maternal uncle; Hyperlipidemia in her father, maternal uncle, and mother; Hypertension in her father, maternal uncle, and mother; Psychiatric Illness in her father, maternal grandfather, maternal uncle, and paternal uncle; Stroke in her maternal grandfather.      ROS:  Constitutional: Negative for chills and fever.   HENT: Negative for ear pain, hearing loss and tinnitus.    Eyes: Positive for double vision. Negative for blurred vision, photophobia and pain.   Respiratory: Negative for cough, hemoptysis and sputum production.    Cardiovascular: Positive for leg swelling. Negative for chest pain, palpitations, orthopnea and claudication.   Gastrointestinal: Positive for constipation, nausea and vomiting. Negative for abdominal pain, blood in stool, diarrhea and heartburn.   Genitourinary: Positive for urgency. Negative for dysuria.   Musculoskeletal: Positive for falls, myalgias and neck pain. Negative for back pain and joint pain.   Skin: Negative for rash.         "Scalp lesions, mild erythema, no discharge   Neurological: Positive for dizziness, loss of consciousness and headaches. Negative for tingling, tremors, sensory change and focal weakness.   Psychiatric/Behavioral: Negative for depression, substance abuse and suicidal ideas.        PHYSICAL EXAM:    /99   Pulse 71   Temp 36.1 °C (97 °F) (Temporal)   Resp 18   Ht 1.803 m (5' 11\")   Wt 122.9 kg (270 lb 15.1 oz)   SpO2 93%   BMI 37.79 kg/m²      Physical Exam   Constitutional: She is oriented to person, place, and time.   Overweight female, in no acute distress    HENT:   Head: Normocephalic.   Mouth/Throat: Oropharynx is clear and moist.   Several small scattered 0.5-1 cm scabbed scalp lesions without drainage or surrounding erythema, there are also some patches of broken hair follicles    Eyes: Pupils are equal, round, and reactive to light. Conjunctivae and EOM are normal. No scleral icterus.   Neck: Normal range of motion. Neck supple. No JVD present.   Cardiovascular: Normal rate, regular rhythm, normal heart sounds and intact distal pulses. Exam reveals no friction rub.   No murmur heard.  Pulmonary/Chest: Effort normal and breath sounds normal. No stridor. No respiratory distress. She has no wheezes.   Abdominal: Soft. Bowel sounds are normal. She exhibits no distension. There is no tenderness.   Musculoskeletal: Normal range of motion. She exhibits edema.   Rt foot with high arch and mild dorsal swelling    Neurological: She is alert and oriented to person, place, and time. No cranial nerve deficit or sensory deficit. She exhibits normal muscle tone. Coordination normal.   Fine bilateral tremor    Skin: Skin is warm and dry. Capillary refill takes 2 to 3 seconds. No rash noted. No erythema.   Dry skin    Psychiatric: Anxious.     Lab Results   Component Value Date/Time    CHOLSTRLTOT 179 09/12/2019 05:07 AM     (H) 09/12/2019 05:07 AM    HDL 45 09/12/2019 05:07 AM    TRIGLYCERIDE 172 (H) " 09/12/2019 05:07 AM       Lab Results   Component Value Date/Time    SODIUM 140 09/12/2019 05:07 AM    POTASSIUM 3.9 09/12/2019 05:07 AM    CHLORIDE 101 09/12/2019 05:07 AM    CO2 31 09/12/2019 05:07 AM    GLUCOSE 109 (H) 09/12/2019 05:07 AM    BUN 27 (H) 09/12/2019 05:07 AM    CREATININE 1.17 09/12/2019 05:07 AM     Lab Results   Component Value Date/Time    ALKPHOSPHAT 38 09/11/2019 01:22 AM    ASTSGOT 17 09/11/2019 01:22 AM    ALTSGPT 13 09/11/2019 01:22 AM    TBILIRUBIN 0.6 09/11/2019 01:22 AM      Lab Results   Component Value Date/Time    BNPBTYPENAT 16 12/24/2014 09:30 AM       Patient Active Problem List    Diagnosis Date Noted   • Psychogenic nonepileptic seizure 08/06/2016     Priority: High   • Anxiety 09/11/2019     Priority: Medium   • Morbid obesity ([facility]) 06/25/2015     Priority: Medium   • Closed fracture of one rib 06/25/2015     Priority: Medium   • Lumbar transverse process fracture ([facility]) 06/25/2015     Priority: Medium   • Chronic pain 08/18/2014     Priority: Medium   • HTN (hypertension) 10/04/2011     Priority: Medium   • Depression 09/28/2011     Priority: Medium   • Bipolar 2 disorder ([facility]) 09/28/2011     Priority: Medium   • Chronic migraine 09/28/2011     Priority: Medium   • Asthma 09/28/2011     Priority: Medium   • Electrolyte abnormality 09/12/2019     Priority: Low   • Skin lesion of scalp 09/11/2019     Priority: Low   • Hyperlipidemia 08/18/2014     Priority: Low   • AVA (obstructive sleep apnea), intolerant of CPAP 07/08/2013     Priority: Low   • GERD (gastroesophageal reflux disease) 09/28/2011     Priority: Low   • Morbid obesity with BMI of 40.0-44.9, adult ([facility]) 06/20/2019   • Medication overuse headache 10/30/2018   • Vitamin deficiency 09/26/2018   • Obesity (BMI 30-39.9) 01/05/2018   • Personality disorder 10/04/2016   • Dystonia 10/04/2016   • Chest pain 10/03/2016   • Ankle fracture, right 10/03/2016   • Vaginal yeast infection 10/03/2016   • PTSD (post-traumatic stress  "disorder) 10/02/2016   • Depression 08/07/2016   • Chronic pain 08/07/2016   • HTN (hypertension), benign 08/07/2016   • Fracture of ankle, trimalleolar, closed 08/06/2016   • Controlled substance agreement signed 02/19/2016   • Rib fracture 06/25/2015   • Benign hypertensive heart disease without heart failure 10/16/2014   • Mixed hyperlipidemia 10/16/2014   • Open scalp wound 10/16/2014   • Lump or mass in breast 05/29/2014   • Intractable migraine without aura and without status migrainosus 02/19/2014   • Neck pain 02/19/2014   • Fibromyalgia 02/19/2014   • Obese 07/08/2013   • Oxygen dependent, since 2011 07/08/2013   • Hypertrophy of breast 04/29/2013         ASSESSMENT & PLAN:  # A 45 yo female with complex PMHx including subarachnoid hemorrhage in 2011( related to uncontrolled HTN & Triptan use ), Asthma, AVA, HTN, chronic intractable migraine, multiple psychiatric disorders including PTSD, personality disorder and bipolar disorder as well as chronic pain who presented with c/o of frequent episodes of passing out over the last 2 months.  -24 hr video  EEG 9/11 showed \"Three spells reported by patient. The spells consisted of body rocking movements, appearing jittery, without loss of consciousness. The spells captured were psychogenic and non epileptic in nature.\"  - pt's symptoms can be explained by psychogenic seizure bases on EEG result   - CTA head showed possible old infarct in right basal ganglia  - CTA head showed no significant occulusion  - No A fib on EKG , no pauses on Tele    - ECHO : EF 70% with mild LVH, normal wall motion, no valve abnormalities   - US L/E : No DVT   - Fall /seizure/ aspiration precautions   - Recommended to follow up with Psychologist Ivone Brian who specializes in cognitive behavior therapy for conversion disorder   - Neurology will sign off.     -------------    The patient was seen and examined, I agree with plan above, SEE ANY CORRECTIONS BELOW.    I saw and evaluated the " patient and discussed the management with the resident staff. I reviewed Dr. Mallory's note and agree with the resident's findings and plan as documented in the note except as documented below. The chart was reviewed and summarized.  As listed in reviewed note any/all available labs, imaging, vitals were reviewed and neuroimaging visualized. Available nursing, consultant, and resident notes were reviewed.    Counseled the patient extensively at the bedside on underlying phenomenon.  Reviewed all studies and work-up and answered all questions.    Ricky Stein MD  Director, Comprehensive Stroke Center, [facility]  Neurohospitalist, [facility]  Clinical  of Neurology, [facility] [facility] [employer]  t) [phone] (f) [phone]    Referring Provider-  Anselmo España M.D.

## 2019-09-12 NOTE — DISCHARGE PLANNING
"Anticipated Discharge Disposition:   Home with help    Action:    Spoke with patient.  She stated she lives in a trailer home in [place].  There are 4-5 steps into home.  She uses a wheelchair sometimes.  She has a personal care assistant who comes into help her 3 days a week for shopping.  She can assist patient with cooking, cleaning.  Pt uses oxygen at home 2-3 LNC.  Pt does not drive.  Pt reported falling a lot at home.  She has 3 dogs she cares for.  She c/o of pain all over concentrated as headache and neck pain currently at 7-8/10.  This is her normal pain and states hx of fibromylagia.  Her father lives in [place] and will be staying with her for a few days after dc from hospital.  She reported having a son, however, has not had contact with him in a long time.    Pt refused PT eval today.    Pt has Medicaid FFS insurance.    Barriers to Discharge:    Medical clearance    Plan:    Encourage PT.    Care Transition Team Assessment    Information Source  Orientation : Oriented x 4  Information Given By: Patient  Who is responsible for making decisions for patient? : Patient    Readmission Evaluation  Is this a readmission?: No    Elopement Risk  Legal Hold: No  Ambulatory or Self Mobile in Wheelchair: Yes  Disoriented: No  Psychiatric Symptoms: None  History of Wandering: No  Elopement this Admit: No  Vocalizing Wanting to Leave: No  Displays Behaviors, Body Language Wanting to Leave: No-Not at Risk for Elopement  Elopement Risk: Not at Risk for Elopement    Interdisciplinary Discharge Planning  Patient or legal guardian wants to designate a caregiver (see row info): No(Pt uses \"[facility]\" services for home health aid)    Discharge Preparedness  What is your plan after discharge?: Home with help  What are your discharge supports?: Parent  Prior Functional Level: Ambulatory, Needs Assist with Activities of Daily Living, Independent with Medication Management, Uses Wheelchair  Difficulity with " ADLs: Walking  Difficulity with IADLs: Cooking, Driving, Laundry, Shopping    Functional Assesment  Prior Functional Level: Ambulatory, Needs Assist with Activities of Daily Living, Independent with Medication Management, Uses Wheelchair    Finances  Financial Barriers to Discharge: No  Prescription Coverage: Yes    Vision / Hearing Impairment  Vision Impairment : No  Hearing Impairment : No         Advance Directive  Advance Directive?: None    Domestic Abuse  Have you ever been the victim of abuse or violence?: No  Physical Abuse or Sexual Abuse: No  Verbal Abuse or Emotional Abuse: No  Possible Abuse Reported to:: Not Applicable         Discharge Risks or Barriers  Discharge risks or barriers?: No    Anticipated Discharge Information  Anticipated discharge disposition: Home  Discharge Address: [address] [place] [id number]  Discharge Contact Phone Number: [phone]

## 2019-09-13 ENCOUNTER — PATIENT OUTREACH (OUTPATIENT)
Dept: HEALTH INFORMATION MANAGEMENT | Facility: OTHER | Age: 44
End: 2019-09-13

## 2019-09-13 VITALS
RESPIRATION RATE: 18 BRPM | BODY MASS INDEX: 37.93 KG/M2 | WEIGHT: 270.95 LBS | HEIGHT: 71 IN | TEMPERATURE: 98.5 F | HEART RATE: 68 BPM | SYSTOLIC BLOOD PRESSURE: 122 MMHG | DIASTOLIC BLOOD PRESSURE: 83 MMHG | OXYGEN SATURATION: 95 %

## 2019-09-13 LAB
ANION GAP SERPL CALC-SCNC: 9 MMOL/L (ref 0–11.9)
BASOPHILS # BLD AUTO: 0.8 % (ref 0–1.8)
BASOPHILS # BLD: 0.04 K/UL (ref 0–0.12)
BUN SERPL-MCNC: 25 MG/DL (ref 8–22)
CALCIUM SERPL-MCNC: 9.3 MG/DL (ref 8.5–10.5)
CHLORIDE SERPL-SCNC: 99 MMOL/L (ref 96–112)
CO2 SERPL-SCNC: 31 MMOL/L (ref 20–33)
CREAT SERPL-MCNC: 1.38 MG/DL (ref 0.5–1.4)
EOSINOPHIL # BLD AUTO: 0.07 K/UL (ref 0–0.51)
EOSINOPHIL NFR BLD: 1.3 % (ref 0–6.9)
ERYTHROCYTE [DISTWIDTH] IN BLOOD BY AUTOMATED COUNT: 42.5 FL (ref 35.9–50)
GLUCOSE SERPL-MCNC: 81 MG/DL (ref 65–99)
HCT VFR BLD AUTO: 39.5 % (ref 37–47)
HGB BLD-MCNC: 12.3 G/DL (ref 12–16)
IMM GRANULOCYTES # BLD AUTO: 0.01 K/UL (ref 0–0.11)
IMM GRANULOCYTES NFR BLD AUTO: 0.2 % (ref 0–0.9)
LYMPHOCYTES # BLD AUTO: 2.63 K/UL (ref 1–4.8)
LYMPHOCYTES NFR BLD: 49.6 % (ref 22–41)
MAGNESIUM SERPL-MCNC: 1.5 MG/DL (ref 1.5–2.5)
MCH RBC QN AUTO: 27.9 PG (ref 27–33)
MCHC RBC AUTO-ENTMCNC: 31.1 G/DL (ref 33.6–35)
MCV RBC AUTO: 89.6 FL (ref 81.4–97.8)
MONOCYTES # BLD AUTO: 0.73 K/UL (ref 0–0.85)
MONOCYTES NFR BLD AUTO: 13.8 % (ref 0–13.4)
NEUTROPHILS # BLD AUTO: 1.82 K/UL (ref 2–7.15)
NEUTROPHILS NFR BLD: 34.3 % (ref 44–72)
NRBC # BLD AUTO: 0 K/UL
NRBC BLD-RTO: 0 /100 WBC
PLATELET # BLD AUTO: 197 K/UL (ref 164–446)
PMV BLD AUTO: 10.4 FL (ref 9–12.9)
POTASSIUM SERPL-SCNC: 3.9 MMOL/L (ref 3.6–5.5)
RBC # BLD AUTO: 4.41 M/UL (ref 4.2–5.4)
SODIUM SERPL-SCNC: 139 MMOL/L (ref 135–145)
WBC # BLD AUTO: 5.3 K/UL (ref 4.8–10.8)

## 2019-09-13 PROCEDURE — 83735 ASSAY OF MAGNESIUM: CPT

## 2019-09-13 PROCEDURE — G0378 HOSPITAL OBSERVATION PER HR: HCPCS

## 2019-09-13 PROCEDURE — 700102 HCHG RX REV CODE 250 W/ 637 OVERRIDE(OP): Performed by: STUDENT IN AN ORGANIZED HEALTH CARE EDUCATION/TRAINING PROGRAM

## 2019-09-13 PROCEDURE — 36415 COLL VENOUS BLD VENIPUNCTURE: CPT

## 2019-09-13 PROCEDURE — 85025 COMPLETE CBC W/AUTO DIFF WBC: CPT

## 2019-09-13 PROCEDURE — A9270 NON-COVERED ITEM OR SERVICE: HCPCS | Performed by: STUDENT IN AN ORGANIZED HEALTH CARE EDUCATION/TRAINING PROGRAM

## 2019-09-13 PROCEDURE — 700111 HCHG RX REV CODE 636 W/ 250 OVERRIDE (IP): Performed by: STUDENT IN AN ORGANIZED HEALTH CARE EDUCATION/TRAINING PROGRAM

## 2019-09-13 PROCEDURE — 97535 SELF CARE MNGMENT TRAINING: CPT

## 2019-09-13 PROCEDURE — 99238 HOSP IP/OBS DSCHRG MGMT 30/<: CPT | Mod: GC | Performed by: INTERNAL MEDICINE

## 2019-09-13 PROCEDURE — 96372 THER/PROPH/DIAG INJ SC/IM: CPT

## 2019-09-13 PROCEDURE — 80048 BASIC METABOLIC PNL TOTAL CA: CPT

## 2019-09-13 PROCEDURE — 97162 PT EVAL MOD COMPLEX 30 MIN: CPT

## 2019-09-13 RX ORDER — PAROXETINE 30 MG/1
30 TABLET, FILM COATED ORAL DAILY
Qty: 30 TAB | Refills: 0 | Status: SHIPPED | OUTPATIENT
Start: 2019-09-13 | End: 2019-10-24

## 2019-09-13 RX ADMIN — GUAIFENESIN 600 MG: 600 TABLET, EXTENDED RELEASE ORAL at 05:20

## 2019-09-13 RX ADMIN — PROPRANOLOL HYDROCHLORIDE 20 MG: 10 TABLET ORAL at 16:53

## 2019-09-13 RX ADMIN — HYDROXYZINE HYDROCHLORIDE 50 MG: 25 TABLET, FILM COATED ORAL at 05:16

## 2019-09-13 RX ADMIN — FUROSEMIDE 20 MG: 20 TABLET ORAL at 16:54

## 2019-09-13 RX ADMIN — IBUPROFEN 400 MG: 800 TABLET, FILM COATED ORAL at 05:32

## 2019-09-13 RX ADMIN — HYDROXYZINE HYDROCHLORIDE 50 MG: 25 TABLET, FILM COATED ORAL at 16:53

## 2019-09-13 RX ADMIN — ANTACID TABLETS 500 MG: 500 TABLET, CHEWABLE ORAL at 05:17

## 2019-09-13 RX ADMIN — PROPRANOLOL HYDROCHLORIDE 20 MG: 10 TABLET ORAL at 05:17

## 2019-09-13 RX ADMIN — FLUTICASONE PROPIONATE 220 MCG: 110 AEROSOL, METERED RESPIRATORY (INHALATION) at 16:56

## 2019-09-13 RX ADMIN — FLUTICASONE PROPIONATE 100 MCG: 50 SPRAY, METERED NASAL at 05:18

## 2019-09-13 RX ADMIN — HYDROXYZINE HYDROCHLORIDE 50 MG: 25 TABLET, FILM COATED ORAL at 13:10

## 2019-09-13 RX ADMIN — HYDROCORTISONE: 1 CREAM TOPICAL at 16:55

## 2019-09-13 RX ADMIN — DIVALPROEX SODIUM 500 MG: 500 TABLET, DELAYED RELEASE ORAL at 16:53

## 2019-09-13 RX ADMIN — FUROSEMIDE 20 MG: 20 TABLET ORAL at 05:17

## 2019-09-13 RX ADMIN — HYDROCORTISONE: 1 CREAM TOPICAL at 05:19

## 2019-09-13 RX ADMIN — MORPHINE SULFATE 30 MG: 15 TABLET, EXTENDED RELEASE ORAL at 16:53

## 2019-09-13 RX ADMIN — DIVALPROEX SODIUM 1000 MG: 500 TABLET, DELAYED RELEASE ORAL at 08:49

## 2019-09-13 RX ADMIN — HYDROCORTISONE: 1 CREAM TOPICAL at 13:10

## 2019-09-13 RX ADMIN — VITAMIN D, TAB 1000IU (100/BT) 4000 UNITS: 25 TAB at 05:17

## 2019-09-13 RX ADMIN — MORPHINE SULFATE 30 MG: 15 TABLET, EXTENDED RELEASE ORAL at 05:18

## 2019-09-13 RX ADMIN — FLUTICASONE PROPIONATE 220 MCG: 110 AEROSOL, METERED RESPIRATORY (INHALATION) at 05:19

## 2019-09-13 RX ADMIN — GUAIFENESIN 600 MG: 600 TABLET, EXTENDED RELEASE ORAL at 16:52

## 2019-09-13 RX ADMIN — ENOXAPARIN SODIUM 40 MG: 100 INJECTION SUBCUTANEOUS at 05:18

## 2019-09-13 RX ADMIN — OMEPRAZOLE 20 MG: 20 CAPSULE, DELAYED RELEASE ORAL at 16:52

## 2019-09-13 RX ADMIN — PAROXETINE HYDROCHLORIDE 30 MG: 20 TABLET, FILM COATED ORAL at 05:18

## 2019-09-13 RX ADMIN — OMEPRAZOLE 20 MG: 20 CAPSULE, DELAYED RELEASE ORAL at 05:17

## 2019-09-13 ASSESSMENT — GAIT ASSESSMENTS
DISTANCE (FEET): 15
ASSISTIVE DEVICE: FRONT WHEEL WALKER
GAIT LEVEL OF ASSIST: SUPERVISED

## 2019-09-13 ASSESSMENT — COGNITIVE AND FUNCTIONAL STATUS - GENERAL
CLIMB 3 TO 5 STEPS WITH RAILING: A LITTLE
MOVING FROM LYING ON BACK TO SITTING ON SIDE OF FLAT BED: A LOT
MOBILITY SCORE: 15
TURNING FROM BACK TO SIDE WHILE IN FLAT BAD: A LOT
SUGGESTED CMS G CODE MODIFIER MOBILITY: CK
MOVING TO AND FROM BED TO CHAIR: A LOT
STANDING UP FROM CHAIR USING ARMS: A LITTLE
WALKING IN HOSPITAL ROOM: A LITTLE

## 2019-09-13 NOTE — DISCHARGE PLANNING
.Received Choice form at 1500  Agency/Facility Name: [facility]  Referral sent per Choice form @ 1503    [facility]  Spoke To: Karen  Outcome: Patient declined- not accepting Medicaid patients at this time.    Agency/Facility Name: [facility]  Spoke To: Admissions  Outcome: At capacity for Medicaid patients.    Agency/Facility Name: [facility]  Outcome: Attempted to contact [place], however, no answer. Voicemail left for Ryan.    Cathryn(SB) notified.

## 2019-09-13 NOTE — FACE TO FACE
Face to Face Supporting Documentation - Home Health    The encounter with this patient was in whole or in part the primary reason for home health admission.    Date of encounter:   Patient:                    MRN:                       YOB: 2018  Peter Trimble  [id number]  1975     Home health to see patient for:  Skilled Nursing care for assessment, interventions & education and Physical Therapy evaluation and treatment    Skilled need for:  Exacerbation of Chronic Disease State - and Medication Management -    Skilled nursing interventions to include:  Comment: Physical therapy, assistance with medication management    Homebound status evidenced by:  Need the aid of supportive devices such as crutches, canes, wheelchairs or walkers. Leaving home requires a considerable and taxing effort. There is a normal inability to leave the home.    Community Physician to provide follow up care: No primary care provider on file.     Optional Interventions? No      I certify the face to face encounter for this home health care referral meets the CMS requirements and the encounter/clinical assessment with the patient was, in whole, or in part, for the medical condition(s) listed above, which is the primary reason for home health care. Based on my clinical findings: the service(s) are medically necessary, support the need for home health care, and the homebound criteria are met.  I certify that this patient has had a face to face encounter by myself.  Shwetha Nelson M.D. - NPI: [id number]

## 2019-09-13 NOTE — THERAPY
"Physical Therapy Evaluation completed.   Bed Mobility:  Supine to Sit: Supervised(HOB flat without bed rail)  Transfers: Sit to Stand: Supervised  Gait: Level Of Assist: Supervised with Front-Wheel Walker       Plan of Care: Will benefit from Physical Therapy 3 times per week  Discharge Recommendations: Equipment: Front-Wheel Walker, bed rails, grab bars for bathroom. Post-acute therapy Discharge to home with outpatient or home health for additional skilled therapy services.    Pt is 43 y/o F hospitalized 2/2 c/p passing out over the past 2 months. PMH includes SAH (2011), asthma, HTN, chronic intractable migraine, multiple psychiatric disorders including PTSD, personality disorder, bipolar disorder, and chronic pain. Pt reports that at baseline she requires assistance from her caregiver to navigate steps in/out of home with BHR. She reports that she does not amb with AD but feels she needs this for stability. Additonally, pt reports she needs bed rails d/t falling out of her bed and grab bars for the bathroom. Today, patient is able to perform all mobility at SPV level. She is able to navigate bed mobility and transfers on/off the commode and bed with FWW. Pt requiring max encouragement to attempt amb with FWW. She reports that she feels dizzy. PT checked pts /85. PT explained that although she may feel dizzy, her BP and SpO2 WNL and encouarged her to work through anxiety about upright mobility, pt pleasantly agreeable. Pt tolerating amb x15ft from bed to commode with FWW and SPV without any LOB. Pt would benefit from skilled acute PT while in acute setting. Recommend home health transitional care for continued physical therapy services.    See \"Rehab Therapy-Acute\" Patient Summary Report for complete documentation.     "

## 2019-09-13 NOTE — DISCHARGE PLANNING
Anticipated Discharge Disposition: Home Health     Action: LSW met with pt at bedside to discuss home health choice.  Pt states she will be discharging to [address] [facility] [address] [place] [address] and her PCP is Dr. Norton in [place] (pt last saw MD 1 week ago).  Pt states she has had services through [facility] in the past and requested to use this company again.  Pt signed choice form and LSW faxed to [facility] to process referral.  Pt expected to d/c home today pending home health approval.  Pt states she needs a FWW ordered as well.  No orders for FWW at this time.  LSW reviewed PT notes which recommends FWW for discharge.  Requested MD place order for FWW.  Pt provided verbal consent to have referral sent to [facility] once FWW orders have been placed.  Pt states she will also need MTM services arranged for transportation home upon discharge.     Barriers to Discharge: Home Health acceptance, FWW orders/delivery     Plan: Pt expected to d/c home today pending Home Health acceptance and delivery of FWW.

## 2019-09-13 NOTE — CARE PLAN
Problem: Safety  Goal: Will remain free from injury  Outcome: PROGRESSING AS EXPECTED   No falls this admission.     Problem: Knowledge Deficit  Goal: Knowledge of disease process/condition, treatment plan, diagnostic tests, and medications will improve  Outcome: PROGRESSING SLOWER THAN EXPECTED   Pt is extremely anxious at times.

## 2019-09-13 NOTE — CARE PLAN
Problem: Safety  Goal: Will remain free from injury  Outcome: PROGRESSING AS EXPECTED  Goal: Will remain free from falls  Outcome: PROGRESSING AS EXPECTED     Problem: Venous Thromboembolism (VTW)/Deep Vein Thrombosis (DVT) Prevention:  Goal: Patient will participate in Venous Thrombosis (VTE)/Deep Vein Thrombosis (DVT)Prevention Measures  Outcome: PROGRESSING AS EXPECTED     Problem: Discharge Barriers/Planning  Goal: Patient's continuum of care needs will be met  Outcome: PROGRESSING AS EXPECTED

## 2019-09-13 NOTE — DISCHARGE PLANNING
Anticipated Discharge Disposition: Home w/ FWW     Action: LSW notified by [facility] that both [facility] has denied pt as they are not accepting Medicaid ins at this time.  Referral sent to [facility], however received denial as they are at Capacity.  No other Home Health agency servicing the [facility].  LSW notified MD that pt will not receive home health services at this time.  MD states she will speak with pt.  Pt provided verbal consent via phone on choice form for FWW [facility].  Choice form faxed to ERIK Olivares to process referral and bedside RN notified to place traction order for FWW delivery.      Barriers to Discharge: No home health available.     Plan: MD to discuss d/c plan with pt as HHC not available.  FWW to be delivered to bedside.

## 2019-09-13 NOTE — DISCHARGE PLANNING
Received Choice form at 2015  Agency/Facility Name: [facility]  Referral sent per Choice form @ [phone]

## 2019-09-14 NOTE — DISCHARGE INSTRUCTIONS
Discharge Instructions    Discharged to home by taxi with self. Discharged via wheelchair, hospital escort: Yes.  Special equipment needed: Walker    Be sure to schedule a follow-up appointment with your primary care doctor or any specialists as instructed.     Discharge Plan:   Diet Plan: Discussed  Activity Level: Discussed  Confirmed Follow up Appointment: Appointment Scheduled  Confirmed Symptoms Management: Discussed  Medication Reconciliation Updated: Yes  Influenza Vaccine Indication: Patient Refuses  Influenza Vaccine Given - only chart on this line when given: Influenza Vaccine Given (See MAR)    I understand that a diet low in cholesterol, fat, and sodium is recommended for good health. Unless I have been given specific instructions below for another diet, I accept this instruction as my diet prescription.   Other diet: Regular    Special Instructions: None    · Is patient discharged on Warfarin / Coumadin?   No     Depression / Suicide Risk    As you are discharged from this [facility] facility, it is important to learn how to keep safe from harming yourself.    Recognize the warning signs:  · Abrupt changes in personality, positive or negative- including increase in energy   · Giving away possessions  · Change in eating patterns- significant weight changes-  positive or negative  · Change in sleeping patterns- unable to sleep or sleeping all the time   · Unwillingness or inability to communicate  · Depression  · Unusual sadness, discouragement and loneliness  · Talk of wanting to die  · Neglect of personal appearance   · Rebelliousness- reckless behavior  · Withdrawal from people/activities they love  · Confusion- inability to concentrate     If you or a loved one observes any of these behaviors or has concerns about self-harm, here's what you can do:  · Talk about it- your feelings and reasons for harming yourself  · Remove any means that you might use to hurt yourself (examples: pills, rope,  extension cords, firearm)  · Get professional help from the community (Mental Health, Substance Abuse, psychological counseling)  · Do not be alone:Call your Safe Contact- someone whom you trust who will be there for you.  · Call your local CRISIS HOTLINE 817-9285 or 120-710-6083  · Call your local Children's Mobile Crisis Response Team [place] (907) 374-1072 or www.[employer]  · Call the toll free National Suicide Prevention Hotlines   · National Suicide Prevention Lifeline 610-895-CJOV (3379)  · National Hope Line Network 800-SUICIDE (289-9312)

## 2019-09-14 NOTE — DISCHARGE PLANNING
SHAHANA received call from Dr. Nelson requesting assistance with patient transportation home and for PCP appointment scheduled Monday with [facility].  SHAHANA called [facility] and was able to secure transportation home to [place] this evening with [employer] ([phone]) at 5:30 pm.  Transportation was also arranged for patient's PCP appointment Monday afternoon at 1 pm with Dr. Norton.  [facility] ([phone])will pick this patient up at 12:00 from her home and take her to [address] [place] [address] ([phone]).  SHAHANA called and updated the bedside RNSusie who is now requesting a 6:30 pm.  SHAHANA called [facility] and requested that the transportation be pushed back and was told that they would not be able to accomodate this patient.  SHAHANA called [facility] back ([phone]) and was able to secure [facility] tp pick patient up in about 15 minutes.  SHAHANA called and updated bedside, EDWIN Richards.

## 2019-09-14 NOTE — NON-PROVIDER
"       Internal Medicine Medical Student Note  Note Author: Gracy Concepcion, Student    Name Peter Trimble     1975   Age/Sex 44 y.o. female   MRN [id number]   Code Status FULL       Reason for interval visit  (Principal Problem)   Psychogenic nonepileptic seizure    Interval Problem Daily Status Update  (problem status, last 24 hours, new history, new data )     Patient reports that she is feeling very tired today and has multiple concerns about going home. She made multiple comments about being \"hopeless\" and \"never getting better.\" She does not see the point of CBT therapy and wishes that we could give her medications to deal with her anxiety. Talked about CBT providing her with skills she needs to cope with her anxiety and depression. Patient would like Depakote increased, but we discussed CBT being more beneficial for her psychogenic seizures than antiepileptics. Had another long conversation about the results of her EEG being normal, as she read online that normal EEGs were not diagnostic. Reassured her that the video monitoring recorded several of patients reported episodes without evidence of change on her EEG. Patient had concerns that her oxygen was being cut off during these episodes. Reassured her that we have not seen a change in her saturations or heart rate during these episodes.    Patient still reporting itchy, yellow vaginal discharge. Reassured patient that this was likely a result of her scratching, as we did not see any discharge yesterday on exam. Agreed to do another vaginal exam today.     Patient continues to feel upset that we are not listening to her complaints. She believes that the insurance company wants to stick her in a home. She does not feel like we are treating her illness. Explained to her that she will receive outpatient follow up with her primary care provider, her therapist, and her psychiatrists. She believes she will be too tired to make it to these appointments. " She believes she will be bed bound forever. Patient asked if she should wear a helmet, as one time she fell asleep on the toilet for 8 hours and fell against the wall. Advised patient to not sleep in dangerous locations and to call for help if she is not able to get up.     Social work coordinating ride for her today to get home. They were working to get her a home health nurse to help manage her medications, but due to insurance issues and availability of services, this is not an option at this time.       Physical Exam       Vitals:    09/13/19 0400 09/13/19 0800 09/13/19 1200 09/13/19 1600   BP: 123/68 132/83 121/76 122/83   Pulse: 74 98 70 68   Resp: 16 17 18 18   Temp: 36.3 °C (97.4 °F) 36.8 °C (98.2 °F) 36.6 °C (97.8 °F) 36.9 °C (98.5 °F)   TempSrc: Temporal Temporal Oral Temporal   SpO2: 97% 100% 95% 95%   Weight:       Height:         Body mass index is 37.79 kg/m².    Oxygen Therapy:  Pulse Oximetry: 95 %, O2 (LPM): 2, O2 Delivery: Silicone Nasal Cannula    Physical Exam   Constitutional: She is oriented to person, place, and time and well-developed, well-nourished, and in no distress. No distress.   HENT:   Head: Normocephalic.   Mouth/Throat: Oropharynx is clear and moist.   Two excoriated lesions on scalp   Eyes: Pupils are equal, round, and reactive to light. Conjunctivae are normal.   Cardiovascular: Normal rate, regular rhythm and normal heart sounds.   Pulmonary/Chest: Effort normal and breath sounds normal. No respiratory distress. She has no wheezes. She has no rales. She exhibits no tenderness.   Abdominal: Soft. Bowel sounds are normal. There is no tenderness. There is no rebound and no guarding.   Genitourinary: No vaginal discharge found.   Genitourinary Comments: No vaginal discharge seen on exam. No smell noticeable.    Musculoskeletal: She exhibits no edema or deformity.   Akithisia    Neurological: She is alert and oriented to person, place, and time.   Skin: Skin is warm and dry. No rash  noted. She is not diaphoretic. No erythema.   Psychiatric: Memory and judgment normal.   Patient became very agitated and anxious when we discussed the possibility of her discharging   Nursing note and vitals reviewed.      Assessment/Plan     #Nonepileptic seizure disorder (psychogenic seizures)  Patient has no cardiac or neurologic cause of blackout spells, given normal ECG, echocardiogram, CTA of head and neck, and video EEG. With extensive psychiatric history, most likely psychiatric cause. Patient states that she is working with  to start CBT therapy. Patient was very accepting of this diagnosis, but when it was mentioned that it was not an indication to keep her in the hospital, she became very upset. She is afraid that she will fall and hurt herself at home. She has a home health aid that comes to her house regularly and her father will be staying with her after she discharges.      -Likely needs primary care referral for out patient PT, as patient may be deconditioned from not getting out of bed  -Outpatient management of anxiety medications   -Patient has previously been tapered off of benzodiazepines, would not advise restarting   -Patient currently on Atarax and Paxil, may need Paxil to be increased or medications switched in outpatient setting to ensure good followup  -Patient given a walker to help with stability  -Patient safe for discharge  -Extensive, repeated reassurance given to patient (per HPI)  -Outpatient CBT follow up   -Outpatient psychiatry follow up     #Dermatillomania  Patient is constantly itching scalp whenever we are in the room and now has multiple ulcers on her head. Complains of frequent and constant itching of her skin folds and genitals. No evidence of infection in either area. No erythema of skin or vagina, no vaginal discharge or smell. Performed two vaginal exams while inpatient, no evidence of discharge or smell on either exam. Likely a manifestation of anxiety.       -Barrier creams  -No abx or fungal medications given for patients vaginal itching, as there was no discharge or smell on either exam and issue has previously been treated multiple times with no improvement  -Psychiatry follow up     #Anxiety  Patient is anxious on every exam with notable akithisia. Reported shortness of breath likely secondary to anxiety, not asthma as patient believes. Lung sounds were clear, respiratory rate has been consistently normal, and oxygenation has been normal on patients home O2. Unclear what home O2 if for, as patient reports if was started for her AVA. Patient is requesting Ativan to take at home as needed.      -Outpatient follow up with primary care for workup of asthma. Patient is using abundant inhaled steroids at home (4 puffs twice daily of Flovent) and albuterol, but SOB appears to only be related to panic attacks.   -Outpatient assessment of O2 needs. Patient claims that she is using O2 all day for diagnosis of AVA, which would be inappropriate, and because she feels she needs oxygen during her blackout spells, during which she is oxygenating appropriately. It seems that her oxygen is one of her many unhealthy coping mechanisms for anxiety.  -Outpatient management of anxiety medications. We will not restart patient on benzodiazepines in the hospital, as she was previously tapered off.       Gracy Concepcion [facility]

## 2019-09-27 ENCOUNTER — HOSPITAL ENCOUNTER (EMERGENCY)
Facility: MEDICAL CENTER | Age: 44
End: 2019-09-28
Attending: EMERGENCY MEDICINE
Payer: MEDICAID

## 2019-09-27 ENCOUNTER — TELEPHONE (OUTPATIENT)
Dept: NEUROLOGY | Facility: MEDICAL CENTER | Age: 44
End: 2019-09-27

## 2019-09-27 DIAGNOSIS — M79.671 RIGHT FOOT PAIN: ICD-10-CM

## 2019-09-27 DIAGNOSIS — R56.9 SEIZURE-LIKE ACTIVITY (HCC): ICD-10-CM

## 2019-09-27 LAB
ALBUMIN SERPL BCP-MCNC: 4.4 G/DL (ref 3.2–4.9)
ALBUMIN/GLOB SERPL: 1.5 G/DL
ALP SERPL-CCNC: 41 U/L (ref 30–99)
ALT SERPL-CCNC: 11 U/L (ref 2–50)
ANION GAP SERPL CALC-SCNC: 13 MMOL/L (ref 0–11.9)
AST SERPL-CCNC: 19 U/L (ref 12–45)
BASOPHILS # BLD AUTO: 0.4 % (ref 0–1.8)
BASOPHILS # BLD: 0.03 K/UL (ref 0–0.12)
BILIRUB SERPL-MCNC: 0.3 MG/DL (ref 0.1–1.5)
BUN SERPL-MCNC: 30 MG/DL (ref 8–22)
CALCIUM SERPL-MCNC: 9.4 MG/DL (ref 8.5–10.5)
CHLORIDE SERPL-SCNC: 100 MMOL/L (ref 96–112)
CO2 SERPL-SCNC: 27 MMOL/L (ref 20–33)
CREAT SERPL-MCNC: 1.54 MG/DL (ref 0.5–1.4)
EOSINOPHIL # BLD AUTO: 0.09 K/UL (ref 0–0.51)
EOSINOPHIL NFR BLD: 1.1 % (ref 0–6.9)
ERYTHROCYTE [DISTWIDTH] IN BLOOD BY AUTOMATED COUNT: 44 FL (ref 35.9–50)
GLOBULIN SER CALC-MCNC: 2.9 G/DL (ref 1.9–3.5)
GLUCOSE SERPL-MCNC: 92 MG/DL (ref 65–99)
HCT VFR BLD AUTO: 41.3 % (ref 37–47)
HGB BLD-MCNC: 12.7 G/DL (ref 12–16)
IMM GRANULOCYTES # BLD AUTO: 0.02 K/UL (ref 0–0.11)
IMM GRANULOCYTES NFR BLD AUTO: 0.2 % (ref 0–0.9)
LYMPHOCYTES # BLD AUTO: 3.8 K/UL (ref 1–4.8)
LYMPHOCYTES NFR BLD: 47.1 % (ref 22–41)
MAGNESIUM SERPL-MCNC: 1.2 MG/DL (ref 1.5–2.5)
MCH RBC QN AUTO: 27.7 PG (ref 27–33)
MCHC RBC AUTO-ENTMCNC: 30.8 G/DL (ref 33.6–35)
MCV RBC AUTO: 90.2 FL (ref 81.4–97.8)
MONOCYTES # BLD AUTO: 0.69 K/UL (ref 0–0.85)
MONOCYTES NFR BLD AUTO: 8.6 % (ref 0–13.4)
NEUTROPHILS # BLD AUTO: 3.43 K/UL (ref 2–7.15)
NEUTROPHILS NFR BLD: 42.6 % (ref 44–72)
NRBC # BLD AUTO: 0 K/UL
NRBC BLD-RTO: 0 /100 WBC
PLATELET # BLD AUTO: 246 K/UL (ref 164–446)
PMV BLD AUTO: 9.9 FL (ref 9–12.9)
POTASSIUM SERPL-SCNC: 4 MMOL/L (ref 3.6–5.5)
PROT SERPL-MCNC: 7.3 G/DL (ref 6–8.2)
RBC # BLD AUTO: 4.58 M/UL (ref 4.2–5.4)
SODIUM SERPL-SCNC: 140 MMOL/L (ref 135–145)
TROPONIN T SERPL-MCNC: 12 NG/L (ref 6–19)
WBC # BLD AUTO: 8.1 K/UL (ref 4.8–10.8)

## 2019-09-27 PROCEDURE — 83735 ASSAY OF MAGNESIUM: CPT

## 2019-09-27 PROCEDURE — 85025 COMPLETE CBC W/AUTO DIFF WBC: CPT

## 2019-09-27 PROCEDURE — 80053 COMPREHEN METABOLIC PANEL: CPT

## 2019-09-27 PROCEDURE — 99283 EMERGENCY DEPT VISIT LOW MDM: CPT

## 2019-09-27 PROCEDURE — 93005 ELECTROCARDIOGRAM TRACING: CPT | Performed by: EMERGENCY MEDICINE

## 2019-09-27 PROCEDURE — 84484 ASSAY OF TROPONIN QUANT: CPT

## 2019-09-27 NOTE — TELEPHONE ENCOUNTER
Pt left me a voicemail stating she is had multiple seizures last night and has extremity swelling.     Spoke to patient advised ER. Pt agreed.   JOSH Aguilar.

## 2019-09-28 VITALS
DIASTOLIC BLOOD PRESSURE: 81 MMHG | OXYGEN SATURATION: 97 % | RESPIRATION RATE: 18 BRPM | BODY MASS INDEX: 38.35 KG/M2 | WEIGHT: 275 LBS | TEMPERATURE: 98 F | HEART RATE: 69 BPM | SYSTOLIC BLOOD PRESSURE: 145 MMHG

## 2019-09-28 NOTE — ED TRIAGE NOTES
"Pt to triage, pt states \" she had multiple seizures lastnight, her seizures are psychiatric in nature per pt\" , she c/o rt foot pain , + redness and swelling noted to RLE. Pt presents with multiple bruises in various stages of healing, pt also presents with old \" tape marks on her\" states she has been seen elsewhere for this, but not today   "

## 2019-09-28 NOTE — ED NOTES
Discharge instructions gone over with pt. Pt verbalized understanding. All questions answered. Pt educated on s/sx to return to ED. Pt states she needs to oxygen tank as hers is empty.  called to help coordinate oxygen.

## 2019-09-30 LAB — EKG IMPRESSION: NORMAL

## 2019-10-22 ENCOUNTER — APPOINTMENT (OUTPATIENT)
Dept: NEUROLOGY | Facility: MEDICAL CENTER | Age: 44
End: 2019-10-22
Payer: MEDICAID

## 2019-10-24 ENCOUNTER — HOSPITAL ENCOUNTER (OUTPATIENT)
Facility: MEDICAL CENTER | Age: 44
End: 2019-10-28
Attending: EMERGENCY MEDICINE | Admitting: INTERNAL MEDICINE
Payer: MEDICAID

## 2019-10-24 ENCOUNTER — APPOINTMENT (OUTPATIENT)
Dept: RADIOLOGY | Facility: MEDICAL CENTER | Age: 44
End: 2019-10-24
Attending: EMERGENCY MEDICINE
Payer: MEDICAID

## 2019-10-24 DIAGNOSIS — R53.1 WEAKNESS: ICD-10-CM

## 2019-10-24 DIAGNOSIS — R56.9 SEIZURE-LIKE ACTIVITY (HCC): ICD-10-CM

## 2019-10-24 DIAGNOSIS — G89.29 OTHER CHRONIC PAIN: ICD-10-CM

## 2019-10-24 DIAGNOSIS — R31.29 OTHER MICROSCOPIC HEMATURIA: ICD-10-CM

## 2019-10-24 DIAGNOSIS — R55 SYNCOPE, UNSPECIFIED SYNCOPE TYPE: ICD-10-CM

## 2019-10-24 DIAGNOSIS — N17.9 AKI (ACUTE KIDNEY INJURY) (HCC): ICD-10-CM

## 2019-10-24 DIAGNOSIS — R50.9 FEVER, UNSPECIFIED FEVER CAUSE: ICD-10-CM

## 2019-10-24 PROBLEM — R31.9 HEMATURIA: Status: ACTIVE | Noted: 2019-10-24

## 2019-10-24 LAB
ALBUMIN SERPL BCP-MCNC: 3.7 G/DL (ref 3.2–4.9)
ALBUMIN/GLOB SERPL: 1.2 G/DL
ALP SERPL-CCNC: 43 U/L (ref 30–99)
ALT SERPL-CCNC: 12 U/L (ref 2–50)
ANION GAP SERPL CALC-SCNC: 10 MMOL/L (ref 0–11.9)
APPEARANCE UR: ABNORMAL
APPEARANCE UR: CLEAR
AST SERPL-CCNC: 21 U/L (ref 12–45)
BACTERIA #/AREA URNS HPF: NEGATIVE /HPF
BACTERIA #/AREA URNS HPF: NEGATIVE /HPF
BASOPHILS # BLD AUTO: 0.4 % (ref 0–1.8)
BASOPHILS # BLD: 0.02 K/UL (ref 0–0.12)
BILIRUB SERPL-MCNC: 0.4 MG/DL (ref 0.1–1.5)
BILIRUB UR QL STRIP.AUTO: NEGATIVE
BILIRUB UR QL STRIP.AUTO: NEGATIVE
BUN SERPL-MCNC: 22 MG/DL (ref 8–22)
CALCIUM SERPL-MCNC: 9 MG/DL (ref 8.5–10.5)
CHLORIDE SERPL-SCNC: 96 MMOL/L (ref 96–112)
CO2 SERPL-SCNC: 34 MMOL/L (ref 20–33)
COLOR UR: ABNORMAL
COLOR UR: YELLOW
CREAT SERPL-MCNC: 1.59 MG/DL (ref 0.5–1.4)
CREAT UR-MCNC: 73.3 MG/DL
EKG IMPRESSION: NORMAL
EOSINOPHIL # BLD AUTO: 0.08 K/UL (ref 0–0.51)
EOSINOPHIL NFR BLD: 1.6 % (ref 0–6.9)
EPI CELLS #/AREA URNS HPF: ABNORMAL /HPF
EPI CELLS #/AREA URNS HPF: NEGATIVE /HPF
ERYTHROCYTE [DISTWIDTH] IN BLOOD BY AUTOMATED COUNT: 44.5 FL (ref 35.9–50)
GLOBULIN SER CALC-MCNC: 3 G/DL (ref 1.9–3.5)
GLUCOSE SERPL-MCNC: 94 MG/DL (ref 65–99)
GLUCOSE UR STRIP.AUTO-MCNC: NEGATIVE MG/DL
GLUCOSE UR STRIP.AUTO-MCNC: NEGATIVE MG/DL
HCT VFR BLD AUTO: 36.7 % (ref 37–47)
HGB BLD-MCNC: 12.3 G/DL (ref 12–16)
HYALINE CASTS #/AREA URNS LPF: ABNORMAL /LPF
HYALINE CASTS #/AREA URNS LPF: ABNORMAL /LPF
IMM GRANULOCYTES # BLD AUTO: 0.02 K/UL (ref 0–0.11)
IMM GRANULOCYTES NFR BLD AUTO: 0.4 % (ref 0–0.9)
KETONES UR STRIP.AUTO-MCNC: NEGATIVE MG/DL
KETONES UR STRIP.AUTO-MCNC: NEGATIVE MG/DL
LACTATE BLD-SCNC: 1.2 MMOL/L (ref 0.5–2)
LEUKOCYTE ESTERASE UR QL STRIP.AUTO: ABNORMAL
LEUKOCYTE ESTERASE UR QL STRIP.AUTO: NEGATIVE
LYMPHOCYTES # BLD AUTO: 2.59 K/UL (ref 1–4.8)
LYMPHOCYTES NFR BLD: 50.7 % (ref 22–41)
MAGNESIUM SERPL-MCNC: 1.5 MG/DL (ref 1.5–2.5)
MCH RBC QN AUTO: 29 PG (ref 27–33)
MCHC RBC AUTO-ENTMCNC: 33.5 G/DL (ref 33.6–35)
MCV RBC AUTO: 86.6 FL (ref 81.4–97.8)
MICRO URNS: ABNORMAL
MICRO URNS: ABNORMAL
MONOCYTES # BLD AUTO: 0.68 K/UL (ref 0–0.85)
MONOCYTES NFR BLD AUTO: 13.3 % (ref 0–13.4)
NEUTROPHILS # BLD AUTO: 1.72 K/UL (ref 2–7.15)
NEUTROPHILS NFR BLD: 33.6 % (ref 44–72)
NITRITE UR QL STRIP.AUTO: NEGATIVE
NITRITE UR QL STRIP.AUTO: NEGATIVE
NRBC # BLD AUTO: 0 K/UL
NRBC BLD-RTO: 0 /100 WBC
PH UR STRIP.AUTO: 5.5 [PH] (ref 5–8)
PH UR STRIP.AUTO: 6 [PH] (ref 5–8)
PLATELET # BLD AUTO: 248 K/UL (ref 164–446)
PMV BLD AUTO: 10 FL (ref 9–12.9)
POTASSIUM SERPL-SCNC: 3.6 MMOL/L (ref 3.6–5.5)
PROT SERPL-MCNC: 6.7 G/DL (ref 6–8.2)
PROT UR QL STRIP: NEGATIVE MG/DL
PROT UR QL STRIP: NEGATIVE MG/DL
PROT UR-MCNC: 4.4 MG/DL (ref 0–15)
RBC # BLD AUTO: 4.24 M/UL (ref 4.2–5.4)
RBC # URNS HPF: >150 /HPF
RBC # URNS HPF: ABNORMAL /HPF
RBC UR QL AUTO: ABNORMAL
RBC UR QL AUTO: ABNORMAL
SODIUM SERPL-SCNC: 140 MMOL/L (ref 135–145)
SODIUM UR-SCNC: 51 MMOL/L
SP GR UR STRIP.AUTO: 1.02
SP GR UR STRIP.AUTO: 1.03
UROBILINOGEN UR STRIP.AUTO-MCNC: 0.2 MG/DL
UROBILINOGEN UR STRIP.AUTO-MCNC: 0.2 MG/DL
WBC # BLD AUTO: 5.1 K/UL (ref 4.8–10.8)
WBC #/AREA URNS HPF: ABNORMAL /HPF
WBC #/AREA URNS HPF: ABNORMAL /HPF

## 2019-10-24 PROCEDURE — 85025 COMPLETE CBC W/AUTO DIFF WBC: CPT

## 2019-10-24 PROCEDURE — 93005 ELECTROCARDIOGRAM TRACING: CPT | Performed by: STUDENT IN AN ORGANIZED HEALTH CARE EDUCATION/TRAINING PROGRAM

## 2019-10-24 PROCEDURE — 700111 HCHG RX REV CODE 636 W/ 250 OVERRIDE (IP): Performed by: EMERGENCY MEDICINE

## 2019-10-24 PROCEDURE — 87086 URINE CULTURE/COLONY COUNT: CPT

## 2019-10-24 PROCEDURE — 96375 TX/PRO/DX INJ NEW DRUG ADDON: CPT

## 2019-10-24 PROCEDURE — 96374 THER/PROPH/DIAG INJ IV PUSH: CPT

## 2019-10-24 PROCEDURE — 304561 HCHG STAT O2

## 2019-10-24 PROCEDURE — 700105 HCHG RX REV CODE 258: Performed by: EMERGENCY MEDICINE

## 2019-10-24 PROCEDURE — 99285 EMERGENCY DEPT VISIT HI MDM: CPT

## 2019-10-24 PROCEDURE — 700105 HCHG RX REV CODE 258: Performed by: STUDENT IN AN ORGANIZED HEALTH CARE EDUCATION/TRAINING PROGRAM

## 2019-10-24 PROCEDURE — 74177 CT ABD & PELVIS W/CONTRAST: CPT

## 2019-10-24 PROCEDURE — 700102 HCHG RX REV CODE 250 W/ 637 OVERRIDE(OP): Performed by: EMERGENCY MEDICINE

## 2019-10-24 PROCEDURE — 84300 ASSAY OF URINE SODIUM: CPT

## 2019-10-24 PROCEDURE — A9270 NON-COVERED ITEM OR SERVICE: HCPCS | Performed by: EMERGENCY MEDICINE

## 2019-10-24 PROCEDURE — G0378 HOSPITAL OBSERVATION PER HR: HCPCS

## 2019-10-24 PROCEDURE — 83605 ASSAY OF LACTIC ACID: CPT

## 2019-10-24 PROCEDURE — 87040 BLOOD CULTURE FOR BACTERIA: CPT

## 2019-10-24 PROCEDURE — 71045 X-RAY EXAM CHEST 1 VIEW: CPT

## 2019-10-24 PROCEDURE — 80053 COMPREHEN METABOLIC PANEL: CPT

## 2019-10-24 PROCEDURE — 81001 URINALYSIS AUTO W/SCOPE: CPT

## 2019-10-24 PROCEDURE — A9270 NON-COVERED ITEM OR SERVICE: HCPCS | Performed by: STUDENT IN AN ORGANIZED HEALTH CARE EDUCATION/TRAINING PROGRAM

## 2019-10-24 PROCEDURE — 84156 ASSAY OF PROTEIN URINE: CPT

## 2019-10-24 PROCEDURE — 83735 ASSAY OF MAGNESIUM: CPT

## 2019-10-24 PROCEDURE — 82570 ASSAY OF URINE CREATININE: CPT

## 2019-10-24 PROCEDURE — 700102 HCHG RX REV CODE 250 W/ 637 OVERRIDE(OP): Performed by: STUDENT IN AN ORGANIZED HEALTH CARE EDUCATION/TRAINING PROGRAM

## 2019-10-24 PROCEDURE — 700117 HCHG RX CONTRAST REV CODE 255: Performed by: EMERGENCY MEDICINE

## 2019-10-24 RX ORDER — CETIRIZINE HYDROCHLORIDE 10 MG/1
5 TABLET ORAL DAILY
Status: DISCONTINUED | OUTPATIENT
Start: 2019-10-25 | End: 2019-10-28 | Stop reason: HOSPADM

## 2019-10-24 RX ORDER — DIVALPROEX SODIUM 500 MG/1
1000 TABLET, DELAYED RELEASE ORAL EVERY MORNING
Status: DISCONTINUED | OUTPATIENT
Start: 2019-10-25 | End: 2019-10-28 | Stop reason: HOSPADM

## 2019-10-24 RX ORDER — NYSTATIN 100000 U/G
1 CREAM TOPICAL 2 TIMES DAILY
COMMUNITY
End: 2022-08-10

## 2019-10-24 RX ORDER — NYSTATIN 100000 U/G
1 CREAM TOPICAL 2 TIMES DAILY
Status: DISCONTINUED | OUTPATIENT
Start: 2019-10-24 | End: 2019-10-28 | Stop reason: HOSPADM

## 2019-10-24 RX ORDER — BISACODYL 10 MG
10 SUPPOSITORY, RECTAL RECTAL
Status: DISCONTINUED | OUTPATIENT
Start: 2019-10-24 | End: 2019-10-28 | Stop reason: HOSPADM

## 2019-10-24 RX ORDER — GUAIFENESIN 600 MG/1
600 TABLET, EXTENDED RELEASE ORAL EVERY 12 HOURS
Status: DISCONTINUED | OUTPATIENT
Start: 2019-10-24 | End: 2019-10-28 | Stop reason: HOSPADM

## 2019-10-24 RX ORDER — CICLOPIROX 1 G/100ML
1 SHAMPOO TOPICAL
COMMUNITY
End: 2022-04-12

## 2019-10-24 RX ORDER — BISACODYL 10 MG
10 SUPPOSITORY, RECTAL RECTAL
Status: DISCONTINUED | OUTPATIENT
Start: 2019-10-24 | End: 2019-10-24

## 2019-10-24 RX ORDER — AMOXICILLIN 250 MG
2 CAPSULE ORAL 2 TIMES DAILY
Status: DISCONTINUED | OUTPATIENT
Start: 2019-10-24 | End: 2019-10-24

## 2019-10-24 RX ORDER — DIVALPROEX SODIUM 500 MG/1
500 TABLET, DELAYED RELEASE ORAL EVERY EVENING
Status: DISCONTINUED | OUTPATIENT
Start: 2019-10-24 | End: 2019-10-28 | Stop reason: HOSPADM

## 2019-10-24 RX ORDER — PAROXETINE 30 MG/1
30 TABLET, FILM COATED ORAL DAILY
Status: DISCONTINUED | OUTPATIENT
Start: 2019-10-25 | End: 2019-10-26

## 2019-10-24 RX ORDER — FLUTICASONE PROPIONATE 110 UG/1
1 AEROSOL, METERED RESPIRATORY (INHALATION) 2 TIMES DAILY
Status: DISCONTINUED | OUTPATIENT
Start: 2019-10-24 | End: 2019-10-28 | Stop reason: HOSPADM

## 2019-10-24 RX ORDER — ONDANSETRON 4 MG/1
4 TABLET, ORALLY DISINTEGRATING ORAL EVERY 6 HOURS PRN
COMMUNITY
End: 2022-04-12

## 2019-10-24 RX ORDER — SODIUM CHLORIDE 9 MG/ML
2000 INJECTION, SOLUTION INTRAVENOUS ONCE
Status: DISCONTINUED | OUTPATIENT
Start: 2019-10-24 | End: 2019-10-24

## 2019-10-24 RX ORDER — OMEPRAZOLE 20 MG/1
20 CAPSULE, DELAYED RELEASE ORAL 2 TIMES DAILY
Status: DISCONTINUED | OUTPATIENT
Start: 2019-10-25 | End: 2019-10-28 | Stop reason: HOSPADM

## 2019-10-24 RX ORDER — DIVALPROEX SODIUM 500 MG/1
500-1000 TABLET, DELAYED RELEASE ORAL 2 TIMES DAILY
Status: DISCONTINUED | OUTPATIENT
Start: 2019-10-24 | End: 2019-10-24

## 2019-10-24 RX ORDER — DIVALPROEX SODIUM 500 MG/1
500-1000 TABLET, DELAYED RELEASE ORAL 2 TIMES DAILY
COMMUNITY
End: 2020-01-02

## 2019-10-24 RX ORDER — AMLODIPINE BESYLATE 5 MG/1
2.5 TABLET ORAL 2 TIMES DAILY
Status: ON HOLD | COMMUNITY
End: 2019-12-06

## 2019-10-24 RX ORDER — BACLOFEN 10 MG/1
10 TABLET ORAL 3 TIMES DAILY PRN
COMMUNITY
End: 2023-04-28

## 2019-10-24 RX ORDER — ALBUTEROL SULFATE 90 UG/1
2 AEROSOL, METERED RESPIRATORY (INHALATION)
Status: DISCONTINUED | OUTPATIENT
Start: 2019-10-24 | End: 2019-10-28 | Stop reason: HOSPADM

## 2019-10-24 RX ORDER — POTASSIUM CHLORIDE 20 MEQ/1
20 TABLET, EXTENDED RELEASE ORAL 2 TIMES DAILY
COMMUNITY
End: 2021-03-19

## 2019-10-24 RX ORDER — FLUTICASONE PROPIONATE 220 UG/1
1 AEROSOL, METERED RESPIRATORY (INHALATION) 2 TIMES DAILY
COMMUNITY
End: 2022-04-12

## 2019-10-24 RX ORDER — ACETAMINOPHEN 325 MG/1
650 TABLET ORAL EVERY 6 HOURS PRN
Status: DISCONTINUED | OUTPATIENT
Start: 2019-10-24 | End: 2019-10-28 | Stop reason: HOSPADM

## 2019-10-24 RX ORDER — FLUTICASONE PROPIONATE 50 MCG
1 SPRAY, SUSPENSION (ML) NASAL DAILY
COMMUNITY
End: 2022-09-17

## 2019-10-24 RX ORDER — AMOXICILLIN 250 MG
2 CAPSULE ORAL 2 TIMES DAILY
Status: DISCONTINUED | OUTPATIENT
Start: 2019-10-25 | End: 2019-10-28 | Stop reason: HOSPADM

## 2019-10-24 RX ORDER — MONTELUKAST SODIUM 10 MG/1
10 TABLET ORAL
Status: DISCONTINUED | OUTPATIENT
Start: 2019-10-24 | End: 2019-10-28 | Stop reason: HOSPADM

## 2019-10-24 RX ORDER — POTASSIUM CHLORIDE 20 MEQ/1
20 TABLET, EXTENDED RELEASE ORAL 2 TIMES DAILY
Status: DISCONTINUED | OUTPATIENT
Start: 2019-10-24 | End: 2019-10-25

## 2019-10-24 RX ORDER — HYDROXYZINE 50 MG/1
50 TABLET, FILM COATED ORAL 2 TIMES DAILY PRN
Status: DISCONTINUED | OUTPATIENT
Start: 2019-10-24 | End: 2019-10-27

## 2019-10-24 RX ORDER — MORPHINE SULFATE 30 MG/1
30 TABLET, FILM COATED, EXTENDED RELEASE ORAL EVERY MORNING
Status: DISCONTINUED | OUTPATIENT
Start: 2019-10-25 | End: 2019-10-28 | Stop reason: HOSPADM

## 2019-10-24 RX ORDER — POLYETHYLENE GLYCOL 3350 17 G/17G
1 POWDER, FOR SOLUTION ORAL
Status: DISCONTINUED | OUTPATIENT
Start: 2019-10-24 | End: 2019-10-24

## 2019-10-24 RX ORDER — FERROUS SULFATE 325(65) MG
325 TABLET ORAL DAILY
Status: DISCONTINUED | OUTPATIENT
Start: 2019-10-25 | End: 2019-10-28 | Stop reason: HOSPADM

## 2019-10-24 RX ORDER — MORPHINE SULFATE 15 MG/1
15 TABLET ORAL ONCE
Status: COMPLETED | OUTPATIENT
Start: 2019-10-24 | End: 2019-10-24

## 2019-10-24 RX ORDER — SODIUM CHLORIDE 9 MG/ML
1000 INJECTION, SOLUTION INTRAVENOUS ONCE
Status: COMPLETED | OUTPATIENT
Start: 2019-10-24 | End: 2019-10-24

## 2019-10-24 RX ORDER — ONDANSETRON 2 MG/ML
4 INJECTION INTRAMUSCULAR; INTRAVENOUS ONCE
Status: COMPLETED | OUTPATIENT
Start: 2019-10-24 | End: 2019-10-24

## 2019-10-24 RX ORDER — BACLOFEN 10 MG/1
10 TABLET ORAL 3 TIMES DAILY PRN
Status: DISCONTINUED | OUTPATIENT
Start: 2019-10-24 | End: 2019-10-28 | Stop reason: HOSPADM

## 2019-10-24 RX ORDER — FLUTICASONE PROPIONATE 50 MCG
1 SPRAY, SUSPENSION (ML) NASAL DAILY
Status: DISCONTINUED | OUTPATIENT
Start: 2019-10-25 | End: 2019-10-28 | Stop reason: HOSPADM

## 2019-10-24 RX ORDER — CICLOPIROX 1 G/100ML
1 SHAMPOO TOPICAL
Status: DISCONTINUED | OUTPATIENT
Start: 2019-10-24 | End: 2019-10-24

## 2019-10-24 RX ORDER — POLYETHYLENE GLYCOL 3350 17 G/17G
1 POWDER, FOR SOLUTION ORAL
Status: DISCONTINUED | OUTPATIENT
Start: 2019-10-24 | End: 2019-10-28 | Stop reason: HOSPADM

## 2019-10-24 RX ORDER — PAROXETINE 30 MG/1
30 TABLET, FILM COATED ORAL DAILY
Status: ON HOLD | COMMUNITY
End: 2019-10-28 | Stop reason: SDUPTHER

## 2019-10-24 RX ORDER — AMLODIPINE BESYLATE 5 MG/1
2.5 TABLET ORAL 2 TIMES DAILY
Status: DISCONTINUED | OUTPATIENT
Start: 2019-10-24 | End: 2019-10-25

## 2019-10-24 RX ORDER — SODIUM CHLORIDE 9 MG/ML
INJECTION, SOLUTION INTRAVENOUS CONTINUOUS
Status: DISCONTINUED | OUTPATIENT
Start: 2019-10-24 | End: 2019-10-26

## 2019-10-24 RX ORDER — MORPHINE SULFATE 15 MG/1
15 TABLET ORAL 2 TIMES DAILY PRN
Status: DISCONTINUED | OUTPATIENT
Start: 2019-10-24 | End: 2019-10-28 | Stop reason: HOSPADM

## 2019-10-24 RX ADMIN — POTASSIUM CHLORIDE 20 MEQ: 1500 TABLET, EXTENDED RELEASE ORAL at 21:59

## 2019-10-24 RX ADMIN — SODIUM CHLORIDE 1000 ML: 9 INJECTION, SOLUTION INTRAVENOUS at 18:00

## 2019-10-24 RX ADMIN — GUAIFENESIN 600 MG: 600 TABLET, EXTENDED RELEASE ORAL at 21:56

## 2019-10-24 RX ADMIN — IOHEXOL 75 ML: 350 INJECTION, SOLUTION INTRAVENOUS at 15:08

## 2019-10-24 RX ADMIN — DIVALPROEX SODIUM 500 MG: 500 TABLET, DELAYED RELEASE ORAL at 21:56

## 2019-10-24 RX ADMIN — MORPHINE SULFATE 15 MG: 15 TABLET ORAL at 17:58

## 2019-10-24 RX ADMIN — NYSTATIN 100000 UNITS: 100000 CREAM TOPICAL at 23:09

## 2019-10-24 RX ADMIN — MONTELUKAST 10 MG: 10 TABLET, FILM COATED ORAL at 21:56

## 2019-10-24 RX ADMIN — AMLODIPINE BESYLATE 2.5 MG: 5 TABLET ORAL at 21:57

## 2019-10-24 RX ADMIN — SODIUM CHLORIDE: 9 INJECTION, SOLUTION INTRAVENOUS at 22:14

## 2019-10-24 RX ADMIN — Medication 400 MG: at 21:57

## 2019-10-24 RX ADMIN — ONDANSETRON 4 MG: 2 INJECTION INTRAMUSCULAR; INTRAVENOUS at 16:01

## 2019-10-24 RX ADMIN — Medication 400 MG: at 23:10

## 2019-10-24 ASSESSMENT — LIFESTYLE VARIABLES
ALCOHOL_USE: NO
TOTAL SCORE: 0
HAVE YOU EVER FELT YOU SHOULD CUT DOWN ON YOUR DRINKING: NO
DO YOU DRINK ALCOHOL: NO
EVER FELT BAD OR GUILTY ABOUT YOUR DRINKING: NO
ON A TYPICAL DAY WHEN YOU DRINK ALCOHOL HOW MANY DRINKS DO YOU HAVE: 0
EVER HAD A DRINK FIRST THING IN THE MORNING TO STEADY YOUR NERVES TO GET RID OF A HANGOVER: NO
CONSUMPTION TOTAL: NEGATIVE
TOTAL SCORE: 0
DOES PATIENT WANT TO STOP DRINKING: NO
EVER_SMOKED: NEVER
AVERAGE NUMBER OF DAYS PER WEEK YOU HAVE A DRINK CONTAINING ALCOHOL: 0
HOW MANY TIMES IN THE PAST YEAR HAVE YOU HAD 5 OR MORE DRINKS IN A DAY: 0
TOTAL SCORE: 0
HAVE PEOPLE ANNOYED YOU BY CRITICIZING YOUR DRINKING: NO

## 2019-10-24 ASSESSMENT — ENCOUNTER SYMPTOMS
SEIZURES: 1
DOUBLE VISION: 0
SPUTUM PRODUCTION: 0
HEARTBURN: 0
CONSTIPATION: 0
BLOOD IN STOOL: 0
WEIGHT LOSS: 0
NECK PAIN: 1
TREMORS: 0
DIARRHEA: 1
VOMITING: 0
FEVER: 1
HEADACHES: 1
NAUSEA: 1

## 2019-10-24 ASSESSMENT — COGNITIVE AND FUNCTIONAL STATUS - GENERAL
WALKING IN HOSPITAL ROOM: A LOT
MOVING TO AND FROM BED TO CHAIR: A LITTLE
CLIMB 3 TO 5 STEPS WITH RAILING: A LOT
STANDING UP FROM CHAIR USING ARMS: A LITTLE
EATING MEALS: A LITTLE
TURNING FROM BACK TO SIDE WHILE IN FLAT BAD: A LITTLE
PERSONAL GROOMING: A LITTLE
DRESSING REGULAR LOWER BODY CLOTHING: A LOT
MOVING FROM LYING ON BACK TO SITTING ON SIDE OF FLAT BED: A LITTLE
SUGGESTED CMS G CODE MODIFIER MOBILITY: CK
SUGGESTED CMS G CODE MODIFIER DAILY ACTIVITY: CK
MOBILITY SCORE: 16
HELP NEEDED FOR BATHING: A LOT
TOILETING: A LITTLE
DRESSING REGULAR UPPER BODY CLOTHING: A LITTLE
DAILY ACTIVITIY SCORE: 16

## 2019-10-24 NOTE — ED TRIAGE NOTES
"Chief Complaint   Patient presents with   • Fever   • Painful Urination   • Other     \"liquid that is coming out of my scalp that I was told is plasma\"    • Chills   • Tremors   • Seizure   Pt to triage from [facility] with multiple complaints.  Pt wears 2.5-3L [place] home O2.  Pt educated on triage process and instructed to notify triage RN of any change in status.          "

## 2019-10-24 NOTE — ED PROVIDER NOTES
"    ED Provider Note    Scribed for Juno Serra M.D. by Ginette Gruber. 10/24/2019  1:20 PM    Primary care provider: Vika Norton M.D.  Means of arrival: Walk-In  History obtained from: Patient  History limited by: None    CHIEF COMPLAINT  Chief Complaint   Patient presents with   • Fever   • Painful Urination   • Other     \"liquid that is coming out of my scalp that I was told is plasma\"    • Chills   • Tremors   • Seizure       HPI  Peter Trimble is a 44 y.o. female who presents to the Emergency Department complaining of persistent fever onset 3 months ago. Patient reports multiple associated symptoms of difficultly moving, dysuria, abdominal pain, nausea, difficulty sleeping for multiple nights, falls, seizures, and liquid coming out of back of scalp. She states that she was on 2 different courses of antibiotics for previous bladder infection without improvement. She took Cefdinir and finished course last week. She states that she was unable to  new prescription due to insurance. She notes history of asthma, hypertension, and pseudoseizures. She denies use of recreational drugs or alcohol. She is currently on Paxil. Negative for chest pain.    REVIEW OF SYSTEMS  Pertinent positives include fever, difficultly moving, dysuria, abdominal pain, nausea, difficulty sleeping for multiple nights, falls, seizures, and liquid coming out of back of scalp. Pertinent negatives include no chest pain.  All other systems reviewed and negative.    PAST MEDICAL HISTORY   has a past medical history of Acute blood loss anemia (5/2/2013), Acute renal failure (ARF) (Formerly [facility]) (10/5/2011), Anemia, Anxiety, Arthritis, ASTHMA, Bipolar affective ([facility]), Breath shortness, Cold (), Depression, Eclampsia complicating pregnancy, Environmental allergies, Essential hypertension, malignant (9/28/2011), Fibromyalgia, GERD (gastroesophageal reflux disease), Heart murmur, History of pregnancy (10/16/2014), MRSA infection, " Hyperlipidemia (04-26-13), Hypertension, Kidney stones, Migraines, Pain (05-27-14), Personality disorder (Formerly [facility]), Pneumonia (2012), PTSD (post-traumatic stress disorder), Scalp wound (8/18/2014), Seizure ([facility]) (05-27-14), Sleep apnea, Snoring, Stroke ([facility]) (2011), Subarachnoid hemorrhage ([facility]) (9/28/2011), Unspecified hemorrhagic conditions, Unspecified urinary incontinence, and Urinary bladder disorder.    SURGICAL HISTORY   has a past surgical history that includes removal of ovary(s); exploration of spinal fusion; recovery (10/5/2011); knee reconstruction; other orthopedic surgery; other orthopedic surgery; recovery (1/30/2012); laminotomy; mammoplasty reduction (4/29/2013); hysterectomy laparoscopy; evacuation of hematoma (5/2/2013); breast biopsy (5/29/2014); irrigation & debridement general (8/17/2014); and ankle orif (Right, 8/7/2016).    SOCIAL HISTORY  Social History     Tobacco Use   • Smoking status: Never Smoker   • Smokeless tobacco: Never Used   Substance Use Topics   • Alcohol use: No   • Drug use: No      Social History     Substance and Sexual Activity   Drug Use No       FAMILY HISTORY  Family History   Problem Relation Age of Onset   • Hypertension Mother    • Hyperlipidemia Mother    • Hypertension Father    • Hyperlipidemia Father    • Heart Disease Father    • Psychiatric Illness Father    • Alcohol/Drug Father    • Alcohol/Drug Brother    • Arthritis Maternal Uncle    • Psychiatric Illness Maternal Uncle    • Heart Disease Maternal Uncle    • Hypertension Maternal Uncle    • Hyperlipidemia Maternal Uncle    • Genetic Disorder Paternal Aunt    • Psychiatric Illness Paternal Uncle    • Arthritis Maternal Grandmother    • Heart Disease Maternal Grandmother    • Alcohol/Drug Maternal Grandfather    • Stroke Maternal Grandfather    • Psychiatric Illness Maternal Grandfather    • Arthritis Paternal Grandmother    • Alcohol/Drug Paternal Grandfather        CURRENT MEDICATIONS  Home Medications      "Reviewed by Herlinda Campos PhT (Pharmacy Tech) on 10/24/19 at 1615  Med List Status: Complete   Medication Last Dose Status   albuterol (PROAIR HFA) 108 (90 Base) MCG/ACT Aero Soln inhalation aerosol 10/24/2019 Active   amLODIPine (NORVASC) 5 MG Tab 10/24/2019 Active   baclofen (LIORESAL) 10 MG Tab 10/23/2019 Active   cetirizine (ZYRTEC) 10 MG Tab 10/24/2019 Active   Cholecalciferol (VITAMIN D) 2000 UNIT Tab 10/24/2019 Active   Ciclopirox 1 % Shampoo 10/23/2019 Active   divalproex (DEPAKOTE) 500 MG Tablet Delayed Response 10/24/2019 Active   Erenumab (AIMOVIG) 70 MG/ML Solution Auto-injector 10/10/2019 Active   esomeprazole (NEXIUM) 40 MG delayed-release capsule 10/24/2019 Active   Estradiol 10 MCG INSERT 10/23/2019 Active   ferrous sulfate 325 (65 FE) MG tablet 10/24/2019 Active   fluticasone (FLONASE) 50 MCG/ACT nasal spray 10/24/2019 Active   fluticasone (FLOVENT HFA) 220 MCG/ACT Aerosol 10/24/2019 Active   furosemide (LASIX) 20 MG Tab 10/24/2019 Active   guaifenesin LA (MUCINEX) 600 MG TABLET SR 12 HR 10/24/2019 Active   hydrOXYzine HCl (ATARAX) 25 MG Tab 10/24/2019 Active   montelukast (SINGULAIR) 10 MG Tab 10/23/2019 Active   morphine (MS IR) 15 MG tablet 10/21/2019 Active   Morphine Sulfate ER 30 MG Tablet Extended Release 12 hour Abuse-Deterrent 10/21/2019 Active   nystatin (MYCOSTATIN) 260897 UNIT/GM Cream topical cream 10/24/2019 Active   ondansetron (ZOFRAN ODT) 4 MG TABLET DISPERSIBLE 10/24/2019 Active   PARoxetine (PAXIL) 30 MG Tab 10/24/2019 Active   potassium chloride SA (KDUR) 20 MEQ Tab CR 10/24/2019 Active                ALLERGIES  Allergies   Allergen Reactions   • Compazine      \"psychotic reaction\"   • Imitrex [Sumatriptan Succinate]      Had brain bleed   • Inapsine [Droperidol]      \"psychotic reaction\"   • Maxalt [Rizatriptan Benzoate]      Had brain bleed   • Phenergan [Promethazine Hcl]      \"psychotic reaction\"   • Xanax [Alprazolam]      Sores and dry mouth, many others pt cannot " "remember   • Zantac      Stomach pain   • Apple Cider Vinegar Rash     Patient states she gets rash   • Butrans [Buprenorphine]    • Clarithromycin Vomiting and Palpitations   • Dilaudid [Hydromorphone] Rash     Patient states she had rash, hallucinations, unable to urinate.    • Doxycycline    • Effexor [Venlafaxine]      \"Really depressed, like i wanted to hurt myself\"   • Eucalyptus Oil    • Gabapentin    • Kiwi Extract    • Latex Rash   • Lyrica [Pregabalin]      \"depression, slept a lot\"   • Minocycline Vomiting     \"any cyclines\"   • Morphabond Er [Suly]    • Patchouli Oil Rash     Rash    • Sulfa Drugs    • Tea Tree Oil Rash     Break out in rash   • Topiramate Nausea     Patient states made sick to stomach and dizzy.       PHYSICAL EXAM  VITAL SIGNS: /84   Pulse 99   Temp 36.2 °C (97.2 °F) (Temporal)   Resp 20   Ht 1.803 m (5' 11\")   SpO2 94% Comment: on home O2 3L  BMI 38.35 kg/m²     Constitutional: Chronically ill appearing. Well developed, Well nourished, mild distress, Non-toxic appearance.   HENT: Slightly dry mucous membranes. Normocephalic, Atraumatic, Bilateral external ears normal, No oral exudates.  Slightly dry mucous members  Eyes: PERRLA, EOMI, Conjunctiva normal, No discharge.   Neck: No tenderness, Supple, No stridor.   Lymphatic: No lymphadenopathy noted.   Cardiovascular: Normal heart rate, Normal rhythm.   Thorax & Lungs: Clear to auscultation bilaterally, No respiratory distress, No wheezing, No crackles.   Abdomen: Mild diffuse abdominal tenderness.Soft, No masses, No pulsatile masses.   Skin: Flushed cheeks. Warm, Dry, No erythema, No rash.   Extremities:, 1-2+ lower extremity pitting edema. No cyanosis.   Musculoskeletal: No tenderness to palpation or major deformities noted.  Intact distal pulses  Neurologic: Tremulous. Awake, alert. Moves all extremities spontaneously.  Psychiatric: Poor eye contact    LABS  Results for orders placed or performed during the hospital " encounter of 10/24/19   LACTIC ACID   Result Value Ref Range    Lactic Acid 1.2 0.5 - 2.0 mmol/L   CBC WITH DIFFERENTIAL   Result Value Ref Range    WBC 5.1 4.8 - 10.8 K/uL    RBC 4.24 4.20 - 5.40 M/uL    Hemoglobin 12.3 12.0 - 16.0 g/dL    Hematocrit 36.7 (L) 37.0 - 47.0 %    MCV 86.6 81.4 - 97.8 fL    MCH 29.0 27.0 - 33.0 pg    MCHC 33.5 (L) 33.6 - 35.0 g/dL    RDW 44.5 35.9 - 50.0 fL    Platelet Count 248 164 - 446 K/uL    MPV 10.0 9.0 - 12.9 fL    Neutrophils-Polys 33.60 (L) 44.00 - 72.00 %    Lymphocytes 50.70 (H) 22.00 - 41.00 %    Monocytes 13.30 0.00 - 13.40 %    Eosinophils 1.60 0.00 - 6.90 %    Basophils 0.40 0.00 - 1.80 %    Immature Granulocytes 0.40 0.00 - 0.90 %    Nucleated RBC 0.00 /100 WBC    Neutrophils (Absolute) 1.72 (L) 2.00 - 7.15 K/uL    Lymphs (Absolute) 2.59 1.00 - 4.80 K/uL    Monos (Absolute) 0.68 0.00 - 0.85 K/uL    Eos (Absolute) 0.08 0.00 - 0.51 K/uL    Baso (Absolute) 0.02 0.00 - 0.12 K/uL    Immature Granulocytes (abs) 0.02 0.00 - 0.11 K/uL    NRBC (Absolute) 0.00 K/uL   COMP METABOLIC PANEL   Result Value Ref Range    Sodium 140 135 - 145 mmol/L    Potassium 3.6 3.6 - 5.5 mmol/L    Chloride 96 96 - 112 mmol/L    Co2 34 (H) 20 - 33 mmol/L    Anion Gap 10.0 0.0 - 11.9    Glucose 94 65 - 99 mg/dL    Bun 22 8 - 22 mg/dL    Creatinine 1.59 (H) 0.50 - 1.40 mg/dL    Calcium 9.0 8.5 - 10.5 mg/dL    AST(SGOT) 21 12 - 45 U/L    ALT(SGPT) 12 2 - 50 U/L    Alkaline Phosphatase 43 30 - 99 U/L    Total Bilirubin 0.4 0.1 - 1.5 mg/dL    Albumin 3.7 3.2 - 4.9 g/dL    Total Protein 6.7 6.0 - 8.2 g/dL    Globulin 3.0 1.9 - 3.5 g/dL    A-G Ratio 1.2 g/dL   URINALYSIS   Result Value Ref Range    Color DK Yellow     Character Turbid (A)     Specific Gravity 1.016 <1.035    Ph 6.0 5.0 - 8.0    Glucose Negative Negative mg/dL    Ketones Negative Negative mg/dL    Protein Negative Negative mg/dL    Bilirubin Negative Negative    Urobilinogen, Urine 0.2 Negative    Nitrite Negative Negative    Leukocyte  Esterase Small (A) Negative    Occult Blood Large (A) Negative    Micro Urine Req Microscopic    ESTIMATED GFR   Result Value Ref Range    GFR If  43 (A) >60 mL/min/1.73 m 2    GFR If Non African American 35 (A) >60 mL/min/1.73 m 2   URINE MICROSCOPIC (W/UA)   Result Value Ref Range    WBC 5-10 (A) /hpf    RBC >150 (A) /hpf    Bacteria Negative None /hpf    Epithelial Cells Moderate (A) /hpf    Hyaline Cast 11-20 (A) /lpf   URINALYSIS,CULTURE IF INDICATED   Result Value Ref Range    Color Yellow     Character Clear     Specific Gravity 1.027 <1.035    Ph 5.5 5.0 - 8.0    Glucose Negative Negative mg/dL    Ketones Negative Negative mg/dL    Protein Negative Negative mg/dL    Bilirubin Negative Negative    Urobilinogen, Urine 0.2 Negative    Nitrite Negative Negative    Leukocyte Esterase Negative Negative    Occult Blood Moderate (A) Negative    Micro Urine Req Microscopic    URINE MICROSCOPIC (W/UA)   Result Value Ref Range    WBC 0-2 /hpf    RBC  (A) /hpf    Bacteria Negative None /hpf    Epithelial Cells Negative /hpf    Hyaline Cast 0-2 /lpf         RADIOLOGY  CT-ABDOMEN-PELVIS WITH   Final Result      Bilateral nonobstructing renal calculi.      Right renal cyst and small hypodense renal lesions which are too small to characterize.      Moderate amount of colonic stool.      Spleen is at the upper limits of normal in size.      Subsegmental bibasilar atelectasis.      Nonvisualization of the appendix, limiting evaluation.      DX-CHEST-PORTABLE (1 VIEW)   Final Result      Linear bibasilar atelectasis.           The radiologist's interpretation of all radiological studies have been reviewed by me.      COURSE & MEDICAL DECISION MAKING  Pertinent Labs & Imaging studies reviewed. (See chart for details)    1:20 PM - Patient seen and examined at bedside. Patient will be treated with Zofran 4 mg. Ordered CT-abdomen-pelvis, DX-chest, lactic acid, CBC with differential, CMP, UA, urine culture,  blood culture to evaluate her symptoms. The differential diagnoses include but are not limited to: Sepsis, chronic UTI, psuedoseizure, seizure.        Decision Making:  Patient is coming in with nonspecific symptoms, subjective fevers last 3 months.  The patient states she is on been on multiple antibiotics for urinary tract infections.  The patient has dysuria, fatigue, malaise, increasing tremors, seizure-like activity, syncopal episodes.  Of note the patient was just recently admitted to the hospital in September for syncopal episodes that were felt to be psychogenic seizures.  Patient states she is unable to get up, unable to take care of herself.  Work-up here was unremarkable, original urinalysis showed red blood cells and white blood cells however cath specimen was obtained and did show some gross hematuria.  Patient is unable to care for self therefore I discussed the case with the residents for admission the hospital.    HYDRATION: Based on the patient's presentation of Dehydration the patient was given IV fluids. IV Hydration was used because oral hydration was not adequate alone. Upon recheck following hydration, the patient was Improved.    DISPOSITION:  Patient will be admitted to the residents in guarded condition.      FINAL IMPRESSION  1. Weakness    2. Fever, unspecified fever cause    3. Syncope, unspecified syncope type    4. Seizure-like activity (HCC)    5. Other microscopic hematuria          IGinette (Scribe), am scribing for, and in the presence of, Juno Serra M.D..    Electronically signed by: Ginette Gruber (Sudeepe), 10/24/2019    IJuno M.D. personally performed the services described in this documentation, as scribed by Ginette Gruber in my presence, and it is both accurate and complete. C    The note accurately reflects work and decisions made by me.  Juno Serra  10/24/2019  5:46 PM

## 2019-10-25 PROBLEM — B37.49 GENITAL CANDIDIASIS: Status: ACTIVE | Noted: 2019-10-25

## 2019-10-25 PROBLEM — R30.0 DYSURIA: Status: RESOLVED | Noted: 2019-10-25 | Resolved: 2019-10-25

## 2019-10-25 PROBLEM — E83.42 HYPOMAGNESEMIA: Status: ACTIVE | Noted: 2019-10-25

## 2019-10-25 PROBLEM — L02.92 FURUNCULOSIS: Status: ACTIVE | Noted: 2019-09-11

## 2019-10-25 PROBLEM — N17.9 AKI (ACUTE KIDNEY INJURY) (HCC): Status: ACTIVE | Noted: 2019-10-25

## 2019-10-25 PROBLEM — R30.0 DYSURIA: Status: ACTIVE | Noted: 2019-10-25

## 2019-10-25 LAB
ALBUMIN SERPL BCP-MCNC: 3.3 G/DL (ref 3.2–4.9)
ALBUMIN/GLOB SERPL: 1.3 G/DL
ALP SERPL-CCNC: 34 U/L (ref 30–99)
ALT SERPL-CCNC: 10 U/L (ref 2–50)
ANION GAP SERPL CALC-SCNC: 8 MMOL/L (ref 0–11.9)
AST SERPL-CCNC: 21 U/L (ref 12–45)
BASOPHILS # BLD AUTO: 0.2 % (ref 0–1.8)
BASOPHILS # BLD: 0.01 K/UL (ref 0–0.12)
BILIRUB SERPL-MCNC: 0.4 MG/DL (ref 0.1–1.5)
BUN SERPL-MCNC: 18 MG/DL (ref 8–22)
CALCIUM SERPL-MCNC: 8.4 MG/DL (ref 8.5–10.5)
CHLORIDE SERPL-SCNC: 101 MMOL/L (ref 96–112)
CO2 SERPL-SCNC: 34 MMOL/L (ref 20–33)
CREAT SERPL-MCNC: 1.46 MG/DL (ref 0.5–1.4)
EOSINOPHIL # BLD AUTO: 0.13 K/UL (ref 0–0.51)
EOSINOPHIL NFR BLD: 2.9 % (ref 0–6.9)
ERYTHROCYTE [DISTWIDTH] IN BLOOD BY AUTOMATED COUNT: 46.2 FL (ref 35.9–50)
GLOBULIN SER CALC-MCNC: 2.6 G/DL (ref 1.9–3.5)
GLUCOSE SERPL-MCNC: 87 MG/DL (ref 65–99)
HCT VFR BLD AUTO: 36.8 % (ref 37–47)
HGB BLD-MCNC: 11.7 G/DL (ref 12–16)
IMM GRANULOCYTES # BLD AUTO: 0.01 K/UL (ref 0–0.11)
IMM GRANULOCYTES NFR BLD AUTO: 0.2 % (ref 0–0.9)
LYMPHOCYTES # BLD AUTO: 2.73 K/UL (ref 1–4.8)
LYMPHOCYTES NFR BLD: 60.9 % (ref 22–41)
MAGNESIUM SERPL-MCNC: 1.5 MG/DL (ref 1.5–2.5)
MCH RBC QN AUTO: 28.3 PG (ref 27–33)
MCHC RBC AUTO-ENTMCNC: 31.8 G/DL (ref 33.6–35)
MCV RBC AUTO: 89.1 FL (ref 81.4–97.8)
MONOCYTES # BLD AUTO: 0.53 K/UL (ref 0–0.85)
MONOCYTES NFR BLD AUTO: 11.8 % (ref 0–13.4)
NEUTROPHILS # BLD AUTO: 1.07 K/UL (ref 2–7.15)
NEUTROPHILS NFR BLD: 24 % (ref 44–72)
NRBC # BLD AUTO: 0 K/UL
NRBC BLD-RTO: 0 /100 WBC
PLATELET # BLD AUTO: 222 K/UL (ref 164–446)
PMV BLD AUTO: 9.5 FL (ref 9–12.9)
POTASSIUM SERPL-SCNC: 3.3 MMOL/L (ref 3.6–5.5)
PROT SERPL-MCNC: 5.9 G/DL (ref 6–8.2)
RBC # BLD AUTO: 4.13 M/UL (ref 4.2–5.4)
SODIUM SERPL-SCNC: 143 MMOL/L (ref 135–145)
WBC # BLD AUTO: 4.5 K/UL (ref 4.8–10.8)

## 2019-10-25 PROCEDURE — 700111 HCHG RX REV CODE 636 W/ 250 OVERRIDE (IP): Performed by: STUDENT IN AN ORGANIZED HEALTH CARE EDUCATION/TRAINING PROGRAM

## 2019-10-25 PROCEDURE — G0378 HOSPITAL OBSERVATION PER HR: HCPCS

## 2019-10-25 PROCEDURE — 80053 COMPREHEN METABOLIC PANEL: CPT

## 2019-10-25 PROCEDURE — A9270 NON-COVERED ITEM OR SERVICE: HCPCS | Performed by: HOSPITALIST

## 2019-10-25 PROCEDURE — 36415 COLL VENOUS BLD VENIPUNCTURE: CPT

## 2019-10-25 PROCEDURE — A9270 NON-COVERED ITEM OR SERVICE: HCPCS | Performed by: STUDENT IN AN ORGANIZED HEALTH CARE EDUCATION/TRAINING PROGRAM

## 2019-10-25 PROCEDURE — 99219 PR INITIAL OBSERVATION CARE,LEVL II: CPT | Mod: GC | Performed by: INTERNAL MEDICINE

## 2019-10-25 PROCEDURE — 700102 HCHG RX REV CODE 250 W/ 637 OVERRIDE(OP): Performed by: HOSPITALIST

## 2019-10-25 PROCEDURE — 700105 HCHG RX REV CODE 258: Performed by: STUDENT IN AN ORGANIZED HEALTH CARE EDUCATION/TRAINING PROGRAM

## 2019-10-25 PROCEDURE — 85025 COMPLETE CBC W/AUTO DIFF WBC: CPT

## 2019-10-25 PROCEDURE — 96366 THER/PROPH/DIAG IV INF ADDON: CPT

## 2019-10-25 PROCEDURE — 700102 HCHG RX REV CODE 250 W/ 637 OVERRIDE(OP): Performed by: STUDENT IN AN ORGANIZED HEALTH CARE EDUCATION/TRAINING PROGRAM

## 2019-10-25 PROCEDURE — 96365 THER/PROPH/DIAG IV INF INIT: CPT

## 2019-10-25 PROCEDURE — 83735 ASSAY OF MAGNESIUM: CPT

## 2019-10-25 RX ORDER — FLUCONAZOLE 200 MG/1
200 TABLET ORAL ONCE
Status: COMPLETED | OUTPATIENT
Start: 2019-10-25 | End: 2019-10-25

## 2019-10-25 RX ORDER — BUTALBITAL, ACETAMINOPHEN AND CAFFEINE 50; 325; 40 MG/1; MG/1; MG/1
1 TABLET ORAL ONCE
Status: COMPLETED | OUTPATIENT
Start: 2019-10-25 | End: 2019-10-25

## 2019-10-25 RX ORDER — POTASSIUM CHLORIDE 20 MEQ/1
40 TABLET, EXTENDED RELEASE ORAL ONCE
Status: COMPLETED | OUTPATIENT
Start: 2019-10-25 | End: 2019-10-25

## 2019-10-25 RX ORDER — MAGNESIUM SULFATE HEPTAHYDRATE 40 MG/ML
2 INJECTION, SOLUTION INTRAVENOUS ONCE
Status: COMPLETED | OUTPATIENT
Start: 2019-10-25 | End: 2019-10-25

## 2019-10-25 RX ORDER — CEPHALEXIN 500 MG/1
500 CAPSULE ORAL EVERY 6 HOURS
Status: DISCONTINUED | OUTPATIENT
Start: 2019-10-25 | End: 2019-10-28 | Stop reason: HOSPADM

## 2019-10-25 RX ORDER — TAMSULOSIN HYDROCHLORIDE 0.4 MG/1
0.4 CAPSULE ORAL
Status: DISCONTINUED | OUTPATIENT
Start: 2019-10-25 | End: 2019-10-28 | Stop reason: HOSPADM

## 2019-10-25 RX ADMIN — SODIUM CHLORIDE: 9 INJECTION, SOLUTION INTRAVENOUS at 23:00

## 2019-10-25 RX ADMIN — ACETAMINOPHEN 650 MG: 325 TABLET, FILM COATED ORAL at 00:32

## 2019-10-25 RX ADMIN — POTASSIUM CHLORIDE 40 MEQ: 1500 TABLET, EXTENDED RELEASE ORAL at 11:25

## 2019-10-25 RX ADMIN — DIVALPROEX SODIUM 1000 MG: 500 TABLET, DELAYED RELEASE ORAL at 04:14

## 2019-10-25 RX ADMIN — Medication 400 MG: at 04:14

## 2019-10-25 RX ADMIN — DIVALPROEX SODIUM 500 MG: 500 TABLET, DELAYED RELEASE ORAL at 18:01

## 2019-10-25 RX ADMIN — MAGNESIUM SULFATE 2 G: 2 INJECTION INTRAVENOUS at 21:46

## 2019-10-25 RX ADMIN — OMEPRAZOLE 20 MG: 20 CAPSULE, DELAYED RELEASE ORAL at 18:01

## 2019-10-25 RX ADMIN — FLUTICASONE PROPIONATE 50 MCG: 50 SPRAY, METERED NASAL at 04:07

## 2019-10-25 RX ADMIN — FERROUS SULFATE TAB 325 MG (65 MG ELEMENTAL FE) 325 MG: 325 (65 FE) TAB at 04:17

## 2019-10-25 RX ADMIN — MONTELUKAST 10 MG: 10 TABLET, FILM COATED ORAL at 21:46

## 2019-10-25 RX ADMIN — BUTALBITAL, ACETAMINOPHEN, AND CAFFEINE 1 TABLET: 50; 325; 40 TABLET ORAL at 18:43

## 2019-10-25 RX ADMIN — GUAIFENESIN 600 MG: 600 TABLET, EXTENDED RELEASE ORAL at 04:12

## 2019-10-25 RX ADMIN — OMEPRAZOLE 20 MG: 20 CAPSULE, DELAYED RELEASE ORAL at 04:16

## 2019-10-25 RX ADMIN — ACETAMINOPHEN 650 MG: 325 TABLET, FILM COATED ORAL at 07:23

## 2019-10-25 RX ADMIN — BACLOFEN 10 MG: 10 TABLET ORAL at 14:43

## 2019-10-25 RX ADMIN — FLUTICASONE PROPIONATE 110 MCG: 110 AEROSOL, METERED RESPIRATORY (INHALATION) at 18:46

## 2019-10-25 RX ADMIN — SENNOSIDES AND DOCUSATE SODIUM 2 TABLET: 8.6; 5 TABLET ORAL at 04:17

## 2019-10-25 RX ADMIN — MORPHINE SULFATE 30 MG: 30 TABLET, EXTENDED RELEASE ORAL at 04:16

## 2019-10-25 RX ADMIN — VITAMIN D, TAB 1000IU (100/BT) 4000 UNITS: 25 TAB at 04:11

## 2019-10-25 RX ADMIN — AMLODIPINE BESYLATE 2.5 MG: 5 TABLET ORAL at 04:15

## 2019-10-25 RX ADMIN — MORPHINE SULFATE 15 MG: 15 TABLET ORAL at 14:41

## 2019-10-25 RX ADMIN — SODIUM CHLORIDE: 9 INJECTION, SOLUTION INTRAVENOUS at 11:27

## 2019-10-25 RX ADMIN — FLUTICASONE PROPIONATE 110 MCG: 110 AEROSOL, METERED RESPIRATORY (INHALATION) at 04:08

## 2019-10-25 RX ADMIN — CETIRIZINE HYDROCHLORIDE 5 MG: 10 TABLET, FILM COATED ORAL at 04:13

## 2019-10-25 RX ADMIN — PAROXETINE HYDROCHLORIDE 30 MG: 20 TABLET, FILM COATED ORAL at 13:30

## 2019-10-25 RX ADMIN — TAMSULOSIN HYDROCHLORIDE 0.4 MG: 0.4 CAPSULE ORAL at 11:25

## 2019-10-25 RX ADMIN — CEPHALEXIN 500 MG: 500 CAPSULE ORAL at 21:46

## 2019-10-25 RX ADMIN — SENNOSIDES AND DOCUSATE SODIUM 2 TABLET: 8.6; 5 TABLET ORAL at 18:01

## 2019-10-25 RX ADMIN — HYDROXYZINE HYDROCHLORIDE 50 MG: 50 TABLET, FILM COATED ORAL at 22:11

## 2019-10-25 RX ADMIN — FLUCONAZOLE 200 MG: 200 TABLET ORAL at 11:26

## 2019-10-25 RX ADMIN — NYSTATIN 100000 UNITS: 100000 CREAM TOPICAL at 18:48

## 2019-10-25 RX ADMIN — GUAIFENESIN 600 MG: 600 TABLET, EXTENDED RELEASE ORAL at 18:01

## 2019-10-25 RX ADMIN — PAROXETINE 30 MG: 30 TABLET, FILM COATED ORAL at 04:14

## 2019-10-25 RX ADMIN — POTASSIUM CHLORIDE 20 MEQ: 1500 TABLET, EXTENDED RELEASE ORAL at 04:13

## 2019-10-25 RX ADMIN — ACETAMINOPHEN 650 MG: 325 TABLET, FILM COATED ORAL at 13:30

## 2019-10-25 RX ADMIN — NYSTATIN 100000 UNITS: 100000 CREAM TOPICAL at 04:11

## 2019-10-25 ASSESSMENT — ENCOUNTER SYMPTOMS
DIZZINESS: 0
CHILLS: 0
DIARRHEA: 0
COUGH: 0
VOMITING: 0
BLURRED VISION: 0
WHEEZING: 0
SORE THROAT: 0
TREMORS: 1
ORTHOPNEA: 0
BACK PAIN: 1
STRIDOR: 0
PALPITATIONS: 0
SPEECH CHANGE: 0
FEVER: 0
NAUSEA: 0
SEIZURES: 1
CONSTIPATION: 0
SHORTNESS OF BREATH: 0
MYALGIAS: 1
EYE PAIN: 0

## 2019-10-25 ASSESSMENT — PATIENT HEALTH QUESTIONNAIRE - PHQ9
8. MOVING OR SPEAKING SO SLOWLY THAT OTHER PEOPLE COULD HAVE NOTICED. OR THE OPPOSITE, BEING SO FIGETY OR RESTLESS THAT YOU HAVE BEEN MOVING AROUND A LOT MORE THAN USUAL: SEVERAL DAYS
SUM OF ALL RESPONSES TO PHQ QUESTIONS 1-9: 8
6. FEELING BAD ABOUT YOURSELF - OR THAT YOU ARE A FAILURE OR HAVE LET YOURSELF OR YOUR FAMILY DOWN: SEVERAL DAYS
3. TROUBLE FALLING OR STAYING ASLEEP OR SLEEPING TOO MUCH: SEVERAL DAYS
9. THOUGHTS THAT YOU WOULD BE BETTER OFF DEAD, OR OF HURTING YOURSELF: NOT AT ALL
5. POOR APPETITE OR OVEREATING: SEVERAL DAYS
4. FEELING TIRED OR HAVING LITTLE ENERGY: SEVERAL DAYS
SUM OF ALL RESPONSES TO PHQ9 QUESTIONS 1 AND 2: 2
2. FEELING DOWN, DEPRESSED, IRRITABLE, OR HOPELESS: SEVERAL DAYS
7. TROUBLE CONCENTRATING ON THINGS, SUCH AS READING THE NEWSPAPER OR WATCHING TELEVISION: SEVERAL DAYS
1. LITTLE INTEREST OR PLEASURE IN DOING THINGS: SEVERAL DAYS

## 2019-10-25 ASSESSMENT — LIFESTYLE VARIABLES: SUBSTANCE_ABUSE: 0

## 2019-10-25 NOTE — ASSESSMENT & PLAN NOTE
3 weeks of some redness in urine.  No clots.   Had taken OTC NSAIDs.  CT showed Right renal cyst and multiple small hypodense renal lesions  2015 CT with hepatic cyst . Echo done in 9/19 showed no valvular heart lesions/aneurysms.   Possibly related to Polycystic kidney disease given constellation of issues.  hematuria may be caused by NSAIDs, vs. PCKD, vs Nephrolithiasis.    Felt likely secondary to passing of stone.   -Admitting resident spoke with urology, recommend no intervention at this time,      but outpatient referral advised for cystoscopy  Plan:  - Counseled patient to refrain from using NSAIDs  - repeat UA  Yesterday negative for pyuria  - discharge patient, home health for physical therapy and skilled health aid ordered.

## 2019-10-25 NOTE — DIETARY
Nutrition Services: Day 0 of admit.  Peter Trimble is a 44 y.o. female with admitting DX of Hematuria.  Consult received for 24-33 lb weight loss x 3 months, poor appetite.     Patient reports weighing 136.4 kg (300 lb) 3 months ago. She stated she usually weighs about 113.6 kg (250 lb) but recent prescribed steroid use has resulted in weight gain. Patient verbalized low appetite consuming 50% of usual intake for the last 2 months. She stated her appetite continues to be low and PO of last 3 meals has been 25%-50% of meals. Likes and dislikes discussed and reported.     Assessment:  Ht: 180.3 cm, Wt: 120.9 kg (266 lb) via bed scale, BMI: 37.17 - class II obesity  Diet/Intake: regular - Per chart no pt PO recorded to assess     Evaluation:   1. Weight was 124.7 kg (275 lb) on 9/27/2019 per chart review. 4.1 kg (9 lb) weight loss x ~ 1 month, 3% weight decrease noted.   2. Weight loss difficult to truly discern due to fluid accumulation.   3. Extremities: 2+ RLE edema, 2+ LLE edema  4. Labs: potassium 3.3  5. Meds: ferrous sulfate, bowel regimen, vitamin D, magnesium oxide, zofran, potassium chloride  6. Fluids: NS infusion @ 100 ml/hr  7. Last BM: 10/21    Malnutrition Risk: Pt with severe malnutrition in the context of acute illness related malnutrition related to nausea and abdominal pain as evidenced by </= 50% of estimated energy requirements for >/= 5 days per patient verbalization and moderate fluid accumulation.     Recommendations/Plan:   1. Encourage intake of meals/snacks.  2. Document intake of all meals/snacks as % taken in ADL's to provide interdisciplinary communication across all shifts.   3. Monitor weight.  4. Nutrition rep will continue to see patient for ongoing meal and snack preferences.  5. Obtain supplement order per RD as needed.    RD following.

## 2019-10-25 NOTE — H&P
Internal Medicine Admitting History and Physical    Note Author: Amina Jamil M.D.       Name Peter Trimble 1975   Age/Sex 44 y.o. female   MRN [id number]   Code Status Full     After 5PM or if no immediate response to page, please call for cross-coverage  Attending/Team: /Andrey team. See Patient List for primary contact information  Call [phone] to page    1st Call - Day Intern (R1):   . 2nd Call - Day Sr. Resident (R2/R3):   .       Chief Complaint:   Hematuria.    HPI:  Ms. Bhat is a 44 years old female with PMHx of AVA ,HTN , Asthma, Pseudoseizure ,BPD,ADHD, Depression fibromyalgia/chronic fatigue syndrome,Subarachanoid hemorrhage.  who presented to the ED with a complaint of blood in her urine x 3 weeks.patient states that her urine has became red for the last 3 weeks.she noticed this hematuria throughout the stream ,but didn't notice any clots. patietn also reports low grade fever,nausea ,generalized abdominal pain, bilateral flank pain,dysuria, frequency and hesitancy.but denies chest pain,dyspnea or any trauma to her back, easy bruising or bleeding from any other sites.patient also noticed liquid coming out of back of scalp.  Patient also reports that she is taking OTC NSAIDs for her chronic pain and PRN Zofran for nausea.    In the ED patietnt vitals were stable, Labs were remarkable for Hb of 11.7 ; serum K 3.6  ; HCO3 34 ; high serum Cr. Of 1.59 ( base line Cr. Of 1.0 - 1.2) ; Mag of 1.5 ;serum lactate 1.2 ; UA showed > 150 RBCs,11-20 hyaline cast,but negative for LE and Nitrites.    CT abdomen/pelvis showed ;   1- Bilateral nonobstructing renal calculi. 2- Right renal cyst and small hypodense renal lesions   .   CXR showed Linear bibasilar atelectasis.    Patient received 1 liter bolus of NS , 15 mg PO Morphine (IR)  And 4 mg of ondansetron. and will be admitted for further evaluation of hematuria.      Review of Systems    Constitutional: Positive for fever (subjective fever) and malaise/fatigue. Negative for weight loss.   HENT: Negative for hearing loss.    Eyes: Negative for double vision.   Respiratory: Negative for sputum production.    Cardiovascular: Positive for leg swelling. Negative for chest pain.   Gastrointestinal: Positive for diarrhea and nausea. Negative for blood in stool, constipation, heartburn, melena and vomiting.   Genitourinary: Positive for dysuria, frequency and hematuria.   Musculoskeletal: Positive for neck pain.   Skin: Negative for itching and rash.   Neurological: Positive for seizures (patietn diagnosed with pseudoseizures per records.) and headaches. Negative for tremors.   Psychiatric/Behavioral: Negative for suicidal ideas.             Past Medical History (Chronic medical problem, known complications and current treatment)    *AVA  *Asthma.  *Bipolar disorder.  *Depression.  *Fibromyalgia/chronic fatigue syndrome.  *subarachanoid hemorrhage.    Past Surgical History:  Past Surgical History:   Procedure Laterality Date   • ANKLE ORIF Right 8/7/2016    Procedure: ANKLE ORIF;  Surgeon: Bill Brambila M.D.;  Location: [facility];  Service:    • IRRIGATION & DEBRIDEMENT GENERAL  8/17/2014    Performed by Ezra Wright M.D. at [facility]   • BREAST BIOPSY  5/29/2014    Performed by Negro Hester M.D. at [facility] SAME DAY [facility]   • EVACUATION OF HEMATOMA  5/2/2013    Performed by Lzaaro Connors Jr., M.D. at [facility]   • MAMMOPLASTY REDUCTION  4/29/2013    Performed by Negro Hester M.D. at [facility]   • RECOVERY  1/30/2012    Performed by BENEDICT IR-RECOVERY at [facility] SAME DAY [facility]   • RECOVERY  10/5/2011    Performed by MARIAELENA MCMULLENONADELITA at [facility]   • HYSTERECTOMY LAPAROSCOPY      W BSO   • KNEE RECONSTRUCTION     • LAMINOTOMY     • OTHER ORTHOPEDIC SURGERY      maddie. knee scopes   • OTHER ORTHOPEDIC  "SURGERY      back fusion then hardware removal   • PB EXPLORATION OF SPINAL FUSION     • PB REMOVAL OF OVARY(S)         Current Outpatient Medications:  Home Medications     Reviewed by Delfina Chiang R.N. (Registered Nurse) on 10/24/19 at 2121  Med List Status: Complete   Medication Last Dose Status   albuterol (PROAIR HFA) 108 (90 Base) MCG/ACT Aero Soln inhalation aerosol 10/24/2019 Active   amLODIPine (NORVASC) 5 MG Tab 10/24/2019 Active   baclofen (LIORESAL) 10 MG Tab 10/23/2019 Active   cetirizine (ZYRTEC) 10 MG Tab 10/24/2019 Active   Cholecalciferol (VITAMIN D) 2000 UNIT Tab 10/24/2019 Active   Ciclopirox 1 % Shampoo 10/23/2019 Active   divalproex (DEPAKOTE) 500 MG Tablet Delayed Response 10/24/2019 Active   Erenumab (AIMOVIG) 70 MG/ML Solution Auto-injector 10/10/2019 Active   esomeprazole (NEXIUM) 40 MG delayed-release capsule 10/24/2019 Active   Estradiol 10 MCG INSERT 10/23/2019 Active   ferrous sulfate 325 (65 FE) MG tablet 10/24/2019 Active   fluticasone (FLONASE) 50 MCG/ACT nasal spray 10/24/2019 Active   fluticasone (FLOVENT HFA) 220 MCG/ACT Aerosol 10/24/2019 Active   furosemide (LASIX) 20 MG Tab 10/24/2019 Active   guaifenesin LA (MUCINEX) 600 MG TABLET SR 12 HR 10/24/2019 Active   hydrOXYzine HCl (ATARAX) 25 MG Tab 10/24/2019 Active   montelukast (SINGULAIR) 10 MG Tab 10/23/2019 Active   morphine (MS IR) 15 MG tablet 10/21/2019 Active   Morphine Sulfate ER 30 MG Tablet Extended Release 12 hour Abuse-Deterrent 10/21/2019 Active   nystatin (MYCOSTATIN) 613970 UNIT/GM Cream topical cream 10/24/2019 Active   ondansetron (ZOFRAN ODT) 4 MG TABLET DISPERSIBLE 10/24/2019 Active   PARoxetine (PAXIL) 30 MG Tab 10/24/2019 Active   potassium chloride SA (KDUR) 20 MEQ Tab CR 10/24/2019 Active                Medication Allergy/Sensitivities:  Allergies   Allergen Reactions   • Compazine      \"psychotic reaction\"   • Imitrex [Sumatriptan Succinate]      Had brain bleed   • Inapsine [Droperidol]      \"psychotic " "reaction\"   • Maxalt [Rizatriptan Benzoate]      Had brain bleed   • Phenergan [Promethazine Hcl]      \"psychotic reaction\"   • Xanax [Alprazolam]      Sores and dry mouth, many others pt cannot remember   • Zantac      Stomach pain   • Apple Cider Vinegar Rash     Patient states she gets rash   • Butrans [Buprenorphine]    • Clarithromycin Vomiting and Palpitations   • Dilaudid [Hydromorphone] Rash     Patient states she had rash, hallucinations, unable to urinate.    • Doxycycline    • Effexor [Venlafaxine]      \"Really depressed, like i wanted to hurt myself\"   • Eucalyptus Oil    • Gabapentin    • Kiwi Extract    • Latex Rash   • Lyrica [Pregabalin]      \"depression, slept a lot\"   • Minocycline Vomiting     \"any cyclines\"   • Morphabond Er [Suly]    • Patchouli Oil Rash     Rash    • Sulfa Drugs    • Tea Tree Oil Rash     Break out in rash   • Topiramate Nausea     Patient states made sick to stomach and dizzy.         Family History (mandatory)   Family History   Problem Relation Age of Onset   • Hypertension Mother    • Hyperlipidemia Mother    • Hypertension Father    • Hyperlipidemia Father    • Heart Disease Father    • Psychiatric Illness Father    • Alcohol/Drug Father    • Alcohol/Drug Brother    • Arthritis Maternal Uncle    • Psychiatric Illness Maternal Uncle    • Heart Disease Maternal Uncle    • Hypertension Maternal Uncle    • Hyperlipidemia Maternal Uncle    • Genetic Disorder Paternal Aunt    • Psychiatric Illness Paternal Uncle    • Arthritis Maternal Grandmother    • Heart Disease Maternal Grandmother    • Alcohol/Drug Maternal Grandfather    • Stroke Maternal Grandfather    • Psychiatric Illness Maternal Grandfather    • Arthritis Paternal Grandmother    • Alcohol/Drug Paternal Grandfather        Social History (mandatory)   Social History     Socioeconomic History   • Marital status:      Spouse name: Not on file   • Number of children: Not on file   • Years of education: Not on " file   • Highest education level: Not on file   Occupational History   • Not on file   Social Needs   • Financial resource strain: Not on file   • Food insecurity:     Worry: Not on file     Inability: Not on file   • Transportation needs:     Medical: Not on file     Non-medical: Not on file   Tobacco Use   • Smoking status: Never Smoker   • Smokeless tobacco: Never Used   Substance and Sexual Activity   • Alcohol use: No   • Drug use: No   • Sexual activity: Not on file   Lifestyle   • Physical activity:     Days per week: Not on file     Minutes per session: Not on file   • Stress: Not on file   Relationships   • Social connections:     Talks on phone: Not on file     Gets together: Not on file     Attends Christianity service: Not on file     Active member of club or organization: Not on file     Attends meetings of clubs or organizations: Not on file     Relationship status: Not on file   • Intimate partner violence:     Fear of current or ex partner: Not on file     Emotionally abused: Not on file     Physically abused: Not on file     Forced sexual activity: Not on file   Other Topics Concern   • Not on file   Social History Narrative   • Not on file     Living situation: Home  PCP : Vika Norton M.D.    Physical Exam     Vitals:    10/24/19 2157 10/25/19 0310 10/25/19 0415 10/25/19 0416   BP: 112/72 153/75 150/75    Pulse:  73     Resp:  16  16   Temp:  36.6 °C (97.9 °F)     TempSrc:  Temporal     SpO2:  99%     Weight:       Height:         Body mass index is 37.17 kg/m².  O2 therapy: Pulse Oximetry: 99 %, O2 (LPM): 3, O2 Delivery: Nasal Cannula    Physical Exam   Constitutional: She is oriented to person, place, and time and well-developed, well-nourished, and in no distress. No distress.   HENT:   Head: Normocephalic and atraumatic.   Eyes: Pupils are equal, round, and reactive to light. No scleral icterus.   Neck: Normal range of motion. Neck supple. No JVD present. No thyromegaly present.    Cardiovascular: Normal rate and regular rhythm. Exam reveals no friction rub.   No murmur heard.  Pulmonary/Chest: Effort normal and breath sounds normal. No respiratory distress. She has no wheezes. She has no rales.   Abdominal: Soft. She exhibits no distension. There is no rebound and no guarding.   Musculoskeletal: She exhibits edema (+2 pitting edema in the LL bilaterally.). She exhibits no deformity.   Neurological: She is alert and oriented to person, place, and time. No cranial nerve deficit.   Skin: Skin is warm. She is not diaphoretic.   Psychiatric: Mood normal.   Nursing note and vitals reviewed.        Data Review       Old Records Request:   Completed  Current Records review/summary: Completed    Lab Data Review:  Recent Results (from the past 24 hour(s))   LACTIC ACID    Collection Time: 10/24/19  2:03 PM   Result Value Ref Range    Lactic Acid 1.2 0.5 - 2.0 mmol/L   CBC WITH DIFFERENTIAL    Collection Time: 10/24/19  2:03 PM   Result Value Ref Range    WBC 5.1 4.8 - 10.8 K/uL    RBC 4.24 4.20 - 5.40 M/uL    Hemoglobin 12.3 12.0 - 16.0 g/dL    Hematocrit 36.7 (L) 37.0 - 47.0 %    MCV 86.6 81.4 - 97.8 fL    MCH 29.0 27.0 - 33.0 pg    MCHC 33.5 (L) 33.6 - 35.0 g/dL    RDW 44.5 35.9 - 50.0 fL    Platelet Count 248 164 - 446 K/uL    MPV 10.0 9.0 - 12.9 fL    Neutrophils-Polys 33.60 (L) 44.00 - 72.00 %    Lymphocytes 50.70 (H) 22.00 - 41.00 %    Monocytes 13.30 0.00 - 13.40 %    Eosinophils 1.60 0.00 - 6.90 %    Basophils 0.40 0.00 - 1.80 %    Immature Granulocytes 0.40 0.00 - 0.90 %    Nucleated RBC 0.00 /100 WBC    Neutrophils (Absolute) 1.72 (L) 2.00 - 7.15 K/uL    Lymphs (Absolute) 2.59 1.00 - 4.80 K/uL    Monos (Absolute) 0.68 0.00 - 0.85 K/uL    Eos (Absolute) 0.08 0.00 - 0.51 K/uL    Baso (Absolute) 0.02 0.00 - 0.12 K/uL    Immature Granulocytes (abs) 0.02 0.00 - 0.11 K/uL    NRBC (Absolute) 0.00 K/uL   COMP METABOLIC PANEL    Collection Time: 10/24/19  2:03 PM   Result Value Ref Range    Sodium  140 135 - 145 mmol/L    Potassium 3.6 3.6 - 5.5 mmol/L    Chloride 96 96 - 112 mmol/L    Co2 34 (H) 20 - 33 mmol/L    Anion Gap 10.0 0.0 - 11.9    Glucose 94 65 - 99 mg/dL    Bun 22 8 - 22 mg/dL    Creatinine 1.59 (H) 0.50 - 1.40 mg/dL    Calcium 9.0 8.5 - 10.5 mg/dL    AST(SGOT) 21 12 - 45 U/L    ALT(SGPT) 12 2 - 50 U/L    Alkaline Phosphatase 43 30 - 99 U/L    Total Bilirubin 0.4 0.1 - 1.5 mg/dL    Albumin 3.7 3.2 - 4.9 g/dL    Total Protein 6.7 6.0 - 8.2 g/dL    Globulin 3.0 1.9 - 3.5 g/dL    A-G Ratio 1.2 g/dL   ESTIMATED GFR    Collection Time: 10/24/19  2:03 PM   Result Value Ref Range    GFR If  43 (A) >60 mL/min/1.73 m 2    GFR If Non African American 35 (A) >60 mL/min/1.73 m 2   MAGNESIUM    Collection Time: 10/24/19  2:03 PM   Result Value Ref Range    Magnesium 1.5 1.5 - 2.5 mg/dL   URINALYSIS    Collection Time: 10/24/19  2:14 PM   Result Value Ref Range    Color DK Yellow     Character Turbid (A)     Specific Gravity 1.016 <1.035    Ph 6.0 5.0 - 8.0    Glucose Negative Negative mg/dL    Ketones Negative Negative mg/dL    Protein Negative Negative mg/dL    Bilirubin Negative Negative    Urobilinogen, Urine 0.2 Negative    Nitrite Negative Negative    Leukocyte Esterase Small (A) Negative    Occult Blood Large (A) Negative    Micro Urine Req Microscopic    URINE MICROSCOPIC (W/UA)    Collection Time: 10/24/19  2:14 PM   Result Value Ref Range    WBC 5-10 (A) /hpf    RBC >150 (A) /hpf    Bacteria Negative None /hpf    Epithelial Cells Moderate (A) /hpf    Hyaline Cast 11-20 (A) /lpf   URINALYSIS,CULTURE IF INDICATED    Collection Time: 10/24/19  4:20 PM   Result Value Ref Range    Color Yellow     Character Clear     Specific Gravity 1.027 <1.035    Ph 5.5 5.0 - 8.0    Glucose Negative Negative mg/dL    Ketones Negative Negative mg/dL    Protein Negative Negative mg/dL    Bilirubin Negative Negative    Urobilinogen, Urine 0.2 Negative    Nitrite Negative Negative    Leukocyte Esterase  Negative Negative    Occult Blood Moderate (A) Negative    Micro Urine Req Microscopic    URINE MICROSCOPIC (W/UA)    Collection Time: 10/24/19  4:20 PM   Result Value Ref Range    WBC 0-2 /hpf    RBC  (A) /hpf    Bacteria Negative None /hpf    Epithelial Cells Negative /hpf    Hyaline Cast 0-2 /lpf   URINE CREATININE RANDOM    Collection Time: 10/24/19  4:20 PM   Result Value Ref Range    Creatinine, Random Urine 73.30 mg/dL   URINE SODIUM RANDOM    Collection Time: 10/24/19  4:20 PM   Result Value Ref Range    Sodium, Urine -per volume 51 mmol/L   URINE PROTEIN RANDOM    Collection Time: 10/24/19  4:20 PM   Result Value Ref Range    Total Protein, Urine 4.4 0.0 - 15.0 mg/dL   CBC with Differential    Collection Time: 10/25/19  2:46 AM   Result Value Ref Range    WBC 4.5 (L) 4.8 - 10.8 K/uL    RBC 4.13 (L) 4.20 - 5.40 M/uL    Hemoglobin 11.7 (L) 12.0 - 16.0 g/dL    Hematocrit 36.8 (L) 37.0 - 47.0 %    MCV 89.1 81.4 - 97.8 fL    MCH 28.3 27.0 - 33.0 pg    MCHC 31.8 (L) 33.6 - 35.0 g/dL    RDW 46.2 35.9 - 50.0 fL    Platelet Count 222 164 - 446 K/uL    MPV 9.5 9.0 - 12.9 fL    Neutrophils-Polys 24.00 (L) 44.00 - 72.00 %    Lymphocytes 60.90 (H) 22.00 - 41.00 %    Monocytes 11.80 0.00 - 13.40 %    Eosinophils 2.90 0.00 - 6.90 %    Basophils 0.20 0.00 - 1.80 %    Immature Granulocytes 0.20 0.00 - 0.90 %    Nucleated RBC 0.00 /100 WBC    Neutrophils (Absolute) 1.07 (L) 2.00 - 7.15 K/uL    Lymphs (Absolute) 2.73 1.00 - 4.80 K/uL    Monos (Absolute) 0.53 0.00 - 0.85 K/uL    Eos (Absolute) 0.13 0.00 - 0.51 K/uL    Baso (Absolute) 0.01 0.00 - 0.12 K/uL    Immature Granulocytes (abs) 0.01 0.00 - 0.11 K/uL    NRBC (Absolute) 0.00 K/uL   Comp Metabolic Panel (CMP)    Collection Time: 10/25/19  2:46 AM   Result Value Ref Range    Sodium 143 135 - 145 mmol/L    Potassium 3.3 (L) 3.6 - 5.5 mmol/L    Chloride 101 96 - 112 mmol/L    Co2 34 (H) 20 - 33 mmol/L    Anion Gap 8.0 0.0 - 11.9    Glucose 87 65 - 99 mg/dL    Bun 18 8  - 22 mg/dL    Creatinine 1.46 (H) 0.50 - 1.40 mg/dL    Calcium 8.4 (L) 8.5 - 10.5 mg/dL    AST(SGOT) 21 12 - 45 U/L    ALT(SGPT) 10 2 - 50 U/L    Alkaline Phosphatase 34 30 - 99 U/L    Total Bilirubin 0.4 0.1 - 1.5 mg/dL    Albumin 3.3 3.2 - 4.9 g/dL    Total Protein 5.9 (L) 6.0 - 8.2 g/dL    Globulin 2.6 1.9 - 3.5 g/dL    A-G Ratio 1.3 g/dL   Magnesium    Collection Time: 10/25/19  2:46 AM   Result Value Ref Range    Magnesium 1.5 1.5 - 2.5 mg/dL   ESTIMATED GFR    Collection Time: 10/25/19  2:46 AM   Result Value Ref Range    GFR If  47 (A) >60 mL/min/1.73 m 2    GFR If Non  39 (A) >60 mL/min/1.73 m 2       Imaging/Procedures Review:    Independant Imaging Review: Completed  CT-ABDOMEN-PELVIS WITH   Final Result      Bilateral nonobstructing renal calculi.      Right renal cyst and small hypodense renal lesions which are too small to characterize.      Moderate amount of colonic stool.      Spleen is at the upper limits of normal in size.      Subsegmental bibasilar atelectasis.      Nonvisualization of the appendix, limiting evaluation.      DX-CHEST-PORTABLE (1 VIEW)   Final Result      Linear bibasilar atelectasis.                  EKG:   EKG Independent Review: Completed      Records reviewed and summarized in current documentation :  Yes           Assessment/Plan     Hematuria  Assessment & Plan  - symptoms started 3 weeks ago. patient noticed redness throughout the stream but no clots.  - Patient admit to taking OTC NSAIDs.  - CT showed Right renal cyst and multiple small hypodense renal lesions  - 2015 CT with hepatic cyst . Echo done in 9/19 showed no valvular heart lesions/aneurysms.   - History of subarachnoid hemorrhage and HTN.  - Polycystic kidney disease with stent with this constellation of symptoms/findings  - hematuria may be caused by NSAID induced  VS. Polycystic kidney disease VS. Nephrolithiasis.  - Urology consulted, recommend no intervention at this time ,  appreciate their input.  @Plan:-  - Admit to observation.  - outpatient consult advised for cystoscopy.  - Counseled patient to refrain from using NSAIDs.  - CTM.    ZULLY on CKD (acute kidney injury) (HCC)  Assessment & Plan  - Hx of CKD stage III A  - GFR on admission 35, serum Cr. 1.59 (baseline of 1-1.2)  - Chronic use of NSAIDs  - Concurrent hematuria, analgesic nephropathy not excluded  @Plan:  - IVF NS bolus of 1 liter followed by maintenance ( NS at a rate of 100 ml /hr)  - Urine electrolytes, Fe Urea.  - avoid nephrotoxic medication.      Hypertension  Assessment & Plan  - Hx of HTN , on amlodipine 5 mg.  - BP on admission of 123/84.  - continue with home meds.  - Hold lasix.    Genital fungal infection.  Assessment & Plan  - keep area dry  - continue Nystatin topical        Hypomagnesemia  Assessment & Plan  - serum Mag on admission of 1.5.  - Replete with PO Mag-Ox.  - CTM.      Dysuria  Assessment & Plan  - Dysuria x 2 weeks, flank pain but no fever.  - UA showes no evidence of infection. No leukocytosis.  - CTM.  - Consider adding Pyridium if symptoms persist.    Skin lesion of scalp- (present on admission)  Assessment & Plan  - continue Ciclopirox topical      Anticipated Hospital stay: Observation admit        Quality Measures  Quality-Core Measures  PCP: Vika Norton M.D.

## 2019-10-25 NOTE — PROGRESS NOTES
Received report from ER and assumed care of patient upon arrival to the floor.  Patient arrived via gurney and was able to transfer self from gurney to bed by scooting over.  She reports that she does not feel that she is strong enough to walk but can transfer to [facility].  She is alert and oriented.  She reports generalized pain.  IV is patent.   Edema noted in BLE.   Plan of care reviewed and questions answered. Hourly rounding in place.

## 2019-10-25 NOTE — NON-PROVIDER
Internal Medicine Medical Student Note  Note Author: Karley Rubio, Student    Name Peter Trimble 1975   Age/Sex 44 y.o. female   MRN [id number]   Code Status Full Code             Reason for interval visit  (Principal Problem)   Hematuria    Interval Problem Daily Status Update  (problem status, last 24 hours, new history, new data )   - No acute events overnight  - Pt reports generalized weakness  - States she lives alone in [place] and uses a bedpan   - Pt has difficulty ambulating  - Reports desire to stay inpatient for one more day  - Pt reports she sees Dr. Federico Gamez for psychiatry and [facility] for primary care        Physical Exam       Vitals:    10/25/19 0415 10/25/19 0416 10/25/19 0700 10/25/19 0800   BP: 150/75   148/87   Pulse:    88   Resp:  16 16 16   Temp:    36.3 °C (97.3 °F)   TempSrc:    Temporal   SpO2:    96%   Weight:       Height:         Body mass index is 37.17 kg/m². Weight: 120.9 kg (266 lb 8.6 oz)  Oxygen Therapy:  Pulse Oximetry: 96 %, O2 (LPM): 3, O2 Delivery: Nasal Cannula    Physical Exam   Constitutional: She is oriented to person, place, and time.   Chronically ill appearing   HENT:   Dry, scabbed boils on scalp, pigmentation on bilateral cheeks   Eyes: Pupils are equal, round, and reactive to light. Conjunctivae and EOM are normal. No scleral icterus.   Neck: Normal range of motion. Neck supple. No thyromegaly present.   Cardiovascular: Normal rate, regular rhythm, normal heart sounds and intact distal pulses. Exam reveals no gallop and no friction rub.   No murmur heard.  Pulmonary/Chest: Effort normal and breath sounds normal. No respiratory distress. She has no wheezes. She has no rales. She exhibits no tenderness.   Abdominal: Soft. Bowel sounds are normal. She exhibits no distension and no mass. There is tenderness. There is no rebound and no guarding.   Musculoskeletal: She exhibits edema.   Poor muscle tone. Edema of bilateral lower  extremities.   Lymphadenopathy:     She has no cervical adenopathy.   Neurological: She is alert and oriented to person, place, and time. No cranial nerve deficit. GCS score is 15.   Skin: She is not diaphoretic. There is pallor.   Dry skin on bilateral feet with thick callous. Ecchymosis over arms and feet             Assessment/Plan     # Hematuria  Blood but no protein present in UA points to post-glomerular origin. Nephrolithiasis is possible etiology. Renal cyst evident on CT suggests rupture could be cause of hematuria. Pt has history of SAH--consider Adult PCKD.   - Avoid blood thinners  - Avoid NSAIDs  - Switch to Flomax (dc Lasix) and tamulosin (dc amlodipine) for improvement of urinary symptoms and blood pressure control  - Outpatient cystoscopy    # Furunculosis  Scalp lesions likely 2/2 to irritation.  - Keflex    # Anxiety  Dose was decreased at last admission but patient is complaining of worsening psychiatric symptoms.  - Increase Paxil from 30 to 60 mg    # Weakness  Chronic weakness/fatigue. Unable to do ADLs at home.  - PT/OT    # Fungal infection  Currently on topical nystatin in genital region.  - Oral diflucan
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
## 2019-10-25 NOTE — SENIOR ADMIT NOTE
Senior admit note    44-year-old female with past medical history of multiple psychiatric conditions including BPD, ADHD, AVA, adjustment disorder, depression on multiple psychiatric medications, diagnosed also with fibromyalgia/chronic fatigue syndrome, subarachnoid hemorrhage thought to be due to hypertension and triptan use, nonconvulsive syncope/pseudoseizure disorder based on EEG recently, asthma, presents with 1 month of fevers, myalgias.  Did not take her temperature at home but states it was low-grade fever about  F.  She is also been having blood in the urine, dysuria, flank tenderness bilaterally.  Also endorses to diffuse abdominal pain, joint pain, headache, neck pain and swelling, shortness of breath, cough.  Has a chronic nonhealing ulcer on her scalp.  Also endorses to a nonhealing yeast infection.  Also has chronic constipation.  Uses frequent NSAIDs for chronic pain symptoms.    Pertinent physical exam findings:  -Vital signs stable, afebrile, normotensive  - Diffuse abdominal tenderness, bilateral CVA angle tenderness, suprapubic tenderness  - Left occipital scalp ulcer with good granulation tissue, non-discharging  - General exam: Labial redness, no discharge, crural folds without erythema or discharge    Pertinent labs:  - Hb 12.3, , HCO3 34, creatinine 1.59 [baseline 1-1.2], GFR 35, baseline 50, MG 1.5, K3.6, lactate 1.2, UA moderate occult blood, 5200 RBCs, 0-2 WBCs, negative bacteria, negative leukocyte esterase, negative nitrite, no dysmorphic RBCs, 0-2 hyaline casts    Pertinent radiology:  - CT abdomen pelvis-bilateral nonobstructing renal calculi, right renal cyst, small hypodense renal lesions too small to characterize  -CXR-bibasilar atelectasis, increased vascular markings    Assessment and plan:    #Hematuria  - 2 weeks of hematuria, coexistent renal cyst,, multiple hypodense renal lesions  - 2015 CT with hepatic cyst  -Hypertension  -History of subarachnoid hemorrhage  -  Polycystic kidney disease with stent with this constellation of symptoms/findings  -No valvular heart lesions/aneurysms on most recent echo 9/19  -Heavy use of NSAIDs, NSAID induced nephropathy is also a possibility in her  Plan  - Admit to observation  - Urology consulted, recommend no intervention at this time - outpatient consult advised for cystoscopy  -Counseled patient to refrain from using NSAIDs  - strain urine    #Dysuria  - For 2 weeks, with urine without bacteria, LE, nitrite  -Afebrile, normal white count  -Endorses to yeast infection, on genital examination erythema of the labial folds  Plan  - Continue to monitor    #ZULLY on CKD  - Baseline CKD 3A, currently GFR down to 35, elevated creatinine 1.59 above baseline of 1-1.2  - Chronic use of NSAIDs  - Concurrent hematuria, analgesic nephropathy not excluded  Plan  - Urine electrolytes, FeUrea  - IVF NS bolus and maintenance  -Withhold nephrotoxic medication    #PTSD/adjustment disorder/bipolar affective disorder  #Chronic pain/fibromyalgia/chronic fatigue syndrome  -Was previously evaluated by psychiatry, with the above diagnoses, is currently taking Paxil, Atarax, Nexium, Keppra  - We will schedule for CBT session, could not attend  Plan  -Continue psychiatric medications at recommended doses for renal injury, with hold nephrotoxic medications    #Hypertension  - Monitor vitals  - Continue Norvasc 5  - hold Lasix for ZULLY    #Seizure-like activity  -Diagnosed with psychogenic seizures on most recent hospitalization for EEG and neurology evaluation  - CTM  - Continue to monitor    # Hypomagnesemia  - replete PO, recheck AM Mg, goal >2    # Scalp lesion  - continue Ciclopirox topical    # Genital fungal infection  - keep area dry  - continue Nystatin topical

## 2019-10-25 NOTE — ASSESSMENT & PLAN NOTE
Hx of CKD stage III A,  FeNa - 0.7 (pre-renal).   +Hyaline casts on UA. (negative for infection), possible secondary to dehyration  Concurrent hematuria, analgesic nephropathy not excluded   Creatinine is now back to baseline after iv fluids    Plan:  - discontinue iv fluids  - electrolytes now stable  - avoid nephrotoxic medication.

## 2019-10-25 NOTE — CARE PLAN
Problem: Safety  Goal: Will remain free from falls  Outcome: PROGRESSING AS EXPECTED   Patient encouraged to call before getting out of bed.    Problem: Knowledge Deficit  Goal: Knowledge of disease process/condition, treatment plan, diagnostic tests, and medications will improve  Outcome: PROGRESSING AS EXPECTED   Patient oriented to the room and hospital policy.

## 2019-10-25 NOTE — CARE PLAN
Problem: Nutritional:  Goal: Achieve adequate nutritional intake  Description  Patient will consume 50% of meals  Outcome: PROGRESSING AS EXPECTED

## 2019-10-25 NOTE — ASSESSMENT & PLAN NOTE
- Dysuria x 2 weeks, flank pain but no fever.  - UA showes no evidence of infection. No leukocytosis.  - CTM.  - Consider adding Pyridium if symptoms persist.

## 2019-10-25 NOTE — PROGRESS NOTES
2 RN skin assessment  Bilateral feet redness and swelling noted.  Scabs noted on right lower shin  Scab noted to left elbow  Bruising noted to bilateral upper extremities  Scabs noted to head.  Patient reports she has multiple areas on her head that she scratches and causes scabs.

## 2019-10-25 NOTE — PROGRESS NOTES
Spiritual Care Note    Patient Information     Patient's Name: Peter Trimble   MRN: [id number]    YOB: 1975   Age and Gender: 44 y.o. female   Service Area: [facility]   Room (and Bed): [place] [address]   Ethnicity or Nationality: white   Primary Language: English   Confucianist/Spiritual preference: Restoration   Place of Residence: [place], [place]   Medical Diagnosis(-es)/Procedure(s): hematuria  ZULLY on CKD  hypertension  genital fungal infection  hypomagnesemia  dysuria  skin lesion of scalp   Code Status: Full Code    Date of Admission: 10/24/2019   Length of Stay: 0 days        Spiritual Screen Information  Is your spiritual health or inner well-being important to you as you cope with your medical condition?: Yes  Would you like to receive a visit from our Spiritual Care team or your own Congregational or spiritual leader?: Yes  Was spiritual care education provided to the patient?: Yes       Encounter/Request Information  Nature of the Visit:     Initial, On shift  General Visit:      Yes  Referral From/Origin of Request:   [facility] nursing  Referral To:      Other  (Fr. Amilcar Peres)  Plan:       Visit Upon Request      Spiritual Care Provider Information  Title of Spiritual Care Provider:    Name of Spiritual Care Provider:   CONCHITA Allred  Phone Number:     [phone]   E-Mail:       [email or web address]

## 2019-10-26 LAB
ALBUMIN SERPL BCP-MCNC: 3.4 G/DL (ref 3.2–4.9)
ALBUMIN/GLOB SERPL: 1.2 G/DL
ALP SERPL-CCNC: 37 U/L (ref 30–99)
ALT SERPL-CCNC: 9 U/L (ref 2–50)
ANION GAP SERPL CALC-SCNC: 8 MMOL/L (ref 0–11.9)
AST SERPL-CCNC: 19 U/L (ref 12–45)
BACTERIA UR CULT: NORMAL
BASOPHILS # BLD AUTO: 0.5 % (ref 0–1.8)
BASOPHILS # BLD: 0.02 K/UL (ref 0–0.12)
BILIRUB SERPL-MCNC: 0.3 MG/DL (ref 0.1–1.5)
BUN SERPL-MCNC: 12 MG/DL (ref 8–22)
CALCIUM SERPL-MCNC: 8.6 MG/DL (ref 8.5–10.5)
CHLORIDE SERPL-SCNC: 107 MMOL/L (ref 96–112)
CO2 SERPL-SCNC: 29 MMOL/L (ref 20–33)
CREAT SERPL-MCNC: 1.13 MG/DL (ref 0.5–1.4)
EOSINOPHIL # BLD AUTO: 0.13 K/UL (ref 0–0.51)
EOSINOPHIL NFR BLD: 3.2 % (ref 0–6.9)
ERYTHROCYTE [DISTWIDTH] IN BLOOD BY AUTOMATED COUNT: 48.4 FL (ref 35.9–50)
GLOBULIN SER CALC-MCNC: 2.8 G/DL (ref 1.9–3.5)
GLUCOSE SERPL-MCNC: 85 MG/DL (ref 65–99)
HCT VFR BLD AUTO: 38.4 % (ref 37–47)
HGB BLD-MCNC: 12.1 G/DL (ref 12–16)
IMM GRANULOCYTES # BLD AUTO: 0.01 K/UL (ref 0–0.11)
IMM GRANULOCYTES NFR BLD AUTO: 0.2 % (ref 0–0.9)
LYMPHOCYTES # BLD AUTO: 2.71 K/UL (ref 1–4.8)
LYMPHOCYTES NFR BLD: 67.4 % (ref 22–41)
MAGNESIUM SERPL-MCNC: 2.1 MG/DL (ref 1.5–2.5)
MCH RBC QN AUTO: 28.7 PG (ref 27–33)
MCHC RBC AUTO-ENTMCNC: 31.5 G/DL (ref 33.6–35)
MCV RBC AUTO: 91 FL (ref 81.4–97.8)
MONOCYTES # BLD AUTO: 0.4 K/UL (ref 0–0.85)
MONOCYTES NFR BLD AUTO: 10 % (ref 0–13.4)
NEUTROPHILS # BLD AUTO: 0.75 K/UL (ref 2–7.15)
NEUTROPHILS NFR BLD: 18.7 % (ref 44–72)
NRBC # BLD AUTO: 0 K/UL
NRBC BLD-RTO: 0 /100 WBC
PLATELET # BLD AUTO: 225 K/UL (ref 164–446)
PMV BLD AUTO: 9.4 FL (ref 9–12.9)
POTASSIUM SERPL-SCNC: 4 MMOL/L (ref 3.6–5.5)
PROT SERPL-MCNC: 6.2 G/DL (ref 6–8.2)
RBC # BLD AUTO: 4.22 M/UL (ref 4.2–5.4)
SIGNIFICANT IND 70042: NORMAL
SITE SITE: NORMAL
SODIUM SERPL-SCNC: 144 MMOL/L (ref 135–145)
SOURCE SOURCE: NORMAL
WBC # BLD AUTO: 4 K/UL (ref 4.8–10.8)

## 2019-10-26 PROCEDURE — 700102 HCHG RX REV CODE 250 W/ 637 OVERRIDE(OP): Performed by: STUDENT IN AN ORGANIZED HEALTH CARE EDUCATION/TRAINING PROGRAM

## 2019-10-26 PROCEDURE — G0378 HOSPITAL OBSERVATION PER HR: HCPCS

## 2019-10-26 PROCEDURE — 80053 COMPREHEN METABOLIC PANEL: CPT

## 2019-10-26 PROCEDURE — A9270 NON-COVERED ITEM OR SERVICE: HCPCS | Performed by: STUDENT IN AN ORGANIZED HEALTH CARE EDUCATION/TRAINING PROGRAM

## 2019-10-26 PROCEDURE — 700102 HCHG RX REV CODE 250 W/ 637 OVERRIDE(OP): Performed by: HOSPITALIST

## 2019-10-26 PROCEDURE — 97165 OT EVAL LOW COMPLEX 30 MIN: CPT

## 2019-10-26 PROCEDURE — 99224 PR SUBSEQUENT OBSERVATION CARE,LEVEL I: CPT | Mod: GC | Performed by: INTERNAL MEDICINE

## 2019-10-26 PROCEDURE — 36415 COLL VENOUS BLD VENIPUNCTURE: CPT

## 2019-10-26 PROCEDURE — 83735 ASSAY OF MAGNESIUM: CPT

## 2019-10-26 PROCEDURE — 85025 COMPLETE CBC W/AUTO DIFF WBC: CPT

## 2019-10-26 PROCEDURE — 700101 HCHG RX REV CODE 250: Performed by: STUDENT IN AN ORGANIZED HEALTH CARE EDUCATION/TRAINING PROGRAM

## 2019-10-26 PROCEDURE — A9270 NON-COVERED ITEM OR SERVICE: HCPCS | Performed by: HOSPITALIST

## 2019-10-26 RX ORDER — PAROXETINE HYDROCHLORIDE 20 MG/1
60 TABLET, FILM COATED ORAL DAILY
Status: DISCONTINUED | OUTPATIENT
Start: 2019-10-27 | End: 2019-10-26

## 2019-10-26 RX ORDER — DIPHENHYDRAMINE HCL 12.5MG/5ML
6.25 LIQUID (ML) ORAL ONCE
Status: COMPLETED | OUTPATIENT
Start: 2019-10-26 | End: 2019-10-26

## 2019-10-26 RX ORDER — PAROXETINE HYDROCHLORIDE 20 MG/1
60 TABLET, FILM COATED ORAL DAILY
Status: DISCONTINUED | OUTPATIENT
Start: 2019-10-27 | End: 2019-10-28 | Stop reason: HOSPADM

## 2019-10-26 RX ADMIN — CETIRIZINE HYDROCHLORIDE 5 MG: 10 TABLET, FILM COATED ORAL at 04:31

## 2019-10-26 RX ADMIN — CEPHALEXIN 500 MG: 500 CAPSULE ORAL at 16:41

## 2019-10-26 RX ADMIN — SENNOSIDES AND DOCUSATE SODIUM 2 TABLET: 8.6; 5 TABLET ORAL at 04:30

## 2019-10-26 RX ADMIN — ACETAMINOPHEN 650 MG: 325 TABLET, FILM COATED ORAL at 10:23

## 2019-10-26 RX ADMIN — HYDROXYZINE HYDROCHLORIDE 50 MG: 50 TABLET, FILM COATED ORAL at 08:20

## 2019-10-26 RX ADMIN — DIVALPROEX SODIUM 500 MG: 500 TABLET, DELAYED RELEASE ORAL at 16:41

## 2019-10-26 RX ADMIN — FLUTICASONE PROPIONATE 50 MCG: 50 SPRAY, METERED NASAL at 04:32

## 2019-10-26 RX ADMIN — FLUTICASONE PROPIONATE 110 MCG: 110 AEROSOL, METERED RESPIRATORY (INHALATION) at 04:32

## 2019-10-26 RX ADMIN — MORPHINE SULFATE 15 MG: 15 TABLET ORAL at 15:21

## 2019-10-26 RX ADMIN — GUAIFENESIN 600 MG: 600 TABLET, EXTENDED RELEASE ORAL at 04:30

## 2019-10-26 RX ADMIN — CEPHALEXIN 500 MG: 500 CAPSULE ORAL at 04:31

## 2019-10-26 RX ADMIN — MONTELUKAST 10 MG: 10 TABLET, FILM COATED ORAL at 20:49

## 2019-10-26 RX ADMIN — OMEPRAZOLE 20 MG: 20 CAPSULE, DELAYED RELEASE ORAL at 04:30

## 2019-10-26 RX ADMIN — OMEPRAZOLE 20 MG: 20 CAPSULE, DELAYED RELEASE ORAL at 16:41

## 2019-10-26 RX ADMIN — PAROXETINE 30 MG: 30 TABLET, FILM COATED ORAL at 04:31

## 2019-10-26 RX ADMIN — SENNOSIDES AND DOCUSATE SODIUM 2 TABLET: 8.6; 5 TABLET ORAL at 16:45

## 2019-10-26 RX ADMIN — CEPHALEXIN 500 MG: 500 CAPSULE ORAL at 10:26

## 2019-10-26 RX ADMIN — NYSTATIN 100000 UNITS: 100000 CREAM TOPICAL at 04:36

## 2019-10-26 RX ADMIN — ACETAMINOPHEN 650 MG: 325 TABLET, FILM COATED ORAL at 23:38

## 2019-10-26 RX ADMIN — NYSTATIN 100000 UNITS: 100000 CREAM TOPICAL at 17:59

## 2019-10-26 RX ADMIN — BACLOFEN 10 MG: 10 TABLET ORAL at 08:18

## 2019-10-26 RX ADMIN — VITAMIN D, TAB 1000IU (100/BT) 4000 UNITS: 25 TAB at 04:30

## 2019-10-26 RX ADMIN — DIVALPROEX SODIUM 1000 MG: 500 TABLET, DELAYED RELEASE ORAL at 04:32

## 2019-10-26 RX ADMIN — MORPHINE SULFATE 30 MG: 30 TABLET, EXTENDED RELEASE ORAL at 04:31

## 2019-10-26 RX ADMIN — TAMSULOSIN HYDROCHLORIDE 0.4 MG: 0.4 CAPSULE ORAL at 08:16

## 2019-10-26 RX ADMIN — DIPHENHYDRAMINE HYDROCHLORIDE 6.25 MG: 12.5 SOLUTION ORAL at 20:48

## 2019-10-26 RX ADMIN — GUAIFENESIN 600 MG: 600 TABLET, EXTENDED RELEASE ORAL at 16:41

## 2019-10-26 RX ADMIN — PAROXETINE HYDROCHLORIDE 30 MG: 20 TABLET, FILM COATED ORAL at 08:16

## 2019-10-26 RX ADMIN — FLUTICASONE PROPIONATE 110 MCG: 110 AEROSOL, METERED RESPIRATORY (INHALATION) at 20:49

## 2019-10-26 RX ADMIN — FERROUS SULFATE TAB 325 MG (65 MG ELEMENTAL FE) 325 MG: 325 (65 FE) TAB at 04:30

## 2019-10-26 RX ADMIN — CEPHALEXIN 500 MG: 500 CAPSULE ORAL at 23:36

## 2019-10-26 ASSESSMENT — ENCOUNTER SYMPTOMS
STRIDOR: 0
DIZZINESS: 0
EYE PAIN: 0
VOMITING: 0
MYALGIAS: 1
WHEEZING: 0
ORTHOPNEA: 0
CONSTIPATION: 0
COUGH: 0
SORE THROAT: 0
CHILLS: 0
NAUSEA: 0
DIARRHEA: 0
TREMORS: 1
BACK PAIN: 1
SEIZURES: 0
PALPITATIONS: 0
SHORTNESS OF BREATH: 0
FEVER: 0
SPEECH CHANGE: 0
BLURRED VISION: 0

## 2019-10-26 ASSESSMENT — COGNITIVE AND FUNCTIONAL STATUS - GENERAL
DRESSING REGULAR UPPER BODY CLOTHING: A LITTLE
HELP NEEDED FOR BATHING: A LOT
TOILETING: A LOT
DAILY ACTIVITIY SCORE: 17
SUGGESTED CMS G CODE MODIFIER DAILY ACTIVITY: CK
DRESSING REGULAR LOWER BODY CLOTHING: A LOT

## 2019-10-26 ASSESSMENT — ACTIVITIES OF DAILY LIVING (ADL): TOILETING: REQUIRES ASSIST

## 2019-10-26 ASSESSMENT — LIFESTYLE VARIABLES: SUBSTANCE_ABUSE: 0

## 2019-10-26 NOTE — PROGRESS NOTES
Internal Medicine Interval Note    Name Peter Trimble 1975   Age/Sex 44 y.o. female   MRN [id number]   Code Status full     After 5PM or if no immediate response to page, please call for cross-coverage  Attending/Team: Dr. Venegas / Andrey team See Patient List for primary contact information  Call [phone] to page    1st Call - Day Intern (R1):   Dr. Eastman 2nd Call - Day Sr. Resident (R2/R3):   Dr. Torres       Reason for interval visit  (Principal Problem)   Hematuria    Interval Problem -  Daily Status Update   - overnight no acute events, vitals stable. No nausea, vomiting or diarrhea.    - : No dysuria or increased frequency, however states urine slightly still red. Urology was consulted and recommend outpatient follow up. Hemoglobin has remained stable with no elevation in WBC. Creatinine has trended down to baseline today hence will discontinue IV fluids, as patient's oral intake has been good. Recieved one dose of fluconazole yesterday, feels better in regards to genital fungal infection post that. Will continue topical nystatin.    - Skin: scalp lesion  Improving, will continue cephalexin for 4 more days.    - Patient is medically stable for discharge with outpatient urology follow up, pending PT/OT evaluation.          Review of Systems   Constitutional: Negative for chills and fever.   HENT: Negative for ear pain and sore throat.    Eyes: Negative for blurred vision and pain.   Respiratory: Negative for cough, shortness of breath, wheezing and stridor.    Cardiovascular: Positive for leg swelling (periodic). Negative for palpitations and orthopnea.   Gastrointestinal: Negative for constipation, diarrhea, nausea and vomiting.   Genitourinary: Positive for hematuria. Negative for dysuria.   Musculoskeletal: Positive for back pain, joint pain and myalgias.   Skin:        Dry skin, with callous on foot, and scabs/leaking on scalp.   Neurological: Positive for tremors  "(shakiness that she describes as a \"pseudoseizure\".). Negative for dizziness, speech change and seizures (shakiness that she describes as a \"pseudoseizure\").   Psychiatric/Behavioral: Negative for substance abuse and suicidal ideas.       Consultants/Specialty  Phone consult to urology - recommended outpatient follow-up.     Disposition  Medically clear for discharge with outpatient urology follow up, pending PT/OT evaluation.    Quality Measures  Quality-Core Measures   Reviewed items::  Labs reviewed, Medications reviewed and Radiology images reviewed  Jj catheter::  No Jj  DVT prophylaxis pharmacological::  Contraindicated - High bleeding risk  DVT prophylaxis - mechanical:  SCDs        Physical Exam     Vitals:    10/26/19 0431 10/26/19 0815 10/26/19 0900 10/26/19 0954   BP:  149/98     Pulse:  86     Resp: 16 16     Temp:  36.6 °C (97.9 °F) 36.9 °C (98.4 °F) 36.6 °C (97.9 °F)   TempSrc:  Temporal Oral Temporal   SpO2:  96%     Weight:       Height:         Body mass index is 37.17 kg/m².    Oxygen Therapy:  Pulse Oximetry: 96 %, O2 (LPM): 3, O2 Delivery: Nasal Cannula    Physical Exam   Constitutional: She is oriented to person, place, and time. No distress.   Obese female who appears tired, but alert.    HENT:   Scalp with multiple excoriations (of likely furuncles), with shallow ulcerations/scabbing present.   Faint redness to cheeks, where NC crosses maxilla.    Eyes: Conjunctivae and EOM are normal. Right eye exhibits no discharge. Left eye exhibits no discharge.   Neck: Neck supple. No tracheal deviation present.   Cardiovascular: Normal rate and regular rhythm.   No murmur heard.  Pulmonary/Chest: Effort normal. No stridor. No respiratory distress. She has no wheezes. She has no rales.   Abdominal: Soft. She exhibits no distension. There is no tenderness. There is no rebound and no guarding.   Musculoskeletal: Normal range of motion. She exhibits edema.   Legs are large but mostly without edema.  " ... slight puffiness to left foot, with Bruising noted to lateral left toes/foot.   Dry skin on bilateral feet with thick callous   Neurological: She is alert and oriented to person, place, and time. No cranial nerve deficit. Coordination normal.   Skin: Skin is warm. She is not diaphoretic.   Psychiatric: Mood and memory normal.         Lab Data Review:     Recent Labs     10/24/19  1403 10/25/19  0246 10/26/19  0052   WBC 5.1 4.5* 4.0*   NEUTSPOLYS 33.60* 24.00* 18.70*   LYMPHOCYTES 50.70* 60.90* 67.40*   MONOCYTES 13.30 11.80 10.00   EOSINOPHILS 1.60 2.90 3.20   BASOPHILS 0.40 0.20 0.50   ASTSGOT 21 21 19   ALTSGPT 12 10 9   ALKPHOSPHAT 43 34 37   TBILIRUBIN 0.4 0.4 0.3     Recent Labs     10/24/19  1403 10/25/19  0246 10/26/19  0052   RBC 4.24 4.13* 4.22   HEMOGLOBIN 12.3 11.7* 12.1   HEMATOCRIT 36.7* 36.8* 38.4   PLATELETCT 248 222 225       Recent Labs     10/24/19  1403 10/25/19  0246 10/26/19  0052   SODIUM 140 143 144   POTASSIUM 3.6 3.3* 4.0   CHLORIDE 96 101 107   CO2 34* 34* 29   BUN 22 18 12   CREATININE 1.59* 1.46* 1.13   MAGNESIUM 1.5 1.5 2.1   CALCIUM 9.0 8.4* 8.6       Recent Labs     10/24/19  1403 10/25/19  0246 10/26/19  0052   ALTSGPT 12 10 9   ASTSGOT 21 21 19   ALKPHOSPHAT 43 34 37   TBILIRUBIN 0.4 0.4 0.3   GLUCOSE 94 87 85         Assessment & Plan     * Hematuria  Assessment & Plan  3 weeks of some redness in urine.  No clots.   Had taken OTC NSAIDs.  CT showed Right renal cyst and multiple small hypodense renal lesions  2015 CT with hepatic cyst . Echo done in 9/19 showed no valvular heart lesions/aneurysms.   Possibly related to Polycystic kidney disease given constellation of issues.  hematuria may be caused by NSAIDs, vs. PCKD, vs Nephrolithiasis.    Felt likely secondary to passing of stone.   -Admitting resident spoke with urology, recommend no intervention at this time,      but outpatient referral advised for cystoscopy  Plan:  - Counseled patient to refrain from using NSAIDs  -   avoid blood thinners/anticoagulation, while bleeding.   - CTM.  - discharge after PT/OT evaluation    ZULLY on CKD (acute kidney injury) (HCC)  Assessment & Plan  Hx of CKD stage III A,   FeNa - 0.7 (pre-renal).   +Hyaline casts on UA. (negative for infection), possible secondary to dehyration  Concurrent hematuria, analgesic nephropathy not excluded   Creatinine is now back to baseline after iv fluids    Plan:  - discontinue iv fluids  - electrolytes now stable  - avoid nephrotoxic medication.      Hypomagnesemia  Assessment & Plan  - serum Mag on admission of 1.5., improved to 2.1 now  - Replete as needed.   - CTM.      Hypertension  Assessment & Plan  - Hx of HTN , on amlodipine 5 mg.  - BP on today of 128/78.  - continue with home meds.  - Hold lasix (previously used for occasional swellings).    Genital fungal infection.  Assessment & Plan  -Received 1 time dose of fluconazole.  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   Appear to have been excoriated areas of furuncles.   - continue keflex

## 2019-10-26 NOTE — ASSESSMENT & PLAN NOTE
Multiple areas of shallow ulceration on scalp.   Appear to have been excoriated areas of furuncles.   - continue keflex for 2 more days on discharge

## 2019-10-26 NOTE — THERAPY
"Occupational Therapy Evaluation completed.   Functional Status:    45 y/o female admitted to hospital with painful urination and seizures. Pt presents with fully body tremors when received by OT, she reports that she spends most of her time in bed and really only stands up to get to BSC and back, has assistance with nearly all ADL at baseline. When told that she could DC home (versus rehab/SNF) if she demonstrated BSC transfer, she said \"But if I do that, then you guys will kick me out.\" Pt demonstrated bed mobility Mod I and STS with supv. Once standing, she took 3 side steps towards HOB with CGA. Will see patient 1-2 more times for purpose of BSC transfer, however pt likely close to baseline.     Plan of Care: Will benefit from Occupational Therapy 2 times per week  Discharge Recommendations:  Equipment: Will Continue to Assess for Equipment Needs. Post-acute therapy Anticipate that the patient will have no further occupational therapy needs after discharge from the hospital.       See \"Rehab Therapy-Acute\" Patient Summary Report for complete documentation.    "

## 2019-10-26 NOTE — PROGRESS NOTES
Patient reports HA, but declines medication. Patient noted right eye irritation, asked for eye to be flushed with normal saline. Flush performed and patient reports eye feels better. Will continue to monitor.

## 2019-10-26 NOTE — PROGRESS NOTES
"       Internal Medicine Interval Note    Name Peter Trimble 1975   Age/Sex 44 y.o. female   MRN [id number]   Code Status full     After 5PM or if no immediate response to page, please call for cross-coverage  Attending/Team: Dr. Venegas / Andrey team See Patient List for primary contact information  Call [phone] to page    1st Call - Day Intern (R1):   Dr. Eastman 2nd Call - Day Sr. Resident (R2/R3):   Dr. Torres       Reason for interval visit  (Principal Problem)   Hematuria    Interval Problem -  Daily Status Update    NAEO.  Reports feeling tired/weak.    Notes multiple issues, she feels others have not addressed.   Would like to stay another day.       Review of Systems   Constitutional: Negative for chills and fever.   HENT: Negative for ear pain and sore throat.    Eyes: Negative for blurred vision and pain.   Respiratory: Negative for cough, shortness of breath, wheezing and stridor.    Cardiovascular: Positive for leg swelling (periodic). Negative for palpitations and orthopnea.   Gastrointestinal: Negative for constipation, diarrhea, nausea and vomiting.   Genitourinary: Positive for hematuria. Negative for dysuria.   Musculoskeletal: Positive for back pain, joint pain and myalgias.   Skin:        Dry skin, with callous on foot, and scabs/leaking on scalp.   Neurological: Positive for tremors (shakiness that she describes as a \"psuedoseizure\".) and seizures (shakiness that she describes as a \"psuedoseizure\".). Negative for dizziness and speech change.   Psychiatric/Behavioral: Negative for substance abuse and suicidal ideas.       Consultants/Specialty  Phone consult to urology - recommended outpatient follow-up.     Disposition  Likely discharge tomorrow.     Quality Measures  Quality-Core Measures   Reviewed items::  Labs reviewed, Medications reviewed and Radiology images reviewed  Jj catheter::  No Jj  DVT prophylaxis pharmacological::  Contraindicated - High bleeding " risk  DVT prophylaxis - mechanical:  SCDs        Physical Exam     Vitals:    10/25/19 0416 10/25/19 0700 10/25/19 0800 10/25/19 1600   BP:   148/87 136/84   Pulse:   88 98   Resp: 16 16 16 16   Temp:   36.3 °C (97.3 °F) 36.7 °C (98.1 °F)   TempSrc:   Temporal Temporal   SpO2:   96% 97%   Weight:       Height:         Body mass index is 37.17 kg/m². Weight: 120.9 kg (266 lb 8.6 oz)  Oxygen Therapy:  Pulse Oximetry: 97 %, O2 (LPM): 3, O2 Delivery: Nasal Cannula    Physical Exam   Constitutional: She is oriented to person, place, and time. No distress.   Obese female who appears tired, but alert.    HENT:   Scalp with multiple excoriations (of likely furuncles), with shallow ulcerations/scabbing present.   Faint redness to cheeks, where NC crosses maxilla.    Eyes: Conjunctivae and EOM are normal. Right eye exhibits no discharge. Left eye exhibits no discharge.   Neck: Neck supple. No tracheal deviation present.   Cardiovascular: Normal rate and regular rhythm.   No murmur heard.  Difficult to hear, due to body habitus.    Pulmonary/Chest: Effort normal. No stridor. No respiratory distress. She has no wheezes. She has no rales.   Abdominal: Soft. She exhibits no distension (large but not distended. ). There is no tenderness. There is no rebound and no guarding.   Musculoskeletal:   Legs are large but mostly without edema.  ... slight puffiness to left foot, with Bruising noted to lateral left toes/foot.   Dry skin on bilateral feet with thick callous   Neurological: She is alert and oriented to person, place, and time.   Skin: She is not diaphoretic.   Psychiatric: Mood and memory normal.         Lab Data Review:     Recent Labs     10/24/19  1403 10/25/19  0246   WBC 5.1 4.5*   NEUTSPOLYS 33.60* 24.00*   LYMPHOCYTES 50.70* 60.90*   MONOCYTES 13.30 11.80   EOSINOPHILS 1.60 2.90   BASOPHILS 0.40 0.20   ASTSGOT 21 21   ALTSGPT 12 10   ALKPHOSPHAT 43 34   TBILIRUBIN 0.4 0.4     Recent Labs     10/24/19  1403  10/25/19  0246   RBC 4.24 4.13*   HEMOGLOBIN 12.3 11.7*   HEMATOCRIT 36.7* 36.8*   PLATELETCT 248 222       Recent Labs     10/24/19  1403 10/25/19  0246   SODIUM 140 143   POTASSIUM 3.6 3.3*   CHLORIDE 96 101   CO2 34* 34*   BUN 22 18   CREATININE 1.59* 1.46*   MAGNESIUM 1.5 1.5   CALCIUM 9.0 8.4*       Recent Labs     10/24/19  1403 10/25/19  0246   ALTSGPT 12 10   ASTSGOT 21 21   ALKPHOSPHAT 43 34   TBILIRUBIN 0.4 0.4   GLUCOSE 94 87         Assessment & Plan     * Hematuria  Assessment & Plan  3 weeks of some redness in urine.  No clots.   Had taken OTC NSAIDs.  CT showed Right renal cyst and multiple small hypodense renal lesions  2015 CT with hepatic cyst . Echo done in 9/19 showed no valvular heart lesions/aneurysms.   History of subarachnoid hemorrhage and HTN.  Posibly related to Polycystic kidney disease given constellation of issues.  hematuria may be caused by NSAIDs, vs. PCKD, vs Nephrolithiasis.    Felt likely secondary to passing of stone.     Admitting resident spoke with urology,      recommend no intervention at this time,      but outpatient referral advised for cystoscopy  - Counseled patient to refrain from using NSAIDs  - strain urine  -  avoid blood thinners/anticoagulation, while bleeding.   - CTM.    ZULLY on CKD (acute kidney injury) (HCC)  Assessment & Plan  Hx of CKD stage III A  GFR on admission 35, serum Cr. 1.59 (baseline of 1-1.2)  Chronic use of NSAIDs  Concurrent hematuria, analgesic nephropathy not excluded  +Hyaline casts on UA. (negative for infection).  -  FeNa - 0.7 (pre-renal).   - continue IV fluids.   - avoid nephrotoxic medication.      Hypomagnesemia  Assessment & Plan  - serum Mag on admission of 1.5.  - Replete as needed.   - CTM.      Hypertension  Assessment & Plan  - Hx of HTN , on amlodipine 5 mg.  - BP on admission of 123/84.  - continue with home meds.  - Hold lasix (previously used for occasional swellings).    Genital fungal infection.  Assessment & Plan  - keep  area dry  - continue Nystatin topical  - gave 1 time dose of fluconazole.       Furunculosis- (present on admission)  Assessment & Plan  Multiple areas of shallow ulceration on scalp.   Appear to have been excoriated areas of furuncles.   - starting Keflex (5 days).

## 2019-10-27 LAB
ANION GAP SERPL CALC-SCNC: 6 MMOL/L (ref 0–11.9)
APPEARANCE UR: CLEAR
BILIRUB UR QL STRIP.AUTO: NEGATIVE
BUN SERPL-MCNC: 9 MG/DL (ref 8–22)
CALCIUM SERPL-MCNC: 8.8 MG/DL (ref 8.5–10.5)
CHLORIDE SERPL-SCNC: 106 MMOL/L (ref 96–112)
CO2 SERPL-SCNC: 29 MMOL/L (ref 20–33)
COLOR UR: YELLOW
CREAT SERPL-MCNC: 0.96 MG/DL (ref 0.5–1.4)
ERYTHROCYTE [DISTWIDTH] IN BLOOD BY AUTOMATED COUNT: 47.6 FL (ref 35.9–50)
GLUCOSE SERPL-MCNC: 101 MG/DL (ref 65–99)
GLUCOSE UR STRIP.AUTO-MCNC: NEGATIVE MG/DL
HCT VFR BLD AUTO: 34.8 % (ref 37–47)
HGB BLD-MCNC: 11.1 G/DL (ref 12–16)
KETONES UR STRIP.AUTO-MCNC: NEGATIVE MG/DL
LEUKOCYTE ESTERASE UR QL STRIP.AUTO: NEGATIVE
MCH RBC QN AUTO: 28.7 PG (ref 27–33)
MCHC RBC AUTO-ENTMCNC: 31.9 G/DL (ref 33.6–35)
MCV RBC AUTO: 89.9 FL (ref 81.4–97.8)
MICRO URNS: NORMAL
NITRITE UR QL STRIP.AUTO: NEGATIVE
PH UR STRIP.AUTO: 7.5 [PH] (ref 5–8)
PLATELET # BLD AUTO: 194 K/UL (ref 164–446)
PMV BLD AUTO: 9.3 FL (ref 9–12.9)
POTASSIUM SERPL-SCNC: 4.2 MMOL/L (ref 3.6–5.5)
PROT UR QL STRIP: NEGATIVE MG/DL
RBC # BLD AUTO: 3.87 M/UL (ref 4.2–5.4)
RBC UR QL AUTO: NEGATIVE
SODIUM SERPL-SCNC: 141 MMOL/L (ref 135–145)
SP GR UR STRIP.AUTO: 1.01
UROBILINOGEN UR STRIP.AUTO-MCNC: 0.2 MG/DL
WBC # BLD AUTO: 4.1 K/UL (ref 4.8–10.8)

## 2019-10-27 PROCEDURE — 700102 HCHG RX REV CODE 250 W/ 637 OVERRIDE(OP): Performed by: STUDENT IN AN ORGANIZED HEALTH CARE EDUCATION/TRAINING PROGRAM

## 2019-10-27 PROCEDURE — 80048 BASIC METABOLIC PNL TOTAL CA: CPT

## 2019-10-27 PROCEDURE — G0378 HOSPITAL OBSERVATION PER HR: HCPCS

## 2019-10-27 PROCEDURE — 36415 COLL VENOUS BLD VENIPUNCTURE: CPT

## 2019-10-27 PROCEDURE — A9270 NON-COVERED ITEM OR SERVICE: HCPCS | Performed by: STUDENT IN AN ORGANIZED HEALTH CARE EDUCATION/TRAINING PROGRAM

## 2019-10-27 PROCEDURE — 81003 URINALYSIS AUTO W/O SCOPE: CPT

## 2019-10-27 PROCEDURE — A9270 NON-COVERED ITEM OR SERVICE: HCPCS | Performed by: HOSPITALIST

## 2019-10-27 PROCEDURE — 97161 PT EVAL LOW COMPLEX 20 MIN: CPT

## 2019-10-27 PROCEDURE — 85027 COMPLETE CBC AUTOMATED: CPT

## 2019-10-27 PROCEDURE — 700102 HCHG RX REV CODE 250 W/ 637 OVERRIDE(OP): Performed by: HOSPITALIST

## 2019-10-27 PROCEDURE — 99225 PR SUBSEQUENT OBSERVATION CARE,LEVEL II: CPT | Mod: GC | Performed by: INTERNAL MEDICINE

## 2019-10-27 RX ORDER — CALCIUM CARBONATE 500 MG/1
500 TABLET, CHEWABLE ORAL 2 TIMES DAILY PRN
Status: DISCONTINUED | OUTPATIENT
Start: 2019-10-27 | End: 2019-10-28 | Stop reason: HOSPADM

## 2019-10-27 RX ORDER — DIPHENHYDRAMINE HCL 25 MG
25 TABLET ORAL ONCE
Status: COMPLETED | OUTPATIENT
Start: 2019-10-27 | End: 2019-10-27

## 2019-10-27 RX ORDER — HYDROXYZINE 50 MG/1
50 TABLET, FILM COATED ORAL 3 TIMES DAILY PRN
Status: DISCONTINUED | OUTPATIENT
Start: 2019-10-27 | End: 2019-10-28 | Stop reason: HOSPADM

## 2019-10-27 RX ADMIN — NYSTATIN 100000 UNITS: 100000 CREAM TOPICAL at 08:38

## 2019-10-27 RX ADMIN — ACETAMINOPHEN 650 MG: 325 TABLET, FILM COATED ORAL at 23:49

## 2019-10-27 RX ADMIN — FLUTICASONE PROPIONATE 110 MCG: 110 AEROSOL, METERED RESPIRATORY (INHALATION) at 19:36

## 2019-10-27 RX ADMIN — CEPHALEXIN 500 MG: 500 CAPSULE ORAL at 11:14

## 2019-10-27 RX ADMIN — HYDROXYZINE HYDROCHLORIDE 50 MG: 50 TABLET, FILM COATED ORAL at 10:34

## 2019-10-27 RX ADMIN — FLUTICASONE PROPIONATE 50 MCG: 50 SPRAY, METERED NASAL at 04:58

## 2019-10-27 RX ADMIN — FLUTICASONE PROPIONATE 110 MCG: 110 AEROSOL, METERED RESPIRATORY (INHALATION) at 08:38

## 2019-10-27 RX ADMIN — ACETAMINOPHEN 650 MG: 325 TABLET, FILM COATED ORAL at 11:14

## 2019-10-27 RX ADMIN — NYSTATIN 100000 UNITS: 100000 CREAM TOPICAL at 19:36

## 2019-10-27 RX ADMIN — BACLOFEN 10 MG: 10 TABLET ORAL at 02:49

## 2019-10-27 RX ADMIN — ANTACID TABLETS 500 MG: 500 TABLET, CHEWABLE ORAL at 14:33

## 2019-10-27 RX ADMIN — MORPHINE SULFATE 30 MG: 30 TABLET, EXTENDED RELEASE ORAL at 04:56

## 2019-10-27 RX ADMIN — ACETAMINOPHEN 650 MG: 325 TABLET, FILM COATED ORAL at 05:10

## 2019-10-27 RX ADMIN — DIVALPROEX SODIUM 500 MG: 500 TABLET, DELAYED RELEASE ORAL at 17:37

## 2019-10-27 RX ADMIN — DIPHENHYDRAMINE HCL 25 MG: 25 TABLET ORAL at 12:36

## 2019-10-27 RX ADMIN — CETIRIZINE HYDROCHLORIDE 5 MG: 10 TABLET, FILM COATED ORAL at 04:57

## 2019-10-27 RX ADMIN — OMEPRAZOLE 20 MG: 20 CAPSULE, DELAYED RELEASE ORAL at 04:57

## 2019-10-27 RX ADMIN — FERROUS SULFATE TAB 325 MG (65 MG ELEMENTAL FE) 325 MG: 325 (65 FE) TAB at 04:57

## 2019-10-27 RX ADMIN — TAMSULOSIN HYDROCHLORIDE 0.4 MG: 0.4 CAPSULE ORAL at 08:37

## 2019-10-27 RX ADMIN — PAROXETINE HYDROCHLORIDE 60 MG: 20 TABLET, FILM COATED ORAL at 04:58

## 2019-10-27 RX ADMIN — SENNOSIDES AND DOCUSATE SODIUM 2 TABLET: 8.6; 5 TABLET ORAL at 04:56

## 2019-10-27 RX ADMIN — MORPHINE SULFATE 15 MG: 15 TABLET ORAL at 17:37

## 2019-10-27 RX ADMIN — MONTELUKAST 10 MG: 10 TABLET, FILM COATED ORAL at 19:36

## 2019-10-27 RX ADMIN — CEPHALEXIN 500 MG: 500 CAPSULE ORAL at 23:49

## 2019-10-27 RX ADMIN — OMEPRAZOLE 20 MG: 20 CAPSULE, DELAYED RELEASE ORAL at 17:37

## 2019-10-27 RX ADMIN — VITAMIN D, TAB 1000IU (100/BT) 4000 UNITS: 25 TAB at 04:57

## 2019-10-27 RX ADMIN — CEPHALEXIN 500 MG: 500 CAPSULE ORAL at 17:37

## 2019-10-27 RX ADMIN — DIVALPROEX SODIUM 1000 MG: 500 TABLET, DELAYED RELEASE ORAL at 04:57

## 2019-10-27 RX ADMIN — GUAIFENESIN 600 MG: 600 TABLET, EXTENDED RELEASE ORAL at 04:57

## 2019-10-27 RX ADMIN — CEPHALEXIN 500 MG: 500 CAPSULE ORAL at 04:57

## 2019-10-27 RX ADMIN — GUAIFENESIN 600 MG: 600 TABLET, EXTENDED RELEASE ORAL at 17:37

## 2019-10-27 ASSESSMENT — COGNITIVE AND FUNCTIONAL STATUS - GENERAL
MOBILITY SCORE: 18
SUGGESTED CMS G CODE MODIFIER MOBILITY: CK
MOVING FROM LYING ON BACK TO SITTING ON SIDE OF FLAT BED: A LITTLE
MOVING TO AND FROM BED TO CHAIR: A LITTLE
WALKING IN HOSPITAL ROOM: A LITTLE
STANDING UP FROM CHAIR USING ARMS: A LITTLE
CLIMB 3 TO 5 STEPS WITH RAILING: A LOT

## 2019-10-27 ASSESSMENT — LIFESTYLE VARIABLES: SUBSTANCE_ABUSE: 0

## 2019-10-27 ASSESSMENT — ENCOUNTER SYMPTOMS
SPEECH CHANGE: 0
SORE THROAT: 0
STRIDOR: 0
MYALGIAS: 1
SHORTNESS OF BREATH: 0
COUGH: 0
BACK PAIN: 1
NAUSEA: 0
VOMITING: 0
DIARRHEA: 0
DIZZINESS: 0
FEVER: 0
WHEEZING: 0
EYE PAIN: 0
CHILLS: 0
PALPITATIONS: 0
ORTHOPNEA: 0
SEIZURES: 0
TREMORS: 1
CONSTIPATION: 0
BLURRED VISION: 0

## 2019-10-27 ASSESSMENT — GAIT ASSESSMENTS: GAIT LEVEL OF ASSIST: REFUSED

## 2019-10-27 NOTE — FACE TO FACE
Face to Face Supporting Documentation - Home Health    The encounter with this patient was in whole or in part the primary reason for home health admission.    Date of encounter:   Patient:                    MRN:                       YOB: 2018  Peter Trimble  [id number]  1975     Home health to see patient for:  Physical Therapy evaluation and treatment        Skilled nursing interventions to include:  Comment: physical therapy    Homebound status evidenced by:  Needs the assistance of another person in order to leave the home. Leaving home requires a considerable and taxing effort. There is a normal inability to leave the home.    Community Physician to provide follow up care: Vika Norton M.D.     Optional Interventions? No      I certify the face to face encounter for this home health care referral meets the CMS requirements and the encounter/clinical assessment with the patient was, in whole, or in part, for the medical condition(s) listed above, which is the primary reason for home health care. Based on my clinical findings: the service(s) are medically necessary, support the need for home health care, and the homebound criteria are met.  I certify that this patient has had a face to face encounter by myself.  Harini Eastman M.D. - NPI: [id number]

## 2019-10-27 NOTE — THERAPY
"Physical Therapy Evaluation completed.   Bed Mobility:  Supine to Sit: Supervised  Transfers: Sit to Stand: Supervised  Gait: Level Of Assist: Refused with Front-Wheel Walker       Plan of Care: Will benefit from Physical Therapy 3 times per week  Discharge Recommendations: Equipment: Will Continue to Assess for Equipment Needs. Post-acute therapy Discharge to home with home health for additional skilled therapy services.    See \"Rehab Therapy-Acute\" Patient Summary Report for complete documentation.     Pt is a 45 y/o female presenting to acute setting with painful urination and seizures.  Pt was not terrible forthcoming regarding PLOF, but per OT notes and info Pt did provide, she appears to be at or very near her functional baseline.  Pt did state that she spends most of her day in bed.  During therapy session, no tremors noted, Pt able to maintain statis standing well without LOB while performing carmelita care after using BSC.  Pt was able to stand pivot transfer with FWW well as well as perform bed mobility at supervision level without bed features.  Pt did report that she has 6 steps to enter her mobile home, but she was unwilling to attempt stair training this session.  PT to continue working with her in acute setting to address stairs and household ambulation, anticipate she will be able to return home with her father and caregiver service once medically able to do so however, she expressed anxiety about being \"sent home too soon.\"   "

## 2019-10-27 NOTE — PROGRESS NOTES
"       Internal Medicine Interval Note    Name Peter Trimble 1975   Age/Sex 44 y.o. female   MRN [id number]   Code Status full     After 5PM or if no immediate response to page, please call for cross-coverage  Attending/Team: Dr. Venegas / [facility] team See Patient List for primary contact information  Call [phone] to page    1st Call - Day Intern (R1):   Dr. Eastman 2nd Call - Day Sr. Resident (R2/R3):   Dr. Torres       Reason for interval visit  (Principal Problem)   Hematuria    Interval Problem -  Daily Status Update   - overnight no acute events, vitals stable. No nausea, vomiting or diarrhea.    - : Patient continues to state that urine is red, and also has dysuria now. However urine cultures taken on admission are negative. Hemoglobin remains stable. Urology recommends outpatient follow up. However will repeat urine analysis today.    - Skin: scalp lesion  Improving, will continue cephalexin for 3 more days.  - OT evaluation done, patient will not require any post discharge therapy.  - Patient is medically stable for discharge with outpatient urology follow up, awaiting PT evaluation        Review of Systems   Constitutional: Negative for chills and fever.   HENT: Negative for ear pain and sore throat.    Eyes: Negative for blurred vision and pain.   Respiratory: Negative for cough, shortness of breath, wheezing and stridor.    Cardiovascular: Positive for leg swelling (periodic). Negative for palpitations and orthopnea.   Gastrointestinal: Negative for constipation, diarrhea, nausea and vomiting.   Genitourinary: Positive for hematuria. Negative for dysuria.   Musculoskeletal: Positive for back pain, joint pain and myalgias.   Skin:        Dry skin, with callous on foot, and scabs/leaking on scalp.   Neurological: Positive for tremors (shakiness that she describes as a \"pseudoseizure\".). Negative for dizziness, speech change and seizures (shakiness that she describes as a " "\"pseudoseizure\").   Psychiatric/Behavioral: Negative for substance abuse and suicidal ideas.       Consultants/Specialty  Phone consult to urology - recommended outpatient follow-up.     Disposition  Medically clear for discharge with outpatient urology follow up, pending PT  evaluation.    Quality Measures  Quality-Core Measures   Reviewed items::  Labs reviewed, Medications reviewed and Radiology images reviewed  Jj catheter::  No Jj  DVT prophylaxis pharmacological::  Contraindicated - High bleeding risk  DVT prophylaxis - mechanical:  SCDs        Physical Exam     Vitals:    10/26/19 0954 10/26/19 2000 10/27/19 0400 10/27/19 0700   BP:  130/83 149/66 140/55   Pulse:  77 68 81   Resp:  18 18 16   Temp: 36.6 °C (97.9 °F) 36.4 °C (97.6 °F) 36.6 °C (97.8 °F) 36.9 °C (98.5 °F)   TempSrc: Temporal Temporal Temporal Temporal   SpO2:  94% 96% 99%   Weight:       Height:         Body mass index is 37.17 kg/m².    Oxygen Therapy:  Pulse Oximetry: 99 %, O2 (LPM): 2, O2 Delivery: Silicone Nasal Cannula    Physical Exam   Constitutional: She is oriented to person, place, and time. No distress.   Obese female who appears tired, but alert.    HENT:   Scalp with multiple excoriations (of likely furuncles), with shallow ulcerations/scabbing present.   Faint redness to cheeks, where NC crosses maxilla.    Eyes: Conjunctivae and EOM are normal. Right eye exhibits no discharge. Left eye exhibits no discharge.   Neck: Neck supple. No tracheal deviation present.   Cardiovascular: Normal rate and regular rhythm.   No murmur heard.  Pulmonary/Chest: Effort normal. No stridor. No respiratory distress. She has no wheezes. She has no rales.   Abdominal: Soft. She exhibits no distension. There is no tenderness. There is no rebound and no guarding.   Musculoskeletal: Normal range of motion. She exhibits edema.   Legs are large but mostly without edema.  ... slight puffiness to left foot, with Bruising noted to lateral left toes/foot. "   Dry skin on bilateral feet with thick callous   Neurological: She is alert and oriented to person, place, and time. No cranial nerve deficit. Coordination normal.   Skin: Skin is warm. She is not diaphoretic.   Psychiatric: Mood and memory normal.         Lab Data Review:     Recent Labs     10/24/19  1403 10/25/19  0246 10/26/19  0052 10/27/19  0103   WBC 5.1 4.5* 4.0* 4.1*   NEUTSPOLYS 33.60* 24.00* 18.70*  --    LYMPHOCYTES 50.70* 60.90* 67.40*  --    MONOCYTES 13.30 11.80 10.00  --    EOSINOPHILS 1.60 2.90 3.20  --    BASOPHILS 0.40 0.20 0.50  --    ASTSGOT 21 21 19  --    ALTSGPT 12 10 9  --    ALKPHOSPHAT 43 34 37  --    TBILIRUBIN 0.4 0.4 0.3  --      Recent Labs     10/25/19  0246 10/26/19  0052 10/27/19  0103   RBC 4.13* 4.22 3.87*   HEMOGLOBIN 11.7* 12.1 11.1*   HEMATOCRIT 36.8* 38.4 34.8*   PLATELETCT 222 225 194       Recent Labs     10/24/19  1403 10/25/19  0246 10/26/19  0052 10/27/19  0103   SODIUM 140 143 144 141   POTASSIUM 3.6 3.3* 4.0 4.2   CHLORIDE 96 101 107 106   CO2 34* 34* 29 29   BUN 22 18 12 9   CREATININE 1.59* 1.46* 1.13 0.96   MAGNESIUM 1.5 1.5 2.1  --    CALCIUM 9.0 8.4* 8.6 8.8       Recent Labs     10/24/19  1403 10/25/19  0246 10/26/19  0052 10/27/19  0103   ALTSGPT 12 10 9  --    ASTSGOT 21 21 19  --    ALKPHOSPHAT 43 34 37  --    TBILIRUBIN 0.4 0.4 0.3  --    GLUCOSE 94 87 85 101*         Assessment & Plan     * Hematuria  Assessment & Plan  3 weeks of some redness in urine.  No clots.   Had taken OTC NSAIDs.  CT showed Right renal cyst and multiple small hypodense renal lesions  2015 CT with hepatic cyst . Echo done in 9/19 showed no valvular heart lesions/aneurysms.   Possibly related to Polycystic kidney disease given constellation of issues.  hematuria may be caused by NSAIDs, vs. PCKD, vs Nephrolithiasis.    Felt likely secondary to passing of stone.   -Admitting resident spoke with urology, recommend no intervention at this time,      but outpatient referral advised for  cystoscopy  Plan:  - Counseled patient to refrain from using NSAIDs  - repeat UA  Today as patient complains of dysuria.  - discharge after PT evaluation.    [facility] on CKD (acute kidney injury) ([facility])  Assessment & Plan  Hx of CKD stage III A,   FeNa - 0.7 (pre-renal).   +Hyaline casts on UA. (negative for infection), possible secondary to dehyration  Concurrent hematuria, analgesic nephropathy not excluded   Creatinine is now back to baseline after iv fluids    Plan:  - discontinue iv fluids  - electrolytes now stable  - avoid nephrotoxic medication.      Hypomagnesemia  Assessment & Plan  - serum Mag on admission of 1.5., improved to 2.1 now  - Replete as needed.   - CTM.      Hypertension  Assessment & Plan  - Hx of HTN , on amlodipine 5 mg.  - BP on today of 140/55.  - continue with home meds.  - Hold lasix (previously used for occasional swellings).    Genital fungal infection.  Assessment & Plan  -Received 1 time dose of fluconazole.  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   Appear to have been excoriated areas of furuncles.   - continue keflex

## 2019-10-27 NOTE — PROGRESS NOTES
Patient complaining of headache at approx 1110.  Explained that she could have tylenol.  Patient took tylenol but also asked if she could have Fioricet.  Also complaints of itchiness.  Talked to [facility] DOC at approx 1200. Who ordered 1 dose of benedryl but stated she would not order Fioricet at this time.     [facility] team contacted multiple times for multiple issues, patient frequent requests for doctor regarding upset stomach, headache, and urine culture.  All of patients concerns addressed with physician.  Axox4, up to commode, unable to ambulate, benedryl x 1 for itchiness, adequate I/Os, IVF d/c after found to be running at 100ml/hr this am when shift began,  Frequent call-light use, tylenol for pain, no stones in urine, UA negative

## 2019-10-27 NOTE — CARE PLAN
Problem: Safety  Goal: Will remain free from injury  Outcome: PROGRESSING AS EXPECTED  Note:   Patient nonslip socks on, call light in reach, bed in low position, ask for assistance if needed     Problem: Infection  Goal: Will remain free from infection  Outcome: PROGRESSING AS EXPECTED  Note:   Patient labs, I/os, vitals, and mentation monitored throughout shift

## 2019-10-27 NOTE — CARE PLAN
Problem: Safety  Goal: Will remain free from injury  Outcome: PROGRESSING AS EXPECTED   Q2 turns, pillows for support and repositioning, HARESH cream, frequent incontinence checks, OOB, Heels floated.     Problem: Infection  Goal: Will remain free from infection  Outcome: PROGRESSING AS EXPECTED   Pt educated on importance of completing abx regimen exactly as prescribed

## 2019-10-28 ENCOUNTER — PATIENT OUTREACH (OUTPATIENT)
Dept: HEALTH INFORMATION MANAGEMENT | Facility: OTHER | Age: 44
End: 2019-10-28

## 2019-10-28 VITALS
HEART RATE: 78 BPM | DIASTOLIC BLOOD PRESSURE: 74 MMHG | BODY MASS INDEX: 37.31 KG/M2 | OXYGEN SATURATION: 94 % | WEIGHT: 266.54 LBS | HEIGHT: 71 IN | SYSTOLIC BLOOD PRESSURE: 119 MMHG | RESPIRATION RATE: 18 BRPM | TEMPERATURE: 98.2 F

## 2019-10-28 LAB
ANION GAP SERPL CALC-SCNC: 5 MMOL/L (ref 0–11.9)
BUN SERPL-MCNC: 9 MG/DL (ref 8–22)
CALCIUM SERPL-MCNC: 9 MG/DL (ref 8.5–10.5)
CHLORIDE SERPL-SCNC: 104 MMOL/L (ref 96–112)
CO2 SERPL-SCNC: 31 MMOL/L (ref 20–33)
CREAT SERPL-MCNC: 1.04 MG/DL (ref 0.5–1.4)
ERYTHROCYTE [DISTWIDTH] IN BLOOD BY AUTOMATED COUNT: 45.4 FL (ref 35.9–50)
GLUCOSE SERPL-MCNC: 101 MG/DL (ref 65–99)
HCT VFR BLD AUTO: 36.6 % (ref 37–47)
HGB BLD-MCNC: 11.7 G/DL (ref 12–16)
MCH RBC QN AUTO: 28.5 PG (ref 27–33)
MCHC RBC AUTO-ENTMCNC: 32 G/DL (ref 33.6–35)
MCV RBC AUTO: 89.1 FL (ref 81.4–97.8)
PLATELET # BLD AUTO: 194 K/UL (ref 164–446)
PMV BLD AUTO: 9 FL (ref 9–12.9)
POTASSIUM SERPL-SCNC: 4.1 MMOL/L (ref 3.6–5.5)
RBC # BLD AUTO: 4.11 M/UL (ref 4.2–5.4)
SODIUM SERPL-SCNC: 140 MMOL/L (ref 135–145)
WBC # BLD AUTO: 4 K/UL (ref 4.8–10.8)

## 2019-10-28 PROCEDURE — 700102 HCHG RX REV CODE 250 W/ 637 OVERRIDE(OP): Performed by: STUDENT IN AN ORGANIZED HEALTH CARE EDUCATION/TRAINING PROGRAM

## 2019-10-28 PROCEDURE — A9270 NON-COVERED ITEM OR SERVICE: HCPCS | Performed by: STUDENT IN AN ORGANIZED HEALTH CARE EDUCATION/TRAINING PROGRAM

## 2019-10-28 PROCEDURE — G0378 HOSPITAL OBSERVATION PER HR: HCPCS

## 2019-10-28 PROCEDURE — 80048 BASIC METABOLIC PNL TOTAL CA: CPT

## 2019-10-28 PROCEDURE — 700102 HCHG RX REV CODE 250 W/ 637 OVERRIDE(OP): Performed by: HOSPITALIST

## 2019-10-28 PROCEDURE — 85027 COMPLETE CBC AUTOMATED: CPT

## 2019-10-28 PROCEDURE — A9270 NON-COVERED ITEM OR SERVICE: HCPCS | Performed by: HOSPITALIST

## 2019-10-28 PROCEDURE — 99217 PR OBSERVATION CARE DISCHARGE: CPT | Mod: GC | Performed by: INTERNAL MEDICINE

## 2019-10-28 PROCEDURE — 36415 COLL VENOUS BLD VENIPUNCTURE: CPT

## 2019-10-28 RX ORDER — BUTALBITAL, ACETAMINOPHEN AND CAFFEINE 50; 325; 40 MG/1; MG/1; MG/1
1 TABLET ORAL ONCE
Status: COMPLETED | OUTPATIENT
Start: 2019-10-28 | End: 2019-10-28

## 2019-10-28 RX ORDER — PAROXETINE 30 MG/1
60 TABLET, FILM COATED ORAL DAILY
Qty: 60 TAB | Refills: 1 | Status: SHIPPED | OUTPATIENT
Start: 2019-10-28 | End: 2019-11-28

## 2019-10-28 RX ORDER — ACETAMINOPHEN 325 MG/1
650 TABLET ORAL ONCE
Status: DISCONTINUED | OUTPATIENT
Start: 2019-10-28 | End: 2019-10-28

## 2019-10-28 RX ORDER — TAMSULOSIN HYDROCHLORIDE 0.4 MG/1
0.4 CAPSULE ORAL
Qty: 30 CAP | Refills: 0 | Status: SHIPPED | OUTPATIENT
Start: 2019-10-29 | End: 2019-11-28

## 2019-10-28 RX ORDER — CEPHALEXIN 500 MG/1
500 CAPSULE ORAL EVERY 6 HOURS
Qty: 8 CAP | Refills: 0 | Status: SHIPPED | OUTPATIENT
Start: 2019-10-28 | End: 2019-10-28

## 2019-10-28 RX ORDER — CEPHALEXIN 500 MG/1
500 CAPSULE ORAL EVERY 6 HOURS
Qty: 8 CAP | Refills: 0 | Status: SHIPPED | OUTPATIENT
Start: 2019-10-28 | End: 2019-10-30

## 2019-10-28 RX ADMIN — GUAIFENESIN 600 MG: 600 TABLET, EXTENDED RELEASE ORAL at 06:01

## 2019-10-28 RX ADMIN — NYSTATIN 100000 UNITS: 100000 CREAM TOPICAL at 08:30

## 2019-10-28 RX ADMIN — FERROUS SULFATE TAB 325 MG (65 MG ELEMENTAL FE) 325 MG: 325 (65 FE) TAB at 05:59

## 2019-10-28 RX ADMIN — VITAMIN D, TAB 1000IU (100/BT) 4000 UNITS: 25 TAB at 05:58

## 2019-10-28 RX ADMIN — CEPHALEXIN 500 MG: 500 CAPSULE ORAL at 11:53

## 2019-10-28 RX ADMIN — TAMSULOSIN HYDROCHLORIDE 0.4 MG: 0.4 CAPSULE ORAL at 09:14

## 2019-10-28 RX ADMIN — ACETAMINOPHEN 650 MG: 325 TABLET, FILM COATED ORAL at 06:08

## 2019-10-28 RX ADMIN — ACETAMINOPHEN 650 MG: 325 TABLET, FILM COATED ORAL at 16:23

## 2019-10-28 RX ADMIN — FLUTICASONE PROPIONATE 50 MCG: 50 SPRAY, METERED NASAL at 06:01

## 2019-10-28 RX ADMIN — DIVALPROEX SODIUM 1000 MG: 500 TABLET, DELAYED RELEASE ORAL at 06:00

## 2019-10-28 RX ADMIN — CETIRIZINE HYDROCHLORIDE 5 MG: 10 TABLET, FILM COATED ORAL at 06:00

## 2019-10-28 RX ADMIN — MORPHINE SULFATE 15 MG: 15 TABLET ORAL at 16:23

## 2019-10-28 RX ADMIN — CEPHALEXIN 500 MG: 500 CAPSULE ORAL at 06:01

## 2019-10-28 RX ADMIN — SENNOSIDES AND DOCUSATE SODIUM 2 TABLET: 8.6; 5 TABLET ORAL at 05:58

## 2019-10-28 RX ADMIN — BUTALBITAL, ACETAMINOPHEN, AND CAFFEINE 1 TABLET: 50; 325; 40 TABLET ORAL at 11:53

## 2019-10-28 RX ADMIN — MORPHINE SULFATE 30 MG: 30 TABLET, EXTENDED RELEASE ORAL at 05:59

## 2019-10-28 RX ADMIN — OMEPRAZOLE 20 MG: 20 CAPSULE, DELAYED RELEASE ORAL at 05:59

## 2019-10-28 RX ADMIN — PAROXETINE HYDROCHLORIDE 60 MG: 20 TABLET, FILM COATED ORAL at 06:01

## 2019-10-28 ASSESSMENT — ENCOUNTER SYMPTOMS
DIZZINESS: 0
CHILLS: 0
NAUSEA: 0
ORTHOPNEA: 0
CONSTIPATION: 0
VOMITING: 0
SEIZURES: 0
MYALGIAS: 1
PALPITATIONS: 0
SORE THROAT: 0
WHEEZING: 0
COUGH: 0
FEVER: 0
DIARRHEA: 0
SHORTNESS OF BREATH: 0
SPEECH CHANGE: 0
BLURRED VISION: 0
STRIDOR: 0
EYE PAIN: 0
BACK PAIN: 1
TREMORS: 1

## 2019-10-28 ASSESSMENT — LIFESTYLE VARIABLES: SUBSTANCE_ABUSE: 0
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
## 2019-10-28 NOTE — PROGRESS NOTES
"       Internal Medicine Interval Note    Name Peter Trimble 1975   Age/Sex 44 y.o. female   MRN [id number]   Code Status full     After 5PM or if no immediate response to page, please call for cross-coverage  Attending/Team: Dr. Venegas / Andrey team See Patient List for primary contact information  Call [phone] to page    1st Call - Day Intern (R1):   Dr. Eastman 2nd Call - Day Sr. Resident (R2/R3):   Dr. Torres       Reason for interval visit  (Principal Problem)   Hematuria    Interval Problem -  Daily Status Update   - overnight no acute events, vitals stable. No nausea, vomiting or diarrhea.  -  stated that she has dysuria, repeat UA was ordered and was negative. Also patient stated that she wants ciprofloxacin as she read on Internet that it is a better a antibiotic, however request was declined as UA and urine culture are negative. She will follow up with urology outpatient for possible cystoscopy.  - Patient appears not motivated to leave the hospital and get back to her routine activity  - Home health for PT was ordered for her. Otherwise medically clear for discharge.  - She will need to schedule an outpatient urology appointment and f/u with PCP .    Review of Systems   Constitutional: Negative for chills and fever.   HENT: Negative for ear pain and sore throat.    Eyes: Negative for blurred vision and pain.   Respiratory: Negative for cough, shortness of breath, wheezing and stridor.    Cardiovascular: Positive for leg swelling (periodic). Negative for palpitations and orthopnea.   Gastrointestinal: Negative for constipation, diarrhea, nausea and vomiting.   Genitourinary: Negative for dysuria and hematuria.   Musculoskeletal: Positive for back pain, joint pain and myalgias.   Skin:        Dry skin, with callous on foot, and scabs/leaking on scalp.   Neurological: Positive for tremors (shakiness that she describes as a \"pseudoseizure\".). Negative for dizziness, speech change " "and seizures (shakiness that she describes as a \"pseudoseizure\").   Psychiatric/Behavioral: Negative for substance abuse and suicidal ideas.       Consultants/Specialty  Phone consult to urology - recommended outpatient follow-up.     Disposition  Medically clear for discharge with outpatient urology follow up    Quality Measures  Quality-Core Measures   Reviewed items::  Labs reviewed, Medications reviewed and Radiology images reviewed  Jj catheter::  No Jj  DVT prophylaxis pharmacological::  Contraindicated - High bleeding risk  DVT prophylaxis - mechanical:  SCDs        Physical Exam     Vitals:    10/27/19 1500 10/27/19 1905 10/28/19 0324 10/28/19 0800   BP: 144/88 153/90 145/84 159/95   Pulse: 69 76 76 76   Resp: 16 18 17 18   Temp: 36.8 °C (98.3 °F) 36.1 °C (96.9 °F) 36.5 °C (97.7 °F) 36.8 °C (98.2 °F)   TempSrc: Temporal Temporal Temporal Oral   SpO2: 96% 96% 97% 97%   Weight:       Height:         Body mass index is 37.17 kg/m².    Oxygen Therapy:  Pulse Oximetry: 97 %, O2 (LPM): 2, O2 Delivery: Silicone Nasal Cannula    Physical Exam   Constitutional: She is oriented to person, place, and time. No distress.   Obese female who appears tired, but alert.    HENT:   Scalp with multiple excoriations (of likely furuncles), with shallow ulcerations/scabbing present.   Faint redness to cheeks, where NC crosses maxilla.    Eyes: Conjunctivae and EOM are normal. Right eye exhibits no discharge. Left eye exhibits no discharge.   Neck: Neck supple. No tracheal deviation present.   Cardiovascular: Normal rate and regular rhythm.   No murmur heard.  Pulmonary/Chest: Effort normal. No stridor. No respiratory distress. She has no wheezes. She has no rales.   Abdominal: Soft. She exhibits no distension. There is no tenderness. There is no rebound and no guarding.   Musculoskeletal: Normal range of motion. She exhibits edema.   Legs are large but mostly without edema.  ... slight puffiness to left foot, with Bruising " noted to lateral left toes/foot.   Dry skin on bilateral feet with thick callous   Neurological: She is alert and oriented to person, place, and time. No cranial nerve deficit. Coordination normal.   Skin: Skin is warm. She is not diaphoretic.   Psychiatric: Mood and memory normal.         Lab Data Review:     Recent Labs     10/26/19  0052 10/27/19  0103 10/28/19  0106   WBC 4.0* 4.1* 4.0*   NEUTSPOLYS 18.70*  --   --    LYMPHOCYTES 67.40*  --   --    MONOCYTES 10.00  --   --    EOSINOPHILS 3.20  --   --    BASOPHILS 0.50  --   --    ASTSGOT 19  --   --    ALTSGPT 9  --   --    ALKPHOSPHAT 37  --   --    TBILIRUBIN 0.3  --   --      Recent Labs     10/26/19  0052 10/27/19  0103 10/28/19  0106   RBC 4.22 3.87* 4.11*   HEMOGLOBIN 12.1 11.1* 11.7*   HEMATOCRIT 38.4 34.8* 36.6*   PLATELETCT 225 194 194       Recent Labs     10/26/19  0052 10/27/19  0103 10/28/19  0106   SODIUM 144 141 140   POTASSIUM 4.0 4.2 4.1   CHLORIDE 107 106 104   CO2 29 29 31   BUN 12 9 9   CREATININE 1.13 0.96 1.04   MAGNESIUM 2.1  --   --    CALCIUM 8.6 8.8 9.0       Recent Labs     10/26/19  0052 10/27/19  0103 10/28/19  0106   ALTSGPT 9  --   --    ASTSGOT 19  --   --    ALKPHOSPHAT 37  --   --    TBILIRUBIN 0.3  --   --    GLUCOSE 85 101* 101*         Assessment & Plan     * Hematuria  Assessment & Plan  3 weeks of some redness in urine.  No clots.   Had taken OTC NSAIDs.  CT showed Right renal cyst and multiple small hypodense renal lesions  2015 CT with hepatic cyst . Echo done in 9/19 showed no valvular heart lesions/aneurysms.   Possibly related to Polycystic kidney disease given constellation of issues.  hematuria may be caused by NSAIDs, vs. PCKD, vs Nephrolithiasis.    Felt likely secondary to passing of stone.   -Admitting resident spoke with urology, recommend no intervention at this time,      but outpatient referral advised for cystoscopy  Plan:  - Counseled patient to refrain from using NSAIDs  - repeat UA  Yesterday negative  for pyuria  - discharge patient, home health for physical therapy and skilled health aid ordered.    ZULLY on CKD (acute kidney injury) ([facility])  Assessment & Plan  Hx of CKD stage III A,   FeNa - 0.7 (pre-renal).   +Hyaline casts on UA. (negative for infection), possible secondary to dehyration  Concurrent hematuria, analgesic nephropathy not excluded   Creatinine is now back to baseline after iv fluids    Plan:  - discontinue iv fluids  - electrolytes now stable  - avoid nephrotoxic medication.      Hypomagnesemia  Assessment & Plan  - serum Mag on admission of 1.5., improved to 2.1  -resolved      Hypertension  Assessment & Plan  - Hx of HTN , on amlodipine 5 mg.  - BP on today of 145/84  - continue with home meds.      Genital fungal infection.  Assessment & Plan  -Received 1 time dose of fluconazole.  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   Appear to have been excoriated areas of furuncles.   - continue keflex for 2 more days on discharge

## 2019-10-28 NOTE — PROGRESS NOTES
Bedside report received. Pt is A&Ox4. Pt assessed for pain regularly and medicated PRN per MAR. Pt was initially upset regarding plan for discharge and refusing to discharge. Social work was notified and other options such as home health were discussed with pt. Pt verbally agrees to home health at this time. POC discussed with pt; all questions answered at this time.

## 2019-10-28 NOTE — DISCHARGE PLANNING
Agency[facility]Facility Name: [facility]  Spoke To: Aletha  Outcome: Patient referral declined, not accepting Medicaid FFS at this time.

## 2019-10-28 NOTE — DISCHARGE PLANNING
Agency/Facility Name: [facility]  Spoke To: Chetan  Outcome: This [facility] called to see if the Home Health provider is accepting Medicaid FFS at this time.  They are not at this time.    [facility] Name: [facility]  Spoke To: Leydi  Outcome: This [facility] called to see if the Home Health provider is accepting Medicaid FFS at this time.  They are not at this time.    EDWIN Dukes notified.

## 2019-10-28 NOTE — DISCHARGE PLANNING
[facility] Name: [facility]  Spoke To: Zuleyka  Outcome: Fax has been down all day.  [facility] will have liaison come to office and  referral.    EDWIN Dukes notified.

## 2019-10-28 NOTE — PROGRESS NOTES
Handoff received from previous nurse Amilcar at the patients bedside. Patient was resting comfortably and the bed was in a low and locked position. Call light in reach. Needs met at this time.

## 2019-10-28 NOTE — CARE PLAN
Problem: Knowledge Deficit  Goal: Knowledge of disease process/condition, treatment plan, diagnostic tests, and medications will improve  Outcome: PROGRESSING AS EXPECTED  Note:   Pt educated regarding plan of care and medications. All questions answered.       Problem: Pain Management  Goal: Pain level will decrease to patient's comfort goal  Outcome: PROGRESSING AS EXPECTED  Note:   Pt assessed for pain regularly and medicated PRN per MAR.

## 2019-10-28 NOTE — DISCHARGE PLANNING
Received Choice form at [phone]  Agency/Facility Name: [facility]  Referral sent per Choice form @ [id number]

## 2019-10-28 NOTE — DISCHARGE PLANNING
Patient is eligible for Medicaid Meds to Beds at discharge if they have coverage with [place] Medicaid, Medicaid FFS, Medicaid HMO ([facility]), or [place]. This service is provided through the [facility] if orders are received by the pharmacy prior to 4pm Monday through Friday excluding holidays. Preferred pharmacy has been changed to [facility]. Please call [phone] prior to discharge.

## 2019-10-28 NOTE — DISCHARGE PLANNING
Medication reconcilliation completed. Medications delivered to patient at bedside. Patient counseled.    Interventions include: tamsulosin required a PA. Insurance will not accept claim until tomorrow. [facility] floated patient 5 capsules and will mail #25 caps to address on file once claim is approved. Address has been verified with patient.     Peter Trimble   Home Medication Instructions LUZ MARINA:[id number]    Printed on:10/28/19 8699   Medication Information                      PARoxetine (PAXIL) 30 MG Tab  Take 2 Tabs by mouth every day for 31 days.             tamsulosin (FLOMAX) 0.4 MG capsule  Take 1 Cap by mouth ONE-HALF HOUR AFTER BREAKFAST for 30 days.

## 2019-10-28 NOTE — DISCHARGE INSTRUCTIONS
Discharge Instructions    Discharged to home by car with friend. Discharged via wheelchair, hospital escort: Yes.  Special equipment needed: Not Applicable    Be sure to schedule a follow-up appointment with your primary care doctor or any specialists as instructed.     Discharge Plan:   Diet Plan: Discussed  Activity Level: Discussed  Confirmed Follow up Appointment: No (Comments)  Confirmed Symptoms Management: Discussed  Medication Reconciliation Updated: Yes  Influenza Vaccine Indication: Not indicated: Previously immunized this influenza season and > 8 years of age    I understand that a diet low in cholesterol, fat, and sodium is recommended for good health. Unless I have been given specific instructions below for another diet, I accept this instruction as my diet prescription.   Other diet: regular diet    Special Instructions: None    · Is patient discharged on Warfarin / Coumadin?   No         Abdominal Pain, Adult  Many things can cause belly (abdominal) pain. Most times, belly pain is not dangerous. Many cases of belly pain can be watched and treated at home. Sometimes belly pain is serious, though. Your doctor will try to find the cause of your belly pain.  Follow these instructions at home:  · Take over-the-counter and prescription medicines only as told by your doctor. Do not take medicines that help you poop (laxatives) unless told to by your doctor.  · Drink enough fluid to keep your pee (urine) clear or pale yellow.  · Watch your belly pain for any changes.  · Keep all follow-up visits as told by your doctor. This is important.  Contact a doctor if:  · Your belly pain changes or gets worse.  · You are not hungry, or you lose weight without trying.  · You are having trouble pooping (constipated) or have watery poop (diarrhea) for more than 2-3 days.  · You have pain when you pee or poop.  · Your belly pain wakes you up at night.  · Your pain gets worse with meals, after eating, or with certain  foods.  · You are throwing up and cannot keep anything down.  · You have a fever.  Get help right away if:  · Your pain does not go away as soon as your doctor says it should.  · You cannot stop throwing up.  · Your pain is only in areas of your belly, such as the right side or the left lower part of the belly.  · You have bloody or black poop, or poop that looks like tar.  · You have very bad pain, cramping, or bloating in your belly.  · You have signs of not having enough fluid or water in your body (dehydration), such as:  ¨ Dark pee, very little pee, or no pee.  ¨ Cracked lips.  ¨ Dry mouth.  ¨ Sunken eyes.  ¨ Sleepiness.  ¨ Weakness.  This information is not intended to replace advice given to you by your health care provider. Make sure you discuss any questions you have with your health care provider.  Document Released: 06/05/2009 Document Revised: 07/07/2017 Document Reviewed: 05/31/2017  [employer] Interactive Patient Education © 2017 [employer]                        Depression / Suicide Risk    As you are discharged from this [facility] facility, it is important to learn how to keep safe from harming yourself.    Recognize the warning signs:  · Abrupt changes in personality, positive or negative- including increase in energy   · Giving away possessions  · Change in eating patterns- significant weight changes-  positive or negative  · Change in sleeping patterns- unable to sleep or sleeping all the time   · Unwillingness or inability to communicate  · Depression  · Unusual sadness, discouragement and loneliness  · Talk of wanting to die  · Neglect of personal appearance   · Rebelliousness- reckless behavior  · Withdrawal from people/activities they love  · Confusion- inability to concentrate     If you or a loved one observes any of these behaviors or has concerns about self-harm, here's what you can do:  · Talk about it- your feelings and reasons for harming yourself  · Remove any means that you might  use to hurt yourself (examples: pills, rope, extension cords, firearm)  · Get professional help from the community (Mental Health, Substance Abuse, psychological counseling)  · Do not be alone:Call your Safe Contact- someone whom you trust who will be there for you.  · Call your local CRISIS HOTLINE 303-8195 or 036-865-3719  · Call your local Children's Mobile Crisis Response Team [place] (867) 016-7438 or www.[employer]  · Call the toll free National Suicide Prevention Hotlines   · National Suicide Prevention Lifeline 842-887-EEDS (9404)  · National Hope Line Network 800-SUICIDE (772-9304)

## 2019-10-28 NOTE — DISCHARGE PLANNING
Anticipated Discharge Disposition: Home with HH    Action: Met with pt at bedside. HH choice signed and received. Per Primary RN pt states she is refusing DC. Pt is a Medicaid insured pt and is unable to refuse DC. Choice form faxed to [facility].     Barriers to Discharge: HH acceptance    Plan: pending HH acceptance then home to [place].       Length of Stay: 3

## 2019-10-28 NOTE — DISCHARGE PLANNING
Meds to Beds: Met with patient at bedside to deliver cephalexin. Patient refused this antibiotic, stating that she has been having some itching and believes it may be related to the cephalexin. Patient requested ciprofloxacin which per chart review was previously declined due to negative cultures. Educated patient that cephalexin is for the furuncles on her scalp. Patient still refused cephalexin. Returned product to [facility] pharmacy.   Patient requested outpatient orders for tamsulosin and her paroxetine. Notified Dr. Eastman of the above.

## 2019-10-29 LAB
BACTERIA BLD CULT: NORMAL
BACTERIA BLD CULT: NORMAL
SIGNIFICANT IND 70042: NORMAL
SIGNIFICANT IND 70042: NORMAL
SITE SITE: NORMAL
SITE SITE: NORMAL
SOURCE SOURCE: NORMAL
SOURCE SOURCE: NORMAL

## 2019-10-29 NOTE — PROGRESS NOTES
Pt dc'd home. IV was removed. Pt left unit via wheelchair with friend. Personal belongings with pt when leaving unit. Pt given discharge instructions prior to leaving unit including prescription and when to visit with physician; verbalizes understanding. Copy of discharge instructions with pt and in the chart.

## 2019-10-30 ENCOUNTER — APPOINTMENT (OUTPATIENT)
Dept: NEPHROLOGY | Facility: MEDICAL CENTER | Age: 44
End: 2019-10-30
Payer: MEDICAID

## 2019-11-04 ENCOUNTER — OFFICE VISIT (OUTPATIENT)
Dept: URGENT CARE | Facility: PHYSICIAN GROUP | Age: 44
End: 2019-11-04
Payer: MEDICAID

## 2019-11-04 ENCOUNTER — HOSPITAL ENCOUNTER (OUTPATIENT)
Facility: MEDICAL CENTER | Age: 44
End: 2019-11-04
Attending: PHYSICIAN ASSISTANT
Payer: MEDICAID

## 2019-11-04 VITALS
HEIGHT: 71 IN | WEIGHT: 250 LBS | TEMPERATURE: 97.2 F | OXYGEN SATURATION: 93 % | RESPIRATION RATE: 14 BRPM | HEART RATE: 107 BPM | BODY MASS INDEX: 35 KG/M2 | DIASTOLIC BLOOD PRESSURE: 74 MMHG | SYSTOLIC BLOOD PRESSURE: 122 MMHG

## 2019-11-04 DIAGNOSIS — N94.9 VAGINAL SYMPTOM: ICD-10-CM

## 2019-11-04 DIAGNOSIS — K29.70 GASTRITIS WITHOUT BLEEDING, UNSPECIFIED CHRONICITY, UNSPECIFIED GASTRITIS TYPE: ICD-10-CM

## 2019-11-04 DIAGNOSIS — N23 RENAL COLIC: ICD-10-CM

## 2019-11-04 LAB
CANDIDA DNA VAG QL PROBE+SIG AMP: NEGATIVE
G VAGINALIS DNA VAG QL PROBE+SIG AMP: NEGATIVE
T VAGINALIS DNA VAG QL PROBE+SIG AMP: NEGATIVE

## 2019-11-04 PROCEDURE — 87086 URINE CULTURE/COLONY COUNT: CPT

## 2019-11-04 PROCEDURE — 87660 TRICHOMONAS VAGIN DIR PROBE: CPT

## 2019-11-04 PROCEDURE — 99214 OFFICE O/P EST MOD 30 MIN: CPT | Performed by: PHYSICIAN ASSISTANT

## 2019-11-04 PROCEDURE — 87480 CANDIDA DNA DIR PROBE: CPT

## 2019-11-04 PROCEDURE — 87510 GARDNER VAG DNA DIR PROBE: CPT

## 2019-11-04 RX ORDER — OMEPRAZOLE 20 MG/1
20 CAPSULE, DELAYED RELEASE ORAL DAILY
Qty: 30 CAP | Refills: 0 | Status: SHIPPED | OUTPATIENT
Start: 2019-11-04 | End: 2019-12-06

## 2019-11-04 RX ORDER — DICLOFENAC SODIUM 75 MG/1
75 TABLET, DELAYED RELEASE ORAL 2 TIMES DAILY
Qty: 30 TAB | Refills: 0 | Status: SHIPPED | OUTPATIENT
Start: 2019-11-04 | End: 2019-12-06

## 2019-11-04 NOTE — PROGRESS NOTES
Chief Complaint   Patient presents with   • Dysuria     Pt states seen at [facility]        HISTORY OF PRESENT ILLNESS: Patient is a 44 y.o. female who presents today because She has multiple symptoms including bilateral flank pain, lower abdominal pain, vaginal burning sensation.  She just got out of hospital about a week ago, had a CT that showed kidney stones and she has a pending appointment with a urologist.  She has not been able to take the tamsulosin that he prescribed because it gives her a rash    Patient Active Problem List    Diagnosis Date Noted   • ZULLY on CKD (acute kidney injury) ([facility]) 10/25/2019     Priority: High   • Hematuria 10/24/2019     Priority: High   • Psychogenic nonepileptic seizure 08/06/2016     Priority: High   • Hypomagnesemia 10/25/2019     Priority: Medium   • Anxiety 09/11/2019     Priority: Medium   • Morbid obesity ([facility]) 06/25/2015     Priority: Medium   • Closed fracture of one rib 06/25/2015     Priority: Medium   • Lumbar transverse process fracture ([facility]) 06/25/2015     Priority: Medium   • Chronic pain 08/18/2014     Priority: Medium   • Hypertension 10/04/2011     Priority: Medium   • Depression 09/28/2011     Priority: Medium   • Bipolar 2 disorder ([facility]) 09/28/2011     Priority: Medium   • Chronic migraine 09/28/2011     Priority: Medium   • Asthma 09/28/2011     Priority: Medium   • Genital fungal infection. 10/25/2019     Priority: Low   • Electrolyte abnormality 09/12/2019     Priority: Low   • Furunculosis 09/11/2019     Priority: Low   • Hyperlipidemia 08/18/2014     Priority: Low   • AVA (obstructive sleep apnea), intolerant of CPAP 07/08/2013     Priority: Low   • GERD (gastroesophageal reflux disease) 09/28/2011     Priority: Low   • Morbid obesity with BMI of 40.0-44.9, adult ([facility]) 06/20/2019   • Medication overuse headache 10/30/2018   • Vitamin deficiency 09/26/2018   • Obesity (BMI 30-39.9) 01/05/2018   • Personality disorder 10/04/2016   • Dystonia 10/04/2016   •  Chest pain 10/03/2016   • Ankle fracture, right 10/03/2016   • Vaginal yeast infection 10/03/2016   • PTSD (post-traumatic stress disorder) 10/02/2016   • Depression 08/07/2016   • Chronic pain 08/07/2016   • HTN (hypertension), benign 08/07/2016   • Fracture of ankle, trimalleolar, closed 08/06/2016   • Controlled substance agreement signed 02/19/2016   • Rib fracture 06/25/2015   • Benign hypertensive heart disease without heart failure 10/16/2014   • Mixed hyperlipidemia 10/16/2014   • Open scalp wound 10/16/2014   • Lump or mass in breast 05/29/2014   • Intractable migraine without aura and without status migrainosus 02/19/2014   • Neck pain 02/19/2014   • Fibromyalgia 02/19/2014   • Obese 07/08/2013   • Oxygen dependent, since 2011 07/08/2013   • Hypertrophy of breast 04/29/2013       Allergies:Compazine; Imitrex [sumatriptan succinate]; Inapsine [droperidol]; Maxalt [rizatriptan benzoate]; Phenergan [promethazine hcl]; Xanax [alprazolam]; Zantac; Apple cider vinegar; Butrans [buprenorphine]; Clarithromycin; Dilaudid [hydromorphone]; Doxycycline; Effexor [venlafaxine]; Eucalyptus oil; Gabapentin; Kiwi extract; Latex; Lyrica [pregabalin]; Minocycline; Morphabond er [renny]; Patchouli oil; Sulfa drugs; Tea tree oil; and Topiramate    Current Outpatient Medications Ordered in Epic   Medication Sig Dispense Refill   • omeprazole (PRILOSEC) 20 MG delayed-release capsule Take 1 Cap by mouth every day. 30 Cap 0   • diclofenac EC (VOLTAREN) 75 MG Tablet Delayed Response Take 1 Tab by mouth 2 times a day. 30 Tab 0   • PARoxetine (PAXIL) 30 MG Tab Take 2 Tabs by mouth every day for 31 days. 60 Tab 1   • baclofen (LIORESAL) 10 MG Tab Take 10 mg by mouth 3 times a day as needed (Pain).     • divalproex (DEPAKOTE) 500 MG Tablet Delayed Response Take 500-1,000 mg by mouth 2 Times a Day. 1000mg in AM  500mg at PM     • fluticasone (FLOVENT HFA) 220 MCG/ACT Aerosol Inhale 1 Puff by mouth 2 times a day.     • amLODIPine  (NORVASC) 5 MG Tab Take 2.5 mg by mouth 2 Times a Day.     • Erenumab (AIMOVIG) 70 MG/ML Solution Auto-injector Inject 70 mg as instructed Q30 DAYS.     • fluticasone (FLONASE) 50 MCG/ACT nasal spray Spray 1 Spray in nose every day.     • potassium chloride SA (KDUR) 20 MEQ Tab CR Take 20 mEq by mouth 2 times a day.     • ondansetron (ZOFRAN ODT) 4 MG TABLET DISPERSIBLE Take 4 mg by mouth every 6 hours as needed for Nausea.     • Ciclopirox 1 % Shampoo 1 g by Apply externally route 1 time daily as needed (Sores on Scalp).     • hydrOXYzine HCl (ATARAX) 25 MG Tab Take 50 mg by mouth 3 times a day.     • Cholecalciferol (VITAMIN D) 2000 UNIT Tab Take 4,000 Units by mouth every day.     • furosemide (LASIX) 20 MG Tab Take 20 mg by mouth 2 times a day.     • morphine (MS IR) 15 MG tablet Take 15 mg by mouth 2 times a day as needed (Breakthrough Pain).     • albuterol (PROAIR HFA) 108 (90 Base) MCG/ACT Aero Soln inhalation aerosol ProAir HFA 90 mcg/actuation aerosol inhaler     • guaifenesin LA (MUCINEX) 600 MG TABLET SR 12 HR Take 600 mg by mouth every 12 hours.     • cetirizine (ZYRTEC) 10 MG Tab Take 10 mg by mouth every day.     • esomeprazole (NEXIUM) 40 MG delayed-release capsule Take 40 mg by mouth 2 Times a Day.     • ferrous sulfate 325 (65 FE) MG tablet Take 325 mg by mouth every day.     • montelukast (SINGULAIR) 10 MG Tab Take 10 mg by mouth every bedtime.     • tamsulosin (FLOMAX) 0.4 MG capsule Take 1 Cap by mouth ONE-HALF HOUR AFTER BREAKFAST for 30 days. (Patient not taking: Reported on 11/4/2019) 30 Cap 0   • Estradiol 10 MCG INSERT Insert 10 mcg in vagina every bedtime.     • nystatin (MYCOSTATIN) 928529 UNIT/GM Cream topical cream Apply 1 g to affected area(s) 2 times a day.     • Morphine Sulfate ER 30 MG Tablet Extended Release 12 hour Abuse-Deterrent Take 30 mg by mouth every morning.       No current [facility]-ordered facility-administered medications on file.        Past Medical History:   Diagnosis  "Date   • Acute blood loss anemia 2013    -resolved, postop     • Acute renal failure (ARF) ([facility]) 10/5/2011    -resolved (post op )    • Anemia    • Anxiety    • Arthritis     osteoarthritis since 15yo.   • ASTHMA     O2 3liters at HS and prn   • Bipolar affective ([facility])    • Breath shortness    • Cold    • Depression    • Eclampsia complicating pregnancy    • Environmental allergies    • Essential hypertension, malignant 2011   • Fibromyalgia    • GERD (gastroesophageal reflux disease)    • Heart murmur     she denies this hx   • History of pregnancy 10/16/2014        • Hx MRSA infection    • Hyperlipidemia 14    \"off medication\"   • Hypertension    • Kidney stones    • Migraines    • Pain 15    \"my body hurts all the time\", 5-6/10   • Personality disorder ([facility])    • Pneumonia    • PTSD (post-traumatic stress disorder)    • Scalp wound 2014   • Seizure ([facility]) 14    last    • Sleep apnea     has had a sleep study, use O2 at HS   • Snoring    • Stroke ([facility])      reports strokes x5 , and a \"brain bleed\"   • Subarachnoid hemorrhage ([facility]) 2011    -small,  (2nd to HTN)    • Unspecified hemorrhagic conditions     nose-bleeds, bruises easily   • Unspecified urinary incontinence    • Urinary bladder disorder        Social History     Tobacco Use   • Smoking status: Never Smoker   • Smokeless tobacco: Never Used   Substance Use Topics   • Alcohol use: No   • Drug use: No       Family Status   Relation Name Status   • Mo  Alive   • Fa  Alive   • Bro  Alive   • MAunt  Alive   • MUnc  Alive   • PAunt     • PUnc     • MGMo  Alive   • MGFa     • PGMo     • PGFa       Family History   Problem Relation Age of Onset   • Hypertension Mother    • Hyperlipidemia Mother    • Hypertension Father    • Hyperlipidemia Father    • Heart Disease Father    • Psychiatric Illness Father    • Alcohol/Drug Father    • Alcohol/Drug Brother  " "  • Arthritis Maternal Uncle    • Psychiatric Illness Maternal Uncle    • Heart Disease Maternal Uncle    • Hypertension Maternal Uncle    • Hyperlipidemia Maternal Uncle    • Genetic Disorder Paternal Aunt    • Psychiatric Illness Paternal Uncle    • Arthritis Maternal Grandmother    • Heart Disease Maternal Grandmother    • Alcohol/Drug Maternal Grandfather    • Stroke Maternal Grandfather    • Psychiatric Illness Maternal Grandfather    • Arthritis Paternal Grandmother    • Alcohol/Drug Paternal Grandfather        ROS:  Review of Systems   Constitutional: Negative for fever, chills, weight loss and  positive for malaise/fatigue.   HENT: Negative for ear pain, nosebleeds, congestion, sore throat and neck pain.    Eyes: Negative for blurred vision.   Respiratory: Negative for cough, sputum production, shortness of breath and wheezing.    Cardiovascular: Negative for chest pain, palpitations, orthopnea and leg swelling.   Gastrointestinal: Negative for heartburn, positive for nausea, vomiting and positive for lower abdominal pain.  Positive for flank pain  Genitourinary: Negative for dysuria, urgency and frequency.  Positive for vaginal burning sensation as in HPI    Exam:  /74 (BP Location: Left arm, Patient Position: Sitting, BP Cuff Size: Adult)   Pulse (!) 107   Temp 36.2 °C (97.2 °F)   Resp 14   Ht 1.803 m (5' 11\")   Wt 113.4 kg (250 lb)   SpO2 93%   General:  Well nourished, well developed female in NAD  Head:Normocephalic, atraumatic  Eyes: PERRLA, EOM within normal limits, no conjunctival injection, no scleral icterus, visual fields and acuity grossly intact.  Nose: Symmetrical without tenderness, no discharge.  Mouth: reasonable hygiene, no erythema exudates or tonsillar enlargement.  Neck: no masses, range of motion within normal limits, no tracheal deviation. No obvious thyroid enlargement.  Extremities: no clubbing, cyanosis, or edema.    Please note that this dictation was created using " voice recognition software. I have made every reasonable attempt to correct obvious errors, but I expect that there are errors of grammar and possibly content that I did not discover before finalizing the note.    Assessment/Plan:  1. Vaginal symptom  POCT Urinalysis    VAGINAL PATHOGENS DNA PANEL    URINE CULTURE(NEW)   2. Renal colic  diclofenac EC (VOLTAREN) 75 MG Tablet Delayed Response   3. Gastritis without bleeding, unspecified chronicity, unspecified gastritis type  omeprazole (PRILOSEC) 20 MG delayed-release capsule   She would like a refill on her Prilosec    Followup with primary care in the next 7-10 days if not significantly improving, return to the urgent care or go to the emergency room sooner for any worsening of symptoms.

## 2019-11-07 LAB
BACTERIA UR CULT: ABNORMAL
BACTERIA UR CULT: ABNORMAL
SIGNIFICANT IND 70042: ABNORMAL
SITE SITE: ABNORMAL
SOURCE SOURCE: ABNORMAL

## 2019-11-08 ENCOUNTER — TELEPHONE (OUTPATIENT)
Dept: URGENT CARE | Facility: PHYSICIAN GROUP | Age: 44
End: 2019-11-08

## 2019-11-08 DIAGNOSIS — N39.0 URINARY TRACT INFECTION WITHOUT HEMATURIA, SITE UNSPECIFIED: ICD-10-CM

## 2019-11-08 RX ORDER — NITROFURANTOIN 25; 75 MG/1; MG/1
100 CAPSULE ORAL 2 TIMES DAILY
Qty: 10 CAP | Refills: 0 | Status: SHIPPED | OUTPATIENT
Start: 2019-11-08 | End: 2019-11-13

## 2019-11-12 ENCOUNTER — HOSPITAL ENCOUNTER (OUTPATIENT)
Facility: MEDICAL CENTER | Age: 44
End: 2019-11-12
Attending: PHYSICIAN ASSISTANT
Payer: MEDICAID

## 2019-11-12 ENCOUNTER — OFFICE VISIT (OUTPATIENT)
Dept: URGENT CARE | Facility: PHYSICIAN GROUP | Age: 44
End: 2019-11-12
Payer: MEDICAID

## 2019-11-12 VITALS
OXYGEN SATURATION: 93 % | HEART RATE: 128 BPM | WEIGHT: 250 LBS | HEIGHT: 71 IN | BODY MASS INDEX: 35 KG/M2 | SYSTOLIC BLOOD PRESSURE: 124 MMHG | DIASTOLIC BLOOD PRESSURE: 82 MMHG | TEMPERATURE: 98.3 F | RESPIRATION RATE: 16 BRPM

## 2019-11-12 DIAGNOSIS — N39.0 RECURRENT UTI: ICD-10-CM

## 2019-11-12 DIAGNOSIS — Z86.19 HISTORY OF CANDIDIASIS: ICD-10-CM

## 2019-11-12 PROCEDURE — 87480 CANDIDA DNA DIR PROBE: CPT

## 2019-11-12 PROCEDURE — 99214 OFFICE O/P EST MOD 30 MIN: CPT | Performed by: PHYSICIAN ASSISTANT

## 2019-11-12 PROCEDURE — 87510 GARDNER VAG DNA DIR PROBE: CPT

## 2019-11-12 PROCEDURE — 87086 URINE CULTURE/COLONY COUNT: CPT

## 2019-11-12 PROCEDURE — 87660 TRICHOMONAS VAGIN DIR PROBE: CPT

## 2019-11-12 RX ORDER — AMOXICILLIN AND CLAVULANATE POTASSIUM 875; 125 MG/1; MG/1
1 TABLET, FILM COATED ORAL 2 TIMES DAILY
Qty: 14 TAB | Refills: 0 | Status: SHIPPED | OUTPATIENT
Start: 2019-11-12 | End: 2019-11-19

## 2019-11-12 NOTE — PROGRESS NOTES
Subjective:      Peter Trimble is a 44 y.o. female who presents with UTI    PMH:  has a past medical history of Acute blood loss anemia (5/2/2013), Acute renal failure (ARF) ([facility]) (10/5/2011), Anemia, Anxiety, Arthritis, ASTHMA, Bipolar affective ([facility]), Breath shortness, Cold (), Depression, Eclampsia complicating pregnancy, Environmental allergies, Essential hypertension, malignant (9/28/2011), Fibromyalgia, GERD (gastroesophageal reflux disease), Heart murmur, History of pregnancy (10/16/2014), MRSA infection, Hyperlipidemia (04-26-13), Hypertension, Kidney stones, Migraines, Pain (05-27-14), Personality disorder ([facility]), Pneumonia (2012), PTSD (post-traumatic stress disorder), Scalp wound (8/18/2014), Seizure ([facility]) (05-27-14), Sleep apnea, Snoring, Stroke ([facility]) (2011), Subarachnoid hemorrhage ([facility]) (9/28/2011), Unspecified hemorrhagic conditions, Unspecified urinary incontinence, and Urinary bladder disorder. She also has no past medical history of COPD.  MEDS:   Current Outpatient Medications:   •  nitrofurantoin monohyd macro (MACROBID) 100 MG Cap, Take 1 Cap by mouth 2 times a day for 5 days., Disp: 10 Cap, Rfl: 0  •  omeprazole (PRILOSEC) 20 MG delayed-release capsule, Take 1 Cap by mouth every day., Disp: 30 Cap, Rfl: 0  •  diclofenac EC (VOLTAREN) 75 MG Tablet Delayed Response, Take 1 Tab by mouth 2 times a day., Disp: 30 Tab, Rfl: 0  •  tamsulosin (FLOMAX) 0.4 MG capsule, Take 1 Cap by mouth ONE-HALF HOUR AFTER BREAKFAST for 30 days. (Patient not taking: Reported on 11/4/2019), Disp: 30 Cap, Rfl: 0  •  PARoxetine (PAXIL) 30 MG Tab, Take 2 Tabs by mouth every day for 31 days., Disp: 60 Tab, Rfl: 1  •  baclofen (LIORESAL) 10 MG Tab, Take 10 mg by mouth 3 times a day as needed (Pain)., Disp: , Rfl:   •  divalproex (DEPAKOTE) 500 MG Tablet Delayed Response, Take 500-1,000 mg by mouth 2 Times a Day. 1000mg in AM 500mg at PM, Disp: , Rfl:   •  fluticasone (FLOVENT HFA) 220 MCG/ACT Aerosol, Inhale 1  Puff by mouth 2 times a day., Disp: , Rfl:   •  amLODIPine (NORVASC) 5 MG Tab, Take 2.5 mg by mouth 2 Times a Day., Disp: , Rfl:   •  Erenumab (AIMOVIG) 70 MG/ML Solution Auto-injector, Inject 70 mg as instructed Q30 DAYS., Disp: , Rfl:   •  Estradiol 10 MCG INSERT, Insert 10 mcg in vagina every bedtime., Disp: , Rfl:   •  fluticasone (FLONASE) 50 MCG/ACT nasal spray, Spray 1 Spray in nose every day., Disp: , Rfl:   •  potassium chloride SA (KDUR) 20 MEQ Tab CR, Take 20 mEq by mouth 2 times a day., Disp: , Rfl:   •  ondansetron (ZOFRAN ODT) 4 MG TABLET DISPERSIBLE, Take 4 mg by mouth every 6 hours as needed for Nausea., Disp: , Rfl:   •  Ciclopirox 1 % Shampoo, 1 g by Apply externally route 1 time daily as needed (Sores on Scalp)., Disp: , Rfl:   •  nystatin (MYCOSTATIN) 930797 UNIT/GM Cream topical cream, Apply 1 g to affected area(s) 2 times a day., Disp: , Rfl:   •  hydrOXYzine HCl (ATARAX) 25 MG Tab, Take 50 mg by mouth 3 times a day., Disp: , Rfl:   •  Cholecalciferol (VITAMIN D) 2000 UNIT Tab, Take 4,000 Units by mouth every day., Disp: , Rfl:   •  furosemide (LASIX) 20 MG Tab, Take 20 mg by mouth 2 times a day., Disp: , Rfl:   •  morphine (MS IR) 15 MG tablet, Take 15 mg by mouth 2 times a day as needed (Breakthrough Pain)., Disp: , Rfl:   •  albuterol (PROAIR HFA) 108 (90 Base) MCG/ACT Aero Soln inhalation aerosol, ProAir HFA 90 mcg/actuation aerosol inhaler, Disp: , Rfl:   •  Morphine Sulfate ER 30 MG Tablet Extended Release 12 hour Abuse-Deterrent, Take 30 mg by mouth every morning., Disp: , Rfl:   •  guaifenesin LA (MUCINEX) 600 MG TABLET SR 12 HR, Take 600 mg by mouth every 12 hours., Disp: , Rfl:   •  cetirizine (ZYRTEC) 10 MG Tab, Take 10 mg by mouth every day., Disp: , Rfl:   •  esomeprazole (NEXIUM) 40 MG delayed-release capsule, Take 40 mg by mouth 2 Times a Day., Disp: , Rfl:   •  ferrous sulfate 325 (65 FE) MG tablet, Take 325 mg by mouth every day., Disp: , Rfl:   •  montelukast (SINGULAIR) 10 MG  "Tab, Take 10 mg by mouth every bedtime., Disp: , Rfl:   ALLERGIES:   Allergies   Allergen Reactions   • Compazine      \"psychotic reaction\"   • Imitrex [Sumatriptan Succinate]      Had brain bleed   • Inapsine [Droperidol]      \"psychotic reaction\"   • Maxalt [Rizatriptan Benzoate]      Had brain bleed   • Phenergan [Promethazine Hcl]      \"psychotic reaction\"   • Xanax [Alprazolam]      Sores and dry mouth, many others pt cannot remember   • Zantac      Stomach pain   • Apple Cider Vinegar Rash     Patient states she gets rash   • Butrans [Buprenorphine]    • Clarithromycin Vomiting and Palpitations   • Dilaudid [Hydromorphone] Rash     Patient states she had rash, hallucinations, unable to urinate.    • Doxycycline    • Effexor [Venlafaxine]      \"Really depressed, like i wanted to hurt myself\"   • Eucalyptus Oil    • Gabapentin    • Kiwi Extract    • Latex Rash   • Lyrica [Pregabalin]      \"depression, slept a lot\"   • Minocycline Vomiting     \"any cyclines\"   • Morphabond Er [Suly]    • Patchouli Oil Rash     Rash    • Sulfa Drugs    • Tea Tree Oil Rash     Break out in rash   • Topiramate Nausea     Patient states made sick to stomach and dizzy.     SURGHX:   Past Surgical History:   Procedure Laterality Date   • ANKLE ORIF Right 8/7/2016    Procedure: ANKLE ORIF;  Surgeon: Bill Brambila M.D.;  Location: [facility];  Service:    • IRRIGATION & DEBRIDEMENT GENERAL  8/17/2014    Performed by Ezra Wright M.D. at [facility]   • BREAST BIOPSY  5/29/2014    Performed by Negro Hester M.D. at SURGERY SAME DAY [facility]   • EVACUATION OF HEMATOMA  5/2/2013    Performed by Lazaro Connors Jr., M.D. at [facility]   • MAMMOPLASTY REDUCTION  4/29/2013    Performed by Negro Hester M.D. at [facility]   • RECOVERY  1/30/2012    Performed by BENEDICT IR-RECOVERY at [facility] SAME DAY [facility]   • RECOVERY  10/5/2011    Performed by SURGERY, RECOVERYONADELITA at " "SURGERY [facility] ORS   • HYSTERECTOMY LAPAROSCOPY      W BSO   • KNEE RECONSTRUCTION     • LAMINOTOMY     • OTHER ORTHOPEDIC SURGERY      maddie. knee scopes   • OTHER ORTHOPEDIC SURGERY      back fusion then hardware removal   • PB EXPLORATION OF SPINAL FUSION     • PB REMOVAL OF OVARY(S)       SOCHX:  reports that she has never smoked. She has never used smokeless tobacco. She reports that she does not drink alcohol or use drugs.  FH: Reviewed with patient, not pertinent to this visit.           Patient presents with:  UTI. Pt is currently being treated with macrobid for UTI which she states she cannot tolerate, it makes her very sick to her stomach and it is not working anyway.  Pt is requesting a different antibiotic.        UTI   This is a new problem. The current episode started in the past 7 days. The problem occurs constantly. The problem has been unchanged. Associated symptoms include nausea and urinary symptoms. Pertinent negatives include no abdominal pain or fever. Exacerbated by: macrobid. Treatments tried: antibiotics. The treatment provided no relief.       Review of Systems   Constitutional: Negative for fever.   Gastrointestinal: Positive for nausea. Negative for abdominal pain.   Genitourinary: Positive for dysuria, frequency and urgency. Negative for flank pain and hematuria.   All other systems reviewed and are negative.         Objective:     /82 (BP Location: Right arm, Patient Position: Sitting, BP Cuff Size: Large adult)   Pulse (!) 128   Temp 36.8 °C (98.3 °F) (Oral)   Resp 16   Ht 1.803 m (5' 11\")   Wt 113.4 kg (250 lb)   SpO2 93% Comment: on 2 L  BMI 34.87 kg/m²      Physical Exam  Vitals signs and nursing note reviewed.   Constitutional:       General: She is not in acute distress.     Appearance: Normal appearance. She is well-developed. She is not toxic-appearing.   HENT:      Head: Normocephalic and atraumatic.      Nose: Nose normal.      Mouth/Throat:      Mouth: Mucous " membranes are moist.   Eyes:      Extraocular Movements: Extraocular movements intact.      Conjunctiva/sclera: Conjunctivae normal.      Pupils: Pupils are equal, round, and reactive to light.   Neck:      Musculoskeletal: Normal range of motion and neck supple.   Cardiovascular:      Rate and Rhythm: Normal rate and regular rhythm.      Pulses: Normal pulses.      Heart sounds: Normal heart sounds.   Pulmonary:      Effort: Pulmonary effort is normal.      Breath sounds: Normal breath sounds.   Abdominal:      General: Bowel sounds are normal.      Palpations: Abdomen is soft.      Tenderness: There is no guarding or rebound.   Musculoskeletal: Normal range of motion.   Skin:     General: Skin is warm and dry.      Capillary Refill: Capillary refill takes less than 2 seconds.   Neurological:      General: No focal deficit present.      Mental Status: She is alert and oriented to person, place, and time.      Gait: Gait normal.   Psychiatric:         Mood and Affect: Mood normal.         Behavior: Behavior is cooperative.                 Assessment/Plan:     1. Recurrent UTI  VAGINAL PATHOGENS DNA PANEL    amoxicillin-clavulanate (AUGMENTIN) 875-125 MG Tab    URINE CULTURE(NEW)   2. History of candidiasis  VAGINAL PATHOGENS DNA PANEL    amoxicillin-clavulanate (AUGMENTIN) 875-125 MG Tab    URINE CULTURE(NEW)     Culture sent to lab, will call with any necessary treatment or treatment changes.     Pt given new rx for augmentin at her request.      PT should follow up with PCP in 1-2 days for re-evaluation if symptoms have not improved.  Discussed red flags and reasons to return to UC or ED.  Pt and/or family verbalized understanding of diagnosis and follow up instructions and was offered informational handout on diagnosis.  PT discharged.

## 2019-11-13 DIAGNOSIS — N39.0 RECURRENT UTI: ICD-10-CM

## 2019-11-13 DIAGNOSIS — Z86.19 HISTORY OF CANDIDIASIS: ICD-10-CM

## 2019-11-15 LAB
BACTERIA UR CULT: NORMAL
SIGNIFICANT IND 70042: NORMAL
SITE SITE: NORMAL
SOURCE SOURCE: NORMAL

## 2019-11-16 ENCOUNTER — TELEPHONE (OUTPATIENT)
Dept: URGENT CARE | Facility: PHYSICIAN GROUP | Age: 44
End: 2019-11-16

## 2019-11-16 ASSESSMENT — ENCOUNTER SYMPTOMS
FLANK PAIN: 0
FEVER: 0
NAUSEA: 1
ABDOMINAL PAIN: 0

## 2019-11-16 NOTE — TELEPHONE ENCOUNTER
VOICEMAIL  1. Caller Name: Peter                      Call Back Number: [phone] (home)     2. Message: Peter called, she was wanting her UA results and was hoping that you could release them to her in her Mychart. She is starting to feel a little bit better but is still having burning at night.    3. Patient approves office to leave a detailed voicemail/MyChart message: yes

## 2019-12-03 ENCOUNTER — APPOINTMENT (OUTPATIENT)
Dept: NEUROLOGY | Facility: MEDICAL CENTER | Age: 44
End: 2019-12-03
Payer: MEDICAID

## 2019-12-05 ENCOUNTER — HOSPITAL ENCOUNTER (EMERGENCY)
Facility: MEDICAL CENTER | Age: 44
End: 2019-12-05
Attending: EMERGENCY MEDICINE
Payer: MEDICAID

## 2019-12-05 ENCOUNTER — APPOINTMENT (OUTPATIENT)
Dept: RADIOLOGY | Facility: MEDICAL CENTER | Age: 44
End: 2019-12-05
Attending: EMERGENCY MEDICINE
Payer: MEDICAID

## 2019-12-05 VITALS
OXYGEN SATURATION: 93 % | TEMPERATURE: 98.2 F | DIASTOLIC BLOOD PRESSURE: 87 MMHG | WEIGHT: 249 LBS | HEIGHT: 71 IN | BODY MASS INDEX: 34.86 KG/M2 | HEART RATE: 68 BPM | SYSTOLIC BLOOD PRESSURE: 154 MMHG | RESPIRATION RATE: 20 BRPM

## 2019-12-05 DIAGNOSIS — S40.012A CONTUSION OF LEFT SHOULDER, INITIAL ENCOUNTER: ICD-10-CM

## 2019-12-05 DIAGNOSIS — E86.0 DEHYDRATION: ICD-10-CM

## 2019-12-05 DIAGNOSIS — E87.6 HYPOKALEMIA: ICD-10-CM
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
## 2019-12-05 LAB
ALBUMIN SERPL BCP-MCNC: 4.7 G/DL (ref 3.2–4.9)
ALBUMIN/GLOB SERPL: 1.7 G/DL
ALP SERPL-CCNC: 42 U/L (ref 30–99)
ALT SERPL-CCNC: 19 U/L (ref 2–50)
ANION GAP SERPL CALC-SCNC: 7 MMOL/L (ref 0–11.9)
AST SERPL-CCNC: 21 U/L (ref 12–45)
BASOPHILS # BLD AUTO: 0.6 % (ref 0–1.8)
BASOPHILS # BLD: 0.04 K/UL (ref 0–0.12)
BILIRUB SERPL-MCNC: 0.4 MG/DL (ref 0.1–1.5)
BUN SERPL-MCNC: 27 MG/DL (ref 8–22)
CALCIUM SERPL-MCNC: 9.3 MG/DL (ref 8.5–10.5)
CHLORIDE SERPL-SCNC: 98 MMOL/L (ref 96–112)
CO2 SERPL-SCNC: 34 MMOL/L (ref 20–33)
CREAT SERPL-MCNC: 1.34 MG/DL (ref 0.5–1.4)
EOSINOPHIL # BLD AUTO: 0.13 K/UL (ref 0–0.51)
EOSINOPHIL NFR BLD: 2 % (ref 0–6.9)
ERYTHROCYTE [DISTWIDTH] IN BLOOD BY AUTOMATED COUNT: 43.3 FL (ref 35.9–50)
GLOBULIN SER CALC-MCNC: 2.8 G/DL (ref 1.9–3.5)
GLUCOSE SERPL-MCNC: 82 MG/DL (ref 65–99)
HCT VFR BLD AUTO: 40.1 % (ref 37–47)
HGB BLD-MCNC: 13.2 G/DL (ref 12–16)
IMM GRANULOCYTES # BLD AUTO: 0.02 K/UL (ref 0–0.11)
IMM GRANULOCYTES NFR BLD AUTO: 0.3 % (ref 0–0.9)
LYMPHOCYTES # BLD AUTO: 3.45 K/UL (ref 1–4.8)
LYMPHOCYTES NFR BLD: 52.2 % (ref 22–41)
MCH RBC QN AUTO: 29.7 PG (ref 27–33)
MCHC RBC AUTO-ENTMCNC: 32.9 G/DL (ref 33.6–35)
MCV RBC AUTO: 90.1 FL (ref 81.4–97.8)
MONOCYTES # BLD AUTO: 0.55 K/UL (ref 0–0.85)
MONOCYTES NFR BLD AUTO: 8.3 % (ref 0–13.4)
NEUTROPHILS # BLD AUTO: 2.42 K/UL (ref 2–7.15)
NEUTROPHILS NFR BLD: 36.6 % (ref 44–72)
NRBC # BLD AUTO: 0 K/UL
NRBC BLD-RTO: 0 /100 WBC
PLATELET # BLD AUTO: 255 K/UL (ref 164–446)
PMV BLD AUTO: 10.1 FL (ref 9–12.9)
POTASSIUM SERPL-SCNC: 3.3 MMOL/L (ref 3.6–5.5)
PROT SERPL-MCNC: 7.5 G/DL (ref 6–8.2)
RBC # BLD AUTO: 4.45 M/UL (ref 4.2–5.4)
SODIUM SERPL-SCNC: 139 MMOL/L (ref 135–145)
WBC # BLD AUTO: 6.6 K/UL (ref 4.8–10.8)

## 2019-12-05 PROCEDURE — 73562 X-RAY EXAM OF KNEE 3: CPT | Mod: RT

## 2019-12-05 PROCEDURE — A9270 NON-COVERED ITEM OR SERVICE: HCPCS | Performed by: EMERGENCY MEDICINE

## 2019-12-05 PROCEDURE — 700102 HCHG RX REV CODE 250 W/ 637 OVERRIDE(OP): Performed by: EMERGENCY MEDICINE

## 2019-12-05 PROCEDURE — 99285 EMERGENCY DEPT VISIT HI MDM: CPT

## 2019-12-05 PROCEDURE — 80053 COMPREHEN METABOLIC PANEL: CPT

## 2019-12-05 PROCEDURE — 36415 COLL VENOUS BLD VENIPUNCTURE: CPT

## 2019-12-05 PROCEDURE — 304561 HCHG STAT O2

## 2019-12-05 PROCEDURE — 85025 COMPLETE CBC W/AUTO DIFF WBC: CPT

## 2019-12-05 PROCEDURE — 73110 X-RAY EXAM OF WRIST: CPT | Mod: RT

## 2019-12-05 PROCEDURE — 73030 X-RAY EXAM OF SHOULDER: CPT | Mod: LT

## 2019-12-05 RX ORDER — MORPHINE SULFATE 15 MG/1
15 TABLET ORAL ONCE
Status: COMPLETED | OUTPATIENT
Start: 2019-12-05 | End: 2019-12-05

## 2019-12-05 RX ORDER — POTASSIUM CHLORIDE 20 MEQ/1
20 TABLET, EXTENDED RELEASE ORAL ONCE
Status: COMPLETED | OUTPATIENT
Start: 2019-12-05 | End: 2019-12-05

## 2019-12-05 RX ADMIN — POTASSIUM CHLORIDE 20 MEQ: 1500 TABLET, EXTENDED RELEASE ORAL at 18:30

## 2019-12-05 RX ADMIN — MORPHINE SULFATE 15 MG: 15 TABLET ORAL at 18:30

## 2019-12-05 NOTE — ED TRIAGE NOTES
Pt. 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 Pain management MD advised patient to come to ED, pt. Stated generalized pain, No obvious deformity.

## 2019-12-05 NOTE — ED PROVIDER NOTES
"ED Provider Note    CHIEF COMPLAINT  Chief Complaint   Patient presents with   • Fall       HPI  Peter Trimble is a 44 y.o. female who presents complaining of multiple injuries following a ground-level fall.  Patient was sent in from her PCPs office in [place] for further evaluation.  Patient states she was dizzy and stepped up onto a curb and fell.  She states \"I was all tangled.\"  She is unable to give me more details regarding the way she landed but denies loss of consciousness, neck pain, syncope.  Patient states her fall may be due to recent administration of amitriptyline.  She started this as a new medication on 11/29.  She was having dizziness while taking it so she cut it down in half under the advice of her PCP.  This did not relieve her symptoms so she completely discontinued it 3 days ago.    Patient denies feeling unsafe in her home where she lives.  She states her father occasionally visits her but otherwise she lives alone.  She denies anyone harming her.      ALLERGIES  Allergies   Allergen Reactions   • Compazine      \"psychotic reaction\"   • Imitrex [Sumatriptan Succinate]      Had brain bleed   • Inapsine [Droperidol]      \"psychotic reaction\"   • Maxalt [Rizatriptan Benzoate]      Had brain bleed   • Phenergan [Promethazine Hcl]      \"psychotic reaction\"   • Xanax [Alprazolam]      Sores and dry mouth, many others pt cannot remember   • Zantac      Stomach pain   • Apple Cider Vinegar Rash     Patient states she gets rash   • Butrans [Buprenorphine]    • Clarithromycin Vomiting and Palpitations   • Dilaudid [Hydromorphone] Rash     Patient states she had rash, hallucinations, unable to urinate.    • Doxycycline    • Effexor [Venlafaxine]      \"Really depressed, like i wanted to hurt myself\"   • Eucalyptus Oil    • Gabapentin    • Kiwi Extract    • Latex Rash   • Lyrica [Pregabalin]      \"depression, slept a lot\"   • Minocycline Vomiting     \"any cyclines\"   • Morphabond Er [Suly]    • " Patchouli Oil Rash     Rash    • Sulfa Drugs    • Tea Tree Oil Rash     Break out in rash   • Topiramate Nausea     Patient states made sick to stomach and dizzy.       CURRENT MEDICATIONS  Home Medications    **Home medications have not yet been reviewed for this encounter**         PAST MEDICAL HISTORY   has a past medical history of Acute blood loss anemia (5/2/2013), Acute renal failure (ARF) ([facility]) (10/5/2011), Anemia, Anxiety, Arthritis, ASTHMA, Bipolar affective ([facility]), Breath shortness, Cold (), Depression, Eclampsia complicating pregnancy, Environmental allergies, Essential hypertension, malignant (9/28/2011), Fibromyalgia, GERD (gastroesophageal reflux disease), Heart murmur, History of pregnancy (10/16/2014), MRSA infection, Hyperlipidemia (04-26-13), Hypertension, Kidney stones, Migraines, Pain (05-27-14), Personality disorder ([facility]), Pneumonia (2012), PTSD (post-traumatic stress disorder), Scalp wound (8/18/2014), Seizure ([facility]) (05-27-14), Sleep apnea, Snoring, Stroke ([facility]) (2011), Subarachnoid hemorrhage ([facility]) (9/28/2011), Unspecified hemorrhagic conditions, Unspecified urinary incontinence, and Urinary bladder disorder.    SURGICAL HISTORY   has a past surgical history that includes removal of ovary(s); exploration of spinal fusion; recovery (10/5/2011); knee reconstruction; other orthopedic surgery; other orthopedic surgery; recovery (1/30/2012); laminotomy; mammoplasty reduction (4/29/2013); hysterectomy laparoscopy; evacuation of hematoma (5/2/2013); breast biopsy (5/29/2014); irrigation & debridement general (8/17/2014); and ankle orif (Right, 8/7/2016).    SOCIAL HISTORY  Social History     Tobacco Use   • Smoking status: Never Smoker   • Smokeless tobacco: Never Used   Substance and Sexual Activity   • Alcohol use: No   • Drug use: No   • Sexual activity: Not on file       REVIEW OF SYSTEMS  See HPI for further details.  All other systems are negative except as above in HPI.      PHYSICAL  "EXAM  VITAL SIGNS: /74   Pulse 75   Temp 36.5 °C (97.7 °F) (Temporal)   Resp 20   Ht 1.803 m (5' 11\")   Wt 112.9 kg (249 lb)   SpO2 92%   BMI 34.73 kg/m²     General:  WDWN, nontoxic appearing in NAD; A+Ox3; V/S as above  Skin: warm and dry; good color; no rash  HEENT: NCAT; EOMs intact; PERRL; no scleral icterus; dry mouth and lips  Neck: FROM; soft  Cardiovascular: Regular heart rate and rhythm.  No murmurs, rubs, or gallops; pulses 2+ bilaterally radially and DP areas  Lungs: Clear to auscultation with good air movement bilaterally.  No wheezes, rhonchi, or rales.   Abdomen: BS present; soft; NTND; no rebound, guarding, or rigidity.  No organomegaly or pulsatile mass  Extremities: BROOKS x 4; ecchymosis with edema over the right lateral knee area and the left shoulder region; no obvious bony deformity or point tenderness; full range of motion at the right knee and right wrist; superficial abrasion over the dorsum of the right foot; multiple areas of  older appearing/deeper purple ecchymosis over the right forearm  Neurologic: CNs III-XII grossly intact; speech clear; distal sensation intact; strength 5/5 UE/LEs  Psychiatric: Appropriate affect, normal mood    LABS  Results for orders placed or performed during the hospital encounter of 12/05/19   CBC WITH DIFFERENTIAL   Result Value Ref Range    WBC 6.6 4.8 - 10.8 K/uL    RBC 4.45 4.20 - 5.40 M/uL    Hemoglobin 13.2 12.0 - 16.0 g/dL    Hematocrit 40.1 37.0 - 47.0 %    MCV 90.1 81.4 - 97.8 fL    MCH 29.7 27.0 - 33.0 pg    MCHC 32.9 (L) 33.6 - 35.0 g/dL    RDW 43.3 35.9 - 50.0 fL    Platelet Count 255 164 - 446 K/uL    MPV 10.1 9.0 - 12.9 fL    Neutrophils-Polys 36.60 (L) 44.00 - 72.00 %    Lymphocytes 52.20 (H) 22.00 - 41.00 %    Monocytes 8.30 0.00 - 13.40 %    Eosinophils 2.00 0.00 - 6.90 %    Basophils 0.60 0.00 - 1.80 %    Immature Granulocytes 0.30 0.00 - 0.90 %    Nucleated RBC 0.00 /100 WBC    Neutrophils (Absolute) 2.42 2.00 - 7.15 K/uL    Lymphs " (Absolute) 3.45 1.00 - 4.80 K/uL    Monos (Absolute) 0.55 0.00 - 0.85 K/uL    Eos (Absolute) 0.13 0.00 - 0.51 K/uL    Baso (Absolute) 0.04 0.00 - 0.12 K/uL    Immature Granulocytes (abs) 0.02 0.00 - 0.11 K/uL    NRBC (Absolute) 0.00 K/uL         IMAGING  DX-WRIST-COMPLETE 3+ RIGHT   Final Result      No radiographic evidence of acute traumatic injury right wrist.      DX-SHOULDER 2+ LEFT   Final Result      Negative LEFT shoulder series.      DX-KNEE 3 VIEWS RIGHT   Final Result      No radiographic evidence of acute traumatic injury      Lateral tibial metaphysis periosteal reaction most likely is related to the leg injury in 2016.        MEDICAL RECORD  I have reviewed patient's medical record and pertinent results are listed below.      COURSE & MEDICAL DECISION MAKING  I have reviewed any medical record information, laboratory studies and radiographic results as noted.    Peter Trimble is a 44 y.o. female who presents complaining of multiple injuries following a fall today.  No head injury or neck pain is reported.  No neurologic symptoms.  Patient has ecchymosis over the left shoulder and the right lateral knee region.  She is able to range these joints.  She is neurovascularly intact.  X-rays were obtained of these areas and her right wrist.      Pt was re-evaluated at 5:58 PM  Patient was advised that xrays were negative for acute pathology.  She states she missed her afternoon dose of morphine IR 15 mg.  This has been ordered.  She was advised that if her chemistry panel which we are still waiting on is negative/normal she will be discharged with return precautions.  She is amenable to this plan.      6:23 PM  Labs are now all resulted.  Patient is mildly hypokalemic with a BUN that is slightly elevated.  These both indicate mild dehydration.  Patient was given KCl here.  She ambulated well on her own.  She will be discharged with return precautions.    FINAL IMPRESSION  1. Fall, initial encounter      2. Contusion of left shoulder, initial encounter         Electronically signed by: Lilia Mishra, 12/5/2019 3:06 PM

## 2019-12-06 ENCOUNTER — HOSPITAL ENCOUNTER (INPATIENT)
Facility: MEDICAL CENTER | Age: 44
LOS: 2 days | DRG: 683 | End: 2019-12-09
Attending: EMERGENCY MEDICINE | Admitting: INTERNAL MEDICINE
Payer: MEDICAID

## 2019-12-06 ENCOUNTER — APPOINTMENT (OUTPATIENT)
Dept: RADIOLOGY | Facility: MEDICAL CENTER | Age: 44
DRG: 683 | End: 2019-12-06
Attending: STUDENT IN AN ORGANIZED HEALTH CARE EDUCATION/TRAINING PROGRAM
Payer: MEDICAID

## 2019-12-06 DIAGNOSIS — G89.29 OTHER CHRONIC PAIN: ICD-10-CM

## 2019-12-06 DIAGNOSIS — S22.39XS CLOSED FRACTURE OF ONE RIB, UNSPECIFIED LATERALITY, SEQUELA: ICD-10-CM

## 2019-12-06 DIAGNOSIS — S82.851A CLOSED TRIMALLEOLAR FRACTURE OF RIGHT ANKLE, INITIAL ENCOUNTER: ICD-10-CM

## 2019-12-06 DIAGNOSIS — R29.6 REPEATED FALLS: ICD-10-CM

## 2019-12-06 LAB
ALBUMIN SERPL BCP-MCNC: 4.1 G/DL (ref 3.2–4.9)
ALBUMIN/GLOB SERPL: 1.5 G/DL
ALP SERPL-CCNC: 37 U/L (ref 30–99)
ALT SERPL-CCNC: 19 U/L (ref 2–50)
AMPHET UR QL SCN: NEGATIVE
ANION GAP SERPL CALC-SCNC: 8 MMOL/L (ref 0–11.9)
APPEARANCE UR: CLEAR
AST SERPL-CCNC: 27 U/L (ref 12–45)
BACTERIA #/AREA URNS HPF: NEGATIVE /HPF
BARBITURATES UR QL SCN: NEGATIVE
BASOPHILS # BLD AUTO: 0.5 % (ref 0–1.8)
BASOPHILS # BLD: 0.04 K/UL (ref 0–0.12)
BENZODIAZ UR QL SCN: NEGATIVE
BILIRUB SERPL-MCNC: 0.3 MG/DL (ref 0.1–1.5)
BILIRUB UR QL STRIP.AUTO: NEGATIVE
BUN SERPL-MCNC: 34 MG/DL (ref 8–22)
BZE UR QL SCN: NEGATIVE
CALCIUM SERPL-MCNC: 9.6 MG/DL (ref 8.5–10.5)
CANNABINOIDS UR QL SCN: NEGATIVE
CAOX CRY #/AREA URNS HPF: ABNORMAL /HPF
CHLORIDE SERPL-SCNC: 100 MMOL/L (ref 96–112)
CO2 SERPL-SCNC: 33 MMOL/L (ref 20–33)
COLOR UR: ABNORMAL
CREAT SERPL-MCNC: 2.05 MG/DL (ref 0.5–1.4)
EOSINOPHIL # BLD AUTO: 0.12 K/UL (ref 0–0.51)
EOSINOPHIL NFR BLD: 1.6 % (ref 0–6.9)
EPI CELLS #/AREA URNS HPF: NEGATIVE /HPF
ERYTHROCYTE [DISTWIDTH] IN BLOOD BY AUTOMATED COUNT: 45 FL (ref 35.9–50)
GLOBULIN SER CALC-MCNC: 2.7 G/DL (ref 1.9–3.5)
GLUCOSE SERPL-MCNC: 88 MG/DL (ref 65–99)
GLUCOSE UR STRIP.AUTO-MCNC: NEGATIVE MG/DL
HCT VFR BLD AUTO: 38.7 % (ref 37–47)
HGB BLD-MCNC: 12.3 G/DL (ref 12–16)
HYALINE CASTS #/AREA URNS LPF: ABNORMAL /LPF
IMM GRANULOCYTES # BLD AUTO: 0.04 K/UL (ref 0–0.11)
IMM GRANULOCYTES NFR BLD AUTO: 0.5 % (ref 0–0.9)
KETONES UR STRIP.AUTO-MCNC: ABNORMAL MG/DL
LEUKOCYTE ESTERASE UR QL STRIP.AUTO: ABNORMAL
LYMPHOCYTES # BLD AUTO: 2.84 K/UL (ref 1–4.8)
LYMPHOCYTES NFR BLD: 37.4 % (ref 22–41)
MCH RBC QN AUTO: 29.3 PG (ref 27–33)
MCHC RBC AUTO-ENTMCNC: 31.8 G/DL (ref 33.6–35)
MCV RBC AUTO: 92.1 FL (ref 81.4–97.8)
METHADONE UR QL SCN: NEGATIVE
MICRO URNS: ABNORMAL
MONOCYTES # BLD AUTO: 0.93 K/UL (ref 0–0.85)
MONOCYTES NFR BLD AUTO: 12.3 % (ref 0–13.4)
NEUTROPHILS # BLD AUTO: 3.62 K/UL (ref 2–7.15)
NEUTROPHILS NFR BLD: 47.7 % (ref 44–72)
NITRITE UR QL STRIP.AUTO: NEGATIVE
NRBC # BLD AUTO: 0 K/UL
NRBC BLD-RTO: 0 /100 WBC
OPIATES UR QL SCN: POSITIVE
OXYCODONE UR QL SCN: NEGATIVE
PCP UR QL SCN: NEGATIVE
PH UR STRIP.AUTO: 5.5 [PH] (ref 5–8)
PLATELET # BLD AUTO: 239 K/UL (ref 164–446)
PMV BLD AUTO: 10.5 FL (ref 9–12.9)
POTASSIUM SERPL-SCNC: 3.8 MMOL/L (ref 3.6–5.5)
PROPOXYPH UR QL SCN: NEGATIVE
PROT SERPL-MCNC: 6.8 G/DL (ref 6–8.2)
PROT UR QL STRIP: NEGATIVE MG/DL
RBC # BLD AUTO: 4.2 M/UL (ref 4.2–5.4)
RBC # URNS HPF: ABNORMAL /HPF
RBC UR QL AUTO: ABNORMAL
SODIUM SERPL-SCNC: 141 MMOL/L (ref 135–145)
SP GR UR STRIP.AUTO: 1.02
UROBILINOGEN UR STRIP.AUTO-MCNC: 1 MG/DL
WBC # BLD AUTO: 7.6 K/UL (ref 4.8–10.8)
WBC #/AREA URNS HPF: ABNORMAL /HPF

## 2019-12-06 PROCEDURE — 700105 HCHG RX REV CODE 258: Performed by: STUDENT IN AN ORGANIZED HEALTH CARE EDUCATION/TRAINING PROGRAM

## 2019-12-06 PROCEDURE — 81001 URINALYSIS AUTO W/SCOPE: CPT

## 2019-12-06 PROCEDURE — 80053 COMPREHEN METABOLIC PANEL: CPT

## 2019-12-06 PROCEDURE — 85025 COMPLETE CBC W/AUTO DIFF WBC: CPT

## 2019-12-06 PROCEDURE — 304561 HCHG STAT O2

## 2019-12-06 PROCEDURE — 99220 PR INITIAL OBSERVATION CARE,LEVL III: CPT | Mod: GC | Performed by: INTERNAL MEDICINE

## 2019-12-06 PROCEDURE — 700102 HCHG RX REV CODE 250 W/ 637 OVERRIDE(OP): Performed by: STUDENT IN AN ORGANIZED HEALTH CARE EDUCATION/TRAINING PROGRAM

## 2019-12-06 PROCEDURE — 80307 DRUG TEST PRSMV CHEM ANLYZR: CPT

## 2019-12-06 PROCEDURE — 70450 CT HEAD/BRAIN W/O DYE: CPT

## 2019-12-06 PROCEDURE — G0378 HOSPITAL OBSERVATION PER HR: HCPCS

## 2019-12-06 PROCEDURE — 96374 THER/PROPH/DIAG INJ IV PUSH: CPT

## 2019-12-06 PROCEDURE — 700111 HCHG RX REV CODE 636 W/ 250 OVERRIDE (IP): Performed by: EMERGENCY MEDICINE

## 2019-12-06 PROCEDURE — A9270 NON-COVERED ITEM OR SERVICE: HCPCS | Performed by: STUDENT IN AN ORGANIZED HEALTH CARE EDUCATION/TRAINING PROGRAM

## 2019-12-06 PROCEDURE — 99285 EMERGENCY DEPT VISIT HI MDM: CPT

## 2019-12-06 RX ORDER — DIVALPROEX SODIUM 500 MG/1
500-1000 TABLET, DELAYED RELEASE ORAL 2 TIMES DAILY
Status: DISCONTINUED | OUTPATIENT
Start: 2019-12-06 | End: 2019-12-06

## 2019-12-06 RX ORDER — AMLODIPINE BESYLATE 5 MG/1
2.5 TABLET ORAL 2 TIMES DAILY
Status: DISCONTINUED | OUTPATIENT
Start: 2019-12-06 | End: 2019-12-06

## 2019-12-06 RX ORDER — BACLOFEN 10 MG/1
10 TABLET ORAL 3 TIMES DAILY PRN
Status: DISCONTINUED | OUTPATIENT
Start: 2019-12-06 | End: 2019-12-09 | Stop reason: HOSPADM

## 2019-12-06 RX ORDER — DICLOFENAC SODIUM 75 MG/1
75 TABLET, DELAYED RELEASE ORAL 2 TIMES DAILY
COMMUNITY
End: 2020-06-02

## 2019-12-06 RX ORDER — DIVALPROEX SODIUM 500 MG/1
500 TABLET, DELAYED RELEASE ORAL EVERY EVENING
Status: DISCONTINUED | OUTPATIENT
Start: 2019-12-06 | End: 2019-12-09 | Stop reason: HOSPADM

## 2019-12-06 RX ORDER — DIVALPROEX SODIUM 500 MG/1
1000 TABLET, DELAYED RELEASE ORAL EVERY MORNING
Status: DISCONTINUED | OUTPATIENT
Start: 2019-12-07 | End: 2019-12-09 | Stop reason: HOSPADM

## 2019-12-06 RX ORDER — SODIUM CHLORIDE 9 MG/ML
INJECTION, SOLUTION INTRAVENOUS CONTINUOUS
Status: DISCONTINUED | OUTPATIENT
Start: 2019-12-06 | End: 2019-12-08

## 2019-12-06 RX ORDER — PAROXETINE 30 MG/1
60 TABLET, FILM COATED ORAL DAILY
COMMUNITY
End: 2022-04-12

## 2019-12-06 RX ORDER — POLYETHYLENE GLYCOL 3350 17 G/17G
1 POWDER, FOR SOLUTION ORAL
Status: DISCONTINUED | OUTPATIENT
Start: 2019-12-06 | End: 2019-12-09 | Stop reason: HOSPADM

## 2019-12-06 RX ORDER — PAROXETINE HYDROCHLORIDE 20 MG/1
60 TABLET, FILM COATED ORAL DAILY
Status: DISCONTINUED | OUTPATIENT
Start: 2019-12-07 | End: 2019-12-09 | Stop reason: HOSPADM

## 2019-12-06 RX ORDER — OMEPRAZOLE 20 MG/1
20 CAPSULE, DELAYED RELEASE ORAL DAILY
COMMUNITY
End: 2022-09-17

## 2019-12-06 RX ORDER — SODIUM CHLORIDE 9 MG/ML
1000 INJECTION, SOLUTION INTRAVENOUS ONCE
Status: COMPLETED | OUTPATIENT
Start: 2019-12-06 | End: 2019-12-06

## 2019-12-06 RX ORDER — AMOXICILLIN 250 MG
2 CAPSULE ORAL 2 TIMES DAILY
Status: DISCONTINUED | OUTPATIENT
Start: 2019-12-06 | End: 2019-12-09 | Stop reason: HOSPADM

## 2019-12-06 RX ORDER — HYDROXYZINE 50 MG/1
50 TABLET, FILM COATED ORAL 3 TIMES DAILY
Status: DISCONTINUED | OUTPATIENT
Start: 2019-12-06 | End: 2019-12-07

## 2019-12-06 RX ORDER — NALOXONE HYDROCHLORIDE 0.4 MG/ML
0.1 INJECTION, SOLUTION INTRAMUSCULAR; INTRAVENOUS; SUBCUTANEOUS ONCE
Status: COMPLETED | OUTPATIENT
Start: 2019-12-06 | End: 2019-12-06

## 2019-12-06 RX ORDER — FLUTICASONE PROPIONATE 110 UG/1
2 AEROSOL, METERED RESPIRATORY (INHALATION) 2 TIMES DAILY
Status: DISCONTINUED | OUTPATIENT
Start: 2019-12-06 | End: 2019-12-09 | Stop reason: HOSPADM

## 2019-12-06 RX ORDER — KETOCONAZOLE 20 MG/ML
SHAMPOO TOPICAL ONCE
Status: DISCONTINUED | OUTPATIENT
Start: 2019-12-06 | End: 2019-12-07

## 2019-12-06 RX ORDER — NYSTATIN 100000 [USP'U]/G
POWDER TOPICAL 2 TIMES DAILY
Status: DISCONTINUED | OUTPATIENT
Start: 2019-12-06 | End: 2019-12-09 | Stop reason: HOSPADM

## 2019-12-06 RX ORDER — CIPROFLOXACIN 500 MG/1
500 TABLET, FILM COATED ORAL 2 TIMES DAILY
COMMUNITY
Start: 2019-11-22 | End: 2020-06-02

## 2019-12-06 RX ORDER — BISACODYL 10 MG
10 SUPPOSITORY, RECTAL RECTAL
Status: DISCONTINUED | OUTPATIENT
Start: 2019-12-06 | End: 2019-12-09 | Stop reason: HOSPADM

## 2019-12-06 RX ORDER — MORPHINE SULFATE 30 MG/1
30 TABLET, FILM COATED, EXTENDED RELEASE ORAL EVERY MORNING
Status: DISCONTINUED | OUTPATIENT
Start: 2019-12-07 | End: 2019-12-09 | Stop reason: HOSPADM

## 2019-12-06 RX ORDER — PROPRANOLOL HYDROCHLORIDE 10 MG/1
5 TABLET ORAL 2 TIMES DAILY
COMMUNITY

## 2019-12-06 RX ORDER — MORPHINE SULFATE 15 MG/1
15 TABLET ORAL 2 TIMES DAILY PRN
Status: DISCONTINUED | OUTPATIENT
Start: 2019-12-06 | End: 2019-12-09 | Stop reason: HOSPADM

## 2019-12-06 RX ADMIN — SENNOSIDES AND DOCUSATE SODIUM 2 TABLET: 8.6; 5 TABLET ORAL at 21:17

## 2019-12-06 RX ADMIN — HYDROXYZINE HYDROCHLORIDE 50 MG: 50 TABLET, FILM COATED ORAL at 21:17

## 2019-12-06 RX ADMIN — DIVALPROEX SODIUM 500 MG: 500 TABLET, DELAYED RELEASE ORAL at 21:17

## 2019-12-06 RX ADMIN — FLUTICASONE PROPIONATE 220 MCG: 110 AEROSOL, METERED RESPIRATORY (INHALATION) at 22:35

## 2019-12-06 RX ADMIN — NYSTATIN: 100000 POWDER TOPICAL at 23:24

## 2019-12-06 RX ADMIN — SODIUM CHLORIDE: 9 INJECTION, SOLUTION INTRAVENOUS at 21:13

## 2019-12-06 RX ADMIN — BACLOFEN 10 MG: 10 TABLET ORAL at 23:23

## 2019-12-06 RX ADMIN — SODIUM CHLORIDE 1000 ML: 9 INJECTION, SOLUTION INTRAVENOUS at 15:03

## 2019-12-06 RX ADMIN — NALOXONE HYDROCHLORIDE 0.1 MG: 0.4 INJECTION, SOLUTION INTRAMUSCULAR; INTRAVENOUS; SUBCUTANEOUS at 16:08

## 2019-12-06 ASSESSMENT — ENCOUNTER SYMPTOMS
WHEEZING: 0
BLURRED VISION: 0
TINGLING: 0
FEVER: 0
TREMORS: 1
WEAKNESS: 0
FOCAL WEAKNESS: 0
PHOTOPHOBIA: 0
INSOMNIA: 1
PND: 0
ABDOMINAL PAIN: 0
VOMITING: 0
SENSORY CHANGE: 0
CONSTIPATION: 0
DIAPHORESIS: 0
CHILLS: 0
NECK PAIN: 1
CLAUDICATION: 0
HEARTBURN: 0
WEIGHT LOSS: 0
MEMORY LOSS: 0
COUGH: 0
ORTHOPNEA: 0
HEMOPTYSIS: 0
DEPRESSION: 1
PALPITATIONS: 0
BLOOD IN STOOL: 0
HALLUCINATIONS: 1
SPEECH CHANGE: 0
DIARRHEA: 0
FLANK PAIN: 0
LOSS OF CONSCIOUSNESS: 0
SPUTUM PRODUCTION: 0
DOUBLE VISION: 0
NERVOUS/ANXIOUS: 1
DIZZINESS: 1
MYALGIAS: 1
BACK PAIN: 1
HEADACHES: 0
SHORTNESS OF BREATH: 0
FALLS: 1
NAUSEA: 0

## 2019-12-06 ASSESSMENT — PATIENT HEALTH QUESTIONNAIRE - PHQ9
8. MOVING OR SPEAKING SO SLOWLY THAT OTHER PEOPLE COULD HAVE NOTICED. OR THE OPPOSITE, BEING SO FIGETY OR RESTLESS THAT YOU HAVE BEEN MOVING AROUND A LOT MORE THAN USUAL: SEVERAL DAYS
9. THOUGHTS THAT YOU WOULD BE BETTER OFF DEAD, OR OF HURTING YOURSELF: SEVERAL DAYS
4. FEELING TIRED OR HAVING LITTLE ENERGY: SEVERAL DAYS
SUM OF ALL RESPONSES TO PHQ QUESTIONS 1-9: 21
1. LITTLE INTEREST OR PLEASURE IN DOING THINGS: NEARLY EVERY DAY
6. FEELING BAD ABOUT YOURSELF - OR THAT YOU ARE A FAILURE OR HAVE LET YOURSELF OR YOUR FAMILY DOWN: NEARLY EVERY DAY
2. FEELING DOWN, DEPRESSED, IRRITABLE, OR HOPELESS: NEARLY EVERY DAY
3. TROUBLE FALLING OR STAYING ASLEEP OR SLEEPING TOO MUCH: NEARLY EVERY DAY
SUM OF ALL RESPONSES TO PHQ9 QUESTIONS 1 AND 2: 6
5. POOR APPETITE OR OVEREATING: NEARLY EVERY DAY
7. TROUBLE CONCENTRATING ON THINGS, SUCH AS READING THE NEWSPAPER OR WATCHING TELEVISION: NEARLY EVERY DAY

## 2019-12-06 ASSESSMENT — LIFESTYLE VARIABLES
SUBSTANCE_ABUSE: 0
EVER_SMOKED: NEVER

## 2019-12-06 NOTE — DISCHARGE INSTRUCTIONS
Avoid amitriptyline as you are doing    Your potassium is slightly low at 3.3.  Please drink orange juice, eat banana daily and drink plenty of fluids    Return to the ER for worsening symptoms or other concerns  Follow-up with your primary care provider tomorrow for recheck

## 2019-12-06 NOTE — ED PROVIDER NOTES
CHIEF COMPLAINT  Chief Complaint   Patient presents with   • Dizziness   • Nausea       HPI (1,4)  Peter Trimble is a 44 y.o. female with complex past medical history including multiple psychiatric conditions including bipolar, ADHD, depression, fibromyalgia, chronic fatigue syndrome.  She also has a history of pseudoseizures and possible subarachnoid hemorrhage.  She presents with new onset shaking.  Shaking began early this morning this described as diffuse and she states multiple times that she just feels unwell overall.  She does endorse dysuria that has been present for approximately 1 day.  Denies flank pain, fevers, chills, nausea, vomiting, diarrhea.  States that she has been compliant with her previous medication regimen denies any new medications.  She was seen yesterday after a ground-level fall.  Found to be slightly hypokalemic and she received 20 mill equivalents of potassium chloride.  Head imaging was not done at that time.  Denies new onset numbness, tingling, weakness.      REVIEW OF SYSTEMS(2/10)  Pertinent positives include: Dysuria, shaking  Pertinent negatives include: Vision changes, hearing changes, dysphasia, chest pain, shortness of breath, vomiting, diarrhea, rash, lower extremity swelling, SI/HI.    PAST MEDICAL HISTORY(PFS1,2)  Past Medical History:   Diagnosis Date   • Acute blood loss anemia 2013    -resolved, postop     • Acute renal failure (ARF) (HCC) 10/5/2011    -resolved (post op )    • Anemia    • Anxiety    • Arthritis     osteoarthritis since 15yo.   • ASTHMA     O2 3liters at HS and prn   • Bipolar affective (HCC)    • Breath shortness    • Cold    • Depression    • Eclampsia complicating pregnancy    • Environmental allergies    • Essential hypertension, malignant 2011   • Fibromyalgia    • GERD (gastroesophageal reflux disease)    • Heart murmur     she denies this hx   • History of pregnancy 10/16/2014        • Hx MRSA infection    •  "Hyperlipidemia 04-26-13    \"off medication\"   • Hypertension    • Kidney stones    • Migraines    • Pain 05-27-14    \"my body hurts all the time\", 5-6/10   • Personality disorder ([facility])    • Pneumonia 2012   • PTSD (post-traumatic stress disorder)    • Scalp wound 8/18/2014   • Seizure ([facility]) 05-27-14    last 2011   • Sleep apnea     has had a sleep study, use O2 at HS   • Snoring    • Stroke ([facility]) 2011     reports strokes x5 , and a \"brain bleed\"   • Subarachnoid hemorrhage ([facility]) 9/28/2011    -small, 2011 (2nd to HTN)    • Unspecified hemorrhagic conditions     nose-bleeds, bruises easily   • Unspecified urinary incontinence    • Urinary bladder disorder        FAMILY HISTORY  Family History   Problem Relation Age of Onset   • Hypertension Mother    • Hyperlipidemia Mother    • Hypertension Father    • Hyperlipidemia Father    • Heart Disease Father    • Psychiatric Illness Father    • Alcohol/Drug Father    • Alcohol/Drug Brother    • Arthritis Maternal Uncle    • Psychiatric Illness Maternal Uncle    • Heart Disease Maternal Uncle    • Hypertension Maternal Uncle    • Hyperlipidemia Maternal Uncle    • Genetic Disorder Paternal Aunt    • Psychiatric Illness Paternal Uncle    • Arthritis Maternal Grandmother    • Heart Disease Maternal Grandmother    • Alcohol/Drug Maternal Grandfather    • Stroke Maternal Grandfather    • Psychiatric Illness Maternal Grandfather    • Arthritis Paternal Grandmother    • Alcohol/Drug Paternal Grandfather        SOCIAL HISTORY  Social History     Tobacco Use   • Smoking status: Never Smoker   • Smokeless tobacco: Never Used   Substance Use Topics   • Alcohol use: No   • Drug use: No     Social History     Substance and Sexual Activity   Drug Use No       SURGICAL HISTORY  Past Surgical History:   Procedure Laterality Date   • ANKLE ORIF Right 8/7/2016    Procedure: ANKLE ORIF;  Surgeon: Bill Brambila M.D.;  Location: SURGERY [facility];  Service:    • IRRIGATION & DEBRIDEMENT " GENERAL  8/17/2014    Performed by Ezra Wright M.D. at SURGERY [facility] ORS   • BREAST BIOPSY  5/29/2014    Performed by Negro Hester M.D. at SURGERY SAME DAY [facility] ORS   • EVACUATION OF HEMATOMA  5/2/2013    Performed by Lazaro Connors Jr., M.D. at SURGERY [facility] ORS   • MAMMOPLASTY REDUCTION  4/29/2013    Performed by Negro Hester M.D. at SURGERY [facility] ORS   • RECOVERY  1/30/2012    Performed by BENEDICT, IR-RECOVERY at SURGERY SAME DAY [facility] ORS   • RECOVERY  10/5/2011    Performed by MARIAELENA MCMULLENONADELITA at SURGERY [facility] ORS   • HYSTERECTOMY LAPAROSCOPY      W BSO   • KNEE RECONSTRUCTION     • LAMINOTOMY     • OTHER ORTHOPEDIC SURGERY      maddie. knee scopes   • OTHER ORTHOPEDIC SURGERY      back fusion then hardware removal   • PB EXPLORATION OF SPINAL FUSION     • PB REMOVAL OF OVARY(S)         CURRENT MEDICATIONS  Home Medications     Reviewed by Jyothi Pruett R.N. (Registered Nurse) on 12/06/19 at 1259  Med List Status: <None>   Medication Last Dose Status   albuterol (PROAIR HFA) 108 (90 Base) MCG/ACT Aero Soln inhalation aerosol  Active   amLODIPine (NORVASC) 5 MG Tab  Active   baclofen (LIORESAL) 10 MG Tab  Active   cetirizine (ZYRTEC) 10 MG Tab  Active   Cholecalciferol (VITAMIN D) 2000 UNIT Tab  Active   Ciclopirox 1 % Shampoo  Active   diclofenac EC (VOLTAREN) 75 MG Tablet Delayed Response  Active   divalproex (DEPAKOTE) 500 MG Tablet Delayed Response  Active   Erenumab (AIMOVIG) 70 MG/ML Solution Auto-injector  Active   esomeprazole (NEXIUM) 40 MG delayed-release capsule  Active   Estradiol 10 MCG INSERT  Active   ferrous sulfate 325 (65 FE) MG tablet  Active   fluticasone (FLONASE) 50 MCG/ACT nasal spray  Active   fluticasone (FLOVENT HFA) 220 MCG/ACT Aerosol  Active   furosemide (LASIX) 20 MG Tab  Active   guaifenesin LA (MUCINEX) 600 MG TABLET SR 12 HR  Active   hydrOXYzine HCl (ATARAX) 25 MG Tab  Active   montelukast (SINGULAIR) 10 MG Tab  Active   morphine  "(MS IR) 15 MG tablet  Active   Morphine Sulfate ER 30 MG Tablet Extended Release 12 hour Abuse-Deterrent  Active   nystatin (MYCOSTATIN) 699334 UNIT/GM Cream topical cream  Active   omeprazole (PRILOSEC) 20 MG delayed-release capsule  Active   ondansetron (ZOFRAN ODT) 4 MG TABLET DISPERSIBLE  Active   potassium chloride SA (KDUR) 20 MEQ Tab CR  Active                ALLERGIES  Allergies   Allergen Reactions   • Compazine      \"psychotic reaction\"   • Imitrex [Sumatriptan Succinate]      Had brain bleed   • Inapsine [Droperidol]      \"psychotic reaction\"   • Maxalt [Rizatriptan Benzoate]      Had brain bleed   • Phenergan [Promethazine Hcl]      \"psychotic reaction\"   • Xanax [Alprazolam]      Sores and dry mouth, many others pt cannot remember   • Zantac      Stomach pain   • Apple Cider Vinegar Rash     Patient states she gets rash   • Butrans [Buprenorphine]    • Clarithromycin Vomiting and Palpitations   • Dilaudid [Hydromorphone] Rash     Patient states she had rash, hallucinations, unable to urinate.    • Doxycycline    • Effexor [Venlafaxine]      \"Really depressed, like i wanted to hurt myself\"   • Eucalyptus Oil    • Gabapentin    • Kiwi Extract    • Latex Rash   • Lyrica [Pregabalin]      \"depression, slept a lot\"   • Minocycline Vomiting     \"any cyclines\"   • Morphabond Er [Suly]    • Patchouli Oil Rash     Rash    • Sulfa Drugs    • Tea Tree Oil Rash     Break out in rash   • Topiramate Nausea     Patient states made sick to stomach and dizzy.       PHYSICAL EXAM (2,8)  VITAL SIGNS: /57   Pulse 76   Temp 36 °C (96.8 °F) (Temporal)   Resp 16   Ht 1.803 m (5' 11\")   Wt 112.9 kg (249 lb)   SpO2 98%   BMI 34.73 kg/m²  Reviewed   Constitutional: well appearing  HENT: atraumatic, neck supple, no LAD  Eyes:PERRLA, EOMI .  Cardiovascular: RRR, no murmur   Respiratory: CTABL  Gastrointestinal: soft, NT, + BS, no CVA tenderness   Skin:  No rash, well prefused, scratch on forehead no sign of " infection .  Neurologic: CN 2-12 intact, patient appear sleepy dozing off during conversation, strength and sensation grossly intact, twitching movement of bilateral upper and lower extremities   Psychiatric: normal mood and affect     DIFFERENTIAL DIAGNOSIS:  UTI  Drug overdose  Pseudoseizure   Brain Bleed     RADIOLOGY/PROCEDURES  CT-HEAD W/O   Final Result      1.  No CT evidence of acute infarct, hemorrhage or mass.   2.  Tiny chronic infarct in the right basal ganglia.          LABORATORY: Reviewed as below.  Results for orders placed or performed during the hospital encounter of 12/06/19   CBC WITH DIFFERENTIAL   Result Value Ref Range    WBC 7.6 4.8 - 10.8 K/uL    RBC 4.20 4.20 - 5.40 M/uL    Hemoglobin 12.3 12.0 - 16.0 g/dL    Hematocrit 38.7 37.0 - 47.0 %    MCV 92.1 81.4 - 97.8 fL    MCH 29.3 27.0 - 33.0 pg    MCHC 31.8 (L) 33.6 - 35.0 g/dL    RDW 45.0 35.9 - 50.0 fL    Platelet Count 239 164 - 446 K/uL    MPV 10.5 9.0 - 12.9 fL    Neutrophils-Polys 47.70 44.00 - 72.00 %    Lymphocytes 37.40 22.00 - 41.00 %    Monocytes 12.30 0.00 - 13.40 %    Eosinophils 1.60 0.00 - 6.90 %    Basophils 0.50 0.00 - 1.80 %    Immature Granulocytes 0.50 0.00 - 0.90 %    Nucleated RBC 0.00 /100 WBC    Neutrophils (Absolute) 3.62 2.00 - 7.15 K/uL    Lymphs (Absolute) 2.84 1.00 - 4.80 K/uL    Monos (Absolute) 0.93 (H) 0.00 - 0.85 K/uL    Eos (Absolute) 0.12 0.00 - 0.51 K/uL    Baso (Absolute) 0.04 0.00 - 0.12 K/uL    Immature Granulocytes (abs) 0.04 0.00 - 0.11 K/uL    NRBC (Absolute) 0.00 K/uL   COMP METABOLIC PANEL   Result Value Ref Range    Sodium 141 135 - 145 mmol/L    Potassium 3.8 3.6 - 5.5 mmol/L    Chloride 100 96 - 112 mmol/L    Co2 33 20 - 33 mmol/L    Anion Gap 8.0 0.0 - 11.9    Glucose 88 65 - 99 mg/dL    Bun 34 (H) 8 - 22 mg/dL    Creatinine 2.05 (H) 0.50 - 1.40 mg/dL    Calcium 9.6 8.5 - 10.5 mg/dL    AST(SGOT) 27 12 - 45 U/L    ALT(SGPT) 19 2 - 50 U/L    Alkaline Phosphatase 37 30 - 99 U/L    Total Bilirubin  0.3 0.1 - 1.5 mg/dL    Albumin 4.1 3.2 - 4.9 g/dL    Total Protein 6.8 6.0 - 8.2 g/dL    Globulin 2.7 1.9 - 3.5 g/dL    A-G Ratio 1.5 g/dL   ESTIMATED GFR   Result Value Ref Range    GFR If  32 (A) >60 mL/min/1.73 m 2    GFR If Non African American 26 (A) >60 mL/min/1.73 m 2       INTERVENTIONS:  Medications   NS infusion 1,000 mL (has no administration in time range)       COURSE & MEDICAL DECISION MAKING  Patient evaluated at bedside she is alert and oriented.  Noted to have diffuse upper and lower extremity twitching.  Neuro exam WNL.  Patient is very sleepy and falls asleep multiple times during conversation.   Given history of dysuria will evaluate for UTI.  History of recent ground-level fall new on voluntary movements will do CT head to rule out brain bleed.  No evidence of seizure-like activity at this time.    1420:  W BC 7.6, creatinine 2.05, baseline 1.3, CT head no acute changes.  NS bolus for dehydration.     1620: UA ketones, small blood, trace LE, patient difficult to arouse, given 0.1 of narcan with minimal effect.  Dad at bedside.      Patient with polysubstance overdose secondary to chronic pain/psychiatric medications.  Given that she is difficult to arouse she is not a safe discharge home.  She is currently protecting her airway.  Plan to Admit to floor    CONDITION: guareded    FINAL IMPRESSION  No diagnosis found.      Electronically signed by: Lori Reyes, 12/6/2019 2:16 PM

## 2019-12-06 NOTE — ED TRIAGE NOTES
Chief Complaint   Patient presents with   • Dizziness   • Nausea   Pt bib EMS from [facility].  Pt had MGLF yesterday and was seen here.  Pt noted to K 3.3 and given potassium chloride 20 mEq.  Pt reports feeling dizzy, twitching and not feeling quiet right.

## 2019-12-07 PROBLEM — Z79.899 POLYPHARMACY: Status: ACTIVE | Noted: 2019-12-07

## 2019-12-07 PROBLEM — R29.6 REPEATED FALLS: Status: ACTIVE | Noted: 2019-12-07

## 2019-12-07 LAB
ALBUMIN SERPL BCP-MCNC: 3.3 G/DL (ref 3.2–4.9)
ALBUMIN/GLOB SERPL: 1.4 G/DL
ALP SERPL-CCNC: 32 U/L (ref 30–99)
ALT SERPL-CCNC: 15 U/L (ref 2–50)
AMMONIA PLAS-SCNC: 90 UMOL/L (ref 11–45)
ANION GAP SERPL CALC-SCNC: 7 MMOL/L (ref 0–11.9)
AST SERPL-CCNC: 19 U/L (ref 12–45)
BASOPHILS # BLD AUTO: 0.9 % (ref 0–1.8)
BASOPHILS # BLD: 0.04 K/UL (ref 0–0.12)
BILIRUB SERPL-MCNC: 0.3 MG/DL (ref 0.1–1.5)
BUN SERPL-MCNC: 29 MG/DL (ref 8–22)
CALCIUM SERPL-MCNC: 8.3 MG/DL (ref 8.5–10.5)
CHLORIDE SERPL-SCNC: 106 MMOL/L (ref 96–112)
CK SERPL-CCNC: 69 U/L (ref 0–154)
CO2 SERPL-SCNC: 28 MMOL/L (ref 20–33)
CREAT SERPL-MCNC: 1.42 MG/DL (ref 0.5–1.4)
EOSINOPHIL # BLD AUTO: 0.17 K/UL (ref 0–0.51)
EOSINOPHIL NFR BLD: 3.6 % (ref 0–6.9)
ERYTHROCYTE [DISTWIDTH] IN BLOOD BY AUTOMATED COUNT: 44.3 FL (ref 35.9–50)
ETHANOL BLD-MCNC: 0 G/DL
GLOBULIN SER CALC-MCNC: 2.3 G/DL (ref 1.9–3.5)
GLUCOSE SERPL-MCNC: 91 MG/DL (ref 65–99)
HCT VFR BLD AUTO: 34.5 % (ref 37–47)
HGB BLD-MCNC: 11.2 G/DL (ref 12–16)
IMM GRANULOCYTES # BLD AUTO: 0.02 K/UL (ref 0–0.11)
IMM GRANULOCYTES NFR BLD AUTO: 0.4 % (ref 0–0.9)
LYMPHOCYTES # BLD AUTO: 2.63 K/UL (ref 1–4.8)
LYMPHOCYTES NFR BLD: 56 % (ref 22–41)
MCH RBC QN AUTO: 29.6 PG (ref 27–33)
MCHC RBC AUTO-ENTMCNC: 32.5 G/DL (ref 33.6–35)
MCV RBC AUTO: 91.3 FL (ref 81.4–97.8)
MONOCYTES # BLD AUTO: 0.46 K/UL (ref 0–0.85)
MONOCYTES NFR BLD AUTO: 9.8 % (ref 0–13.4)
NEUTROPHILS # BLD AUTO: 1.38 K/UL (ref 2–7.15)
NEUTROPHILS NFR BLD: 29.3 % (ref 44–72)
NRBC # BLD AUTO: 0 K/UL
NRBC BLD-RTO: 0 /100 WBC
PLATELET # BLD AUTO: 190 K/UL (ref 164–446)
PMV BLD AUTO: 10.2 FL (ref 9–12.9)
POTASSIUM SERPL-SCNC: 3.5 MMOL/L (ref 3.6–5.5)
PROT SERPL-MCNC: 5.6 G/DL (ref 6–8.2)
RBC # BLD AUTO: 3.78 M/UL (ref 4.2–5.4)
SODIUM SERPL-SCNC: 141 MMOL/L (ref 135–145)
WBC # BLD AUTO: 4.7 K/UL (ref 4.8–10.8)

## 2019-12-07 PROCEDURE — 97166 OT EVAL MOD COMPLEX 45 MIN: CPT

## 2019-12-07 PROCEDURE — 80307 DRUG TEST PRSMV CHEM ANLYZR: CPT

## 2019-12-07 PROCEDURE — 700102 HCHG RX REV CODE 250 W/ 637 OVERRIDE(OP): Performed by: STUDENT IN AN ORGANIZED HEALTH CARE EDUCATION/TRAINING PROGRAM

## 2019-12-07 PROCEDURE — 82140 ASSAY OF AMMONIA: CPT

## 2019-12-07 PROCEDURE — 85025 COMPLETE CBC W/AUTO DIFF WBC: CPT

## 2019-12-07 PROCEDURE — 36415 COLL VENOUS BLD VENIPUNCTURE: CPT

## 2019-12-07 PROCEDURE — A9270 NON-COVERED ITEM OR SERVICE: HCPCS | Performed by: STUDENT IN AN ORGANIZED HEALTH CARE EDUCATION/TRAINING PROGRAM

## 2019-12-07 PROCEDURE — 80053 COMPREHEN METABOLIC PANEL: CPT

## 2019-12-07 PROCEDURE — 97161 PT EVAL LOW COMPLEX 20 MIN: CPT

## 2019-12-07 PROCEDURE — 700111 HCHG RX REV CODE 636 W/ 250 OVERRIDE (IP): Performed by: STUDENT IN AN ORGANIZED HEALTH CARE EDUCATION/TRAINING PROGRAM

## 2019-12-07 PROCEDURE — 96372 THER/PROPH/DIAG INJ SC/IM: CPT

## 2019-12-07 PROCEDURE — 82550 ASSAY OF CK (CPK): CPT

## 2019-12-07 PROCEDURE — 51798 US URINE CAPACITY MEASURE: CPT

## 2019-12-07 PROCEDURE — 700105 HCHG RX REV CODE 258: Performed by: STUDENT IN AN ORGANIZED HEALTH CARE EDUCATION/TRAINING PROGRAM

## 2019-12-07 PROCEDURE — 770006 HCHG ROOM/CARE - MED/SURG/GYN SEMI*

## 2019-12-07 RX ORDER — ACETAMINOPHEN 500 MG
1000 TABLET ORAL 3 TIMES DAILY PRN
Status: DISCONTINUED | OUTPATIENT
Start: 2019-12-07 | End: 2019-12-09 | Stop reason: HOSPADM

## 2019-12-07 RX ORDER — KETOCONAZOLE 20 MG/G
CREAM TOPICAL DAILY
Status: DISCONTINUED | OUTPATIENT
Start: 2019-12-07 | End: 2019-12-09 | Stop reason: HOSPADM

## 2019-12-07 RX ORDER — POTASSIUM CHLORIDE 20 MEQ/1
40 TABLET, EXTENDED RELEASE ORAL ONCE
Status: COMPLETED | OUTPATIENT
Start: 2019-12-07 | End: 2019-12-07

## 2019-12-07 RX ORDER — MONTELUKAST SODIUM 10 MG/1
10 TABLET ORAL NIGHTLY
Status: DISCONTINUED | OUTPATIENT
Start: 2019-12-07 | End: 2019-12-09 | Stop reason: HOSPADM

## 2019-12-07 RX ORDER — HYDROXYZINE 50 MG/1
50 TABLET, FILM COATED ORAL 3 TIMES DAILY PRN
Status: DISCONTINUED | OUTPATIENT
Start: 2019-12-07 | End: 2019-12-09 | Stop reason: HOSPADM

## 2019-12-07 RX ADMIN — DIVALPROEX SODIUM 1000 MG: 500 TABLET, DELAYED RELEASE ORAL at 05:05

## 2019-12-07 RX ADMIN — ENOXAPARIN SODIUM 40 MG: 100 INJECTION SUBCUTANEOUS at 12:00

## 2019-12-07 RX ADMIN — SODIUM CHLORIDE: 9 INJECTION, SOLUTION INTRAVENOUS at 05:07

## 2019-12-07 RX ADMIN — SENNOSIDES AND DOCUSATE SODIUM 2 TABLET: 8.6; 5 TABLET ORAL at 17:31

## 2019-12-07 RX ADMIN — NYSTATIN: 100000 POWDER TOPICAL at 17:31

## 2019-12-07 RX ADMIN — FLUTICASONE PROPIONATE 220 MCG: 110 AEROSOL, METERED RESPIRATORY (INHALATION) at 17:31

## 2019-12-07 RX ADMIN — HYDROXYZINE HYDROCHLORIDE 50 MG: 50 TABLET, FILM COATED ORAL at 17:36

## 2019-12-07 RX ADMIN — POTASSIUM CHLORIDE 40 MEQ: 1500 TABLET, EXTENDED RELEASE ORAL at 12:00

## 2019-12-07 RX ADMIN — NYSTATIN: 100000 POWDER TOPICAL at 05:14

## 2019-12-07 RX ADMIN — PAROXETINE 60 MG: 30 TABLET, FILM COATED ORAL at 05:06

## 2019-12-07 RX ADMIN — KETOCONAZOLE: 20 CREAM TOPICAL at 20:27

## 2019-12-07 RX ADMIN — SENNOSIDES AND DOCUSATE SODIUM 2 TABLET: 8.6; 5 TABLET ORAL at 05:05

## 2019-12-07 RX ADMIN — SODIUM CHLORIDE: 9 INJECTION, SOLUTION INTRAVENOUS at 17:36

## 2019-12-07 RX ADMIN — HYDROXYZINE HYDROCHLORIDE 50 MG: 50 TABLET, FILM COATED ORAL at 05:05

## 2019-12-07 RX ADMIN — MONTELUKAST 10 MG: 10 TABLET, FILM COATED ORAL at 20:27

## 2019-12-07 RX ADMIN — ACETAMINOPHEN 1000 MG: 500 TABLET ORAL at 12:04

## 2019-12-07 RX ADMIN — DIVALPROEX SODIUM 500 MG: 500 TABLET, DELAYED RELEASE ORAL at 17:31

## 2019-12-07 RX ADMIN — FLUTICASONE PROPIONATE 220 MCG: 110 AEROSOL, METERED RESPIRATORY (INHALATION) at 08:04

## 2019-12-07 RX ADMIN — MORPHINE SULFATE 30 MG: 30 TABLET, FILM COATED, EXTENDED RELEASE ORAL at 05:05

## 2019-12-07 ASSESSMENT — LIFESTYLE VARIABLES
TOTAL SCORE: 0
EVER FELT BAD OR GUILTY ABOUT YOUR DRINKING: NO
EVER HAD A DRINK FIRST THING IN THE MORNING TO STEADY YOUR NERVES TO GET RID OF A HANGOVER: NO
ON A TYPICAL DAY WHEN YOU DRINK ALCOHOL HOW MANY DRINKS DO YOU HAVE: 0
ALCOHOL_USE: NO
TOTAL SCORE: 0
HAVE YOU EVER FELT YOU SHOULD CUT DOWN ON YOUR DRINKING: NO
AVERAGE NUMBER OF DAYS PER WEEK YOU HAVE A DRINK CONTAINING ALCOHOL: 0
HOW MANY TIMES IN THE PAST YEAR HAVE YOU HAD 5 OR MORE DRINKS IN A DAY: 0
TOTAL SCORE: 0
HAVE PEOPLE ANNOYED YOU BY CRITICIZING YOUR DRINKING: NO
CONSUMPTION TOTAL: NEGATIVE

## 2019-12-07 ASSESSMENT — COGNITIVE AND FUNCTIONAL STATUS - GENERAL
MOBILITY SCORE: 18
HELP NEEDED FOR BATHING: A LOT
STANDING UP FROM CHAIR USING ARMS: A LITTLE
DRESSING REGULAR LOWER BODY CLOTHING: A LOT
MOVING FROM LYING ON BACK TO SITTING ON SIDE OF FLAT BED: A LITTLE
DAILY ACTIVITIY SCORE: 18
MOVING TO AND FROM BED TO CHAIR: A LITTLE
CLIMB 3 TO 5 STEPS WITH RAILING: A LOT
MOVING FROM LYING ON BACK TO SITTING ON SIDE OF FLAT BED: A LITTLE
MOVING TO AND FROM BED TO CHAIR: A LITTLE
WALKING IN HOSPITAL ROOM: A LITTLE
PERSONAL GROOMING: A LITTLE
STANDING UP FROM CHAIR USING ARMS: A LITTLE
DAILY ACTIVITIY SCORE: 16
TOILETING: A LITTLE
MOBILITY SCORE: 18
DRESSING REGULAR LOWER BODY CLOTHING: A LOT
SUGGESTED CMS G CODE MODIFIER MOBILITY: CK
WALKING IN HOSPITAL ROOM: A LITTLE
SUGGESTED CMS G CODE MODIFIER MOBILITY: CK
DRESSING REGULAR UPPER BODY CLOTHING: A LITTLE
DRESSING REGULAR UPPER BODY CLOTHING: A LITTLE
SUGGESTED CMS G CODE MODIFIER DAILY ACTIVITY: CK
SUGGESTED CMS G CODE MODIFIER DAILY ACTIVITY: CK
TOILETING: A LOT
CLIMB 3 TO 5 STEPS WITH RAILING: A LOT
HELP NEEDED FOR BATHING: A LOT

## 2019-12-07 ASSESSMENT — GAIT ASSESSMENTS
DISTANCE (FEET): 50
ASSISTIVE DEVICE: FRONT WHEEL WALKER
GAIT LEVEL OF ASSIST: MINIMAL ASSIST

## 2019-12-07 ASSESSMENT — ACTIVITIES OF DAILY LIVING (ADL): TOILETING: DEPENDENT

## 2019-12-07 NOTE — ASSESSMENT & PLAN NOTE
Hx of HTN , on amlodipine  mg t 2.5wice daily  The patient reported that she stopped taking amlodipine and was started on propranolol  We will continue to monitor, currently BP stable at 131/83, continue propranolol

## 2019-12-07 NOTE — PROGRESS NOTES
2 RN skin check completed with EDWIN Lyons.  Devices in place: PIV 20G LFA and nasal cannula.  Skin assessed under devices: Yes.  Confirmed pressure ulcers found on: None.  New potential pressure ulcers noted on: n/a. Wound consult placed: n/a.    Hairline scab. Elbows pink/blanching. Generalized bruising/scabs. Redness under pannus. Large left knee bruise from fall. Scab on right big toe from fall. Sacrum pink/blanching. Heels pink/blanching.    The following interventions in place: Foam ear oxygen protectors. Encouraged pt to turn. Ensure adequate fluid intake. Extra pillows provided. Legs floated on pillow.

## 2019-12-07 NOTE — ASSESSMENT & PLAN NOTE
The patient on numerous medication for multiple comorbidities including pain meds (morphine), muscle relaxant (baclofen), SSRI (Paxil), anticonvulsant (Depakote), and anxiolytic (Atarax).  - changed hydroxyzine and Baclofen to PRN instead of scheduled

## 2019-12-07 NOTE — ED NOTES
Patient off unit with transporter for admit, denies any SI/HI. Pt still sleepy frequently falling asleep while talking with her.

## 2019-12-07 NOTE — H&P
Internal Medicine Admitting History and Physical    Note Author: Tom Downey M.D.       Name Peter Trimble 1975   Age/Sex 44 y.o. female   MRN [id number]   Code Status Full Code     After 5PM or if no immediate response to page, please call for cross-coverage  Attending/Team: Dr. Story/Donna See Patient List for primary contact information  Call [phone] to page    1st Call - Day Intern (R1):   Dr. Eastman 2nd Call - Day Sr. Resident (R2/R3):   Dr. Evangelista       Chief Complaint:   Fatigue     HPI:  44 years old female patient with complex psychiatric history including Depression/Anxiety, Bipolar disorders, ADHD, Fibromyalgia, psuedseizures and chronic fatigue syndrome who presented to the hospital because of fatigue and feeling unwell over the past few days which has been progressively getting worse. The patient visited the ED yesterday after having a fall, she felt dizzy and stepped up onto a curb, fell and had multiple injuries but she didn't loose her consciousness.  The patient denied head injury, chest pain, shortness of breath, sweating, weakness or numbness. X-rays were negative for any fractures. The patient also reported increase of shaking that began this morning, the patient always has baseline whole body shaking, but she noticed it's getting worse recently, she reported that she was diagnosed before with pseudoseizures.   The patient and her father believe that the falls could be secondary to recent use of amitriptyline which was recently prescribed by her PCP. She stopped taking that 3 days ago.    The patient's father reported that she had multiple falls over the past few months due to imbalance  The patient had admission to the hospital a month ago for a concern of Hematuria, UA was negative for infection, she was noted to be in ZULLY, CT of abdomen and pelvis noted non-obstructing renal calculi, right renal cyst, and small hypodense renal lesions. The patient was treated  with IV fluids and tamsulosin, was discharged home.    The patient on multiple medication for her multiple comorbidities including pain meds (morphine), muscle relaxant (baclofen), SSRI (Paxil), anticonvulsant (Depakote), and anxiolytic (Atarax), recent administration of amitriptyline.  The patient reported that she has been taking education as prescribed    On presentation to the ED, the patient was vitally stable, afebrile.  Blood workup unremarkable, Head CT scan no acute infarcts or hemorrhage with tiny chronic infarct in the right basal ganglia.  She did receive 0.1 Narcan in the ED       Review of Systems   Constitutional: Positive for malaise/fatigue. Negative for chills, diaphoresis, fever and weight loss.   HENT: Negative for congestion, ear discharge, ear pain, hearing loss, nosebleeds and tinnitus.    Eyes: Negative for blurred vision, double vision and photophobia.   Respiratory: Negative for cough, hemoptysis, sputum production, shortness of breath and wheezing.    Cardiovascular: Negative for chest pain, palpitations, orthopnea, claudication, leg swelling and PND.   Gastrointestinal: Negative for abdominal pain, blood in stool, constipation, diarrhea, heartburn, nausea and vomiting.   Genitourinary: Negative for dysuria, flank pain, frequency, hematuria and urgency.   Musculoskeletal: Positive for back pain, falls, myalgias and neck pain.   Skin: Negative for itching and rash.   Neurological: Positive for dizziness and tremors. Negative for tingling, sensory change, speech change, focal weakness, loss of consciousness, weakness and headaches.        Shaky movements(pseduoseizures)   Psychiatric/Behavioral: Positive for depression and hallucinations. Negative for memory loss, substance abuse and suicidal ideas. The patient is nervous/anxious and has insomnia.         Hearing voices             Past Medical History (Chronic medical problem, known complications and current treatment)    Past Medical  "History:   Diagnosis Date   • Acute blood loss anemia 2013    -resolved, postop     • Acute renal failure (ARF) ([facility]) 10/5/2011    -resolved (post op )    • Anemia    • Anxiety    • Arthritis     osteoarthritis since 17yo.   • ASTHMA     O2 3liters at HS and prn   • Bipolar affective ([facility])    • Breath shortness    • Cold    • Depression    • Eclampsia complicating pregnancy    • Environmental allergies    • Essential hypertension, malignant 2011   • Fibromyalgia    • GERD (gastroesophageal reflux disease)    • Heart murmur     she denies this hx   • History of pregnancy 10/16/2014        • Hx MRSA infection    • Hyperlipidemia 14    \"off medication\"   • Hypertension    • Kidney stones    • Migraines    • Pain 15    \"my body hurts all the time\", 5-6/10   • Personality disorder ([facility])    • Pneumonia    • PTSD (post-traumatic stress disorder)    • Scalp wound 2014   • Seizure ([facility]) 14    last    • Sleep apnea     has had a sleep study, use O2 at HS   • Snoring    • Stroke ([facility])      reports strokes x5 , and a \"brain bleed\"   • Subarachnoid hemorrhage ([facility]) 2011    -small,  (2nd to HTN)    • Unspecified hemorrhagic conditions     nose-bleeds, bruises easily   • Unspecified urinary incontinence    • Urinary bladder disorder          Past Surgical History:  Past Surgical History:   Procedure Laterality Date   • ANKLE ORIF Right 2016    Procedure: ANKLE ORIF;  Surgeon: Bill Brambila M.D.;  Location: SURGERY [facility];  Service:    • IRRIGATION & DEBRIDEMENT GENERAL  2014    Performed by Ezra Wright M.D. at SURGERY [facility]   • BREAST BIOPSY  2014    Performed by Negro Hester M.D. at SURGERY SAME DAY [facility]S   • EVACUATION OF HEMATOMA  2013    Performed by Lazaro Connors Jr., M.D. at SURGERY [facility]   • MAMMOPLASTY REDUCTION  2013    Performed by Negro Hester M.D. at [facility] "   • RECOVERY  1/30/2012    Performed by SURGERY, IR-RECOVERY at SURGERY SAME DAY [facility] ORS   • RECOVERY  10/5/2011    Performed by SURGERY, RECOVERYONLY at SURGERY [facility] ORS   • HYSTERECTOMY LAPAROSCOPY      W BSO   • KNEE RECONSTRUCTION     • LAMINOTOMY     • OTHER ORTHOPEDIC SURGERY      maddie. knee scopes   • OTHER ORTHOPEDIC SURGERY      back fusion then hardware removal   • PB EXPLORATION OF SPINAL FUSION     • PB REMOVAL OF OVARY(S)         Current Outpatient Medications:  Home Medications     Reviewed by Herlinda Campos PhT (Pharmacy Tech) on 12/06/19 at 1845  Med List Status: Complete   Medication Last Dose Status   albuterol (PROAIR HFA) 108 (90 Base) MCG/ACT Aero Soln inhalation aerosol 12/6/2019 Active   amLODIPine (NORVASC) 5 MG Tab 12/6/2019 Active   baclofen (LIORESAL) 10 MG Tab 12/6/2019 Active   cetirizine (ZYRTEC) 10 MG Tab 12/6/2019 Active   Cholecalciferol (VITAMIN D) 2000 UNIT Tab 12/6/2019 Active   Ciclopirox 1 % Shampoo 12/6/2019 Active   ciprofloxacin (CIPRO) 500 MG Tab 12/1/2019 Active   diclofenac EC (VOLTAREN) 75 MG Tablet Delayed Response 12/6/2019 Active   divalproex (DEPAKOTE) 500 MG Tablet Delayed Response 12/6/2019 Active   Erenumab (AIMOVIG) 70 MG/ML Solution Auto-injector 11/9/2019 Active   esomeprazole (NEXIUM) 40 MG delayed-release capsule 12/6/2019 Active   Estradiol 10 MCG INSERT 12/5/2019 Active   ferrous sulfate 325 (65 FE) MG tablet 12/6/2019 Active   fluticasone (FLONASE) 50 MCG/ACT nasal spray 12/6/2019 Active   fluticasone (FLOVENT HFA) 220 MCG/ACT Aerosol 12/6/2019 Active   furosemide (LASIX) 20 MG Tab 12/6/2019 Active   guaifenesin LA (MUCINEX) 600 MG TABLET SR 12 HR 12/6/2019 Active   hydrOXYzine HCl (ATARAX) 25 MG Tab 12/6/2019 Active   montelukast (SINGULAIR) 10 MG Tab 12/5/2019 Active   morphine (MS IR) 15 MG tablet UNK Active   Morphine Sulfate ER 30 MG Tablet Extended Release 12 hour Abuse-Deterrent 12/6/2019 Active   nystatin (MYCOSTATIN) 804067 UNIT/GM  "Cream topical cream 12/6/2019 Active   omeprazole (PRILOSEC) 20 MG delayed-release capsule 12/6/2019 Active   ondansetron (ZOFRAN ODT) 4 MG TABLET DISPERSIBLE UNK Active   PARoxetine (PAXIL) 30 MG Tab 12/6/2019 Active   potassium chloride SA (KDUR) 20 MEQ Tab CR 12/6/2019 Active                Medication Allergy/Sensitivities:  Allergies   Allergen Reactions   • Compazine      \"psychotic reaction\"   • Imitrex [Sumatriptan Succinate]      Had brain bleed   • Inapsine [Droperidol]      \"psychotic reaction\"   • Maxalt [Rizatriptan Benzoate]      Had brain bleed   • Phenergan [Promethazine Hcl]      \"psychotic reaction\"   • Xanax [Alprazolam]      Sores and dry mouth, many others pt cannot remember   • Zantac      Stomach pain   • Apple Cider Vinegar Rash     Patient states she gets rash   • Butrans [Buprenorphine]    • Clarithromycin Vomiting and Palpitations   • Dilaudid [Hydromorphone] Rash     Patient states she had rash, hallucinations, unable to urinate.    • Doxycycline    • Effexor [Venlafaxine]      \"Really depressed, like i wanted to hurt myself\"   • Eucalyptus Oil    • Gabapentin    • Kiwi Extract    • Latex Rash   • Lyrica [Pregabalin]      \"depression, slept a lot\"   • Minocycline Vomiting     \"any cyclines\"   • Morphabond Er [Suly]    • Patchouli Oil Rash     Rash    • Sulfa Drugs    • Tea Tree Oil Rash     Break out in rash   • Topiramate Nausea     Patient states made sick to stomach and dizzy.         Family History (mandatory)   Family History   Problem Relation Age of Onset   • Hypertension Mother    • Hyperlipidemia Mother    • Hypertension Father    • Hyperlipidemia Father    • Heart Disease Father    • Psychiatric Illness Father    • Alcohol/Drug Father    • Alcohol/Drug Brother    • Arthritis Maternal Uncle    • Psychiatric Illness Maternal Uncle    • Heart Disease Maternal Uncle    • Hypertension Maternal Uncle    • Hyperlipidemia Maternal Uncle    • Genetic Disorder Paternal Aunt    • " Psychiatric Illness Paternal Uncle    • Arthritis Maternal Grandmother    • Heart Disease Maternal Grandmother    • Alcohol/Drug Maternal Grandfather    • Stroke Maternal Grandfather    • Psychiatric Illness Maternal Grandfather    • Arthritis Paternal Grandmother    • Alcohol/Drug Paternal Grandfather        Social History (mandatory)   Social History     Socioeconomic History   • Marital status:      Spouse name: Not on file   • Number of children: Not on file   • Years of education: Not on file   • Highest education level: Not on file   Occupational History   • Not on file   Social Needs   • Financial resource strain: Not on file   • Food insecurity:     Worry: Not on file     Inability: Not on file   • Transportation needs:     Medical: Not on file     Non-medical: Not on file   Tobacco Use   • Smoking status: Never Smoker   • Smokeless tobacco: Never Used   Substance and Sexual Activity   • Alcohol use: No   • Drug use: No   • Sexual activity: Not on file   Lifestyle   • Physical activity:     Days per week: Not on file     Minutes per session: Not on file   • Stress: Not on file   Relationships   • Social connections:     Talks on phone: Not on file     Gets together: Not on file     Attends Gnosticist service: Not on file     Active member of club or organization: Not on file     Attends meetings of clubs or organizations: Not on file     Relationship status: Not on file   • Intimate partner violence:     Fear of current or ex partner: Not on file     Emotionally abused: Not on file     Physically abused: Not on file     Forced sexual activity: Not on file   Other Topics Concern   • Not on file   Social History Narrative   • Not on file     Living situation: lives in [place], Her father just moved in to live with her, she is having home health(home aids) going few times/week  PCP : Vika Norton M.D.    Physical Exam     Vitals:    12/06/19 2032 12/06/19 2237 12/06/19 2320 12/06/19 2321   BP: 114/71   102/63 105/75   Pulse: 71 71 69 79   Resp: 20 20     Temp: 36.3 °C (97.4 °F)      TempSrc: Temporal      SpO2: 98% 98%     Weight: 111.4 kg (245 lb 9.5 oz)      Height:         Body mass index is 34.25 kg/m².  O2 therapy: Pulse Oximetry: 98 %, O2 (LPM): 3, O2 Delivery: Nasal Cannula    Physical Exam   Constitutional: She is well-developed, well-nourished, and in no distress. No distress.   The patient was sleepy during the whole encounter   HENT:   Head: Normocephalic and atraumatic.   Eyes: Pupils are equal, round, and reactive to light. Conjunctivae and EOM are normal.   Neck: Normal range of motion. Neck supple. No JVD present. No tracheal deviation present. No thyromegaly present.   Cardiovascular: Normal rate, regular rhythm, normal heart sounds and intact distal pulses. Exam reveals no gallop and no friction rub.   No murmur heard.  Pulmonary/Chest: Effort normal and breath sounds normal. No respiratory distress. She has no wheezes. She has no rales. She exhibits no tenderness.   Abdominal: Soft. Bowel sounds are normal. She exhibits no distension and no mass. There is no tenderness. There is no rebound and no guarding.   Musculoskeletal: Normal range of motion.         General: No tenderness, deformity or edema.   Neurological:   Sleepy, delirious   Skin: Skin is warm. No rash noted. She is not diaphoretic. No erythema. No pallor.   Psychiatric:   Flat affect  Sleepy  Mood can't be assessed at the moment.         Data Review       Old Records Request:   Completed  Current Records review/summary: Completed    Lab Data Review:  Recent Results (from the past 24 hour(s))   CBC WITH DIFFERENTIAL    Collection Time: 12/06/19 12:57 PM   Result Value Ref Range    WBC 7.6 4.8 - 10.8 K/uL    RBC 4.20 4.20 - 5.40 M/uL    Hemoglobin 12.3 12.0 - 16.0 g/dL    Hematocrit 38.7 37.0 - 47.0 %    MCV 92.1 81.4 - 97.8 fL    MCH 29.3 27.0 - 33.0 pg    MCHC 31.8 (L) 33.6 - 35.0 g/dL    RDW 45.0 35.9 - 50.0 fL    Platelet Count 239  164 - 446 K/uL    MPV 10.5 9.0 - 12.9 fL    Neutrophils-Polys 47.70 44.00 - 72.00 %    Lymphocytes 37.40 22.00 - 41.00 %    Monocytes 12.30 0.00 - 13.40 %    Eosinophils 1.60 0.00 - 6.90 %    Basophils 0.50 0.00 - 1.80 %    Immature Granulocytes 0.50 0.00 - 0.90 %    Nucleated RBC 0.00 /100 WBC    Neutrophils (Absolute) 3.62 2.00 - 7.15 K/uL    Lymphs (Absolute) 2.84 1.00 - 4.80 K/uL    Monos (Absolute) 0.93 (H) 0.00 - 0.85 K/uL    Eos (Absolute) 0.12 0.00 - 0.51 K/uL    Baso (Absolute) 0.04 0.00 - 0.12 K/uL    Immature Granulocytes (abs) 0.04 0.00 - 0.11 K/uL    NRBC (Absolute) 0.00 K/uL   COMP METABOLIC PANEL    Collection Time: 12/06/19 12:57 PM   Result Value Ref Range    Sodium 141 135 - 145 mmol/L    Potassium 3.8 3.6 - 5.5 mmol/L    Chloride 100 96 - 112 mmol/L    Co2 33 20 - 33 mmol/L    Anion Gap 8.0 0.0 - 11.9    Glucose 88 65 - 99 mg/dL    Bun 34 (H) 8 - 22 mg/dL    Creatinine 2.05 (H) 0.50 - 1.40 mg/dL    Calcium 9.6 8.5 - 10.5 mg/dL    AST(SGOT) 27 12 - 45 U/L    ALT(SGPT) 19 2 - 50 U/L    Alkaline Phosphatase 37 30 - 99 U/L    Total Bilirubin 0.3 0.1 - 1.5 mg/dL    Albumin 4.1 3.2 - 4.9 g/dL    Total Protein 6.8 6.0 - 8.2 g/dL    Globulin 2.7 1.9 - 3.5 g/dL    A-G Ratio 1.5 g/dL   ESTIMATED GFR    Collection Time: 12/06/19 12:57 PM   Result Value Ref Range    GFR If  32 (A) >60 mL/min/1.73 m 2    GFR If Non African American 26 (A) >60 mL/min/1.73 m 2   URINALYSIS CULTURE, IF INDICATED    Collection Time: 12/06/19  2:50 PM   Result Value Ref Range    Color DK Yellow     Character Clear     Specific Gravity 1.023 <1.035    Ph 5.5 5.0 - 8.0    Glucose Negative Negative mg/dL    Ketones Trace (A) Negative mg/dL    Protein Negative Negative mg/dL    Bilirubin Negative Negative    Urobilinogen, Urine 1.0 Negative    Nitrite Negative Negative    Leukocyte Esterase Trace (A) Negative    Occult Blood Small (A) Negative    Micro Urine Req Microscopic    URINE DRUG SCREEN    Collection Time:  12/06/19  2:50 PM   Result Value Ref Range    Amphetamines Urine Negative Negative    Barbiturates Negative Negative    Benzodiazepines Negative Negative    Cocaine Metabolite Negative Negative    Methadone Negative Negative    Opiates Positive (A) Negative    Oxycodone Negative Negative    Phencyclidine -Pcp Negative Negative    Propoxyphene Negative Negative    Cannabinoid Metab Negative Negative   URINE MICROSCOPIC (W/UA)    Collection Time: 12/06/19  2:50 PM   Result Value Ref Range    WBC 2-5 /hpf    RBC 0-2 /hpf    Bacteria Negative None /hpf    Epithelial Cells Negative /hpf    Ca Oxalate Crystal Few /hpf    Hyaline Cast 11-20 (A) /lpf   CBC with Differential    Collection Time: 12/07/19  2:54 AM   Result Value Ref Range    WBC 4.7 (L) 4.8 - 10.8 K/uL    RBC 3.78 (L) 4.20 - 5.40 M/uL    Hemoglobin 11.2 (L) 12.0 - 16.0 g/dL    Hematocrit 34.5 (L) 37.0 - 47.0 %    MCV 91.3 81.4 - 97.8 fL    MCH 29.6 27.0 - 33.0 pg    MCHC 32.5 (L) 33.6 - 35.0 g/dL    RDW 44.3 35.9 - 50.0 fL    Platelet Count 190 164 - 446 K/uL    MPV 10.2 9.0 - 12.9 fL    Neutrophils-Polys 29.30 (L) 44.00 - 72.00 %    Lymphocytes 56.00 (H) 22.00 - 41.00 %    Monocytes 9.80 0.00 - 13.40 %    Eosinophils 3.60 0.00 - 6.90 %    Basophils 0.90 0.00 - 1.80 %    Immature Granulocytes 0.40 0.00 - 0.90 %    Nucleated RBC 0.00 /100 WBC    Neutrophils (Absolute) 1.38 (L) 2.00 - 7.15 K/uL    Lymphs (Absolute) 2.63 1.00 - 4.80 K/uL    Monos (Absolute) 0.46 0.00 - 0.85 K/uL    Eos (Absolute) 0.17 0.00 - 0.51 K/uL    Baso (Absolute) 0.04 0.00 - 0.12 K/uL    Immature Granulocytes (abs) 0.02 0.00 - 0.11 K/uL    NRBC (Absolute) 0.00 K/uL       Imaging/Procedures Review:    Independant Imaging Review: Completed  CT-HEAD W/O   Final Result      1.  No CT evidence of acute infarct, hemorrhage or mass.   2.  Tiny chronic infarct in the right basal ganglia.               EKG:   EKG was not done    Records reviewed and summarized in current documentation :  Yes  [employer]  teaching service handout given to patient:  No         Assessment/Plan     * Repeated falls  Assessment & Plan  The patient and her father reported that she had multiple falls over the past few months with multiple injuries that required hospitalization multiple times  The patient reported that she has been feeling and confident and having imbalance with the walking  chronic infarct in the right basal ganglia could be contributing  PT/OT    Polypharmacy  Assessment & Plan  The patient on numerous medication for multiple comorbidities including pain meds (morphine), muscle relaxant (baclofen), SSRI (Paxil), anticonvulsant (Depakote), and anxiolytic (Atarax).  Primary team consider deprescribing unnecessary meds (sedatives)  Consider gastric consults for medication management the patient has not been seen by psychiatrist in years    ZULLY on CKD (acute kidney injury) ([facility])- (present on admission)  Assessment & Plan  Hx of CKD stage III A, creatinine of 2.05(baseline is around 1)  +Hyaline casts on UA. (negative for infection), possible secondary to dehyration  IV fluid resuscitation  Avoid NSAIDS  Renally adjusted meds       Psychogenic nonepileptic seizure- (present on admission)  Assessment & Plan  Excessive shakiness of the whole body  These movements have been without provoking factors other than anxiety  Per chart review, she was admitted before and she had CTA 9/10/19 normal with no perfusion defects, old infarct seen. MRI in 2014 was negative for any lesions, only signs of microangiopathic ischemic change. EEG nonepileptiform and likely psychogenic in nature  -reassurance for patient that there are no indications of stroke or brain bleed that could be causing cause current symptoms. Underlying psych/behavioral issues should to be addressed.     -continue home Depakote  -nursing instructed to ambulate pt to prove to herself that she can be steady without falling. PT consult also ordered for possible  walker    Chronic pain- (present on admission)  Assessment & Plan  Currently on baclofen, Depakote, and morphine ER 30 mg once daily and morphine IR twice daily as needed  She is also on Paxil which helps also with chronic pain      Hypertension- (present on admission)  Assessment & Plan  Hx of HTN , on amlodipine  mg t 2.5wice daily  Pressure was stable at the presentation to the ED  The patient reported that she stopped taking amlodipine and was started on propranolol  We will continue to monitor    Bipolar 2 disorder (HCC)- (present on admission)  Assessment & Plan  Stable  The patient has not been seen by a psychiatrist in many years  Continue home Depakote   Consider psychiatric consult    Depression- (present on admission)  Assessment & Plan  Stable on home Paxil    Fibromyalgia- (present on admission)  Assessment & Plan  Fibromyalgia and chronic pain along with mood disturbance  She is currently on SSRI (Paxil), Depakote, morphine, and baclofen  Patient pain is stable  She will need a psychiatric assessment as she has not been seen by a psychiatrist in years      Anticipated Hospital stay:  >2 midnights        Quality Measures  Quality-Core Measures  PCP: Vika Norton M.D.

## 2019-12-07 NOTE — CARE PLAN
Problem: Safety  Goal: Will remain free from falls  Outcome: PROGRESSING AS EXPECTED  Note:   High fall risk precautions in place.     Problem: Urinary Elimination:  Goal: Ability to reestablish a normal urinary elimination pattern will improve  Outcome: PROGRESSING SLOWER THAN EXPECTED  Note:   Patient reports painful urination.  MD aware.  Bladder scans q shift.

## 2019-12-07 NOTE — SENIOR ADMIT NOTE
"Senior Admit Note:    44F, PMH of multiple psychiatric conditions, including bipolar, ADHD, depression, fibromyalgia, chronic fatigue syndrome, as well as having past possible history of subarachnoid hemorrhage, as well as AVA, hypertension, in the past history of shaking and syncope that she describes as previously being diagnosed as \"pseudoseizures\".  She notes past history of EEG diagnosis with pseudoseizures. (PMH obtained from prior documentation)    Patient presents for general malaise today and she had a GLF yesterday. Patient states she was walking yesterday felt weak and then fell to the floor. CT head, XR right knee/left shoulder/right wrist negative for acute process. Patient very difficult to arouse on interview today but once woken up was alert. She did receive 0.1 narcan in ED. Patient has numerous medications and states \"mabye it's too much\" but denies taking more than prescribed. Patient denies SI/HI. Patient endorses generalized whole body pain but cites she has chronic pain at baseline. We will review medications and deprescribe as appropriate. Will target sedative medications first, reducing opiates then consider baclofen, atarax and adjust per clinical necessity.  "

## 2019-12-07 NOTE — PROGRESS NOTES
Arrived from ED around 2030.  Alert and oriented x 4.  Admission profile completed.  Pt c/o pain.  No pain med in MAR.  Pt stated that she doesn't take Norvasc and she takes Propranolol.  Pt also requested to have antifungal cream for itching scalp and Nystatin cream.  Dr. Downey notified that pt takes Propranolol not Norvasc and pt's request of pain med, scalp itching cream and Nystatin cream.

## 2019-12-07 NOTE — ED NOTES
Pt very drowsy with intermittent tremor like activity.  ERP ordered Narcan 0.1 mg.  Administered with no change in pt drowsiness.  Pt manages airway and secretions and will awaken to voice but falls asleep while talking to pt.

## 2019-12-07 NOTE — ED PROVIDER NOTES
ED Provider Note    Chief Complaint   Patient presents with   • Dizziness   • Nausea         HPI (1,4)  Peter Trimble is a 44 y.o. female with complex past medical history including multiple psychiatric conditions including bipolar, ADHD, depression, fibromyalgia, chronic fatigue syndrome.  She also has a history of pseudoseizures and possible subarachnoid hemorrhage.  She presents with new onset shaking.  Shaking began early this morning this described as diffuse and she states multiple times that she just feels unwell overall.  She does endorse dysuria that has been present for approximately 1 day.  Denies flank pain, fevers, chills, nausea, vomiting, diarrhea.  States that she has been compliant with her previous medication regimen denies any new medications.  She was seen yesterday after a ground-level fall.  Found to be slightly hypokalemic and she received 20 mill equivalents of potassium chloride.  Head imaging was not done at that time.  Denies new onset numbness, tingling, weakness.        REVIEW OF SYSTEMS(2/10)  Pertinent positives include: Dysuria, shaking  Pertinent negatives include: Vision changes, hearing changes, dysphasia, chest pain, shortness of breath, vomiting, diarrhea, rash, lower extremity swelling, SI/HI.     PAST MEDICAL HISTORY(PFS1,2)  Past Medical History        Past Medical History:   Diagnosis Date   • Acute blood loss anemia 5/2/2013     -resolved, postop 2013    • Acute renal failure (ARF) (HCC) 10/5/2011     -resolved (post op 2013)    • Anemia     • Anxiety     • Arthritis       osteoarthritis since 17yo.   • ASTHMA       O2 3liters at HS and prn   • Bipolar affective (HCC)     • Breath shortness     • Cold    • Depression     • Eclampsia complicating pregnancy     • Environmental allergies     • Essential hypertension, malignant 9/28/2011   • Fibromyalgia     • GERD (gastroesophageal reflux disease)     • Heart murmur       she denies this hx   • History of pregnancy  "10/16/2014         • Hx MRSA infection     • Hyperlipidemia 14     \"off medication\"   • Hypertension     • Kidney stones     • Migraines     • Pain 15     \"my body hurts all the time\", 5-6/10   • Personality disorder ([facility])     • Pneumonia    • PTSD (post-traumatic stress disorder)     • Scalp wound 2014   • Seizure ([facility]) 14     last    • Sleep apnea       has had a sleep study, use O2 at HS   • Snoring     • Stroke ([facility])       reports strokes x5 , and a \"brain bleed\"   • Subarachnoid hemorrhage ([facility]) 2011     -small,  (2nd to HTN)    • Unspecified hemorrhagic conditions       nose-bleeds, bruises easily   • Unspecified urinary incontinence     • Urinary bladder disorder              FAMILY HISTORY  Family History         Family History   Problem Relation Age of Onset   • Hypertension Mother     • Hyperlipidemia Mother     • Hypertension Father     • Hyperlipidemia Father     • Heart Disease Father     • Psychiatric Illness Father     • Alcohol/Drug Father     • Alcohol/Drug Brother     • Arthritis Maternal Uncle     • Psychiatric Illness Maternal Uncle     • Heart Disease Maternal Uncle     • Hypertension Maternal Uncle     • Hyperlipidemia Maternal Uncle     • Genetic Disorder Paternal Aunt     • Psychiatric Illness Paternal Uncle     • Arthritis Maternal Grandmother     • Heart Disease Maternal Grandmother     • Alcohol/Drug Maternal Grandfather     • Stroke Maternal Grandfather     • Psychiatric Illness Maternal Grandfather     • Arthritis Paternal Grandmother     • Alcohol/Drug Paternal Grandfather              SOCIAL HISTORY  Social History           Tobacco Use   • Smoking status: Never Smoker   • Smokeless tobacco: Never Used   Substance Use Topics   • Alcohol use: No   • Drug use: No      Social History          Substance and Sexual Activity   Drug Use No         SURGICAL HISTORY  Past Surgical History         Past Surgical History:   Procedure Laterality Date "   • ANKLE ORIF Right 8/7/2016     Procedure: ANKLE ORIF;  Surgeon: Bill Brambila M.D.;  Location: SURGERY [facility];  Service:    • IRRIGATION & DEBRIDEMENT GENERAL   8/17/2014     Performed by Ezra Wright M.D. at SURGERY [facility]   • BREAST BIOPSY   5/29/2014     Performed by Negro Hester M.D. at SURGERY SAME DAY [facility] ORS   • EVACUATION OF HEMATOMA   5/2/2013     Performed by Lazaro Connors Jr., M.D. at SURGERY [facility]   • MAMMOPLASTY REDUCTION   4/29/2013     Performed by Negro Hester M.D. at SURGERY [facility] ORS   • RECOVERY   1/30/2012     Performed by SURGERY, IR-RECOVERY at SURGERY SAME DAY [facility] ORS   • RECOVERY   10/5/2011     Performed by MARIAELENA MCMULLENONLY at SURGERY [facility]   • HYSTERECTOMY LAPAROSCOPY         W BSO   • KNEE RECONSTRUCTION       • LAMINOTOMY       • OTHER ORTHOPEDIC SURGERY         maddie. knee scopes   • OTHER ORTHOPEDIC SURGERY         back fusion then hardware removal   • PB EXPLORATION OF SPINAL FUSION       • PB REMOVAL OF OVARY(S)                CURRENT MEDICATIONS      Home Medications              Reviewed by Jyothi Pruett R.N. (Registered Nurse) on 12/06/19 at 1259  Med List Status: <None>          Medication Last Dose Status    albuterol (PROAIR HFA) 108 (90 Base) MCG/ACT Aero Soln inhalation aerosol   Active    amLODIPine (NORVASC) 5 MG Tab   Active    baclofen (LIORESAL) 10 MG Tab   Active    cetirizine (ZYRTEC) 10 MG Tab   Active    Cholecalciferol (VITAMIN D) 2000 UNIT Tab   Active    Ciclopirox 1 % Shampoo   Active    diclofenac EC (VOLTAREN) 75 MG Tablet Delayed Response   Active    divalproex (DEPAKOTE) 500 MG Tablet Delayed Response   Active    Erenumab (AIMOVIG) 70 MG/ML Solution Auto-injector   Active    esomeprazole (NEXIUM) 40 MG delayed-release capsule   Active    Estradiol 10 MCG INSERT   Active    ferrous sulfate 325 (65 FE) MG tablet   Active    fluticasone (FLONASE) 50 MCG/ACT nasal spray   Active   "  fluticasone (FLOVENT HFA) 220 MCG/ACT Aerosol   Active    furosemide (LASIX) 20 MG Tab   Active    guaifenesin LA (MUCINEX) 600 MG TABLET SR 12 HR   Active    hydrOXYzine HCl (ATARAX) 25 MG Tab   Active    montelukast (SINGULAIR) 10 MG Tab   Active    morphine (MS IR) 15 MG tablet   Active    Morphine Sulfate ER 30 MG Tablet Extended Release 12 hour Abuse-Deterrent   Active    nystatin (MYCOSTATIN) 073692 UNIT/GM Cream topical cream   Active    omeprazole (PRILOSEC) 20 MG delayed-release capsule   Active    ondansetron (ZOFRAN ODT) 4 MG TABLET DISPERSIBLE   Active    potassium chloride SA (KDUR) 20 MEQ Tab CR   Active                     ALLERGIES        Allergies   Allergen Reactions   • Compazine         \"psychotic reaction\"   • Imitrex [Sumatriptan Succinate]         Had brain bleed   • Inapsine [Droperidol]         \"psychotic reaction\"   • Maxalt [Rizatriptan Benzoate]         Had brain bleed   • Phenergan [Promethazine Hcl]         \"psychotic reaction\"   • Xanax [Alprazolam]         Sores and dry mouth, many others pt cannot remember   • Zantac         Stomach pain   • Apple Cider Vinegar Rash       Patient states she gets rash   • Butrans [Buprenorphine]     • Clarithromycin Vomiting and Palpitations   • Dilaudid [Hydromorphone] Rash       Patient states she had rash, hallucinations, unable to urinate.    • Doxycycline     • Effexor [Venlafaxine]         \"Really depressed, like i wanted to hurt myself\"   • Eucalyptus Oil     • Gabapentin     • Kiwi Extract     • Latex Rash   • Lyrica [Pregabalin]         \"depression, slept a lot\"   • Minocycline Vomiting       \"any cyclines\"   • Morphabond Er [Suly]     • Patchouli Oil Rash       Rash    • Sulfa Drugs     • Tea Tree Oil Rash       Break out in rash   • Topiramate Nausea       Patient states made sick to stomach and dizzy.         PHYSICAL EXAM (2,8)  VITAL SIGNS: /57   Pulse 76   Temp 36 °C (96.8 °F) (Temporal)   Resp 16   Ht 1.803 m (5' 11\")   " Wt 112.9 kg (249 lb)   SpO2 98%   BMI 34.73 kg/m²  Reviewed   Constitutional: well appearing  HENT: atraumatic, neck supple, no LAD  Eyes:PERRLA, EOMI .  Cardiovascular: RRR, no murmur   Respiratory: CTABL  Gastrointestinal: soft, NT, + BS, no CVA tenderness   Skin:  No rash, well prefused, scratch on forehead no sign of infection .  Neurologic: CN 2-12 intact, patient appear sleepy dozing off during conversation, strength and sensation grossly intact, twitching movement of bilateral upper and lower extremities   Psychiatric: normal mood and affect      DIFFERENTIAL DIAGNOSIS:  UTI  Drug overdose  Pseudoseizure   Brain Bleed      RADIOLOGY/PROCEDURES  CT-HEAD W/O   Final Result       1.  No CT evidence of acute infarct, hemorrhage or mass.   2.  Tiny chronic infarct in the right basal ganglia.             LABORATORY: Reviewed as below.        Results for orders placed or performed during the hospital encounter of 12/06/19   CBC WITH DIFFERENTIAL   Result Value Ref Range     WBC 7.6 4.8 - 10.8 K/uL     RBC 4.20 4.20 - 5.40 M/uL     Hemoglobin 12.3 12.0 - 16.0 g/dL     Hematocrit 38.7 37.0 - 47.0 %     MCV 92.1 81.4 - 97.8 fL     MCH 29.3 27.0 - 33.0 pg     MCHC 31.8 (L) 33.6 - 35.0 g/dL     RDW 45.0 35.9 - 50.0 fL     Platelet Count 239 164 - 446 K/uL     MPV 10.5 9.0 - 12.9 fL     Neutrophils-Polys 47.70 44.00 - 72.00 %     Lymphocytes 37.40 22.00 - 41.00 %     Monocytes 12.30 0.00 - 13.40 %     Eosinophils 1.60 0.00 - 6.90 %     Basophils 0.50 0.00 - 1.80 %     Immature Granulocytes 0.50 0.00 - 0.90 %     Nucleated RBC 0.00 /100 WBC     Neutrophils (Absolute) 3.62 2.00 - 7.15 K/uL     Lymphs (Absolute) 2.84 1.00 - 4.80 K/uL     Monos (Absolute) 0.93 (H) 0.00 - 0.85 K/uL     Eos (Absolute) 0.12 0.00 - 0.51 K/uL     Baso (Absolute) 0.04 0.00 - 0.12 K/uL     Immature Granulocytes (abs) 0.04 0.00 - 0.11 K/uL     NRBC (Absolute) 0.00 K/uL   COMP METABOLIC PANEL   Result Value Ref Range     Sodium 141 135 - 145 mmol/L      Potassium 3.8 3.6 - 5.5 mmol/L     Chloride 100 96 - 112 mmol/L     Co2 33 20 - 33 mmol/L     Anion Gap 8.0 0.0 - 11.9     Glucose 88 65 - 99 mg/dL     Bun 34 (H) 8 - 22 mg/dL     Creatinine 2.05 (H) 0.50 - 1.40 mg/dL     Calcium 9.6 8.5 - 10.5 mg/dL     AST(SGOT) 27 12 - 45 U/L     ALT(SGPT) 19 2 - 50 U/L     Alkaline Phosphatase 37 30 - 99 U/L     Total Bilirubin 0.3 0.1 - 1.5 mg/dL     Albumin 4.1 3.2 - 4.9 g/dL     Total Protein 6.8 6.0 - 8.2 g/dL     Globulin 2.7 1.9 - 3.5 g/dL     A-G Ratio 1.5 g/dL   ESTIMATED GFR   Result Value Ref Range     GFR If  32 (A) >60 mL/min/1.73 m 2     GFR If Non African American 26 (A) >60 mL/min/1.73 m 2         INTERVENTIONS:  Medications   NS infusion 1,000 mL (has no administration in time range)         COURSE & MEDICAL DECISION MAKING  Patient evaluated at bedside she is alert and oriented.  Noted to have diffuse upper and lower extremity twitching.  Neuro exam WNL.  Patient is very sleepy and falls asleep multiple times during conversation.   Given history of dysuria will evaluate for UTI.  History of recent ground-level fall new on voluntary movements will do CT head to rule out brain bleed.  No evidence of seizure-like activity at this time.     1420:  W BC 7.6, creatinine 2.05, baseline 1.3, CT head no acute changes.  NS bolus for dehydration.      1620: UA ketones, small blood, trace LE, patient difficult to arouse, given 0.1 of narcan with minimal effect.  Dad at bedside.       Patient with polysubstance overdose secondary to chronic pain/psychiatric medications.  Given that she is difficult to arouse she is not a safe discharge home.  She is currently protecting her airway.  Plan to Admit to floor     CONDITION: guareded     FINAL IMPRESSION  Polypharmacy causing altered mental status     Addendum:  I saw the patient in conjunction with the family medicine resident Dr. Bowser.  We both evaluated the patient.  Briefly she has evidence of mild  renal insufficiency due to dehydration, I reviewed her medication list and she has extensive sedatives as well as high-dose opioids.  I suspect that she is altered due to polypharmacy.  CT scan of the head did not demonstrate any intracranial hemorrhage and there is no obvious signs of infection.  She was given IV fluids due to dehydration, dry mucous membranes and ability to take oral fluids.  She will be admitted to [facility] for evaluation of polypharmacy.  She was given a small dose of 0.1 mg Narcan and that did improve her mental status somewhat but I did not feel that that is the main issue and that I did not feel that she required a Narcan continuous infusion or higher doses

## 2019-12-07 NOTE — ASSESSMENT & PLAN NOTE
Fibromyalgia and chronic pain along with mood disturbance  She is currently on SSRI (Paxil), Depakote, morphine, and baclofen  Patient pain is stable  She will benefit from outpatient pcp and psychiatry follow up as she has not been assessed by psychiatrist in years.

## 2019-12-07 NOTE — ED NOTES
Med Rec Updated and Complete per Pt at bedside  Allergies Reviewed    Pt completed a 10-day course of Ciprofloxacin 500mg twice daily from 11/22/19 to 12/01/19.    Pt reports she is not on Flomax at this time.

## 2019-12-07 NOTE — PROGRESS NOTES
Internal Medicine Interval Note  Note Author: Adam Evangelista M.D.     Name Peter Trimble 1975   Age/Sex 44 y.o. female   MRN [id number]   Code Status FULL     After 5PM or if no immediate response to page, please call for cross-coverage  Attending/Team: Dr. Story/Donna See Patient List for primary contact information  Call [phone] to page    1st Call - Day Intern (R1):   Dr. Eastman 2nd Call - Day Sr. Resident (R2/R3):   Dr. Evangelista         Reason for interval visit  (Principal Problem)   Altered mental status      Interval Problem Daily Status Update  (24 hours, problem oriented, brief subjective history, new lab/imaging data pertinent to that problem)   Stable overnight.  Alert and oriented x4 this morning.  Bad pulse oximetry reading which was repositioned and shows better SPO2.  Discussed medication changes with patient, including making hydroxyzine as needed instead of scheduled and stopping Lasix as she says it is having no effect at home.    ROS   Constitutional: Denies weight loss, fever, chills.  Eyes: Denies blurred vision, double vision, yellow sclerae.  ENT: Denies hearing loss, congestion, runny nose, sore throat.  Cardiovascular: Denies chest pain, palpitations.  Respiratory: Denies dyspnea, productive cough.  GI: Denies nausea, vomiting, diarrhea, abdominal pain.  Skin: Denies change in skin, hair, nails.  Neurological: Denies seizures, focal weakness.  Psychological: Denies change in personality, affect, depression.  All others negative    Disposition/Barriers to discharge:   Titrating home medications to symptoms and mental status    Consultants/Specialty  PCP: Vika Norton M.D.      Quality Measures  Quality-Core Measures   Reviewed items::  EKG reviewed, Medications reviewed, Labs reviewed and Radiology images reviewed  Jj catheter::  No Jj  DVT prophylaxis pharmacological::  Enoxaparin (Lovenox)  Ulcer Prophylaxis::  Not indicated      Physical Exam        Vitals:    12/06/19 2320 12/06/19 2321 12/07/19 0340 12/07/19 0855   BP: 102/63 105/75 106/71 102/84   Pulse: 69 79 69 81   Resp:   18 19   Temp:   36.1 °C (97 °F) 36.4 °C (97.5 °F)   TempSrc:   Temporal Temporal   SpO2:   100% 97%   Weight:       Height:         Body mass index is 34.25 kg/m². Weight: 111.4 kg (245 lb 9.5 oz)  Oxygen Therapy:  Pulse Oximetry: 97 %, O2 (LPM): 5, O2 Delivery: Nasal Cannula    Physical Exam  General:  Alert and oriented, No apparent distress.  Eyes: Pupils equal and reactive. No scleral icterus.  Throat: Clear no erythema or exudates noted.  Neck: Supple. No lymphadenopathy noted.  Lungs: Clear to auscultation and percussion bilaterally.  Cardiovascular: Regular rate and rhythm. No murmurs, rubs or gallops.  Abdomen:  Obese. Benign. No rebound or guarding noted.  Extremities: No clubbing, cyanosis, edema.  Skin: Clear. No rash or suspicious skin lesions noted.      Assessment/Plan     Altered mental status - resolved  Polypharmacy  ZULLY on CKD  Psychogenic nonepileptic seizure  Chronic pain  Essential hypertension  History of migraines  Bipolar 2 disorder  Depression  Fibromyalgia  Assessment & Plan  Patient admitted for somnolence/altered mental status, today much improved, completely alert and oriented.  Challenging patient with multiple comorbidities and polypharmacy to treat these comorbidities likely resulting in somnolence on presentation.  Performed medication reconciliation bedside with patient.  Patient agreeable to discontinue Lasix as she states it has no effect at home and make hydroxyzine as needed instead of scheduled.  Would not stop propranolol as patient states this is for migraines not just blood pressure.  We will also give IV fluids for mild acute kidney injury.  Patient also notes constipation will start bowel regimen bladder scans.  Will monitor overnight.  Patient has nephrology appointment with Dr. Baldwin on Monday at 11 AM, anticipate discharge before this  time.  Needs close outpatient follow-up.

## 2019-12-07 NOTE — ASSESSMENT & PLAN NOTE
Hx of CKD stage III A, creatinine of 2.05(baseline is around 1)  +Hyaline casts on UA. (negative for infection), possible secondary to dehydration. Back to baseline now  Plan:  - encourage PO intake, Na also now within normal limits.  - Avoid NSAIDS  -Renally adjusted meds  - f/u with nephrology as scheduled

## 2019-12-07 NOTE — ASSESSMENT & PLAN NOTE
Stable  The patient has not been seen by a psychiatrist in many years  Continue home Depakote   PCP f/u and possible psychiatry referal

## 2019-12-07 NOTE — PROGRESS NOTES
VSS on 3L NC.  Patient denying pain except for a headache earlier in the shift.  Pictures of wounds completed and uploaded into Epic.  Skin is intact, but patient with multiple large bruised areas and scabs to right foot and head.  Patient worked with PT/OT today.  IS at bedside, patient is using independently.  Agreeable to SCDs this afternoon.  Fall precautions in place.  Will continue to monitor.

## 2019-12-07 NOTE — THERAPY
"Physical Therapy Evaluation completed.   Bed Mobility:  Supine to Sit: Supervised  Transfers: Sit to Stand: Supervised  Gait: Level Of Assist: Minimal Assist with Front-Wheel Walker x50 feet.      Plan of Care: Will benefit from Physical Therapy 3 times per week  Discharge Recommendations: Equipment: No Equipment Needed. Post-acute therapy Recommend post-acute placement for continued physical therapy services prior to discharge home depending on progress in acute care. Patient can tolerate post-acute therapies at a 5x/week frequency.       Pt presents to acute PT s/p acute kidney injury with generalized weakness and debilitation. Pt presents with weakness, limited activity tolerance, pain, impaired motor control, and limited functional mobility including ambulation, transfers, and stairs. Pt lives with her father and has some caregiving set up, so pt may be able to d/c home once cleared medically; however, pt may require skilled placement depending on progress in acute care. Pt will continue to benefit from skilled acute PT to address impairments and assist with D/C planning.    See \"Rehab Therapy-Acute\" Patient Summary Report for complete documentation.     "

## 2019-12-07 NOTE — ASSESSMENT & PLAN NOTE
Currently on baclofen, Depakote, and morphine ER 30 mg once daily and morphine IR twice daily as needed  She is also on Paxil which helps also with chronic pain.  - switched baclofen to PRN

## 2019-12-07 NOTE — ASSESSMENT & PLAN NOTE
The patient and her father reported that she had multiple falls over the past few months with multiple injuries that required hospitalization multiple times  The patient reported that she has been having imbalance with the walking  chronic infarct in the right basal ganglia could be contributing  PT/OT evaluated, no OT needs, Home health ordered for PT

## 2019-12-07 NOTE — ED NOTES
Pt's father Buddy at bedside. Per ILANA Monk, pt will be admitted for polysubstance. Pt remains drowsy with intermittent twitching/jerking of BUE and BLE.

## 2019-12-07 NOTE — CARE PLAN
Problem: Safety  Goal: Will remain free from injury  Outcome: PROGRESSING AS EXPECTED  Note:   Safety and fall precautions in place. Padded rails. Hourly rounding. Bed alarm on.      Problem: Infection  Goal: Will remain free from infection  Outcome: PROGRESSING AS EXPECTED  Note:   Assess for signs & symptoms of infection. Educate about hand hygiene.

## 2019-12-08 PROBLEM — E87.0 HYPERNATREMIA: Status: ACTIVE | Noted: 2019-12-08

## 2019-12-08 LAB
25(OH)D3 SERPL-MCNC: 46 NG/ML (ref 30–100)
ALBUMIN SERPL BCP-MCNC: 3.3 G/DL (ref 3.2–4.9)
ALBUMIN/GLOB SERPL: 1.7 G/DL
ALP SERPL-CCNC: 31 U/L (ref 30–99)
ALT SERPL-CCNC: 14 U/L (ref 2–50)
AMMONIA PLAS-SCNC: 81 UMOL/L (ref 11–45)
ANION GAP SERPL CALC-SCNC: 4 MMOL/L (ref 0–11.9)
ANION GAP SERPL CALC-SCNC: 6 MMOL/L (ref 0–11.9)
AST SERPL-CCNC: 16 U/L (ref 12–45)
BILIRUB SERPL-MCNC: 0.3 MG/DL (ref 0.1–1.5)
BUN SERPL-MCNC: 20 MG/DL (ref 8–22)
BUN SERPL-MCNC: 25 MG/DL (ref 8–22)
CALCIUM SERPL-MCNC: 8.2 MG/DL (ref 8.5–10.5)
CALCIUM SERPL-MCNC: 8.6 MG/DL (ref 8.5–10.5)
CHLORIDE SERPL-SCNC: 108 MMOL/L (ref 96–112)
CHLORIDE SERPL-SCNC: 110 MMOL/L (ref 96–112)
CK SERPL-CCNC: 57 U/L (ref 0–154)
CO2 SERPL-SCNC: 29 MMOL/L (ref 20–33)
CO2 SERPL-SCNC: 30 MMOL/L (ref 20–33)
CREAT SERPL-MCNC: 1.01 MG/DL (ref 0.5–1.4)
CREAT SERPL-MCNC: 1.02 MG/DL (ref 0.5–1.4)
GLOBULIN SER CALC-MCNC: 2 G/DL (ref 1.9–3.5)
GLUCOSE SERPL-MCNC: 78 MG/DL (ref 65–99)
GLUCOSE SERPL-MCNC: 81 MG/DL (ref 65–99)
MAGNESIUM SERPL-MCNC: 1.3 MG/DL (ref 1.5–2.5)
POTASSIUM SERPL-SCNC: 4.1 MMOL/L (ref 3.6–5.5)
POTASSIUM SERPL-SCNC: 4.3 MMOL/L (ref 3.6–5.5)
PROT SERPL-MCNC: 5.3 G/DL (ref 6–8.2)
SODIUM SERPL-SCNC: 141 MMOL/L (ref 135–145)
SODIUM SERPL-SCNC: 146 MMOL/L (ref 135–145)

## 2019-12-08 PROCEDURE — 82140 ASSAY OF AMMONIA: CPT

## 2019-12-08 PROCEDURE — A9270 NON-COVERED ITEM OR SERVICE: HCPCS | Performed by: STUDENT IN AN ORGANIZED HEALTH CARE EDUCATION/TRAINING PROGRAM

## 2019-12-08 PROCEDURE — 770006 HCHG ROOM/CARE - MED/SURG/GYN SEMI*

## 2019-12-08 PROCEDURE — 700102 HCHG RX REV CODE 250 W/ 637 OVERRIDE(OP): Performed by: STUDENT IN AN ORGANIZED HEALTH CARE EDUCATION/TRAINING PROGRAM

## 2019-12-08 PROCEDURE — 700105 HCHG RX REV CODE 258: Performed by: STUDENT IN AN ORGANIZED HEALTH CARE EDUCATION/TRAINING PROGRAM

## 2019-12-08 PROCEDURE — 700111 HCHG RX REV CODE 636 W/ 250 OVERRIDE (IP): Performed by: STUDENT IN AN ORGANIZED HEALTH CARE EDUCATION/TRAINING PROGRAM

## 2019-12-08 PROCEDURE — 99232 SBSQ HOSP IP/OBS MODERATE 35: CPT | Mod: GC | Performed by: INTERNAL MEDICINE

## 2019-12-08 PROCEDURE — 80048 BASIC METABOLIC PNL TOTAL CA: CPT

## 2019-12-08 PROCEDURE — 36415 COLL VENOUS BLD VENIPUNCTURE: CPT

## 2019-12-08 PROCEDURE — 82306 VITAMIN D 25 HYDROXY: CPT

## 2019-12-08 PROCEDURE — 700111 HCHG RX REV CODE 636 W/ 250 OVERRIDE (IP): Performed by: INTERNAL MEDICINE

## 2019-12-08 PROCEDURE — 82550 ASSAY OF CK (CPK): CPT

## 2019-12-08 PROCEDURE — 83735 ASSAY OF MAGNESIUM: CPT

## 2019-12-08 PROCEDURE — 80053 COMPREHEN METABOLIC PANEL: CPT

## 2019-12-08 PROCEDURE — 51798 US URINE CAPACITY MEASURE: CPT

## 2019-12-08 RX ORDER — MAGNESIUM SULFATE HEPTAHYDRATE 40 MG/ML
4 INJECTION, SOLUTION INTRAVENOUS ONCE
Status: COMPLETED | OUTPATIENT
Start: 2019-12-08 | End: 2019-12-08

## 2019-12-08 RX ORDER — DEXTROSE MONOHYDRATE 50 MG/ML
INJECTION, SOLUTION INTRAVENOUS CONTINUOUS
Status: DISCONTINUED | OUTPATIENT
Start: 2019-12-08 | End: 2019-12-08

## 2019-12-08 RX ORDER — PROPRANOLOL HYDROCHLORIDE 10 MG/1
5 TABLET ORAL 2 TIMES DAILY
Status: DISCONTINUED | OUTPATIENT
Start: 2019-12-08 | End: 2019-12-09 | Stop reason: HOSPADM

## 2019-12-08 RX ORDER — ONDANSETRON 2 MG/ML
4 INJECTION INTRAMUSCULAR; INTRAVENOUS EVERY 4 HOURS PRN
Status: DISCONTINUED | OUTPATIENT
Start: 2019-12-08 | End: 2019-12-09 | Stop reason: HOSPADM

## 2019-12-08 RX ORDER — ALBUTEROL SULFATE 90 UG/1
1-2 AEROSOL, METERED RESPIRATORY (INHALATION) EVERY 4 HOURS PRN
Status: DISCONTINUED | OUTPATIENT
Start: 2019-12-08 | End: 2019-12-09 | Stop reason: HOSPADM

## 2019-12-08 RX ORDER — GUAIFENESIN 600 MG/1
600 TABLET, EXTENDED RELEASE ORAL EVERY 12 HOURS
Status: DISCONTINUED | OUTPATIENT
Start: 2019-12-08 | End: 2019-12-09 | Stop reason: HOSPADM

## 2019-12-08 RX ADMIN — MONTELUKAST 10 MG: 10 TABLET, FILM COATED ORAL at 21:51

## 2019-12-08 RX ADMIN — ALBUTEROL SULFATE 2 PUFF: 90 AEROSOL, METERED RESPIRATORY (INHALATION) at 15:28

## 2019-12-08 RX ADMIN — BACLOFEN 10 MG: 10 TABLET ORAL at 09:48

## 2019-12-08 RX ADMIN — DIVALPROEX SODIUM 500 MG: 500 TABLET, DELAYED RELEASE ORAL at 17:09

## 2019-12-08 RX ADMIN — ENOXAPARIN SODIUM 40 MG: 100 INJECTION SUBCUTANEOUS at 05:05

## 2019-12-08 RX ADMIN — PAROXETINE 60 MG: 30 TABLET, FILM COATED ORAL at 05:04

## 2019-12-08 RX ADMIN — ACETAMINOPHEN 1000 MG: 500 TABLET ORAL at 02:44

## 2019-12-08 RX ADMIN — HYDROXYZINE HYDROCHLORIDE 50 MG: 50 TABLET, FILM COATED ORAL at 09:49

## 2019-12-08 RX ADMIN — PROPRANOLOL HYDROCHLORIDE 5 MG: 10 TABLET ORAL at 10:27

## 2019-12-08 RX ADMIN — PROPRANOLOL HYDROCHLORIDE 5 MG: 10 TABLET ORAL at 21:51

## 2019-12-08 RX ADMIN — MAGNESIUM SULFATE IN WATER 4 G: 40 INJECTION, SOLUTION INTRAVENOUS at 06:23

## 2019-12-08 RX ADMIN — SENNOSIDES AND DOCUSATE SODIUM 2 TABLET: 8.6; 5 TABLET ORAL at 05:04

## 2019-12-08 RX ADMIN — SENNOSIDES AND DOCUSATE SODIUM 2 TABLET: 8.6; 5 TABLET ORAL at 17:10

## 2019-12-08 RX ADMIN — GUAIFENESIN 600 MG: 600 TABLET, EXTENDED RELEASE ORAL at 15:29

## 2019-12-08 RX ADMIN — NYSTATIN: 100000 POWDER TOPICAL at 17:10

## 2019-12-08 RX ADMIN — FLUTICASONE PROPIONATE 220 MCG: 110 AEROSOL, METERED RESPIRATORY (INHALATION) at 05:08

## 2019-12-08 RX ADMIN — ACETAMINOPHEN 1000 MG: 500 TABLET ORAL at 08:57

## 2019-12-08 RX ADMIN — ALBUTEROL SULFATE 2 PUFF: 90 AEROSOL, METERED RESPIRATORY (INHALATION) at 22:02

## 2019-12-08 RX ADMIN — NYSTATIN: 100000 POWDER TOPICAL at 05:06

## 2019-12-08 RX ADMIN — ONDANSETRON 4 MG: 2 INJECTION INTRAMUSCULAR; INTRAVENOUS at 23:00

## 2019-12-08 RX ADMIN — DIVALPROEX SODIUM 1000 MG: 500 TABLET, DELAYED RELEASE ORAL at 05:04

## 2019-12-08 RX ADMIN — MORPHINE SULFATE 30 MG: 30 TABLET, FILM COATED, EXTENDED RELEASE ORAL at 05:04

## 2019-12-08 RX ADMIN — SODIUM CHLORIDE: 9 INJECTION, SOLUTION INTRAVENOUS at 01:20

## 2019-12-08 RX ADMIN — ONDANSETRON 4 MG: 2 INJECTION INTRAMUSCULAR; INTRAVENOUS at 18:35

## 2019-12-08 ASSESSMENT — ENCOUNTER SYMPTOMS
MYALGIAS: 1
NERVOUS/ANXIOUS: 1
CONSTIPATION: 0
PHOTOPHOBIA: 0
SPUTUM PRODUCTION: 0
COUGH: 0
WEIGHT LOSS: 0
MEMORY LOSS: 0
FEVER: 0
BLURRED VISION: 0
DIZZINESS: 0
LOSS OF CONSCIOUSNESS: 0
SEIZURES: 0
SORE THROAT: 0
NAUSEA: 0
ABDOMINAL PAIN: 0
TREMORS: 1
CHILLS: 0
FALLS: 1
SENSORY CHANGE: 0
HEARTBURN: 0
INSOMNIA: 0

## 2019-12-08 NOTE — ASSESSMENT & PLAN NOTE
Sodium yesterday was 146, with her baseline ~140, most likely secondary to being on NS for ZULLY. However now back to baseline.  -resolved

## 2019-12-08 NOTE — DISCHARGE PLANNING
Anticipated Discharge Disposition: home with home health    Action: Spoke with pt at bedside and obtained choice for home health: 1. [facility]. [facility]. [facility]. 3. [facility]. Faxed to [facility] at x [phone].     Barriers to Discharge: Home health acceptance, medical clearance    Plan: Follow up with home health

## 2019-12-08 NOTE — THERAPY
"Occupational Therapy Evaluation completed.   Functional Status:    45 y/o female admitted to hospital with GLF. This patient is familiar to this OT, she has caregivers 6/7 days per week and also has assistance from her father as needed. Pt reportedly has help for nearly all ADL and IADL. Pt completed bed mobility supv, LB dressing max A (baseline), STS supv, pivoted to [facility] SBA, and completed pericare with SBA. At this time, patient is likely at her baseline. She reports wanting additional caregiver hours, pt educated to speak with medicaid rep regarding this. No further acute OT needed, DC needs only.     Plan of Care: Patient with no further skilled OT needs in the acute care setting at this time  Discharge Recommendations:  Equipment: Will Continue to Assess for Equipment Needs. Post-acute therapy Patient will not be actively followed for occupational therapy services at this time, however may be seen if requested by physician for 1 more visit within 30 days to address any discharge or equipment needs.       See \"Rehab Therapy-Acute\" Patient Summary Report for complete documentation.    "

## 2019-12-08 NOTE — CARE PLAN
Problem: Safety  Goal: Will remain free from injury  Outcome: PROGRESSING AS EXPECTED   Continues to have bed alarm    Problem: Venous Thromboembolism (VTW)/Deep Vein Thrombosis (DVT) Prevention:  Goal: Patient will participate in Venous Thrombosis (VTE)/Deep Vein Thrombosis (DVT)Prevention Measures  Outcome: PROGRESSING AS EXPECTED

## 2019-12-08 NOTE — PROGRESS NOTES
C/o generalized pain but declined to take any med earlier.  Denied urge to get up to urinate.  Pt has multiple pink and peeling area on scalp.  Nizoral cream applied.

## 2019-12-08 NOTE — PROGRESS NOTES
Internal Medicine Interval Note  Note Author: Harini Eastman M.D.     Name Peter Trimble 1975   Age/Sex 44 y.o. female   MRN [id number]   Code Status Full code     After 5PM or if no immediate response to page, please call for cross-coverage  Attending/Team: /Donna See Patient List for primary contact information  Call [phone] to page    1st Call - Day Intern (R1):    2nd Call - Day Sr. Resident (R2/R3):            Reason for interval visit  (Principal Problem)   Altered mental status      Interval Problem Daily Status Update  (24 hours, problem oriented, brief subjective history, new lab/imaging data pertinent to that problem)     - Patient remains stable overnight, Vitals within normal limits.  - She is alert and oriented, continues to complain of vague generalized pain, has h/o fibromyalgia.  - PT evaluated patient , home health for PT placed.  - Renal function improved back to baseline with creatinine 1.02. However Na at 146(baseline 140), jesus manuel give her D5W now and recheck sodium later today. Potential discharge if electrolytes stable, early tomorrow.        Review of Systems   Constitutional: Negative for chills, fever and weight loss.   HENT: Negative for hearing loss and sore throat.    Eyes: Negative for blurred vision and photophobia.   Respiratory: Negative for cough and sputum production.    Cardiovascular: Negative for chest pain and leg swelling.   Gastrointestinal: Negative for abdominal pain, constipation, heartburn and nausea.   Genitourinary: Negative for dysuria and hematuria.   Musculoskeletal: Positive for falls and myalgias.   Skin: Negative for itching and rash.   Neurological: Positive for tremors. Negative for dizziness, sensory change, seizures and loss of consciousness.   Psychiatric/Behavioral: Negative for memory loss. The patient is nervous/anxious. The patient does not have insomnia.        Disposition/Barriers to discharge:    Remains inpatient for monitoring sodium level    Consultants/Specialty  None  PCP: Vika Norton M.D.      Quality Measures  Quality-Core Measures   Reviewed items::  Labs reviewed, Medications reviewed and Radiology images reviewed  Jj catheter::  No Jj  DVT prophylaxis pharmacological::  Enoxaparin (Lovenox)          Physical Exam       Vitals:    12/07/19 1705 12/07/19 1959 12/08/19 0242 12/08/19 0705   BP: 134/84 121/72 159/91 131/83   Pulse: 66 64 72 68   Resp: 18 18 20 18   Temp: 36.1 °C (97 °F) 36.6 °C (97.8 °F) 36.4 °C (97.6 °F) 36.3 °C (97.4 °F)   TempSrc: Temporal Temporal Temporal Temporal   SpO2: 96% 99% 92% 96%   Weight:       Height:         Body mass index is 34.25 kg/m².    Oxygen Therapy:  Pulse Oximetry: 96 %, O2 (LPM): 2, O2 Delivery: Nasal Cannula    Physical Exam   Constitutional: She is oriented to person, place, and time and well-developed, well-nourished, and in no distress.   HENT:   Head: Normocephalic and atraumatic.   Eyes: Pupils are equal, round, and reactive to light. Conjunctivae and EOM are normal. No scleral icterus.   Neck: Normal range of motion. Neck supple.   Cardiovascular: Normal rate, regular rhythm and normal heart sounds.   No murmur heard.  Pulmonary/Chest: Effort normal and breath sounds normal. She has no wheezes.   Abdominal: Soft. Bowel sounds are normal. There is no tenderness.   Musculoskeletal: Normal range of motion.         General: No edema.   Lymphadenopathy:     She has no cervical adenopathy.   Neurological: She is alert and oriented to person, place, and time. No cranial nerve deficit. She exhibits normal muscle tone.   Skin: Skin is warm. She is not diaphoretic. No erythema.   Psychiatric: Memory and affect normal.             Assessment/Plan     * Repeated falls  Assessment & Plan  The patient and her father reported that she had multiple falls over the past few months with multiple injuries that required hospitalization multiple times  The patient  reported that she has been having imbalance with the walking  chronic infarct in the right basal ganglia could be contributing  PT/OT evaluated, no OT needs, Home health ordered for PT    Polypharmacy  Assessment & Plan  The patient on numerous medication for multiple comorbidities including pain meds (morphine), muscle relaxant (baclofen), SSRI (Paxil), anticonvulsant (Depakote), and anxiolytic (Atarax).  - changed hydroxyzine and Baclofen to PRN instead of scheduled    ZULLY on CKD (acute kidney injury) (HCC)- (present on admission)  Assessment & Plan  Hx of CKD stage III A, creatinine of 2.05(baseline is around 1)  +Hyaline casts on UA. (negative for infection), possible secondary to dehydration. Creatinine down to 1.02 with IV fluids.  Plan;  - encourage PO intake, stop NS, will give D5W in view of Na 146  - Avoid NSAIDS  -Renally adjusted meds  - f/u with nephrology as scheduled tomorrow at 11 am.       Psychogenic nonepileptic seizure- (present on admission)  Assessment & Plan  Excessive shakiness of the whole body, chronic. Per patient she has h/o pseudoseizure   Underlying psych/behavioral issues    -continue home Depakote  -Home health for PT request placed after PT evaluation yesterday    Chronic pain- (present on admission)  Assessment & Plan  Currently on baclofen, Depakote, and morphine ER 30 mg once daily and morphine IR twice daily as needed  She is also on Paxil which helps also with chronic pain.  - switched baclofen to PRN      Hypertension- (present on admission)  Assessment & Plan  Hx of HTN , on amlodipine  mg t 2.5wice daily  The patient reported that she stopped taking amlodipine and was started on propranolol  We will continue to monitor, currently BP stable at 131/83, continue propranolol    Bipolar 2 disorder (HCC)- (present on admission)  Assessment & Plan  Stable  The patient has not been seen by a psychiatrist in many years  Continue home Depakote   Consider psychiatric referral on  discharge    Depression- (present on admission)  Assessment & Plan  Stable on home Paxil    Hypernatremia  Assessment & Plan  Sodium today was 146, with her baseline ~140, most likely secondary to bein gof NS for ZULLY.  - will stop NS, switch to D5W  - recheck labs at 3pm today.    Fibromyalgia- (present on admission)  Assessment & Plan  Fibromyalgia and chronic pain along with mood disturbance  She is currently on SSRI (Paxil), Depakote, morphine, and baclofen  Patient pain is stable  She will benefit from outpatient pcp and psychiatry follow up as she has not been assessed by psychiatrist in years.

## 2019-12-08 NOTE — PROGRESS NOTES
Up to [facility].  Urinated.  Pt denied burning sensation with void this time.  Bladder scan ordered after she voided.  Scan showed 69 ml.

## 2019-12-08 NOTE — FACE TO FACE
Face to Face Supporting Documentation - Home Health    The encounter with this patient was in whole or in part the primary reason for home health admission.    Date of encounter:   Patient:                    MRN:                       YOB: 2018  Peter Trimble  [id number]  1975     Home health to see patient for:  Physical Therapy evaluation and treatment and Occupational therapy evaluation and treatment    Skilled need for:  Recent Deterioration of Health Status physical decompensation    Skilled nursing interventions to include:  Comment: PT/OT    Homebound status evidenced by:  Needs the assistance of another person in order to leave the home. Leaving home requires a considerable and taxing effort. There is a normal inability to leave the home.    Community Physician to provide follow up care: Vika Norton M.D.     Optional Interventions? No      I certify the face to face encounter for this home health care referral meets the CMS requirements and the encounter/clinical assessment with the patient was, in whole, or in part, for the medical condition(s) listed above, which is the primary reason for home health care. Based on my clinical findings: the service(s) are medically necessary, support the need for home health care, and the homebound criteria are met.  I certify that this patient has had a face to face encounter by myself.  Adam Evangelista M.D. - NPI: [id number]

## 2019-12-09 ENCOUNTER — PATIENT OUTREACH (OUTPATIENT)
Dept: HEALTH INFORMATION MANAGEMENT | Facility: OTHER | Age: 44
End: 2019-12-09

## 2019-12-09 VITALS
WEIGHT: 245.59 LBS | TEMPERATURE: 98.2 F | HEART RATE: 62 BPM | RESPIRATION RATE: 17 BRPM | SYSTOLIC BLOOD PRESSURE: 140 MMHG | DIASTOLIC BLOOD PRESSURE: 74 MMHG | OXYGEN SATURATION: 98 % | HEIGHT: 71 IN | BODY MASS INDEX: 34.38 KG/M2

## 2019-12-09 LAB
ANION GAP SERPL CALC-SCNC: 5 MMOL/L (ref 0–11.9)
BUN SERPL-MCNC: 20 MG/DL (ref 8–22)
CALCIUM SERPL-MCNC: 8.7 MG/DL (ref 8.5–10.5)
CHLORIDE SERPL-SCNC: 105 MMOL/L (ref 96–112)
CO2 SERPL-SCNC: 30 MMOL/L (ref 20–33)
CREAT SERPL-MCNC: 0.87 MG/DL (ref 0.5–1.4)
GLUCOSE SERPL-MCNC: 78 MG/DL (ref 65–99)
POTASSIUM SERPL-SCNC: 4.1 MMOL/L (ref 3.6–5.5)
SODIUM SERPL-SCNC: 140 MMOL/L (ref 135–145)

## 2019-12-09 PROCEDURE — 80048 BASIC METABOLIC PNL TOTAL CA: CPT

## 2019-12-09 PROCEDURE — 36415 COLL VENOUS BLD VENIPUNCTURE: CPT

## 2019-12-09 PROCEDURE — 700102 HCHG RX REV CODE 250 W/ 637 OVERRIDE(OP): Performed by: STUDENT IN AN ORGANIZED HEALTH CARE EDUCATION/TRAINING PROGRAM

## 2019-12-09 PROCEDURE — A9270 NON-COVERED ITEM OR SERVICE: HCPCS | Performed by: STUDENT IN AN ORGANIZED HEALTH CARE EDUCATION/TRAINING PROGRAM

## 2019-12-09 PROCEDURE — 99239 HOSP IP/OBS DSCHRG MGMT >30: CPT | Mod: GC | Performed by: INTERNAL MEDICINE

## 2019-12-09 PROCEDURE — 700111 HCHG RX REV CODE 636 W/ 250 OVERRIDE (IP): Performed by: STUDENT IN AN ORGANIZED HEALTH CARE EDUCATION/TRAINING PROGRAM

## 2019-12-09 RX ORDER — HYDROXYZINE HYDROCHLORIDE 25 MG/1
50 TABLET, FILM COATED ORAL 3 TIMES DAILY PRN
Qty: 60 TAB | Refills: 0 | Status: SHIPPED | OUTPATIENT
Start: 2019-12-09 | End: 2022-04-12

## 2019-12-09 RX ORDER — FUROSEMIDE 20 MG/1
20 TABLET ORAL 2 TIMES DAILY PRN
Qty: 30 TAB | Refills: 0 | Status: SHIPPED
Start: 2019-12-09 | End: 2020-06-02

## 2019-12-09 RX ADMIN — ACETAMINOPHEN 1000 MG: 500 TABLET ORAL at 13:12

## 2019-12-09 RX ADMIN — KETOCONAZOLE: 20 CREAM TOPICAL at 08:32

## 2019-12-09 RX ADMIN — GUAIFENESIN 600 MG: 600 TABLET, EXTENDED RELEASE ORAL at 09:50

## 2019-12-09 RX ADMIN — ENOXAPARIN SODIUM 40 MG: 100 INJECTION SUBCUTANEOUS at 05:50

## 2019-12-09 RX ADMIN — BACLOFEN 10 MG: 10 TABLET ORAL at 09:57

## 2019-12-09 RX ADMIN — ACETAMINOPHEN 1000 MG: 500 TABLET ORAL at 05:50

## 2019-12-09 RX ADMIN — FLUTICASONE PROPIONATE 220 MCG: 110 AEROSOL, METERED RESPIRATORY (INHALATION) at 08:31

## 2019-12-09 RX ADMIN — DIVALPROEX SODIUM 1000 MG: 500 TABLET, DELAYED RELEASE ORAL at 09:50

## 2019-12-09 RX ADMIN — PROPRANOLOL HYDROCHLORIDE 5 MG: 10 TABLET ORAL at 09:51

## 2019-12-09 RX ADMIN — PAROXETINE 60 MG: 30 TABLET, FILM COATED ORAL at 05:50

## 2019-12-09 RX ADMIN — MORPHINE SULFATE 30 MG: 30 TABLET, FILM COATED, EXTENDED RELEASE ORAL at 05:51

## 2019-12-09 RX ADMIN — NYSTATIN: 100000 POWDER TOPICAL at 08:36

## 2019-12-09 ASSESSMENT — ENCOUNTER SYMPTOMS
SPUTUM PRODUCTION: 0
FEVER: 0
DIZZINESS: 0
SORE THROAT: 0
COUGH: 0
INSOMNIA: 0
LOSS OF CONSCIOUSNESS: 0
CHILLS: 0
WEIGHT LOSS: 0
SEIZURES: 0
NAUSEA: 0
SENSORY CHANGE: 0
HEARTBURN: 0
MYALGIAS: 1
CONSTIPATION: 0
TREMORS: 1
ABDOMINAL PAIN: 0
MEMORY LOSS: 0
BLURRED VISION: 0
FALLS: 1
NERVOUS/ANXIOUS: 1
PHOTOPHOBIA: 0

## 2019-12-09 NOTE — DISCHARGE PLANNING
@1034  Agency/Facility Name: [facility]  Spoke To:  Correspondence via fax  Outcome: Patient's referral declined.  Non-contracted insurance provider.      [facility]ency[facility]Facility Name: [facility]   Referral sent per Choice form @ 1034     @1101  Agency/Facility Name: [facility]  Spoke To: Voice message from [place]  Outcome: Patient's referral declined.  Medicaid census is full.    1152  Agency/Facility Name: [place]   Referral sent per Choice form @ 1153     @1200  Agency/Facility Name: [facility]  Referral sent per Choice form @ 1200     @1202  Received Transport Form @ 1139  Spoke to Sofia @ [facility]    Transport is scheduled for 12/09 @1530 - 1600 going to home:  Transport home is with [facility].    EDWIN Palacio notified  EDWIN Arias notified    @1306  Agency/Facility Name: [facility]  Spoke To: Angelika  Outcome: Patient told EDWIN Arias that her transport is here (Sandy at [facility]).  Angelika verified that [facility] is here early for transport.    @1201  Agency/Facility Name: [facility]  Spoke To: via voice message from [place]  Outcome: Patient's referral declined.  Medicaid census is full.

## 2019-12-09 NOTE — DISCHARGE INSTRUCTIONS
"  Patient is now stable for discharge with outpatient PCP, nephrology and psychiatric follow-up.  Home health referral has been placed, patient can also reach out to primary care physician for outpatient PT referral.         Prevent Falls in Your Home                                                                        \"Falling once doubles your chance of falling again\"        -Center for Disease Control and Prevention    Falls in the home can lead to serious injury (fractures, brain injuries), hospitalizations, increased medical costs, and could even be fatal.  The good news is, there are many precautions you can take to avoid falls in your home and help keep you safe:     · If prescribed an assistive device (walker, crutches), use as instructed by the healthcare provider\"   · Remove any tripping hazards from your home, including loose cords, throw rugs and clutter  · Keep a nightlight on in dark (hallways, bathrooms, etc)   · Get up slowly, to make sure you feel okay before getting up  · Be aware of any side effects of your medications: some medications may make you dizzy  · Place a non-skid rubber mat in your shower or tub-consider a shower bench or chair if unsteady on your feet  · Wear supportive shoes or non-skid socks when moving around  · Start an exercise program once approved by your provider.  If you are feeling weak following a hospital stay, talk to your doctor about home health or outpatient therapy programs designed to help rebuild your strength and endurance  Discharge Instructions    Discharged to home by medical transportation with self. Discharged via wheelchair, hospital escort: Yes.  Special equipment needed: Not Applicable    Be sure to schedule a follow-up appointment with your primary care doctor or any specialists as instructed.     Discharge Plan:   Diet Plan: Discussed  Activity Level: Discussed  Confirmed Follow up Appointment: Patient to Call and Schedule Appointment  Confirmed " Symptoms Management: Discussed  Medication Reconciliation Updated: Yes  Influenza Vaccine Indication: Patient Refuses    I understand that a diet low in cholesterol, fat, and sodium is recommended for good health. Unless I have been given specific instructions below for another diet, I accept this instruction as my diet prescription.   Other diet: Regular    Special Instructions: None    · Is patient discharged on Warfarin / Coumadin?   No     Depression / Suicide Risk    As you are discharged from this [facility] facility, it is important to learn how to keep safe from harming yourself.    Recognize the warning signs:  · Abrupt changes in personality, positive or negative- including increase in energy   · Giving away possessions  · Change in eating patterns- significant weight changes-  positive or negative  · Change in sleeping patterns- unable to sleep or sleeping all the time   · Unwillingness or inability to communicate  · Depression  · Unusual sadness, discouragement and loneliness  · Talk of wanting to die  · Neglect of personal appearance   · Rebelliousness- reckless behavior  · Withdrawal from people/activities they love  · Confusion- inability to concentrate     If you or a loved one observes any of these behaviors or has concerns about self-harm, here's what you can do:  · Talk about it- your feelings and reasons for harming yourself  · Remove any means that you might use to hurt yourself (examples: pills, rope, extension cords, firearm)  · Get professional help from the community (Mental Health, Substance Abuse, psychological counseling)  · Do not be alone:Call your Safe Contact- someone whom you trust who will be there for you.  · Call your local CRISIS HOTLINE 853-6862 or 720-702-5332  · Call your local Children's Mobile Crisis Response Team [place] (813) 426-3216 or www.Bandwidth  · Call the toll free National Suicide Prevention Hotlines   · National Suicide Prevention Lifeline  292-545-PZAI (2358)  · National [place] Line Network 800-SUICIDE (203-8354)

## 2019-12-09 NOTE — PROGRESS NOTES
Pt discharge home via [facility], VSS, lungs diminished, pt home O2 taken with her. PIV removed per protocol. Discharge packet reviewed with patient, education completed. Instructed to call with any questions or concerns Will follow up with PCP to re - apply for home health. Safety maintained, cooperative with care.

## 2019-12-09 NOTE — PROGRESS NOTES
Internal Medicine Interval Note  Note Author: Harini Eastman M.D.     Name Peter Trimble 1975   Age/Sex 44 y.o. female   MRN [id number]   Code Status Full code     After 5PM or if no immediate response to page, please call for cross-coverage  Attending/Team: /Donna See Patient List for primary contact information  Call [phone] to page    1st Call - Day Intern (R1):    2nd Call - Day Sr. Resident (R2/R3):            Reason for interval visit  (Principal Problem)   Altered mental status      Interval Problem Daily Status Update  (24 hours, problem oriented, brief subjective history, new lab/imaging data pertinent to that problem)     - no acute events overnight, Vitals stable.  - Continues to have chronic shaking but no other significant symptoms. Na has come back to her baseline of 140, renal function improved. Stable for discharge today.      Review of Systems   Constitutional: Negative for chills, fever and weight loss.   HENT: Negative for hearing loss and sore throat.    Eyes: Negative for blurred vision and photophobia.   Respiratory: Negative for cough and sputum production.    Cardiovascular: Negative for chest pain and leg swelling.   Gastrointestinal: Negative for abdominal pain, constipation, heartburn and nausea.   Genitourinary: Negative for dysuria and hematuria.   Musculoskeletal: Positive for falls and myalgias.   Skin: Negative for itching and rash.   Neurological: Positive for tremors. Negative for dizziness, sensory change, seizures and loss of consciousness.   Psychiatric/Behavioral: Negative for memory loss. The patient is nervous/anxious. The patient does not have insomnia.        Disposition/Barriers to discharge:   Potential discharge today    Consultants/Specialty  None  PCP: Vika Norton M.D.      Quality Measures  Quality-Core Measures   Reviewed items::  Labs reviewed, Medications reviewed and Radiology images reviewed  Parviz  catheter::  No Jj  DVT prophylaxis pharmacological::  Enoxaparin (Lovenox)          Physical Exam       Vitals:    12/08/19 1930 12/09/19 0306 12/09/19 0705 12/09/19 0951   BP: 128/69 145/82 140/74 140/74   Pulse: 62 63 60 62   Resp: 18 18 17    Temp: 36.1 °C (97 °F) 36.2 °C (97.2 °F) 36.8 °C (98.2 °F)    TempSrc: Temporal Temporal Temporal    SpO2: 100% 96% 98%    Weight:       Height:         Body mass index is 34.25 kg/m².    Oxygen Therapy:  Pulse Oximetry: 98 %, O2 (LPM): 2, O2 Delivery: Nasal Cannula    Physical Exam   Constitutional: She is oriented to person, place, and time and well-developed, well-nourished, and in no distress.   HENT:   Head: Normocephalic and atraumatic.   Eyes: Pupils are equal, round, and reactive to light. Conjunctivae and EOM are normal. No scleral icterus.   Neck: Normal range of motion. Neck supple.   Cardiovascular: Normal rate, regular rhythm and normal heart sounds.   No murmur heard.  Pulmonary/Chest: Effort normal and breath sounds normal. She has no wheezes.   Abdominal: Soft. Bowel sounds are normal. There is no tenderness.   Musculoskeletal: Normal range of motion.         General: No edema.   Lymphadenopathy:     She has no cervical adenopathy.   Neurological: She is alert and oriented to person, place, and time. No cranial nerve deficit. She exhibits normal muscle tone.   Skin: Skin is warm. She is not diaphoretic. No erythema.   Psychiatric: Memory and affect normal.             Assessment/Plan     * Repeated falls  Assessment & Plan  The patient and her father reported that she had multiple falls over the past few months with multiple injuries that required hospitalization multiple times  The patient reported that she has been having imbalance with the walking  chronic infarct in the right basal ganglia could be contributing  PT/OT evaluated, no OT needs, Home health ordered for PT    Polypharmacy  Assessment & Plan  The patient on numerous medication for multiple  comorbidities including pain meds (morphine), muscle relaxant (baclofen), SSRI (Paxil), anticonvulsant (Depakote), and anxiolytic (Atarax).  - changed hydroxyzine and Baclofen to PRN instead of scheduled    ZULLY on CKD (acute kidney injury) (HCC)- (present on admission)  Assessment & Plan  Hx of CKD stage III A, creatinine of 2.05(baseline is around 1)  +Hyaline casts on UA. (negative for infection), possible secondary to dehydration. Back to baseline now  Plan:  - encourage PO intake, Na also now within normal limits.  - Avoid NSAIDS  -Renally adjusted meds  - f/u with nephrology as scheduled        Psychogenic nonepileptic seizure- (present on admission)  Assessment & Plan  Excessive shakiness of the whole body, chronic. Per patient she has h/o pseudoseizure   Underlying psych/behavioral issues    -continue home Depakote  -Home health for PT request placed after PT evaluation     Chronic pain- (present on admission)  Assessment & Plan  Currently on baclofen, Depakote, and morphine ER 30 mg once daily and morphine IR twice daily as needed  She is also on Paxil which helps also with chronic pain.  - switched baclofen to PRN      Hypertension- (present on admission)  Assessment & Plan  Hx of HTN , on amlodipine  mg t 2.5wice daily  The patient reported that she stopped taking amlodipine and was started on propranolol  We will continue to monitor, currently BP stable at 131/83, continue propranolol    Bipolar 2 disorder (HCC)- (present on admission)  Assessment & Plan  Stable  The patient has not been seen by a psychiatrist in many years  Continue home Depakote   PCP f/u and possible psychiatry referal    Depression- (present on admission)  Assessment & Plan  Stable on home Paxil    Hypernatremia  Assessment & Plan  Sodium yesterday was 146, with her baseline ~140, most likely secondary to being on NS for ZULLY. However now back to baseline.  -resolved    Fibromyalgia- (present on admission)  Assessment &  Plan  Fibromyalgia and chronic pain along with mood disturbance  She is currently on SSRI (Paxil), Depakote, morphine, and baclofen  Patient pain is stable  She will benefit from outpatient pcp and psychiatry follow up as she has not been assessed by psychiatrist in years.

## 2019-12-09 NOTE — DISCHARGE SUMMARY
Internal Medicine Discharge Summary  Note Author: Harini Eastman M.D.       Name Peter Trimble 1975   Age/Sex 44 y.o. female   MRN [id number]         Admit Date:  2019       Discharge Date:   2019    Service:   [facility] Internal Medicine white team  Attending Physician(s):   Dr. Story       Senior Resident(s):   Dr. Evangelista  Omid Resident(s):     PCP: Vika Norton M.D.      Primary Diagnosis:   Repeated falls  Acute kidney injury  Hyponatremia    Secondary Diagnoses:                Principal Problem:    Repeated falls POA: Unknown  Active Problems:    Psychogenic nonepileptic seizure POA: Yes    ZULLY on CKD (acute kidney injury) (HCC) POA: Yes    Polypharmacy POA: Unknown    Depression POA: Yes    Bipolar 2 disorder (HCC) POA: Yes    Hypertension POA: Yes    Chronic pain POA: Yes    Fibromyalgia POA: Yes    Hypernatremia POA: Unknown  Resolved Problems:    * No resolved hospital problems. *      Hospital Summary (Brief Narrative):       44-year-old female with a past medical history of multiple psychiatric conditions including bipolar, ADHD, depression, fibromyalgia, chronic fatigue syndrome, AVA , hypertension, pseudoseizures presented with generalized malaise and ground-level fall.  CT head, x-ray right knee, left shoulder, right wrist negative for any acute process.  Patient was initially difficult to arouse during interview  but eventually was more alert.  This was initially thought to be secondary to polypharmacy hence hydroxyzine and baclofen have been switched over to as needed from scheduled regimen.  Lasix was held as patient did not appear to be volume overloaded.  She also had labs suggestive of ZULLY on initial presentation hence was started on IV fluids with which  renal function returned to baseline.  Hypernatremia was noted during his admission which resolved after increasing free water intake.  Occupational Therapy evaluated patient, no further needs on  discharge.  However physical therapy recommended home health for PT hence referral has been placed.    Patient is now stable for discharge with outpatient PCP, nephrology and psychiatric follow-up.  Home health referral has been placed, patient can also reach out to primary care physician for outpatient PT referral.        Patient /Hospital Summary (Details -- Problem Oriented) :          Polypharmacy  Assessment & Plan  The patient on numerous medication for multiple comorbidities including pain meds (morphine), muscle relaxant (baclofen), SSRI (Paxil), anticonvulsant (Depakote), and anxiolytic (Atarax).  - changed hydroxyzine and Baclofen to PRN instead of scheduled    ZULLY on CKD (acute kidney injury) (HCC)  Assessment & Plan  Hx of CKD stage III A, creatinine of 2.05(baseline is around 1)  +Hyaline casts on UA. (negative for infection), possible secondary to dehydration. Back to baseline now  Plan:  - encourage PO intake, Na also now within normal limits.  - Avoid NSAIDS  -Renally adjusted meds  - f/u with nephrology as scheduled        Psychogenic nonepileptic seizure  Assessment & Plan  Excessive shakiness of the whole body, chronic. Per patient she has h/o pseudoseizure   Underlying psych/behavioral issues    -continue home Depakote  -Home health for PT request placed after PT evaluation     * Repeated falls  Assessment & Plan  The patient and her father reported that she had multiple falls over the past few months with multiple injuries that required hospitalization multiple times  The patient reported that she has been having imbalance with the walking  chronic infarct in the right basal ganglia could be contributing  PT/OT evaluated, no OT needs, Home health ordered for PT    Chronic pain  Assessment & Plan  Currently on baclofen, Depakote, and morphine ER 30 mg once daily and morphine IR twice daily as needed  She is also on Paxil which helps also with chronic pain.  - switched baclofen to  PRN      Hypertension  Assessment & Plan  Hx of HTN , on amlodipine  mg t 2.5wice daily  The patient reported that she stopped taking amlodipine and was started on propranolol  We will continue to monitor, currently BP stable at 131/83, continue propranolol    Bipolar 2 disorder (HCC)  Assessment & Plan  Stable  The patient has not been seen by a psychiatrist in many years  Continue home Depakote   PCP f/u and possible psychiatry referal    Depression  Assessment & Plan  Stable on home Paxil    Hypernatremia  Assessment & Plan  Sodium yesterday was 146, with her baseline ~140, most likely secondary to being on NS for ZULLY. However now back to baseline.  -resolved    Fibromyalgia  Assessment & Plan  Fibromyalgia and chronic pain along with mood disturbance  She is currently on SSRI (Paxil), Depakote, morphine, and baclofen  Patient pain is stable  She will benefit from outpatient pcp and psychiatry follow up as she has not been assessed by psychiatrist in years.        Consultants:     None    Procedures:        None    Imaging/ Testing:      CT-HEAD W/O   Final Result      1.  No CT evidence of acute infarct, hemorrhage or mass.   2.  Tiny chronic infarct in the right basal ganglia.          Discharge Medications:         Medication Reconciliation: Completed       Medication List      CHANGE how you take these medications      Instructions   furosemide 20 MG Tabs  What changed:    · when to take this  · reasons to take this  Commonly known as:  LASIX   Take 1 Tab by mouth 2 times a day as needed (for excess fluid).  Dose:  20 mg     hydrOXYzine HCl 25 MG Tabs  What changed:    · when to take this  · reasons to take this  Commonly known as:  ATARAX   Take 2 Tabs by mouth 3 times a day as needed for Anxiety.  Dose:  50 mg        CONTINUE taking these medications      Instructions   baclofen 10 MG Tabs  Commonly known as:  LIORESAL   Take 10 mg by mouth 3 times a day as needed (Pain).  Dose:  10 mg     cetirizine 10 MG  Tabs  Commonly known as:  ZYRTEC   Take 10 mg by mouth every day.  Dose:  10 mg     Ciclopirox 1 % Sham   1 g by Apply externally route 1 time daily as needed (Sores on Scalp).  Dose:  1 g     ciprofloxacin 500 MG Tabs  Commonly known as:  CIPRO   Take 500 mg by mouth 2 times a day. 10-day course Starting 11/22/19 Ending 12/01/19.  Dose:  500 mg     diclofenac EC 75 MG Tbec  Commonly known as:  VOLTAREN   Take 75 mg by mouth 2 times a day.  Dose:  75 mg     divalproex 500 MG Tbec  Commonly known as:  DEPAKOTE   Take 500-1,000 mg by mouth 2 Times a Day. 1000mg in AM  500mg at PM  Dose:  500-1,000 mg     Erenumab 70 MG/ML Soaj  Commonly known as:  AIMOVIG   Inject 70 mg as instructed Q30 DAYS.  Dose:  70 mg     esomeprazole 40 MG delayed-release capsule  Commonly known as:  NEXIUM   Take 40 mg by mouth 2 Times a Day.  Dose:  40 mg     ferrous sulfate 325 (65 Fe) MG tablet   Take 325 mg by mouth every day.  Dose:  325 mg     FLOVENT  MCG/ACT Aero  Generic drug:  fluticasone   Inhale 1 Puff by mouth 2 times a day.  Dose:  1 Puff     fluticasone 50 MCG/ACT nasal spray  Commonly known as:  FLONASE   Spray 1 Spray in nose every day.  Dose:  1 Spray     guaiFENesin  MG Tb12  Commonly known as:  MUCINEX   Take 600 mg by mouth every 12 hours.  Dose:  600 mg     montelukast 10 MG Tabs  Commonly known as:  SINGULAIR   Take 10 mg by mouth every bedtime.  Dose:  10 mg     * Morphine Sulfate ER 30 MG T12a   Take 30 mg by mouth every morning.  Dose:  30 mg     * morphine 15 MG tablet  Commonly known as:  MS IR   Take 15 mg by mouth 2 times a day as needed (Breakthrough Pain).  Dose:  15 mg     nystatin 727766 UNIT/GM Crea topical cream  Commonly known as:  MYCOSTATIN   Apply 1 g to affected area(s) 2 times a day.  Dose:  1 g     omeprazole 20 MG delayed-release capsule  Commonly known as:  PRILOSEC   Take 20 mg by mouth every day.  Dose:  20 mg     ondansetron 4 MG Tbdp  Commonly known as:  ZOFRAN ODT   Take 4 mg by  mouth every 6 hours as needed for Nausea.  Dose:  4 mg     PARoxetine 30 MG Tabs  Commonly known as:  PAXIL   Take 60 mg by mouth every day.  Dose:  60 mg     potassium chloride SA 20 MEQ Tbcr  Commonly known as:  Kdur   Take 20 mEq by mouth 2 times a day.  Dose:  20 mEq     PROAIR  (90 Base) MCG/ACT Aers inhalation aerosol  Generic drug:  albuterol   ProAir HFA 90 mcg/actuation aerosol inhaler     propranolol 10 MG Tabs  Commonly known as:  INDERAL   Take 5 mg by mouth 2 times a day. Indications: High Blood Pressure Disorder  Dose:  5 mg     vitamin D 2000 UNIT Tabs   Take 4,000 Units by mouth every day.  Dose:  4,000 Units         * This list has 2 medication(s) that are the same as other medications prescribed for you. Read the directions carefully, and ask your doctor or other care provider to review them with you.                      Disposition: Home, Home health with PT    Diet:   Regular    Activity:   As tolerated    Instructions:      The patient was instructed to return to the ER in the event of worsening symptoms. I have counseled the patient on the importance of compliance and the patient has agreed to proceed with all medical recommendations and follow up plan indicated above.   The patient understands that all medications come with benefits and risks. Risks may include permanent injury or death and these risks can be minimized with close reassessment and monitoring.        Primary Care Provider:      Discharge summary faxed to primary care provider:  Deferred  Copy of discharge summary given to the patient: Deferred      Follow up appointment details :      Future Appointments   Date Time Provider Department Center   12/11/2019 11:15 AM Nadja Mcnamara M.D. [address]   12/17/2019  2:00 PM Ginette Aguilar M.D. [facility] None   2/7/2020  1:00 PM NEURODIAGNOSTIC LAB [facility] None     Vika Norton M.D.  [address] [facility] [id number] [phone]    Schedule an appointment as soon as  "possible for a visit in 1 week  [facility]  called office and left a voicemail requesting office to call to schedule your appointment . If you do not hear from them please call to schedule your appointment. Thank you        Pending Studies:        None    Time spent on discharge day patient visit, preparing discharge paperwork and arranging for patient follow up.    Summary of follow up issues:   -Please follow-up with primary care physician, nephrology and psychiatrist for further evaluation and medication adjustment.    Discharge Time (Minutes) :    45 minutes  Hospital Course Type:  Inpatient Stay >2 midnights      Condition on Discharge stable  ______________________________________________________________________    Interval history/exam for day of discharge:     -Overnight no acute events.  Vitals stable  -Elect lites within normal limits renal function also improving.  History of chronic shaking but no other new complaints.  Home health for PT referral has been placed.  Informed patient that she can also reach out to her primary care physician in case not acceptable home health for outpatient PT referral.    /74   Pulse 62   Temp 36.8 °C (98.2 °F) (Temporal)   Resp 17   Ht 1.803 m (5' 11\")   Wt 111.4 kg (245 lb 9.5 oz)   SpO2 98%   BMI 34.25 kg/m²      Physical exam  Cardiovascular: Normal rate, regular rhythm and normal heart sounds.   No murmur heard.  Pulmonary/Chest: Effort normal and breath sounds normal. She has no wheezes.   Abdominal: Soft. Bowel sounds are normal. There is no tenderness.   Musculoskeletal: Normal range of motion.         General: No edema.   Lymphadenopathy:     She has no cervical adenopathy.   Neurological: She is alert and oriented to person, place, and time. No cranial nerve deficit. She exhibits normal muscle tone.     Most Recent Labs:    Lab Results   Component Value Date/Time    WBC 4.7 (L) 12/07/2019 02:54 AM    RBC 3.78 (L) 12/07/2019 02:54 AM    " HEMOGLOBIN 11.2 (L) 12/07/2019 02:54 AM    HEMATOCRIT 34.5 (L) 12/07/2019 02:54 AM    MCV 91.3 12/07/2019 02:54 AM    MCH 29.6 12/07/2019 02:54 AM    MCHC 32.5 (L) 12/07/2019 02:54 AM    MPV 10.2 12/07/2019 02:54 AM    NEUTSPOLYS 29.30 (L) 12/07/2019 02:54 AM    LYMPHOCYTES 56.00 (H) 12/07/2019 02:54 AM    MONOCYTES 9.80 12/07/2019 02:54 AM    EOSINOPHILS 3.60 12/07/2019 02:54 AM    BASOPHILS 0.90 12/07/2019 02:54 AM    HYPOCHROMIA 1+ 05/15/2013 02:05 AM    ANISOCYTOSIS 1+ 05/05/2013 02:35 AM      Lab Results   Component Value Date/Time    SODIUM 140 12/09/2019 02:38 AM    POTASSIUM 4.1 12/09/2019 02:38 AM    CHLORIDE 105 12/09/2019 02:38 AM    CO2 30 12/09/2019 02:38 AM    GLUCOSE 78 12/09/2019 02:38 AM    BUN 20 12/09/2019 02:38 AM    CREATININE 0.87 12/09/2019 02:38 AM      Lab Results   Component Value Date/Time    ALTSGPT 14 12/08/2019 02:13 AM    ASTSGOT 16 12/08/2019 02:13 AM    ALKPHOSPHAT 31 12/08/2019 02:13 AM    TBILIRUBIN 0.3 12/08/2019 02:13 AM    LIPASE 23 09/10/2019 07:56 PM    ALBUMIN 3.3 12/08/2019 02:13 AM    GLOBULIN 2.0 12/08/2019 02:13 AM    PREALBUMIN 31.1 10/08/2011 06:13 AM    INR 1.04 09/11/2019 01:22 AM     Lab Results   Component Value Date/Time    PROTHROMBTM 13.8 09/11/2019 01:22 AM    INR 1.04 09/11/2019 01:22 AM

## 2019-12-09 NOTE — PROGRESS NOTES
Pt alert and oriented, VSS and on baseline O2. Pt calls appropriately and call light is within reach. All needs are met at this time. Fall precautions and hourly rounding in place. Bed alarm is on and pt is wearing SCDs.   Will continue to monitor.

## 2019-12-17 ENCOUNTER — APPOINTMENT (OUTPATIENT)
Dept: NEUROLOGY | Facility: MEDICAL CENTER | Age: 44
End: 2019-12-17
Payer: MEDICAID

## 2019-12-20 ENCOUNTER — OFFICE VISIT (OUTPATIENT)
Dept: URGENT CARE | Facility: PHYSICIAN GROUP | Age: 44
End: 2019-12-20
Payer: MEDICAID

## 2019-12-20 VITALS
TEMPERATURE: 96.4 F | SYSTOLIC BLOOD PRESSURE: 124 MMHG | WEIGHT: 243 LBS | BODY MASS INDEX: 34.02 KG/M2 | OXYGEN SATURATION: 94 % | DIASTOLIC BLOOD PRESSURE: 82 MMHG | RESPIRATION RATE: 16 BRPM | HEIGHT: 71 IN | HEART RATE: 92 BPM

## 2019-12-20 DIAGNOSIS — R42 DIZZY: ICD-10-CM

## 2019-12-20 DIAGNOSIS — B35.0 TINEA CAPITIS: ICD-10-CM

## 2019-12-20 PROCEDURE — 99214 OFFICE O/P EST MOD 30 MIN: CPT | Performed by: PHYSICIAN ASSISTANT

## 2019-12-20 RX ORDER — MECLIZINE HYDROCHLORIDE 25 MG/1
25 TABLET ORAL 3 TIMES DAILY PRN
Qty: 30 TAB | Refills: 0 | Status: SHIPPED
Start: 2019-12-20 | End: 2020-06-02

## 2019-12-20 RX ORDER — GRISEOFULVIN 250 MG/1
500 TABLET ORAL DAILY
Qty: 60 TAB | Refills: 0 | Status: SHIPPED
Start: 2019-12-20 | End: 2020-06-02

## 2019-12-20 NOTE — PROGRESS NOTES
Chief Complaint   Patient presents with   • Other     scalp infection       HISTORY OF PRESENT ILLNESS: Patient is a 44 y.o. female who presents today because she has lesions on her scalp.  This is chronic for her.  She has been on a 6-week course of griseofulvin which made the lesions almost go away but she also picks at the lesions.  They have gotten worse over the last several weeks.    Patient Active Problem List    Diagnosis Date Noted   • Repeated falls 12/07/2019     Priority: High   • Polypharmacy 12/07/2019     Priority: High   • ZULLY on CKD (acute kidney injury) (Formerly [facility]) 10/25/2019     Priority: High   • Hematuria 10/24/2019     Priority: High   • Psychogenic nonepileptic seizure 08/06/2016     Priority: High   • Hypomagnesemia 10/25/2019     Priority: Medium   • Anxiety 09/11/2019     Priority: Medium   • Morbid obesity (Former[facility]) 06/25/2015     Priority: Medium   • Closed fracture of one rib 06/25/2015     Priority: Medium   • Lumbar transverse process fracture ([facility]) 06/25/2015     Priority: Medium   • Chronic pain 08/18/2014     Priority: Medium   • Hypertension 10/04/2011     Priority: Medium   • Depression 09/28/2011     Priority: Medium   • Bipolar 2 disorder ([facility]) 09/28/2011     Priority: Medium   • Chronic migraine 09/28/2011     Priority: Medium   • Asthma 09/28/2011     Priority: Medium   • Genital fungal infection. 10/25/2019     Priority: Low   • Electrolyte abnormality 09/12/2019     Priority: Low   • Furunculosis 09/11/2019     Priority: Low   • Hyperlipidemia 08/18/2014     Priority: Low   • AVA (obstructive sleep apnea), intolerant of CPAP 07/08/2013     Priority: Low   • GERD (gastroesophageal reflux disease) 09/28/2011     Priority: Low   • Hypernatremia 12/08/2019   • Morbid obesity with BMI of 40.0-44.9, adult (Former[facility]) 06/20/2019   • Medication overuse headache 10/30/2018   • Vitamin deficiency 09/26/2018   • Obesity (BMI 30-39.9) 01/05/2018   • Personality disorder 10/04/2016   • Dystonia  10/04/2016   • Chest pain 10/03/2016   • Ankle fracture, right 10/03/2016   • Vaginal yeast infection 10/03/2016   • PTSD (post-traumatic stress disorder) 10/02/2016   • Depression 08/07/2016   • Chronic pain 08/07/2016   • HTN (hypertension), benign 08/07/2016   • Fracture of ankle, trimalleolar, closed 08/06/2016   • Controlled substance agreement signed 02/19/2016   • Rib fracture 06/25/2015   • Benign hypertensive heart disease without heart failure 10/16/2014   • Mixed hyperlipidemia 10/16/2014   • Open scalp wound 10/16/2014   • Lump or mass in breast 05/29/2014   • Intractable migraine without aura and without status migrainosus 02/19/2014   • Neck pain 02/19/2014   • Fibromyalgia 02/19/2014   • Obese 07/08/2013   • Oxygen dependent, since 2011 07/08/2013   • Hypertrophy of breast 04/29/2013       Allergies:Compazine; Imitrex [sumatriptan succinate]; Inapsine [droperidol]; Maxalt [rizatriptan benzoate]; Phenergan [promethazine hcl]; Xanax [alprazolam]; Zantac; Apple cider vinegar; Butrans [buprenorphine]; Clarithromycin; Dilaudid [hydromorphone]; Doxycycline; Effexor [venlafaxine]; Eucalyptus oil; Gabapentin; Kiwi extract; Latex; Lyrica [pregabalin]; Minocycline; Morphabond er [renny]; Patchouli oil; Sulfa drugs; Tea tree oil; and Topiramate    Current Outpatient Medications Ordered in Epic   Medication Sig Dispense Refill   • griseofulvin (GRIFULVIN V) 500 MG tablet Take 1 Tab by mouth every day. 60 Tab 0   • hydrOXYzine HCl (ATARAX) 25 MG Tab Take 2 Tabs by mouth 3 times a day as needed for Anxiety. 60 Tab 0   • furosemide (LASIX) 20 MG Tab Take 1 Tab by mouth 2 times a day as needed (for excess fluid). 30 Tab 0   • PARoxetine (PAXIL) 30 MG Tab Take 60 mg by mouth every day.     • diclofenac EC (VOLTAREN) 75 MG Tablet Delayed Response Take 75 mg by mouth 2 times a day.     • omeprazole (PRILOSEC) 20 MG delayed-release capsule Take 20 mg by mouth every day.     • ciprofloxacin (CIPRO) 500 MG Tab Take 500 mg  by mouth 2 times a day. 10-day course Starting 11/22/19 Ending 12/01/19.     • propranolol (INDERAL) 10 MG Tab Take 5 mg by mouth 2 times a day. Indications: High Blood Pressure Disorder     • baclofen (LIORESAL) 10 MG Tab Take 10 mg by mouth 3 times a day as needed (Pain).     • divalproex (DEPAKOTE) 500 MG Tablet Delayed Response Take 500-1,000 mg by mouth 2 Times a Day. 1000mg in AM  500mg at PM     • fluticasone (FLOVENT HFA) 220 MCG/ACT Aerosol Inhale 1 Puff by mouth 2 times a day.     • Erenumab (AIMOVIG) 70 MG/ML Solution Auto-injector Inject 70 mg as instructed Q30 DAYS.     • fluticasone (FLONASE) 50 MCG/ACT nasal spray Spray 1 Spray in nose every day.     • potassium chloride SA (KDUR) 20 MEQ Tab CR Take 20 mEq by mouth 2 times a day.     • ondansetron (ZOFRAN ODT) 4 MG TABLET DISPERSIBLE Take 4 mg by mouth every 6 hours as needed for Nausea.     • Ciclopirox 1 % Shampoo 1 g by Apply externally route 1 time daily as needed (Sores on Scalp).     • nystatin (MYCOSTATIN) 517709 UNIT/GM Cream topical cream Apply 1 g to affected area(s) 2 times a day.     • Cholecalciferol (VITAMIN D) 2000 UNIT Tab Take 4,000 Units by mouth every day.     • morphine (MS IR) 15 MG tablet Take 15 mg by mouth 2 times a day as needed (Breakthrough Pain).     • albuterol (PROAIR HFA) 108 (90 Base) MCG/ACT Aero Soln inhalation aerosol ProAir HFA 90 mcg/actuation aerosol inhaler     • Morphine Sulfate ER 30 MG Tablet Extended Release 12 hour Abuse-Deterrent Take 30 mg by mouth every morning.     • guaifenesin LA (MUCINEX) 600 MG TABLET SR 12 HR Take 600 mg by mouth every 12 hours.     • cetirizine (ZYRTEC) 10 MG Tab Take 10 mg by mouth every day.     • esomeprazole (NEXIUM) 40 MG delayed-release capsule Take 40 mg by mouth 2 Times a Day.     • ferrous sulfate 325 (65 FE) MG tablet Take 325 mg by mouth every day.     • montelukast (SINGULAIR) 10 MG Tab Take 10 mg by mouth every bedtime.       No current Epic-ordered  "facility-administered medications on file.        Past Medical History:   Diagnosis Date   • Acute blood loss anemia 2013    -resolved, postop     • Acute renal failure (ARF) (Formerly [facility]) 10/5/2011    -resolved (post op )    • Anemia    • Anxiety    • Arthritis     osteoarthritis since 17yo.   • ASTHMA     O2 3liters at HS and prn   • Bipolar affective (Formerly [facility])    • Breath shortness    • Cold    • Depression    • Eclampsia complicating pregnancy    • Environmental allergies    • Essential hypertension, malignant 2011   • Fibromyalgia    • GERD (gastroesophageal reflux disease)    • Heart murmur     she denies this hx   • History of pregnancy 10/16/2014        • Hx MRSA infection    • Hyperlipidemia 14    \"off medication\"   • Hypertension    • Kidney stones    • Migraines    • Pain 15    \"my body hurts all the time\", 5-6/10   • Personality disorder (Formerly [facility])    • Pneumonia    • PTSD (post-traumatic stress disorder)    • Scalp wound 2014   • Seizure (Formerly [facility]) 14    last    • Sleep apnea     has had a sleep study, use O2 at HS   • Snoring    • Stroke ([facility])      reports strokes x5 , and a \"brain bleed\"   • Subarachnoid hemorrhage ([facility]) 2011    -small,  (2nd to HTN)    • Unspecified hemorrhagic conditions     nose-bleeds, bruises easily   • Unspecified urinary incontinence    • Urinary bladder disorder        Social History     Tobacco Use   • Smoking status: Never Smoker   • Smokeless tobacco: Never Used   Substance Use Topics   • Alcohol use: No   • Drug use: No       Family Status   Relation Name Status   • Mo  Alive   • Fa  Alive   • Bro  Alive   • MAunt  Alive   • MUnc  Alive   • PAunt     • PUnc     • MGMo  Alive   • MGFa     • PGMo     • PGFa       Family History   Problem Relation Age of Onset   • Hypertension Mother    • Hyperlipidemia Mother    • Hypertension Father    • Hyperlipidemia Father    • Heart Disease Father  " "  • Psychiatric Illness Father    • Alcohol/Drug Father    • Alcohol/Drug Brother    • Arthritis Maternal Uncle    • Psychiatric Illness Maternal Uncle    • Heart Disease Maternal Uncle    • Hypertension Maternal Uncle    • Hyperlipidemia Maternal Uncle    • Genetic Disorder Paternal Aunt    • Psychiatric Illness Paternal Uncle    • Arthritis Maternal Grandmother    • Heart Disease Maternal Grandmother    • Alcohol/Drug Maternal Grandfather    • Stroke Maternal Grandfather    • Psychiatric Illness Maternal Grandfather    • Arthritis Paternal Grandmother    • Alcohol/Drug Paternal Grandfather        ROS:  Review of Systems   Constitutional: Negative for fever, chills, weight loss and malaise/fatigue.   HENT: Negative for ear pain, nosebleeds, congestion, sore throat and neck pain.    Eyes: Negative for blurred vision.   Respiratory: Negative for cough, sputum production, shortness of breath and wheezing.    Cardiovascular: Negative for chest pain, palpitations, orthopnea and leg swelling.   Gastrointestinal: Negative for heartburn, nausea, vomiting and abdominal pain.   Genitourinary: Negative for dysuria, urgency and frequency.     Exam:  /82 (BP Location: Right arm, Patient Position: Sitting, BP Cuff Size: Large adult)   Pulse 92   Temp (!) 35.8 °C (96.4 °F) (Temporal)   Resp 16   Ht 1.803 m (5' 11\")   Wt 110.2 kg (243 lb)   SpO2 94%   General:  Well nourished, well developed female in NAD  Head: Multiple hairless lesions on her scalp with minimal erythema, no induration, fluctuance or drainage from any of the lesions  Eyes: PERRLA, EOM within normal limits, no conjunctival injection, no scleral icterus, visual fields and acuity grossly intact.  Nose: Symmetrical without tenderness, no discharge.  Mouth: reasonable hygiene, no erythema exudates or tonsillar enlargement.  Neck: no masses, range of motion within normal limits, no tracheal deviation. No obvious thyroid enlargement.  Extremities: no " clubbing, cyanosis, or edema.    Please note that this dictation was created using voice recognition software. I have made every reasonable attempt to correct obvious errors, but I expect that there are errors of grammar and possibly content that I did not discover before finalizing the note.    Assessment/Plan:  1. Tinea capitis  griseofulvin (GRIFULVIN V) 500 MG tablet   She has an appointment with a dermatologist in about 4 weeks.    Followup with primary care in the next 7-10 days if not significantly improving, return to the urgent care or go to the emergency room sooner for any worsening of symptoms.

## 2020-01-03 RX ORDER — DIVALPROEX SODIUM 500 MG/1
TABLET, DELAYED RELEASE ORAL
Qty: 90 TAB | Refills: 3 | Status: SHIPPED | OUTPATIENT
Start: 2020-01-03 | End: 2020-05-07

## 2020-01-03 NOTE — TELEPHONE ENCOUNTER
Used for migraines and psychiatric purposes. Recent discharge summary reviewed. Dose is consistent with what is recommended at discharge. Will continue until follow up.

## 2020-01-20 ENCOUNTER — APPOINTMENT (OUTPATIENT)
Dept: NEUROLOGY | Facility: MEDICAL CENTER | Age: 45
End: 2020-01-20
Payer: MEDICAID

## 2020-02-12 ENCOUNTER — APPOINTMENT (OUTPATIENT)
Dept: NEUROLOGY | Facility: MEDICAL CENTER | Age: 45
End: 2020-02-12
Payer: MEDICAID

## 2020-02-13 NOTE — TELEPHONE ENCOUNTER
Received request via: Pharmacy    Was the patient seen in the last year in this department? Yes    Does the patient have an active prescription (recently filled or refills available) for medication(s) requested? Yes.   This is a dr Aguilar pt. Thank you

## 2020-02-14 RX ORDER — ERENUMAB-AOOE 70 MG/ML
INJECTION SUBCUTANEOUS
Qty: 2 ML | Refills: 3 | Status: SHIPPED | OUTPATIENT
Start: 2020-02-14 | End: 2020-06-02 | Stop reason: SDUPTHER

## 2020-02-20 ENCOUNTER — APPOINTMENT (OUTPATIENT)
Dept: NEPHROLOGY | Facility: MEDICAL CENTER | Age: 45
End: 2020-02-20
Payer: MEDICAID

## 2020-03-27 ENCOUNTER — APPOINTMENT (OUTPATIENT)
Dept: NEUROLOGY | Facility: MEDICAL CENTER | Age: 45
End: 2020-03-27
Payer: MEDICAID

## 2020-05-07 RX ORDER — DIVALPROEX SODIUM 500 MG/1
TABLET, DELAYED RELEASE ORAL
Qty: 90 TAB | Refills: 2 | Status: SHIPPED | OUTPATIENT
Start: 2020-05-07 | End: 2020-08-06

## 2020-05-07 NOTE — TELEPHONE ENCOUNTER
Patient did not follow up. High suspicion for pseudoseizures. Need follow up. This may be more a psychiatric medication than for headaches/seizure given complex psych history.

## 2020-05-07 NOTE — TELEPHONE ENCOUNTER
Received request via: Pharmacy    Was the patient seen in the last year in this department? Yes    Does the patient have an active prescription (recently filled or refills available) for medication(s) requested? No     Patient was last seen on 09/04/2019 , medication was last filled on 01/03/2020.     Patient does not have an upcoming appointment scheduled .

## 2020-06-02 ENCOUNTER — TELEMEDICINE (OUTPATIENT)
Dept: NEUROLOGY | Facility: MEDICAL CENTER | Age: 45
End: 2020-06-02
Payer: MEDICAID

## 2020-06-02 ENCOUNTER — TELEPHONE (OUTPATIENT)
Dept: NEUROLOGY | Facility: MEDICAL CENTER | Age: 45
End: 2020-06-02

## 2020-06-02 VITALS — WEIGHT: 245 LBS | HEIGHT: 71 IN | BODY MASS INDEX: 34.3 KG/M2

## 2020-06-02 DIAGNOSIS — G43.019 INTRACTABLE MIGRAINE WITHOUT AURA AND WITHOUT STATUS MIGRAINOSUS: ICD-10-CM

## 2020-06-02 DIAGNOSIS — F31.81 BIPOLAR 2 DISORDER (HCC): ICD-10-CM

## 2020-06-02 DIAGNOSIS — F44.5 PSYCHOGENIC NONEPILEPTIC SEIZURE: ICD-10-CM

## 2020-06-02 PROCEDURE — 99214 OFFICE O/P EST MOD 30 MIN: CPT | Mod: CR | Performed by: PSYCHIATRY & NEUROLOGY

## 2020-06-02 RX ORDER — ERENUMAB-AOOE 70 MG/ML
70 INJECTION SUBCUTANEOUS
Qty: 1 ML | Refills: 11 | Status: SHIPPED | OUTPATIENT
Start: 2020-06-02 | End: 2020-08-13 | Stop reason: SDUPTHER

## 2020-06-02 ASSESSMENT — ENCOUNTER SYMPTOMS
SHORTNESS OF BREATH: 1
FALLS: 1
WEIGHT LOSS: 0
SENSORY CHANGE: 1
HALLUCINATIONS: 0
SORE THROAT: 0
FEVER: 0
LOSS OF CONSCIOUSNESS: 1

## 2020-06-02 ASSESSMENT — FIBROSIS 4 INDEX: FIB4 SCORE: 0.99

## 2020-06-02 NOTE — TELEPHONE ENCOUNTER
----- Message from Ginette Aguilar M.D. sent at 6/2/2020 12:39 PM PDT -----  Sorry forgot to mention her appointment should be 40 min - migraines, non epileptic spells, new problem of foot numbness, hx of subarachnoid hemorrhage

## 2020-06-02 NOTE — PROGRESS NOTES
"Chief Complaint   Patient presents with   • Follow-Up     Nonspecific paroxysmall spell      History of present illness:  Peter Trimble 44 y.o. female presents today for headache/spells f/u.  She is a prior patient of Dr. Watt.    History is obtained from patient.  and Patient is accompanied by self. Disabled for pain, psychiatric reasons, etc.     Duration/timing: see below  Context:   Headache: hx migraines, \"pain every day in my head\"; posterior head and bitemporal; \"my head changes shape\"; tight squeezing feeling; light/noise sensitivity; nausea without vomiting, improved with activity improved with rest; present all the time, severe   Hx SAH: Occurred in 2011 secondary to hypertension, she does have a history of eclampsia; thought to be secondary to HTN; reports speech difficulties; took triptan that day   Spells: onset since 2011, she will get up and feel like she has too much medicine in her system and she will get wobbly and fall over; states she thinks she loses consciousness for 2-3 minutes and then she wakes up and feels pain everywhere that is more from her baseline; no incontinence/tongue biting; when waking she feels out of it and has trembling in the hands; occurring two times a day but can up to five times a day; she is does drive in [place]   Location: as above  Quality: as above  Severity: as above  Modifying factors: as above  Associated signs/symptoms: Depression/personality disorder/PTSD - doesn't have psychiatrist \"I got pushed to the side\" - \"doing ok\" right now; weakness all over; feet fall asleep when she sits on the toilet and falls asleep; generalized fatigue  Denies: bladder incontinence, bowel incontinence, other weakness, other numbness/tingling, history of intracranial infections, hallucinations, family hx seizure and febrile seizure as child     Patient has tried:  -Occipital nerve block, last in October 30, 2018 performed by Sun Salazar for chronic headaches-8 years ago " "tried for rounds without any benefit  -Triptans- cannot take due to history of subarachnoid ICH  -Topiramate-causes her to be sick to her stomach  -Lyrica-causes depression  -Effexor-causes depression and thoughts of self injury  -Opioids- morphine  -Depakote 1000 mill grams twice daily  -Propranolol -5 mg twice daily  -Paxil - current, Rx by psychiatry  -Fioricet - used to take, taken off by pain management   -Pain management - Dr. Rigoberto Luciano; \"I am just padding his bank account\"; has tried nasal sprays  -Aimovig - initiated 2019- with significant improvement in headache frequency and severity     Subjective: Patient was last seen in neurology clinic on September 2019.     Headaches: Aimovig has made a huge difference in her life. Last big headache was just in the last week but otherwise she was doing really well without severe headaches since initiation. She was prescribed 2 of the 70mg but only takes one injection.     Paroxysmal spells: States she continues to have the spells as described above. My body keeps wanting to fall off the bed while she is asleep.\"I had a real seizure\" -\" I was having a really really bad day\". Patient was described as being in a trance. She still endorses shaking spells. New as of the last two weeks. Occurring everyday. \"I have a lot stress. I internalize stress from her family.\" She has been alone due to COVID.    History of subarachnoid hemorrhage: No further events    She fell in 12/2019 from standing - doesn't want to walk because fear of walking. No driving. Foot is numb due to prior ankle. States her foot is numb and wants to be evaluated.     Past medical history:   Past Medical History:   Diagnosis Date   • Acute blood loss anemia 5/2/2013    -resolved, postop 2013    • Acute renal failure (ARF) (HCC) 10/5/2011    -resolved (post op 2013)    • Anemia    • Anxiety    • Arthritis     osteoarthritis since 17yo.   • ASTHMA     O2 3liters at HS and prn   • Bipolar affective (HCC) " "   • Breath shortness    • Cold    • Depression    • Eclampsia complicating pregnancy    • Environmental allergies    • Essential hypertension, malignant 2011   • Fibromyalgia    • GERD (gastroesophageal reflux disease)    • Heart murmur     she denies this hx   • History of pregnancy 10/16/2014        • Hx MRSA infection    • Hyperlipidemia 14    \"off medication\"   • Hypertension    • Kidney stones    • Migraines    • Pain 15    \"my body hurts all the time\", 5-6/10   • Personality disorder ([facility])    • Pneumonia    • PTSD (post-traumatic stress disorder)    • Scalp wound 2014   • Seizure ([facility]) 15    last    • Sleep apnea     has had a sleep study, use O2 at HS   • Snoring    • Stroke ([facility])      reports strokes x5 , and a \"brain bleed\"   • Subarachnoid hemorrhage ([facility]) 2011    -small,  (2nd to HTN)    • Unspecified hemorrhagic conditions     nose-bleeds, bruises easily   • Unspecified urinary incontinence    • Urinary bladder disorder        Past surgical history:   Past Surgical History:   Procedure Laterality Date   • ANKLE ORIF Right 2016    Procedure: ANKLE ORIF;  Surgeon: Bill Brambila M.D.;  Location: [facility];  Service:    • IRRIGATION & DEBRIDEMENT GENERAL  2014    Performed by Ezra Wright M.D. at [facility]   • BREAST BIOPSY  2014    Performed by Negro Hester M.D. at [facility] SAME DAY [facility]   • EVACUATION OF HEMATOMA  2013    Performed by Lazaro Connors Jr., M.D. at [facility]   • MAMMOPLASTY REDUCTION  2013    Performed by Negro Hester M.D. at [facility]   • RECOVERY  2012    Performed by BENEDICT IR-RECOVERY at [facility] SAME DAY [facility]   • RECOVERY  10/5/2011    Performed by MARIAELENA MCMULLENONADELITA at [facility]   • HYSTERECTOMY LAPAROSCOPY      W BSO   • KNEE RECONSTRUCTION     • LAMINOTOMY     • OTHER ORTHOPEDIC SURGERY      maddie. " knee scopes   • OTHER ORTHOPEDIC SURGERY      back fusion then hardware removal   • PB EXPLORATION OF SPINAL FUSION     • PB REMOVAL OF OVARY(S)         Family history:   Family History   Problem Relation Age of Onset   • Hypertension Mother    • Hyperlipidemia Mother    • Hypertension Father    • Hyperlipidemia Father    • Heart Disease Father    • Psychiatric Illness Father    • Alcohol/Drug Father    • Alcohol/Drug Brother    • Arthritis Maternal Uncle    • Psychiatric Illness Maternal Uncle    • Heart Disease Maternal Uncle    • Hypertension Maternal Uncle    • Hyperlipidemia Maternal Uncle    • Genetic Disorder Paternal Aunt    • Psychiatric Illness Paternal Uncle    • Arthritis Maternal Grandmother    • Heart Disease Maternal Grandmother    • Alcohol/Drug Maternal Grandfather    • Stroke Maternal Grandfather    • Psychiatric Illness Maternal Grandfather    • Arthritis Paternal Grandmother    • Alcohol/Drug Paternal Grandfather        Social history:   Tobacco Use   • Smoking status: Never Smoker   • Smokeless tobacco: Never Used   Substance and Sexual Activity   • Alcohol use: No   • Drug use: No     Current medications:   Current Outpatient Medications   Medication   • Erenumab (AIMOVIG) 70 MG/ML Solution Auto-injector   • divalproex (DEPAKOTE) 500 MG Tablet Delayed Response   • hydrOXYzine HCl (ATARAX) 25 MG Tab   • omeprazole (PRILOSEC) 20 MG delayed-release capsule   • baclofen (LIORESAL) 10 MG Tab   • fluticasone (FLOVENT HFA) 220 MCG/ACT Aerosol   • fluticasone (FLONASE) 50 MCG/ACT nasal spray   • ondansetron (ZOFRAN ODT) 4 MG TABLET DISPERSIBLE   • Ciclopirox 1 % Shampoo   • nystatin (MYCOSTATIN) 628118 UNIT/GM Cream topical cream   • Cholecalciferol (VITAMIN D) 2000 UNIT Tab   • albuterol (PROAIR HFA) 108 (90 Base) MCG/ACT Aero Soln inhalation aerosol   • guaifenesin LA (MUCINEX) 600 MG TABLET SR 12 HR   • cetirizine (ZYRTEC) 10 MG Tab   • ferrous sulfate 325 (65 FE) MG tablet   • montelukast  "(SINGULAIR) 10 MG Tab   • PARoxetine (PAXIL) 30 MG Tab   • propranolol (INDERAL) 10 MG Tab   • potassium chloride SA (KDUR) 20 MEQ Tab CR   • morphine (MS IR) 15 MG tablet     No current facility-administered medications for this visit.        Medication Allergy:  Allergies   Allergen Reactions   • Compazine      \"psychotic reaction\"   • Imitrex [Sumatriptan Succinate]      Had brain bleed   • Inapsine [Droperidol]      \"psychotic reaction\"   • Maxalt [Rizatriptan Benzoate]      Had brain bleed   • Phenergan [Promethazine Hcl]      \"psychotic reaction\"   • Xanax [Alprazolam]      Sores and dry mouth, many others pt cannot remember   • Zantac      Stomach pain   • Apple Cider Vinegar Rash     Patient states she gets rash   • Butrans [Buprenorphine]    • Clarithromycin Vomiting and Palpitations   • Dilaudid [Hydromorphone] Rash     Patient states she had rash, hallucinations, unable to urinate.    • Doxycycline    • Effexor [Venlafaxine]      \"Really depressed, like i wanted to hurt myself\"   • Eucalyptus Oil    • Gabapentin    • Kiwi Extract    • Latex Rash   • Lyrica [Pregabalin]      \"depression, slept a lot\"   • Minocycline Vomiting     \"any cyclines\"   • Morphabond Er [Suly]    • Patchouli Oil Rash     Rash    • Sulfa Drugs    • Tea Tree Oil Rash     Break out in rash   • Topiramate Nausea     Patient states made sick to stomach and dizzy.       Review of Systems   Constitutional: Negative for fever and weight loss.   HENT: Negative for sore throat.    Respiratory: Positive for shortness of breath.    Cardiovascular: Negative for leg swelling.   Musculoskeletal: Positive for falls.   Skin: Negative for rash.   Neurological: Positive for sensory change and loss of consciousness.        As per HPI   Psychiatric/Behavioral: Negative for hallucinations and suicidal ideas.       Physical examination:   Vitals:    06/02/20 1058   Weight: 111.1 kg (245 lb)   Height: 1.803 m (5' 11\")      Prior exam as detailed below. " On today's exam, patient was Oriented to person, place, time, and purpose, Demonstrated normal attention and Speech is fluent without errors.    General: Patient in well nourished in no apparent distress.  Elevated BMI.  Eyes: Ophthalmoscopic examination of the optic discs and posterior elements reveals sharp disk margins, normal vessels and pigmentation on right. Cannot visualize left well enough to characterize  HENT: Normocephalic, atraumatic. No tenderness to palpation of the DIVYA/JC and Mallapatic score 2  Cardiovascular: Very mild lower extremity edema on the foot in the right.  Respiratory: Mild shortness of breath with excessive movements, she is on oxygen nasal cannula.  Skin: Dry reddish skin on the extremities.  Musculoskeletal: No signs of joint or muscle swelling.   Psychiatric: Pleasant.     NEUROLOGICAL EXAM:   Mental status: Awake, alert and fully oriented to person, place, time and situation. Normal attention and concentration.   Speech and language: Speech is fluent without errors and clear.  Mild voice tremor.  Cranial nerve exam:  II: Pupils are equally round and reactive to light. Visual fields are intact by confrontation.  III, IV, VI: EOMI, no diplopia, no ptosis.  V: Sensation to light touch is normal over V1-3 distributions bilaterally.  .  VII: Facial movements are symmetrical. There is no facial droop. .  VIII: Hearing intact to soft speech and finger rub bilaterally  IX: Palate elevates symmetrically, uvula is midline. Dysarthria is not present.  XI: Shoulder shrug are symmetrical and strong.   XII: Tongue protrudes midline.    Motor exam:  Muscle tone is normal in all 4 limbs. and No abnormal movements appreciated.    Muscle strength: Patient is short of breath with strength testing.  She is at least 4-5 proximal upper and lower extremity.    Sensory exam:  Intact to Light touch, Temperature and Vibration in bilateral upper and lower extremity.    Deep tendon reflexes:        Right  Left  Biceps   0/4  0/4  Triceps  0/4  0/4  Brachioradialis 0/4  0/4  Knee jerk  0/4  0/4  Ankle jerk  0/4  0/4   bilateral toes are downgoing to plantar stimulation..    Coordination: shows a normal finger-nose-finger   Gait: Moderately wide based with right foot slightly out turned (chronic), 2.5 step turn      ANCILLARY DATA REVIEWED:   Lab Data Review:  Lab Results   Component Value Date/Time    WBC 4.7 (L) 12/07/2019 02:54 AM    RBC 3.78 (L) 12/07/2019 02:54 AM    HEMOGLOBIN 11.2 (L) 12/07/2019 02:54 AM    HEMATOCRIT 34.5 (L) 12/07/2019 02:54 AM    MCV 91.3 12/07/2019 02:54 AM    MCH 29.6 12/07/2019 02:54 AM    MCHC 32.5 (L) 12/07/2019 02:54 AM    MPV 10.2 12/07/2019 02:54 AM    NEUTSPOLYS 29.30 (L) 12/07/2019 02:54 AM    LYMPHOCYTES 56.00 (H) 12/07/2019 02:54 AM    MONOCYTES 9.80 12/07/2019 02:54 AM    EOSINOPHILS 3.60 12/07/2019 02:54 AM    BASOPHILS 0.90 12/07/2019 02:54 AM    HYPOCHROMIA 1+ 05/15/2013 02:05 AM    ANISOCYTOSIS 1+ 05/05/2013 02:35 AM      Lab Results   Component Value Date/Time    SODIUM 140 12/09/2019 02:38 AM    POTASSIUM 4.1 12/09/2019 02:38 AM    CHLORIDE 105 12/09/2019 02:38 AM    CO2 30 12/09/2019 02:38 AM    GLUCOSE 78 12/09/2019 02:38 AM    BUN 20 12/09/2019 02:38 AM    CREATININE 0.87 12/09/2019 02:38 AM     Lab Results   Component Value Date/Time    ASTSGOT 21 01/05/2018 0843    ALTSGPT 9 01/05/2018 0843    ALKPHOSPHAT 41 01/05/2018 0843    ALBUMIN 3.9 09/26/2018 1122     Lab Results   Component Value Date/Time    HBA1C 6.0 (H) 09/26/2018 11:22 AM      EEG September 2019 interpreted by Dr. Thad Emmanuel:  This is a normal 24 hrs video EEG recording in the awake, drowsy,   and sleep state(s).  Three spells reported by patient. The spells   consisted of body rocking movements, appearing jittery, without   loss of consciousness. The spells captured were psychogenic and   non epileptic in nature. Clinical correlation is recommended.    EEG 2016 interpreted by Dr. Capellan  Basilio:   This is a minimally abnormal EEG for a patient of this age.  No   seizure activity is seen, spontaneously or in association with the involuntary   movements.  Clinical correlation is suggested.  Of note there were rhythmic right upper extremity movements noted by tech without any EEG correlate.    EEG 2012 interpreted by Shaylee Galindo:  Normal electroencephalogram for age in the awake and  drowsy state.  Clinical correlation is suggested.  The presence of a  normal awake electroencephalogram does not preclude a diagnosis of  epilepsy.    Imaging:   MRI C-spine without contrast June 2016:  1.  C3-C4 negligible disc bulging. No central or foraminal stenosis.  2.  C6-C7 degenerative disc space narrowing, right paramedian disc-osteophyte complex with partial effacement of ventral subarachnoid space. No cord impingement or shana central stenosis. No significant change from the previous exam. No foraminal   stenosis.  3.  No myelopathic cord signal abnormality at any cervical level.  4.  Overall, no significant change since 8/5/2015.    CT head 2011:  1. Findings compatible with subarachnoid hemorrhage along the midline sulci of bilateral frontal lobes.    MRI brain without contrast 2014:  1.  Unchanged rare scattered nonspecific periventricular foci of T2 and FLAIR signal hyperintensity consistent with microangiopathic ischemic change versus demyelination or gliosis.  2.  No evidence of acute territorial infarct, intracranial hemorrhage or mass lesion.    Records reviewed:   On review of records patient was seen in the emergency department in October 2019.  Discharge diagnosis was psychogenic nonepileptic spell.  During admission neurology was consulted and a 24-hour video EEG was performed during the admission which showed no focal or generalized epileptiform activity, normal background, multiple patient reported spells without EEG correlate.    Aimovig was approved by insurance. Missed  appointments.    ASSESSMENT AND PLAN:    1. Nonepileptic spell: Patient has had a chronic history of spells since 2011 after subarachnoid hemorrhage.  Since then she has had multiple MRI brain with and without contrast with nonspecific T2 signal intensity in the periventricular white matter but otherwise no focus for seizure.  She has had multiple EEGs (routine/continuous) documenting jittery/tremulousness and rocking back and forth (her reported spells) without EEG correlate, last of which was in September 2019.  Other EEGs have appreciated mild right upper extremity twitching without EEG correlate.  There are chronic stressors in her life.  Episodes do not seem vascular or cardiac in nature.  She has had a hysterectomy.    -Recommend continue follow-up with therapy  -Recommend establish care with psychiatry, referral provided-there is a history of bipolar disorder, PTSD, personality disorder, depression  -Clinically monitor  -Patient does not drive, DMV form filled out in September 2019  -Will defer cardiac work-up if deemed clinically appropriate to primary care  -We discussed ambulatory EEG for 72 hours for patient's recurrent yet new paroxysmal spells during the COVID pandemic.  Spells sound suspicious for again nonepileptic events based on clinical history and triggers.  Patient not agreeable due to concerns of grupo COVID-19.  Will reevaluate and monitor clinically for now.    2. Chronic migraine without aura and without status migrainosus, not intractable: Patient has a history of super refractory with multiple side effects to medications including Botox.  Aimovig was initiated in the fall 2019 with significant improvement in headache frequency and severity.  No significant side effects.  She has had a hysterectomy.  -Patient not Depakote for psychiatric purposes  -Continue propranolol 5 mg twice daily  -Refill provided for Erenumab (AIMOVIG 70 DOSE) 70 MG/ML Solution Auto-injector; Inject 1 mL as  instructed Q 4 Weeks.  For 1 year.  -No triptan medications due to history of subarachnoid hemorrhage    3. AVA (obstructive sleep apnea), intolerant of CPAP: On oxygen, may contribute to headaches as well    4. PTSD (post-traumatic stress disorder): By patient history, recommend continue follow-up with psychiatry    5. Morbid obesity with BMI of 40.0-44.9, adult ([facility]): Monitor    6.  History of subarachnoid hemorrhage: She presented with abrupt onset frontal headaches found to have subarachnoid hemorrhage.  CT angiogram without any aneurysm. She was taking Imitrex at that time.  Cerebral angiography revealed vasoconstriction syndrome, primary angitis of systems CNS.  ESR, rheumatoid factor, YANE, ANCA and so drain antibody was negative.  CSF micro biology negative.    -No triptans    7. Other chronic pain: Patient has pain all over including her head and body.  She is established with her pain management physician who seems to have tried to address most of her chronic pain issues.    FOLLOW-UP: Return for Approximately 2 weeks in clinic to evaluate for foot numbness.  EDUCATION AND COUNSELING:  -I personally discussed the following with the patient:   Epileptic versus nonepileptic spells, [place] driving law and mandatory reporting, reasons for psychiatry consult, emotional support    I personally discussed the risks/benefits/alternatives of temporarily postponing non-urgent workup during the current COVID-19 pandemic.     I spent 25 minutes face-to-face in which greater than 50% of the visit was spent in counseling/coordination of care as detailed above.     The patient understands and agrees that due to the complexity of his/her diagnosis, results of any testing and further recommendations will typically be discussed/made during a face to face encounter in my office. The patient and/or family further understands it is their responsibility to keep proper follow up.     Disclaimer  This dictation was created using  voice recognition software. I have made every reasonable attempt to avoid dictation errors, but this document may contain an error not identified before finalizing. If the error changes the accuracy of the document, I would appreciate it being brought to my attention. Thank you very much.     Ginette Aguilar MD  Neurology, Neurophysiology  [facility]

## 2020-07-07 ENCOUNTER — OFFICE VISIT (OUTPATIENT)
Dept: NEUROLOGY | Facility: MEDICAL CENTER | Age: 45
End: 2020-07-07
Payer: MEDICAID

## 2020-07-07 VITALS — TEMPERATURE: 97.5 F | OXYGEN SATURATION: 92 % | RESPIRATION RATE: 17 BRPM | HEART RATE: 72 BPM

## 2020-07-07 DIAGNOSIS — R20.0 NUMBNESS IN FEET: ICD-10-CM

## 2020-07-07 PROCEDURE — 99214 OFFICE O/P EST MOD 30 MIN: CPT | Performed by: PSYCHIATRY & NEUROLOGY

## 2020-07-07 ASSESSMENT — ENCOUNTER SYMPTOMS
SENSORY CHANGE: 1
WEIGHT LOSS: 0
FEVER: 0
SHORTNESS OF BREATH: 1
SORE THROAT: 0
HALLUCINATIONS: 0
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
## 2020-07-08 ENCOUNTER — TELEPHONE (OUTPATIENT)
Dept: CARDIOLOGY | Facility: MEDICAL CENTER | Age: 45
End: 2020-07-08

## 2020-07-08 NOTE — TELEPHONE ENCOUNTER
----- Message from Ginette Aguilar M.D. sent at 7/7/2020 11:36 AM PDT -----  Please make sure her follow-up is a 40-minute visit for foot numbness, paroxysmal spells, headaches.

## 2020-07-23 ENCOUNTER — APPOINTMENT (OUTPATIENT)
Dept: RADIOLOGY | Facility: MEDICAL CENTER | Age: 45
End: 2020-07-23
Attending: PSYCHIATRY & NEUROLOGY
Payer: MEDICAID

## 2020-08-11 ENCOUNTER — TELEPHONE (OUTPATIENT)
Dept: NEUROLOGY | Facility: MEDICAL CENTER | Age: 45
End: 2020-08-11

## 2020-08-11 NOTE — TELEPHONE ENCOUNTER
----- Message from Ginette Aguilar M.D. sent at 8/11/2020 12:55 PM PDT -----  Regarding: FW: Prescription Question  Contact: [phone]      ----- Message -----  From: Juan Antonio Aragon, Med Ass't  Sent: 8/10/2020   2:21 PM PDT  To: Ginette Aguilar M.D.  Subject: FW: Prescription Question                        Should patient schedule a follow up ?  ----- Message -----  From: Peter Trimble  Sent: 8/10/2020   1:13 PM PDT  To: Juan Antonio Aragon, Med Ass't  Subject: RE: Prescription Question                        Ok sorry to bother. He said I haven't had the med since May!Thanks. I have been having issues falling out of bed again and. cognitive Issues along with balance   ----- Message -----  From: Juan Antonio Aragon, Med Ass't  Sent: 8/10/20, 9:21 AM  To: Peter Trimble  Subject: RE: Prescription Question    Good morning ,     I am somewhat confused , I see the Aimovig script was sent in on 06/11/2020 to [facility]'s pharmacy . Eleven refills were given to you . The pharmacy should have no issues filling the medication.    Thank you , Juan Antonio RUBY       ----- Message -----       From:Peter Trimble       Sent:8/9/2020 10:44 PM PDT         To:Ginette Aguilar M.D.    Subject:Prescription Question    Can your med assistant please call in Amovig script to [facility]     Thanks

## 2020-08-11 NOTE — TELEPHONE ENCOUNTER
Called [facility] pharmacy patient currently has medication with refills.

## 2020-08-13 RX ORDER — ERENUMAB-AOOE 70 MG/ML
140 INJECTION SUBCUTANEOUS
Qty: 2 ML | Refills: 11 | Status: SHIPPED | OUTPATIENT
Start: 2020-08-13 | End: 2021-11-11 | Stop reason: SDUPTHER

## 2020-08-25 ENCOUNTER — APPOINTMENT (OUTPATIENT)
Dept: NEUROLOGY | Facility: MEDICAL CENTER | Age: 45
End: 2020-08-25
Payer: MEDICAID

## 2020-08-25 ENCOUNTER — APPOINTMENT (OUTPATIENT)
Dept: RADIOLOGY | Facility: MEDICAL CENTER | Age: 45
End: 2020-08-25
Attending: PSYCHIATRY & NEUROLOGY
Payer: MEDICAID

## 2020-09-04 ENCOUNTER — APPOINTMENT (OUTPATIENT)
Dept: RADIOLOGY | Facility: MEDICAL CENTER | Age: 45
End: 2020-09-04
Attending: PSYCHIATRY & NEUROLOGY
Payer: MEDICAID

## 2020-09-08 ENCOUNTER — PATIENT MESSAGE (OUTPATIENT)
Dept: NEUROLOGY | Facility: MEDICAL CENTER | Age: 45
End: 2020-09-08

## 2020-09-10 NOTE — PATIENT COMMUNICATION
I called the pt and explained to her rheumatology tests has to be ordered by rheumatologist . There are a list of tests under it that has to be selected. I informed her there are blood tests Dr Aguilar ordered to see secondary causes of neuropathy and she said she will get them done .

## 2020-09-10 NOTE — TELEPHONE ENCOUNTER
I am not certain what rheumatology work-up she is asking for.  I ordered the following as documented below.    -B12, YANE, serum immunoelectrophoresis for secondary causes of neuropathy    If she wants a rheumatologic work-up then she would have to go to rheumatologist to order so that they are able to interpret the labs appropriately..

## 2020-10-24 ENCOUNTER — HOSPITAL ENCOUNTER (OUTPATIENT)
Facility: MEDICAL CENTER | Age: 45
End: 2020-10-24
Attending: PHYSICIAN ASSISTANT
Payer: MEDICAID

## 2020-10-24 ENCOUNTER — OFFICE VISIT (OUTPATIENT)
Dept: URGENT CARE | Facility: PHYSICIAN GROUP | Age: 45
End: 2020-10-24
Payer: MEDICAID

## 2020-10-24 VITALS
TEMPERATURE: 97.1 F | HEART RATE: 100 BPM | OXYGEN SATURATION: 96 % | DIASTOLIC BLOOD PRESSURE: 82 MMHG | SYSTOLIC BLOOD PRESSURE: 118 MMHG | RESPIRATION RATE: 20 BRPM

## 2020-10-24 DIAGNOSIS — N89.8 VAGINAL DISCHARGE: ICD-10-CM

## 2020-10-24 PROCEDURE — 87660 TRICHOMONAS VAGIN DIR PROBE: CPT

## 2020-10-24 PROCEDURE — 87510 GARDNER VAG DNA DIR PROBE: CPT

## 2020-10-24 PROCEDURE — 99214 OFFICE O/P EST MOD 30 MIN: CPT | Performed by: PHYSICIAN ASSISTANT

## 2020-10-24 PROCEDURE — 87480 CANDIDA DNA DIR PROBE: CPT

## 2020-10-24 RX ORDER — METRONIDAZOLE 500 MG/1
500 TABLET ORAL 2 TIMES DAILY
Qty: 14 TAB | Refills: 0 | Status: SHIPPED | OUTPATIENT
Start: 2020-10-24 | End: 2020-10-31

## 2020-10-24 NOTE — PROGRESS NOTES
Chief Complaint   Patient presents with   • Other     BV       HISTORY OF PRESENT ILLNESS: Patient is a 45 y.o. female who presents today because of 4-day history of vaginal discharge.  She tried 2 Diflucan 200 mg pills and it did not change her symptoms.    Patient Active Problem List    Diagnosis Date Noted   • Repeated falls 12/07/2019     Priority: High   • Polypharmacy 12/07/2019     Priority: High   • ZULLY on CKD (acute kidney injury) ([facility]) 10/25/2019     Priority: High   • Hematuria 10/24/2019     Priority: High   • Psychogenic nonepileptic seizure 08/06/2016     Priority: High   • Hypomagnesemia 10/25/2019     Priority: Medium   • Anxiety 09/11/2019     Priority: Medium   • Morbid obesity ([facility]) 06/25/2015     Priority: Medium   • Closed fracture of one rib 06/25/2015     Priority: Medium   • Lumbar transverse process fracture ([facility]) 06/25/2015     Priority: Medium   • Chronic pain 08/18/2014     Priority: Medium   • Hypertension 10/04/2011     Priority: Medium   • Depression 09/28/2011     Priority: Medium   • Bipolar 2 disorder ([facility]) 09/28/2011     Priority: Medium   • Chronic migraine 09/28/2011     Priority: Medium   • Asthma 09/28/2011     Priority: Medium   • Genital fungal infection. 10/25/2019     Priority: Low   • Electrolyte abnormality 09/12/2019     Priority: Low   • Furunculosis 09/11/2019     Priority: Low   • Hyperlipidemia 08/18/2014     Priority: Low   • AVA (obstructive sleep apnea), intolerant of CPAP 07/08/2013     Priority: Low   • GERD (gastroesophageal reflux disease) 09/28/2011     Priority: Low   • Hypernatremia 12/08/2019   • Morbid obesity with BMI of 40.0-44.9, adult ([facility]) 06/20/2019   • Medication overuse headache 10/30/2018   • Vitamin deficiency 09/26/2018   • Obesity (BMI 30-39.9) 01/05/2018   • Personality disorder 10/04/2016   • Dystonia 10/04/2016   • Chest pain 10/03/2016   • Ankle fracture, right 10/03/2016   • Vaginal yeast infection 10/03/2016   • PTSD (post-traumatic  stress disorder) 10/02/2016   • Depression 08/07/2016   • Chronic pain 08/07/2016   • HTN (hypertension), benign 08/07/2016   • Fracture of ankle, trimalleolar, closed 08/06/2016   • Controlled substance agreement signed 02/19/2016   • Rib fracture 06/25/2015   • Benign hypertensive heart disease without heart failure 10/16/2014   • Mixed hyperlipidemia 10/16/2014   • Open scalp wound 10/16/2014   • Lump or mass in breast 05/29/2014   • Intractable migraine without aura and without status migrainosus 02/19/2014   • Neck pain 02/19/2014   • Fibromyalgia 02/19/2014   • Obese 07/08/2013   • Oxygen dependent, since 2011 07/08/2013   • Hypertrophy of breast 04/29/2013       Allergies:Compazine, Imitrex [sumatriptan succinate], Inapsine [droperidol], Maxalt [rizatriptan benzoate], Phenergan [promethazine hcl], Xanax [alprazolam], Zantac, Apple cider vinegar, Butrans [buprenorphine], Clarithromycin, Dilaudid [hydromorphone], Doxycycline, Effexor [venlafaxine], Eucalyptus oil, Gabapentin, Kiwi extract, Latex, Lyrica [pregabalin], Minocycline, Morphabond er [renny], Patchouli oil, Sulfa drugs, Tea tree oil, and Topiramate    Current Outpatient Medications Ordered in Epic   Medication Sig Dispense Refill   • metroNIDAZOLE (FLAGYL) 500 MG Tab Take 1 Tab by mouth 2 Times a Day for 7 days. 14 Tab 0   • Erenumab (AIMOVIG) 70 MG/ML Solution Auto-injector Inject 2 mL as instructed every 4 weeks. 2 mL 11   • divalproex (DEPAKOTE) 500 MG Tablet Delayed Response TAKE ONE TABLET BY MOUTH EVERY MORNING AND TWO TABLETS EVERY EVENING 90 Tab 5   • hydrOXYzine HCl (ATARAX) 25 MG Tab Take 2 Tabs by mouth 3 times a day as needed for Anxiety. 60 Tab 0   • PARoxetine (PAXIL) 30 MG Tab Take 60 mg by mouth every day.     • omeprazole (PRILOSEC) 20 MG delayed-release capsule Take 20 mg by mouth every day.     • propranolol (INDERAL) 10 MG Tab Take 5 mg by mouth 2 times a day. Indications: High Blood Pressure Disorder     • baclofen (LIORESAL) 10  MG Tab Take 10 mg by mouth 3 times a day as needed (Pain).     • fluticasone (FLOVENT HFA) 220 MCG/ACT Aerosol Inhale 1 Puff by mouth 2 times a day.     • fluticasone (FLONASE) 50 MCG/ACT nasal spray Spray 1 Spray in nose every day.     • potassium chloride SA (KDUR) 20 MEQ Tab CR Take 20 mEq by mouth 2 times a day.     • ondansetron (ZOFRAN ODT) 4 MG TABLET DISPERSIBLE Take 4 mg by mouth every 6 hours as needed for Nausea.     • Ciclopirox 1 % Shampoo 1 g by Apply externally route 1 time daily as needed (Sores on Scalp).     • nystatin (MYCOSTATIN) 152386 UNIT/GM Cream topical cream Apply 1 g to affected area(s) 2 times a day.     • Cholecalciferol (VITAMIN D) 2000 UNIT Tab Take 4,000 Units by mouth every day.     • morphine (MS IR) 15 MG tablet Take 7.5 mg by mouth 3 times a day.     • albuterol (PROAIR HFA) 108 (90 Base) MCG/ACT Aero Soln inhalation aerosol ProAir HFA 90 mcg/actuation aerosol inhaler     • guaifenesin LA (MUCINEX) 600 MG TABLET SR 12 HR Take 600 mg by mouth every 12 hours.     • cetirizine (ZYRTEC) 10 MG Tab Take 10 mg by mouth every day.     • ferrous sulfate 325 (65 FE) MG tablet Take 325 mg by mouth every day.     • montelukast (SINGULAIR) 10 MG Tab Take 10 mg by mouth every bedtime.       No current Epic-ordered facility-administered medications on file.        Past Medical History:   Diagnosis Date   • Acute blood loss anemia 2013    -resolved, postop     • Acute renal failure (ARF) ([facility]) 10/5/2011    -resolved (post op )    • Anemia    • Anxiety    • Arthritis     osteoarthritis since 15yo.   • ASTHMA     O2 3liters at HS and prn   • Bipolar affective ([facility])    • Breath shortness    • Cold    • Depression    • Eclampsia complicating pregnancy    • Environmental allergies    • Essential hypertension, malignant 2011   • Fibromyalgia    • GERD (gastroesophageal reflux disease)    • Heart murmur     she denies this hx   • History of pregnancy 10/16/2014        • Hx  "MRSA infection    • Hyperlipidemia 14    \"off medication\"   • Hypertension    • Kidney stones    • Migraines    • Pain 15    \"my body hurts all the time\", 5-6/10   • Personality disorder ([facility])    • Pneumonia    • PTSD (post-traumatic stress disorder)    • Scalp wound 2014   • Seizure ([facility]) 14    last    • Sleep apnea     has had a sleep study, use O2 at HS   • Snoring    • Stroke ([facility])      reports strokes x5 , and a \"brain bleed\"   • Subarachnoid hemorrhage ([facility]) 2011    -small, 2011 (2nd to HTN)    • Unspecified hemorrhagic conditions     nose-bleeds, bruises easily   • Unspecified urinary incontinence    • Urinary bladder disorder        Social History     Tobacco Use   • Smoking status: Never Smoker   • Smokeless tobacco: Never Used   Substance Use Topics   • Alcohol use: No     Frequency: Never     Drinks per session: Patient refused     Binge frequency: Never   • Drug use: No       Family Status   Relation Name Status   • Mo  Alive   • Fa  Alive   • Bro  Alive   • MAunt  Alive   • MUnc  Alive   • PAunt     • PUnc     • MGMo  Alive   • MGFa     • PGMo     • PGFa       Family History   Problem Relation Age of Onset   • Hypertension Mother    • Hyperlipidemia Mother    • Hypertension Father    • Hyperlipidemia Father    • Heart Disease Father    • Psychiatric Illness Father    • Alcohol/Drug Father    • Alcohol/Drug Brother    • Arthritis Maternal Uncle    • Psychiatric Illness Maternal Uncle    • Heart Disease Maternal Uncle    • Hypertension Maternal Uncle    • Hyperlipidemia Maternal Uncle    • Genetic Disorder Paternal Aunt    • Psychiatric Illness Paternal Uncle    • Arthritis Maternal Grandmother    • Heart Disease Maternal Grandmother    • Alcohol/Drug Maternal Grandfather    • Stroke Maternal Grandfather    • Psychiatric Illness Maternal Grandfather    • Arthritis Paternal Grandmother    • Alcohol/Drug Paternal Grandfather  "       ROS:  Review of Systems   Constitutional: Negative for fever, chills, weight loss and malaise/fatigue.   HENT: Negative for ear pain, nosebleeds, congestion, sore throat and neck pain.    Eyes: Negative for blurred vision.   Respiratory: Negative for cough, sputum production, shortness of breath and wheezing.    Cardiovascular: Negative for chest pain, palpitations, orthopnea and leg swelling.   Gastrointestinal: Negative for heartburn, nausea, vomiting and abdominal pain.   Genitourinary: Negative for dysuria, urgency and frequency.  Positive for vaginal discharge as in HPI    Exam:  /82 (BP Location: Right arm, Patient Position: Sitting, BP Cuff Size: Adult)   Pulse 100   Temp 36.2 °C (97.1 °F) (Temporal)   Resp 20   SpO2 96%   General:  Well nourished, well developed female in NAD  Head:Normocephalic, atraumatic  Eyes: PERRLA, EOM within normal limits, no conjunctival injection, no scleral icterus, visual fields and acuity grossly intact.  Nose: Symmetrical without tenderness, no discharge.  Mouth: reasonable hygiene, no erythema exudates or tonsillar enlargement.  Neck: no masses, range of motion within normal limits, no tracheal deviation. No obvious thyroid enlargement.  Extremities: no clubbing, cyanosis, or edema.    Please note that this dictation was created using voice recognition software. I have made every reasonable attempt to correct obvious errors, but I expect that there are errors of grammar and possibly content that I did not discover before finalizing the note.    Assessment/Plan:  1. Vaginal discharge  VAGINAL PATHOGENS DNA PANEL    metroNIDAZOLE (FLAGYL) 500 MG Tab       Followup with primary care in the next 7-10 days if not significantly improving, return to the urgent care or go to the emergency room sooner for any worsening of symptoms.

## 2020-10-25 DIAGNOSIS — N89.8 VAGINAL DISCHARGE: ICD-10-CM

## 2020-12-10 DIAGNOSIS — R20.0 NUMBNESS IN FEET: ICD-10-CM

## 2020-12-10 DIAGNOSIS — R53.81 PHYSICAL DECONDITIONING: ICD-10-CM

## 2020-12-10 DIAGNOSIS — F44.5 PSYCHOGENIC NONEPILEPTIC SEIZURE: ICD-10-CM

## 2020-12-10 DIAGNOSIS — G89.29 OTHER CHRONIC PAIN: ICD-10-CM

## 2020-12-10 DIAGNOSIS — G43.719 INTRACTABLE CHRONIC MIGRAINE WITHOUT AURA AND WITHOUT STATUS MIGRAINOSUS: ICD-10-CM

## 2020-12-26 ENCOUNTER — TELEMEDICINE (OUTPATIENT)
Dept: TELEHEALTH | Facility: TELEMEDICINE | Age: 45
End: 2020-12-26
Payer: MEDICAID

## 2020-12-26 DIAGNOSIS — L72.3 SEBACEOUS CYST: ICD-10-CM

## 2020-12-26 PROCEDURE — 99213 OFFICE O/P EST LOW 20 MIN: CPT | Mod: CR | Performed by: NURSE PRACTITIONER

## 2020-12-26 NOTE — PROGRESS NOTES
"Virtual Visit: Established Patient   This visit was conducted via Zoom using secure and encrypted videoconferencing technology. The patient was in a private location in the state of [place].    The patient's identity was confirmed and verbal consent was obtained for this virtual visit.    Subjective:   CC: No chief complaint on file.      Peter Trimble is a 45 y.o. female presenting for evaluation and management of: cyst on the back of her left ear and neck.  She has had this before, and has had removed by dermatologist in the past, but has returned.  It has recently increased in size and due to its location, is making her feel nauseated.       ROS   Denies any recent fevers or chills. No nausea or vomiting. No chest pains or shortness of breath.     Allergies   Allergen Reactions   • Compazine      \"psychotic reaction\"   • Imitrex [Sumatriptan Succinate]      Had brain bleed   • Inapsine [Droperidol]      \"psychotic reaction\"   • Maxalt [Rizatriptan Benzoate]      Had brain bleed   • Phenergan [Promethazine Hcl]      \"psychotic reaction\"   • Xanax [Alprazolam]      Sores and dry mouth, many others pt cannot remember   • Zantac      Stomach pain   • Apple Cider Vinegar Rash     Patient states she gets rash   • Butrans [Buprenorphine]    • Clarithromycin Vomiting and Palpitations   • Dilaudid [Hydromorphone] Rash     Patient states she had rash, hallucinations, unable to urinate.    • Doxycycline    • Effexor [Venlafaxine]      \"Really depressed, like i wanted to hurt myself\"   • Eucalyptus Oil    • Gabapentin    • Kiwi Extract    • Latex Rash   • Lyrica [Pregabalin]      \"depression, slept a lot\"   • Minocycline Vomiting     \"any cyclines\"   • Morphabond Er [Suly]    • Patchouli Oil Rash     Rash    • Sulfa Drugs    • Tea Tree Oil Rash     Break out in rash   • Topiramate Nausea     Patient states made sick to stomach and dizzy.       Current medicines (including changes today)  Current Outpatient Medications "   Medication Sig Dispense Refill   • Erenumab (AIMOVIG) 70 MG/ML Solution Auto-injector Inject 2 mL as instructed every 4 weeks. 2 mL 11   • divalproex (DEPAKOTE) 500 MG Tablet Delayed Response TAKE ONE TABLET BY MOUTH EVERY MORNING AND TWO TABLETS EVERY EVENING 90 Tab 5   • hydrOXYzine HCl (ATARAX) 25 MG Tab Take 2 Tabs by mouth 3 times a day as needed for Anxiety. 60 Tab 0   • PARoxetine (PAXIL) 30 MG Tab Take 60 mg by mouth every day.     • omeprazole (PRILOSEC) 20 MG delayed-release capsule Take 20 mg by mouth every day.     • propranolol (INDERAL) 10 MG Tab Take 5 mg by mouth 2 times a day. Indications: High Blood Pressure Disorder     • baclofen (LIORESAL) 10 MG Tab Take 10 mg by mouth 3 times a day as needed (Pain).     • fluticasone (FLOVENT HFA) 220 MCG/ACT Aerosol Inhale 1 Puff by mouth 2 times a day.     • fluticasone (FLONASE) 50 MCG/ACT nasal spray Spray 1 Spray in nose every day.     • potassium chloride SA (KDUR) 20 MEQ Tab CR Take 20 mEq by mouth 2 times a day.     • ondansetron (ZOFRAN ODT) 4 MG TABLET DISPERSIBLE Take 4 mg by mouth every 6 hours as needed for Nausea.     • Ciclopirox 1 % Shampoo 1 g by Apply externally route 1 time daily as needed (Sores on Scalp).     • nystatin (MYCOSTATIN) 993781 UNIT/GM Cream topical cream Apply 1 g to affected area(s) 2 times a day.     • Cholecalciferol (VITAMIN D) 2000 UNIT Tab Take 4,000 Units by mouth every day.     • morphine (MS IR) 15 MG tablet Take 7.5 mg by mouth 3 times a day.     • albuterol (PROAIR HFA) 108 (90 Base) MCG/ACT Aero Soln inhalation aerosol ProAir HFA 90 mcg/actuation aerosol inhaler     • guaifenesin LA (MUCINEX) 600 MG TABLET SR 12 HR Take 600 mg by mouth every 12 hours.     • cetirizine (ZYRTEC) 10 MG Tab Take 10 mg by mouth every day.     • ferrous sulfate 325 (65 FE) MG tablet Take 325 mg by mouth every day.     • montelukast (SINGULAIR) 10 MG Tab Take 10 mg by mouth every bedtime.       No current facility-administered  medications for this visit.        Patient Active Problem List    Diagnosis Date Noted   • Repeated falls 12/07/2019     Priority: High   • Polypharmacy 12/07/2019     Priority: High   • ZULLY on CKD (acute kidney injury) ([facility]) 10/25/2019     Priority: High   • Hematuria 10/24/2019     Priority: High   • Psychogenic nonepileptic seizure 08/06/2016     Priority: High   • Hypomagnesemia 10/25/2019     Priority: Medium   • Anxiety 09/11/2019     Priority: Medium   • Morbid obesity ([facility]) 06/25/2015     Priority: Medium   • Closed fracture of one rib 06/25/2015     Priority: Medium   • Lumbar transverse process fracture ([facility]) 06/25/2015     Priority: Medium   • Chronic pain 08/18/2014     Priority: Medium   • Hypertension 10/04/2011     Priority: Medium   • Depression 09/28/2011     Priority: Medium   • Bipolar 2 disorder ([facility]) 09/28/2011     Priority: Medium   • Chronic migraine 09/28/2011     Priority: Medium   • Asthma 09/28/2011     Priority: Medium   • Genital fungal infection. 10/25/2019     Priority: Low   • Electrolyte abnormality 09/12/2019     Priority: Low   • Furunculosis 09/11/2019     Priority: Low   • Hyperlipidemia 08/18/2014     Priority: Low   • AVA (obstructive sleep apnea), intolerant of CPAP 07/08/2013     Priority: Low   • GERD (gastroesophageal reflux disease) 09/28/2011     Priority: Low   • Hypernatremia 12/08/2019   • Morbid obesity with BMI of 40.0-44.9, adult ([facility]) 06/20/2019   • Medication overuse headache 10/30/2018   • Vitamin deficiency 09/26/2018   • Obesity (BMI 30-39.9) 01/05/2018   • Personality disorder 10/04/2016   • Dystonia 10/04/2016   • Chest pain 10/03/2016   • Ankle fracture, right 10/03/2016   • Vaginal yeast infection 10/03/2016   • PTSD (post-traumatic stress disorder) 10/02/2016   • Depression 08/07/2016   • Chronic pain 08/07/2016   • HTN (hypertension), benign 08/07/2016   • Fracture of ankle, trimalleolar, closed 08/06/2016   • Controlled substance agreement signed  02/19/2016   • Rib fracture 06/25/2015   • Benign hypertensive heart disease without heart failure 10/16/2014   • Mixed hyperlipidemia 10/16/2014   • Open scalp wound 10/16/2014   • Lump or mass in breast 05/29/2014   • Intractable migraine without aura and without status migrainosus 02/19/2014   • Neck pain 02/19/2014   • Fibromyalgia 02/19/2014   • Obese 07/08/2013   • Oxygen dependent, since 2011 07/08/2013   • Hypertrophy of breast 04/29/2013       Family History   Problem Relation Age of Onset   • Hypertension Mother    • Hyperlipidemia Mother    • Hypertension Father    • Hyperlipidemia Father    • Heart Disease Father    • Psychiatric Illness Father    • Alcohol/Drug Father    • Alcohol/Drug Brother    • Arthritis Maternal Uncle    • Psychiatric Illness Maternal Uncle    • Heart Disease Maternal Uncle    • Hypertension Maternal Uncle    • Hyperlipidemia Maternal Uncle    • Genetic Disorder Paternal Aunt    • Psychiatric Illness Paternal Uncle    • Arthritis Maternal Grandmother    • Heart Disease Maternal Grandmother    • Alcohol/Drug Maternal Grandfather    • Stroke Maternal Grandfather    • Psychiatric Illness Maternal Grandfather    • Arthritis Paternal Grandmother    • Alcohol/Drug Paternal Grandfather        She  has a past medical history of Acute blood loss anemia (5/2/2013), Acute renal failure (ARF) ([facility]) (10/5/2011), Anemia, Anxiety, Arthritis, ASTHMA, Bipolar affective ([facility]), Breath shortness, Cold (), Depression, Eclampsia complicating pregnancy, Environmental allergies, Essential hypertension, malignant (9/28/2011), Fibromyalgia, GERD (gastroesophageal reflux disease), Heart murmur, History of pregnancy (10/16/2014), MRSA infection, Hyperlipidemia (04-26-13), Hypertension, Kidney stones, Migraines, Pain (05-27-14), Personality disorder ([facility]), Pneumonia (2012), PTSD (post-traumatic stress disorder), Scalp wound (8/18/2014), Seizure ([facility]) (05-27-14), Sleep apnea, Snoring, Stroke ([facility])  (2011), Subarachnoid hemorrhage ([facility]) (9/28/2011), Unspecified hemorrhagic conditions, Unspecified urinary incontinence, and Urinary bladder disorder. She also has no past medical history of COPD.  She  has a past surgical history that includes pr removal of ovary(s); pr exploration of spinal fusion; recovery (10/5/2011); knee reconstruction; other orthopedic surgery; other orthopedic surgery; recovery (1/30/2012); laminotomy; mammoplasty reduction (4/29/2013); hysterectomy laparoscopy; evacuation of hematoma (5/2/2013); breast biopsy (5/29/2014); irrigation & debridement general (8/17/2014); and ankle orif (Right, 8/7/2016).       Objective:   There were no vitals taken for this visit.    Physical Exam:  Constitutional: Alert, no distress, well-groomed.  Skin: No rashes in visible areas.  Eye: Round. Conjunctiva clear, lids normal. No icterus.   ENMT: Lips pink without lesions, good dentition, moist mucous membranes. Phonation normal.  Neck: No masses, no thyromegaly. Moves freely without pain.  Respiratory: Unlabored respiratory effort, no cough or audible wheeze  Psych: Alert and oriented x3, normal affect and mood.       Assessment and Plan:   The following treatment plan was discussed:     1. Sebaceous cyst  - REFERRAL TO DERMATOLOGY    Will refer to dermatology.  Patient encouraged to follow up in person for further evaluation and possible incision and drainage of cyst.  Supportive care options discussed and reviewed.  Differential diagnosis, natural history, and indications for immediate follow-up were discussed.     Advised of signs and symptoms which would warrant further evaluation and /or emergent evaluation in ED.  All questions answered and the patient agrees to the plan of care.       Please note that this dictation was created using voice recognition software. I have made every reasonable attempt to correct obvious errors, but I expect that there may be errors of grammar and possibly content that I  did not discover before finalizing the note.

## 2021-02-24 DIAGNOSIS — G43.719 INTRACTABLE CHRONIC MIGRAINE WITHOUT AURA AND WITHOUT STATUS MIGRAINOSUS: ICD-10-CM

## 2021-02-24 DIAGNOSIS — L65.9 HAIR LOSS: ICD-10-CM

## 2021-02-24 DIAGNOSIS — F44.5 PSYCHOGENIC NONEPILEPTIC SEIZURE: ICD-10-CM

## 2021-02-24 DIAGNOSIS — F31.81 BIPOLAR 2 DISORDER (HCC): ICD-10-CM

## 2021-02-24 DIAGNOSIS — F43.10 PTSD (POST-TRAUMATIC STRESS DISORDER): ICD-10-CM

## 2021-03-02 ENCOUNTER — TELEPHONE (OUTPATIENT)
Dept: NEUROLOGY | Facility: MEDICAL CENTER | Age: 46
End: 2021-03-02

## 2021-03-02 NOTE — TELEPHONE ENCOUNTER
I spoke with pt and she said Dr Arteaga's takes medicaid. She will check with his office and get back to us.

## 2021-03-02 NOTE — TELEPHONE ENCOUNTER
----- Message from Ginette Aguilar M.D. sent at 3/2/2021  8:58 AM PST -----  Please asked the patient if there is another provider that she would prefer for psychiatry.  Dr. Urbano is not in network.

## 2021-03-02 NOTE — TELEPHONE ENCOUNTER
The pt called asking for a PA for ubrelvy. I have scanned the request from the pharmacy in to the pt chart.

## 2021-03-08 NOTE — TELEPHONE ENCOUNTER
Patient needs a follow up appointment please.    [Fever] : no fever [Eye Pain] : no eye pain [Earache] : no earache [Chest Pain] : no chest pain [Shortness Of Breath] : no shortness of breath [Cough] : no cough [Abdominal Pain] : no abdominal pain [Skin Wound] : skin wound [[id number]] : right and left 2nd digit partial amputations, multiple partial toe amputations prior [de-identified] : right and left 2nd digit amputation sites

## 2021-03-10 ENCOUNTER — TELEPHONE (OUTPATIENT)
Dept: NEUROLOGY | Facility: MEDICAL CENTER | Age: 46
End: 2021-03-10

## 2021-03-10 NOTE — TELEPHONE ENCOUNTER
The pt returned my call and she stated her legs got swollen since the past week and she had also a chest pain. For it she went to the ER 3 days back and she was told there was nothing alarming and advised to take Lasix. She said they checked her Valproic acid level and it was with in normal range. I informed her I will relay this message to Dr Aguilar but for her to contact her PCP for her symptoms and she communicated understanding. I gave her an appt.

## 2021-03-11 NOTE — TELEPHONE ENCOUNTER
Thank you for the update.    We are still waiting to see if she has found another psychiatrist she would like to follow with. Not sure if she got clarification with Dr. Rosenthal' office. The referral team is waiting.

## 2021-03-19 ENCOUNTER — TELEMEDICINE (OUTPATIENT)
Dept: TELEHEALTH | Facility: TELEMEDICINE | Age: 46
End: 2021-03-19
Payer: MEDICAID

## 2021-03-19 DIAGNOSIS — R39.15 URINARY URGENCY: ICD-10-CM

## 2021-03-19 DIAGNOSIS — N89.8 VAGINAL DISCHARGE: ICD-10-CM

## 2021-03-19 PROCEDURE — 99213 OFFICE O/P EST LOW 20 MIN: CPT | Mod: CR | Performed by: NURSE PRACTITIONER

## 2021-03-19 RX ORDER — FLUCONAZOLE 200 MG/1
TABLET ORAL
COMMUNITY
Start: 2021-03-01 | End: 2022-07-30

## 2021-03-19 RX ORDER — METRONIDAZOLE 500 MG/1
TABLET ORAL
COMMUNITY
Start: 2021-01-19 | End: 2022-07-30

## 2021-03-19 RX ORDER — OXYCODONE HYDROCHLORIDE 10 MG/1
TABLET ORAL
COMMUNITY
Start: 2021-02-18 | End: 2023-04-28

## 2021-03-19 RX ORDER — DILTIAZEM HYDROCHLORIDE 60 MG/1
CAPSULE, EXTENDED RELEASE ORAL
COMMUNITY
Start: 2021-02-26 | End: 2022-04-12

## 2021-03-19 RX ORDER — CLOBETASOL PROPIONATE 0.46 MG/ML
SOLUTION TOPICAL
COMMUNITY
Start: 2021-01-15 | End: 2022-08-10

## 2021-03-19 RX ORDER — BUSPIRONE HYDROCHLORIDE 30 MG/1
TABLET ORAL
COMMUNITY
Start: 2020-09-01 | End: 2022-10-07

## 2021-03-19 RX ORDER — NYSTATIN 100000 [USP'U]/G
POWDER TOPICAL
COMMUNITY
Start: 2021-01-15 | End: 2022-07-30

## 2021-03-19 RX ORDER — FUROSEMIDE 40 MG/1
TABLET ORAL
COMMUNITY
Start: 2021-01-06 | End: 2022-07-30

## 2021-03-19 RX ORDER — IPRATROPIUM BROMIDE AND ALBUTEROL SULFATE 2.5; .5 MG/3ML; MG/3ML
SOLUTION RESPIRATORY (INHALATION)
COMMUNITY
Start: 2021-02-10 | End: 2022-08-10

## 2021-03-19 RX ORDER — HYDROXYZINE PAMOATE 50 MG/1
CAPSULE ORAL
COMMUNITY
Start: 2021-02-22 | End: 2022-10-07

## 2021-03-20 NOTE — PROGRESS NOTES
"Chief Complaint   Patient presents with   • Vaginal Discharge         HISTORY OF PRESENT ILLNESS: Patient is a 45 y.o. female who presents to urgent care today via virtual visit with complaints of vaginal discharge and itching.  She notes she has had several months of ongoing discharge and itching, describes discharge as \"warm\".  She has been treated several times over last month for yeast infection with Diflucan, last treated 1 to 2 weeks ago.  She reports some improvement with the medication but again worsening.  She does admit to some urinary urgency.  She has not seen her PCP recently for her symptoms.    Patient Active Problem List    Diagnosis Date Noted   • Repeated falls 12/07/2019   • Polypharmacy 12/07/2019   • ZULLY on CKD (acute kidney injury) ([facility]) 10/25/2019   • Hematuria 10/24/2019   • Psychogenic nonepileptic seizure 08/06/2016   • Hypomagnesemia 10/25/2019   • Anxiety 09/11/2019   • Morbid obesity ([facility]) 06/25/2015   • Closed fracture of one rib 06/25/2015   • Lumbar transverse process fracture ([facility]) 06/25/2015   • Chronic pain 08/18/2014   • Hypertension 10/04/2011   • Depression 09/28/2011   • Bipolar 2 disorder ([facility]) 09/28/2011   • Chronic migraine 09/28/2011   • Asthma 09/28/2011   • Genital fungal infection. 10/25/2019   • Electrolyte abnormality 09/12/2019   • Furunculosis 09/11/2019   • Hyperlipidemia 08/18/2014   • AVA (obstructive sleep apnea), intolerant of CPAP 07/08/2013   • GERD (gastroesophageal reflux disease) 09/28/2011   • Hypernatremia 12/08/2019   • Morbid obesity with BMI of 40.0-44.9, adult ([facility]) 06/20/2019   • Medication overuse headache 10/30/2018   • Vitamin deficiency 09/26/2018   • Obesity (BMI 30-39.9) 01/05/2018   • Personality disorder 10/04/2016   • Dystonia 10/04/2016   • Chest pain 10/03/2016   • Ankle fracture, right 10/03/2016   • Vaginal yeast infection 10/03/2016   • PTSD (post-traumatic stress disorder) 10/02/2016   • Depression 08/07/2016   • Chronic pain " 08/07/2016   • HTN (hypertension), benign 08/07/2016   • Fracture of ankle, trimalleolar, closed 08/06/2016   • Controlled substance agreement signed 02/19/2016   • Rib fracture 06/25/2015   • Benign hypertensive heart disease without heart failure 10/16/2014   • Mixed hyperlipidemia 10/16/2014   • Open scalp wound 10/16/2014   • Lump or mass in breast 05/29/2014   • Intractable migraine without aura and without status migrainosus 02/19/2014   • Neck pain 02/19/2014   • Fibromyalgia 02/19/2014   • Obese 07/08/2013   • Oxygen dependent, since 2011 07/08/2013   • Hypertrophy of breast 04/29/2013       Allergies:Compazine, Imitrex [sumatriptan succinate], Inapsine [droperidol], Maxalt [rizatriptan benzoate], Phenergan [promethazine hcl], Xanax [alprazolam], Zantac, Apple cider vinegar, Butrans [buprenorphine], Clarithromycin, Dilaudid [hydromorphone], Doxycycline, Effexor [venlafaxine], Eucalyptus oil, Gabapentin, Hydromorphone hcl, Kiwi extract, Latex, Lyrica [pregabalin], Minocycline, Morphabond er [renny], Patchouli oil, Phenergan  [promethazine], Sulfa drugs, Tea tree oil, and Topiramate    Current Outpatient Medications Ordered in Epic   Medication Sig Dispense Refill   • busPIRone (BUSPAR) 30 MG tablet      • oxyCODONE immediate release (ROXICODONE) 10 MG immediate release tablet      • nystatin (MYCOSTATIN) powder      • metroNIDAZOLE (FLAGYL) 500 MG Tab      • ipratropium-albuterol (DUONEB) 0.5-2.5 (3) MG/3ML nebulizer solution      • hydrOXYzine pamoate (VISTARIL) 50 MG Cap      • furosemide (LASIX) 40 MG Tab      • clobetasol (TEMOVATE) 0.05 % external solution      • diltiazem (CARDIZEM SR) 60 MG SR capsule      • fluconazole (DIFLUCAN) 200 MG Tab      • NON SPECIFIED Ubrelvy 100mg tablet - 100mg PRN migraine. Ok to repeat in 2 hours if no benefit. 9 Each 3   • Erenumab (AIMOVIG) 70 MG/ML Solution Auto-injector Inject 2 mL as instructed every 4 weeks. 2 mL 11   • divalproex (DEPAKOTE) 500 MG Tablet Delayed  Response TAKE ONE TABLET BY MOUTH EVERY MORNING AND TWO TABLETS EVERY EVENING 90 Tab 5   • hydrOXYzine HCl (ATARAX) 25 MG Tab Take 2 Tabs by mouth 3 times a day as needed for Anxiety. 60 Tab 0   • PARoxetine (PAXIL) 30 MG Tab Take 60 mg by mouth every day.     • omeprazole (PRILOSEC) 20 MG delayed-release capsule Take 20 mg by mouth every day.     • propranolol (INDERAL) 10 MG Tab Take 5 mg by mouth 2 times a day. Indications: High Blood Pressure Disorder     • baclofen (LIORESAL) 10 MG Tab Take 10 mg by mouth 3 times a day as needed (Pain).     • fluticasone (FLOVENT HFA) 220 MCG/ACT Aerosol Inhale 1 Puff by mouth 2 times a day.     • fluticasone (FLONASE) 50 MCG/ACT nasal spray Spray 1 Spray in nose every day.     • ondansetron (ZOFRAN ODT) 4 MG TABLET DISPERSIBLE Take 4 mg by mouth every 6 hours as needed for Nausea.     • Ciclopirox 1 % Shampoo 1 g by Apply externally route 1 time daily as needed (Sores on Scalp).     • nystatin (MYCOSTATIN) 483083 UNIT/GM Cream topical cream Apply 1 g to affected area(s) 2 times a day.     • Cholecalciferol (VITAMIN D) 2000 UNIT Tab Take 4,000 Units by mouth every day.     • albuterol (PROAIR HFA) 108 (90 Base) MCG/ACT Aero Soln inhalation aerosol ProAir HFA 90 mcg/actuation aerosol inhaler     • guaifenesin LA (MUCINEX) 600 MG TABLET SR 12 HR Take 600 mg by mouth every 12 hours.     • cetirizine (ZYRTEC) 10 MG Tab Take 10 mg by mouth every day.     • ferrous sulfate 325 (65 FE) MG tablet Take 325 mg by mouth every day.     • montelukast (SINGULAIR) 10 MG Tab Take 10 mg by mouth every bedtime.       No current Epic-ordered facility-administered medications on file.       Past Medical History:   Diagnosis Date   • Acute blood loss anemia 5/2/2013    -resolved, postop 2013    • Acute renal failure (ARF) ([facility]) 10/5/2011    -resolved (post op 2013)    • Anemia    • Anxiety    • Arthritis     osteoarthritis since 15yo.   • ASTHMA     O2 3liters at HS and prn   • Bipolar affective  "([facility])    • Breath shortness    • Cold    • Depression    • Eclampsia complicating pregnancy    • Environmental allergies    • Essential hypertension, malignant 2011   • Fibromyalgia    • GERD (gastroesophageal reflux disease)    • Heart murmur     she denies this hx   • History of pregnancy 10/16/2014        • Hx MRSA infection    • Hyperlipidemia 14    \"off medication\"   • Hypertension    • Kidney stones    • Migraines    • Pain 15    \"my body hurts all the time\", 5-6/10   • Personality disorder ([facility])    • Pneumonia    • PTSD (post-traumatic stress disorder)    • Scalp wound 2014   • Seizure ([facility]) 15    last    • Sleep apnea     has had a sleep study, use O2 at HS   • Snoring    • Stroke ([facility])      reports strokes x5 , and a \"brain bleed\"   • Subarachnoid hemorrhage ([facility]) 2011    -small,  (2nd to HTN)    • Unspecified hemorrhagic conditions     nose-bleeds, bruises easily   • Unspecified urinary incontinence    • Urinary bladder disorder        Social History     Tobacco Use   • Smoking status: Never Smoker   • Smokeless tobacco: Never Used   Substance Use Topics   • Alcohol use: No   • Drug use: No       Family Status   Relation Name Status   • Mo  Alive   • Fa  Alive   • Bro  Alive   • MAunt  Alive   • MUnc  Alive   • PAunt     • PUnc     • MGMo  Alive   • MGFa     • PGMo     • PGFa       Family History   Problem Relation Age of Onset   • Hypertension Mother    • Hyperlipidemia Mother    • Hypertension Father    • Hyperlipidemia Father    • Heart Disease Father    • Psychiatric Illness Father    • Alcohol/Drug Father    • Alcohol/Drug Brother    • Arthritis Maternal Uncle    • Psychiatric Illness Maternal Uncle    • Heart Disease Maternal Uncle    • Hypertension Maternal Uncle    • Hyperlipidemia Maternal Uncle    • Genetic Disorder Paternal Aunt    • Psychiatric Illness Paternal Uncle    • Arthritis Maternal " Grandmother    • Heart Disease Maternal Grandmother    • Alcohol/Drug Maternal Grandfather    • Stroke Maternal Grandfather    • Psychiatric Illness Maternal Grandfather    • Arthritis Paternal Grandmother    • Alcohol/Drug Paternal Grandfather        ROS:  Review of Systems   Constitutional: Negative for fever, chills, weight loss, malaise, and fatigue.   HENT: Negative for ear pain, nosebleeds, congestion, sore throat and neck pain.    Eyes: Negative for vision changes.   Neuro: Negative for headache, sensory changes, weakness, seizure, LOC.   Cardiovascular: Negative for chest pain, palpitations, orthopnea and leg swelling.   Respiratory: Negative for cough, sputum production, shortness of breath and wheezing.   Gastrointestinal: Negative for abdominal pain, nausea, vomiting or diarrhea.   Genitourinary: Positive for urinary urgency.  Negative for dysuria, frequency.  GYN: Positive for discharge, itching  Musculoskeletal: Negative for falls, neck pain, back pain, joint pain, myalgias.   Skin: Negative for rash, diaphoresis.     Exam:    There were no vitals taken for this visit.  General: well-nourished, well-developed female in NAD  Head: normocephalic, atraumatic  Eyes: PERRLA  Ears: normal shape and symmetry  Nose: symmetrical   Neck: no masses, range of motion within normal limits, no tracheal deviation  Neuro: alert and oriented  Pulmonary: no distress  Skin: Pink, appears dry  Psych: appropriate mood, affect, judgement        Assessment/Plan:  1. Vaginal discharge     2. Urinary urgency             I have discussed the challenges of virtual visits, which may affect obtaining correct diagnoses, including not being able to obtain vitals or do a 1:1 physical assessment. Unfortunately, due to the complexity of the patient's symptoms, I feel it is best for the patient to be evaluated in clinic. Therefore the patient has been instructed to come to urgent care today for evaluation. The patient is in agreement and  all questions have been answered. They are in no acute distress and will come to the clinic as soon as possible for evaluation.       The encounter was completed using secure and encrypted videoconferencing equipment, the patient gave consent, and patient identification was confirmed.   Please note that this dictation was created using voice recognition software. I have made every reasonable attempt to correct obvious errors, but I expect that there are errors of grammar and possibly content that I did not discover before finalizing the note.      SHAYE Albrecht.

## 2021-05-12 NOTE — TELEPHONE ENCOUNTER
I called the pt both numbers and LVM stating to call us back with the current dose of Depakote she is on. Left my direct line.

## 2021-05-13 RX ORDER — DIVALPROEX SODIUM 500 MG/1
TABLET, DELAYED RELEASE ORAL
Qty: 270 TABLET | Refills: 2 | Status: SHIPPED | OUTPATIENT
Start: 2021-05-13 | End: 2022-09-15 | Stop reason: SDUPTHER

## 2021-05-26 ENCOUNTER — HOSPITAL ENCOUNTER (OUTPATIENT)
Dept: RADIOLOGY | Facility: MEDICAL CENTER | Age: 46
End: 2021-05-26
Attending: ANESTHESIOLOGY
Payer: MEDICAID

## 2021-05-26 ENCOUNTER — APPOINTMENT (OUTPATIENT)
Dept: RADIOLOGY | Facility: MEDICAL CENTER | Age: 46
End: 2021-05-26
Attending: NEUROLOGICAL SURGERY
Payer: MEDICAID

## 2021-06-16 ENCOUNTER — TELEPHONE (OUTPATIENT)
Dept: NEUROLOGY | Facility: MEDICAL CENTER | Age: 46
End: 2021-06-16

## 2021-06-24 ENCOUNTER — TELEPHONE (OUTPATIENT)
Dept: NEUROLOGY | Facility: MEDICAL CENTER | Age: 46
End: 2021-06-24

## 2021-06-24 DIAGNOSIS — G43.719 INTRACTABLE CHRONIC MIGRAINE WITHOUT AURA AND WITHOUT STATUS MIGRAINOSUS: ICD-10-CM

## 2021-06-24 NOTE — TELEPHONE ENCOUNTER
Patient was notified verbally understood . Next appointment has been scheduled but its a virtual visit . Patient wanted to make sure that is okay ?

## 2021-06-24 NOTE — TELEPHONE ENCOUNTER
----- Message from Ginette Aguilar M.D. sent at 6/24/2021 11:58 AM PDT -----  Please fax the Ubrelvy prescription to her pharmacy and let the patient know I rewrote for more refills.

## 2021-06-24 NOTE — TELEPHONE ENCOUNTER
It has been a year since I have seen her. It may be helpful to see her in clinic if she is agreeable. I can be flexible if she is not comfortable.

## 2021-08-02 ENCOUNTER — APPOINTMENT (OUTPATIENT)
Dept: NEUROLOGY | Facility: MEDICAL CENTER | Age: 46
End: 2021-08-02
Attending: PSYCHIATRY & NEUROLOGY
Payer: MEDICAID

## 2021-09-01 ENCOUNTER — TELEPHONE (OUTPATIENT)
Dept: NEUROLOGY | Facility: MEDICAL CENTER | Age: 46
End: 2021-09-01

## 2021-09-03 NOTE — TELEPHONE ENCOUNTER
Kelly [facility] mental health/[facility] called to discuss Peter. She states she is having hallucinations and wants to consult you. I left her a voicemail to inquire for more info you will likely want, this patient has not been seen since 7/7/20.  Her phone is [phone]

## 2021-09-08 NOTE — TELEPHONE ENCOUNTER
Personally spoke with Kelly with [employer].  She does have a release of records/information if needed that can be faxed over to the office.  She wanted to give an update with regards the patient has not been seen by me since July 2020.    Currently patient is experiencing auditory hallucinations hearing different types of music and some reports of paranoia - believing that people have cameras in the home and adding speakers into her house.  She is established with psychiatry with Dr. Arteaga's's office.  The psychiatrist is aware and is looking into it.  Zoya please reach out to Dr. Arteaga's office and asked them to reach out if needed.

## 2021-11-09 DIAGNOSIS — G43.719 INTRACTABLE CHRONIC MIGRAINE WITHOUT AURA AND WITHOUT STATUS MIGRAINOSUS: ICD-10-CM

## 2021-11-11 DIAGNOSIS — G43.719 INTRACTABLE CHRONIC MIGRAINE WITHOUT AURA AND WITHOUT STATUS MIGRAINOSUS: ICD-10-CM

## 2021-11-11 RX ORDER — UBROGEPANT 100 MG/1
TABLET ORAL
Qty: 12 TABLET | Refills: 0 | Status: SHIPPED | OUTPATIENT
Start: 2021-11-11 | End: 2021-12-13

## 2021-11-11 RX ORDER — ERENUMAB-AOOE 70 MG/ML
140 INJECTION SUBCUTANEOUS
Qty: 2 ML | Refills: 1 | Status: SHIPPED | OUTPATIENT
Start: 2021-11-11 | End: 2022-01-12 | Stop reason: SDUPTHER

## 2021-12-11 DIAGNOSIS — G43.719 INTRACTABLE CHRONIC MIGRAINE WITHOUT AURA AND WITHOUT STATUS MIGRAINOSUS: ICD-10-CM

## 2021-12-11 RX ORDER — UBROGEPANT 100 MG/1
TABLET ORAL
Qty: 4 TABLET | Refills: 0 | Status: CANCELLED | OUTPATIENT
Start: 2021-12-11

## 2021-12-13 RX ORDER — UBROGEPANT 100 MG/1
TABLET ORAL
Qty: 12 TABLET | Refills: 0 | Status: SHIPPED | OUTPATIENT
Start: 2021-12-13 | End: 2021-12-15

## 2021-12-13 NOTE — TELEPHONE ENCOUNTER
Received request via: Pharmacy    Was the patient seen in the last year in this department? Yes    Does the patient have an active prescription (recently filled or refills available) for medication(s) requested? Yes. Pt has appt December 28th

## 2021-12-15 ENCOUNTER — TELEPHONE (OUTPATIENT)
Dept: NEUROLOGY | Facility: MEDICAL CENTER | Age: 46
End: 2021-12-15

## 2021-12-15 DIAGNOSIS — G43.719 INTRACTABLE CHRONIC MIGRAINE WITHOUT AURA AND WITHOUT STATUS MIGRAINOSUS: ICD-10-CM

## 2021-12-15 RX ORDER — UBROGEPANT 100 MG/1
TABLET ORAL
Qty: 4 TABLET | Refills: 0 | Status: SHIPPED | OUTPATIENT
Start: 2021-12-15 | End: 2021-12-20 | Stop reason: SDUPTHER

## 2021-12-15 NOTE — TELEPHONE ENCOUNTER
Received request via: Pharmacy    Was the patient seen in the last year in this department? Yes    Does the patient have an active prescription (recently filled or refills available) for medication(s) requested? Yes.   Pt has appt with dr Aguilar 12/28/21

## 2021-12-15 NOTE — TELEPHONE ENCOUNTER
Ubrelvy 100mg Tablet    Refill request rec'd via PO, emilee TC via [facility], TC paid copay $0.00 #36/90 DS or $0.00 #12/30 DS notifying liaison Marissa Rai or Reba Martinez.

## 2021-12-20 DIAGNOSIS — G43.719 INTRACTABLE CHRONIC MIGRAINE WITHOUT AURA AND WITHOUT STATUS MIGRAINOSUS: ICD-10-CM

## 2021-12-20 RX ORDER — UBROGEPANT 100 MG/1
TABLET ORAL
Qty: 10 TABLET | Refills: 0 | Status: SHIPPED | OUTPATIENT
Start: 2021-12-20 | End: 2022-01-05

## 2021-12-28 ENCOUNTER — APPOINTMENT (OUTPATIENT)
Dept: NEUROLOGY | Facility: MEDICAL CENTER | Age: 46
End: 2021-12-28
Payer: MEDICAID

## 2022-01-05 ENCOUNTER — TELEPHONE (OUTPATIENT)
Dept: NEUROLOGY | Facility: MEDICAL CENTER | Age: 47
End: 2022-01-05

## 2022-01-05 DIAGNOSIS — G43.719 INTRACTABLE CHRONIC MIGRAINE WITHOUT AURA AND WITHOUT STATUS MIGRAINOSUS: ICD-10-CM

## 2022-01-05 RX ORDER — UBROGEPANT 50 MG/1
50 TABLET ORAL
Qty: 12 TABLET | Refills: 0 | Status: SHIPPED | OUTPATIENT
Start: 2022-01-05 | End: 2022-01-12 | Stop reason: CLARIF

## 2022-01-05 NOTE — TELEPHONE ENCOUNTER
Ubrelvy 50mg Tablet    PA submitted per call to [employer] [id number] awaiting response TAT 24 hrs. Ubrelvy previously preferred 2022 changed to non preferred. Possible switch to Nurtec if denial.  - 01/05/2022 2:55pm

## 2022-01-10 DIAGNOSIS — G43.719 INTRACTABLE CHRONIC MIGRAINE WITHOUT AURA AND WITHOUT STATUS MIGRAINOSUS: ICD-10-CM

## 2022-01-11 RX ORDER — UBROGEPANT 100 MG/1
TABLET ORAL
Qty: 12 TABLET | Refills: 0 | OUTPATIENT
Start: 2022-01-11

## 2022-01-12 ENCOUNTER — TELEPHONE (OUTPATIENT)
Dept: NEUROLOGY | Facility: MEDICAL CENTER | Age: 47
End: 2022-01-12

## 2022-01-12 DIAGNOSIS — G43.719 INTRACTABLE CHRONIC MIGRAINE WITHOUT AURA AND WITHOUT STATUS MIGRAINOSUS: ICD-10-CM

## 2022-01-12 RX ORDER — ERENUMAB-AOOE 70 MG/ML
140 INJECTION SUBCUTANEOUS
Qty: 2 ML | Refills: 0 | Status: SHIPPED | OUTPATIENT
Start: 2022-01-12 | End: 2022-01-17

## 2022-01-12 NOTE — TELEPHONE ENCOUNTER
NURTEC 75mg Dispersible Tablet    PA called into [employer] [phone] [id number] good 01/12/2022 to 07/12/2022 TC paid copay $0.00 #16/30 DS notifying liaison Marissa Rai. - 01/12/2022 2:36pm    Aimovig 70mg/ml Soln Auto-Injector    PA called into [employer] [place] [employer] [phone] TC still rejecting for DUR Dup Therapy, may have been filled elsewhere. - 01/12/2022 2:56pm

## 2022-01-18 ENCOUNTER — TELEPHONE (OUTPATIENT)
Dept: NEUROLOGY | Facility: MEDICAL CENTER | Age: 47
End: 2022-01-18

## 2022-01-25 ENCOUNTER — TELEPHONE (OUTPATIENT)
Dept: NEUROLOGY | Facility: MEDICAL CENTER | Age: 47
End: 2022-01-25

## 2022-01-25 NOTE — TELEPHONE ENCOUNTER
Received request via: Pharmacy    Was the patient seen in the last year in this department? Yes    Does the patient have an active prescription (recently filled or refills available) for medication(s) requested? Yes.   Requesting Ubrelvy

## 2022-01-25 NOTE — TELEPHONE ENCOUNTER
Her insurance no longer covers Ubrelvy.  Nurtec was prescribed instead.  I have messaged her about this.  Please remind her and reach out if there are any particular questions.

## 2022-02-02 ENCOUNTER — TELEPHONE (OUTPATIENT)
Dept: NEUROLOGY | Facility: MEDICAL CENTER | Age: 47
End: 2022-02-02

## 2022-02-02 NOTE — TELEPHONE ENCOUNTER
Spoke to pt and confirmed her appt with  on 02/15. She said she intends to make her appt but is worried because the last couple of appointments were no showed and cancelled due to not having transportation from [place]. Pt asked if the  could assign her a ride to and from this appt. I let pt know that I sent our  a message and they will be in contact!     ----- Message from Ginette Aguilar M.D. sent at 2/2/2022  2:24 PM PST -----  Hey did you get a chance to talk with this patient and communicate the message about no further refills without being seen in clinic?  I am just asking because I do not see any documentation

## 2022-02-18 ENCOUNTER — APPOINTMENT (OUTPATIENT)
Dept: NEUROLOGY | Facility: MEDICAL CENTER | Age: 47
End: 2022-02-18
Attending: PSYCHIATRY & NEUROLOGY
Payer: MEDICAID

## 2022-03-10 ENCOUNTER — APPOINTMENT (OUTPATIENT)
Dept: NEUROLOGY | Facility: MEDICAL CENTER | Age: 47
End: 2022-03-10
Attending: NURSE PRACTITIONER
Payer: MEDICAID

## 2022-04-12 ENCOUNTER — OFFICE VISIT (OUTPATIENT)
Dept: NEUROLOGY | Facility: MEDICAL CENTER | Age: 47
End: 2022-04-12
Attending: NURSE PRACTITIONER
Payer: MEDICAID

## 2022-04-12 VITALS
HEIGHT: 71 IN | DIASTOLIC BLOOD PRESSURE: 68 MMHG | BODY MASS INDEX: 35.59 KG/M2 | RESPIRATION RATE: 14 BRPM | WEIGHT: 254.19 LBS | HEART RATE: 78 BPM | SYSTOLIC BLOOD PRESSURE: 122 MMHG | TEMPERATURE: 98.3 F | OXYGEN SATURATION: 95 %

## 2022-04-12 DIAGNOSIS — G43.719 INTRACTABLE CHRONIC MIGRAINE WITHOUT AURA AND WITHOUT STATUS MIGRAINOSUS: Primary | ICD-10-CM

## 2022-04-12 PROCEDURE — 99212 OFFICE O/P EST SF 10 MIN: CPT | Performed by: NURSE PRACTITIONER

## 2022-04-12 PROCEDURE — 99214 OFFICE O/P EST MOD 30 MIN: CPT | Performed by: NURSE PRACTITIONER

## 2022-04-12 RX ORDER — TRIAMCINOLONE ACETONIDE 1 MG/G
CREAM TOPICAL
COMMUNITY
Start: 2022-02-21 | End: 2022-09-17

## 2022-04-12 RX ORDER — ONDANSETRON HYDROCHLORIDE 8 MG/1
TABLET, FILM COATED ORAL
COMMUNITY
Start: 2021-11-01 | End: 2022-04-12 | Stop reason: SDUPTHER

## 2022-04-12 RX ORDER — LORAZEPAM 1 MG/1
TABLET ORAL
COMMUNITY
Start: 2022-03-17 | End: 2022-07-30

## 2022-04-12 RX ORDER — OXYCODONE AND ACETAMINOPHEN 10; 325 MG/1; MG/1
1 TABLET ORAL
COMMUNITY
End: 2022-07-08

## 2022-04-12 RX ORDER — ERENUMAB-AOOE 140 MG/ML
140 INJECTION, SOLUTION SUBCUTANEOUS
Qty: 1 ML | Refills: 5 | Status: SHIPPED | OUTPATIENT
Start: 2022-04-12 | End: 2022-09-15 | Stop reason: SDUPTHER

## 2022-04-12 RX ORDER — TRAZODONE HYDROCHLORIDE 100 MG/1
TABLET ORAL
COMMUNITY
Start: 2022-04-09 | End: 2023-04-28

## 2022-04-12 RX ORDER — ONDANSETRON HYDROCHLORIDE 8 MG/1
8 TABLET, FILM COATED ORAL
Qty: 20 TABLET | Refills: 5 | Status: SHIPPED | OUTPATIENT
Start: 2022-04-12 | End: 2022-09-15 | Stop reason: SDUPTHER

## 2022-04-12 RX ORDER — UBROGEPANT 100 MG/1
TABLET ORAL
COMMUNITY
Start: 2022-03-17 | End: 2022-04-12

## 2022-04-12 ASSESSMENT — ENCOUNTER SYMPTOMS
VOMITING: 1
NECK PAIN: 1
TINGLING: 1
SHORTNESS OF BREATH: 1
DEPRESSION: 1
DOUBLE VISION: 0
COUGH: 0
WEAKNESS: 1
FALLS: 0
PHOTOPHOBIA: 1
NERVOUS/ANXIOUS: 1
DIZZINESS: 1
NAUSEA: 1
SENSORY CHANGE: 1
BLURRED VISION: 1
HEADACHES: 1
FOCAL WEAKNESS: 0

## 2022-04-12 NOTE — PROGRESS NOTES
"Subjective      HPI    Peter Trimble is a 47 y.o. female who presents to establish care for headaches.    She was previously seen by Dr. Aguilar, her last visit was in 7/2020 with Dr. Aguilar.          PMH:  Chronic pain, asthma, SAH, stroke, anxiety attacks/panic attacks, bipolar disorder,     Social:   Denies history of smoking, alcohol or drug use.     Has had headache since she was a child.  She has headaches every day.  The pain is 7-8/10, accompanied by nausea and vomiting, sensitivity to light and sound.  No aura.    Alleviating factors:  Ice on her neck, rosemary essential oil  Triggers:  Heat, noise, HTN, AVA on oxygen,         She is on oxycodone 3 times daily for chronic pain.  Ubrelvy helps, states she has been unable to get it.  Nurtec didn't help.  She has been on triptans in the past but had a stroke.  Topamax didn't work.     Aimovig 140 mg helped, she has been out for a few months.     She is in a wheelchair today, states she uses a wheelchair due to chronic pain.     She is complaining of memory problems and wants us to check a MRI.         Review of Systems   Eyes: Positive for blurred vision and photophobia. Negative for double vision.   Respiratory: Positive for shortness of breath. Negative for cough.    Cardiovascular: Negative for chest pain.   Gastrointestinal: Positive for nausea and vomiting.   Musculoskeletal: Positive for neck pain. Negative for falls.   Neurological: Positive for dizziness, tingling, sensory change, weakness and headaches. Negative for focal weakness.   Psychiatric/Behavioral: Positive for depression. The patient is nervous/anxious.            Objective     /68 (BP Location: Right arm, Patient Position: Sitting, BP Cuff Size: Large adult)   Pulse 78   Temp 36.8 °C (98.3 °F) (Temporal)   Resp 14   Ht 1.803 m (5' 11\")   Wt 115 kg (254 lb 3.1 oz)   SpO2 95% Comment: 2 liters  BMI 35.45 kg/m²      PHYSICAL ASSESSMENT  Constitutional:  Alert, no apparent " distress,  Psych:   mood and affect WNL  Muskuloskeletal:  Moves all extremities equally, strength 5/5  BUE/BLE flexors/extensors, no drift  NEUROLOGICAL ASSESSMENT  Oriented X 4, speech fluent, naming and memory intact  CN II: Visual fields are full to confrontation. Fundoscopic exam is normal with sharp discs and no vascular changes. Pupils are 4 mm and briskly reactive to light.   CN III: IV, VI  EOMs intact, no ptosis  CN V: Facial sensation is intact to pinprick in all 3 divisions bilaterally. Corneal responses are intact.  CN VII: Face is symmetric with normal eye closure and smile.  CN VIII Hearing is normal to rubbing fingers  CN IX, X: Palate elevates symmetrically. Phonation is normal.  CN XI: Head turning and shoulder shrug are intact  CN XII: Tongue is midline with normal movements and no atrophy.                           Sensation to PP equal bilaterally                 No limb ataxia with finger to nose and heel to shin                 In wheelchair                Biceps,brachioradialis, tricep, and patellar reflexes all 1+     Cardiovascular:    S1S2, no abnormal rhythm auscultated, no peripheral edema  Neck:                     No carotid bruits noted   Pulmonary:            Respirations easy, lungs clear to auscultation all fields.     Skin:                     No obvious rashes.          Assessment & Plan      1. Intractable chronic migraine without aura and without status migrainosus  Ubrogepant 100 MG Tab    Erenumab-aooe (AIMOVIG) 140 MG/ML Solution Auto-injector    ondansetron (ZOFRAN) 8 MG Tab     Likely exacerbated by chronic narcotic use causing rebound effects.     Ubrelvy 100mg up to twice a day as needed, no more than 10 per month.     Aimovig 140mg SC every 4 weeks.    Zofran 8mg PO daily PRN for nausea, no more than 20 per month.     I do not recommend a MRI, her memory deficits are likely related to chronic pain and chronic narcotic use.     I spent 35 minutes caring for patient, my  time includes reviewing the chart, obtaining current HPI, exam, discussion of disease process, ordering testing and medications and counseling.      I counseled patient on migraine triggers, lifestyle changes, medication overuse, supplements and medication side effects.      Follow up in 6 months.

## 2022-04-12 NOTE — PATIENT INSTRUCTIONS
Make sure you tell the sheduler you always need a 40 minute visit.      Migraine Headache  A migraine headache is a very strong throbbing pain on one side or both sides of your head. This type of headache can also cause other symptoms. It can last from 4 hours to 3 days. Talk with your doctor about what things may bring on (trigger) this condition.  What are the causes?  The exact cause of this condition is not known. This condition may be triggered or caused by:  · Drinking alcohol.  · Smoking.  · Taking medicines, such as:  ? Medicine used to treat chest pain (nitroglycerin).  ? Birth control pills.  ? Estrogen.  ? Some blood pressure medicines.  · Eating or drinking certain products.  · Doing physical activity.  Other things that may trigger a migraine headache include:  · Having a menstrual period.  · Pregnancy.  · Hunger.  · Stress.  · Not getting enough sleep or getting too much sleep.  · Weather changes.  · Tiredness (fatigue).  What increases the risk?  · Being 25-55 years old.  · Being female.  · Having a family history of migraine headaches.  · Being .  · Having depression or anxiety.  · Being very overweight.  What are the signs or symptoms?  · A throbbing pain. This pain may:  ? Happen in any area of the head, such as on one side or both sides.  ? Make it hard to do daily activities.  ? Get worse with physical activity.  ? Get worse around bright lights or loud noises.  · Other symptoms may include:  ? Feeling sick to your stomach (nauseous).  ? Vomiting.  ? Dizziness.  ? Being sensitive to bright lights, loud noises, or smells.  · Before you get a migraine headache, you may get warning signs (an aura). An aura may include:  ? Seeing flashing lights or having blind spots.  ? Seeing bright spots, halos, or zigzag lines.  ? Having tunnel vision or blurred vision.  ? Having numbness or a tingling feeling.  ? Having trouble talking.  ? Having weak muscles.  · Some people have symptoms after a  migraine headache (postdromal phase), such as:  ? Tiredness.  ? Trouble thinking (concentrating).  How is this treated?  · Taking medicines that:  ? Relieve pain.  ? Relieve the feeling of being sick to your stomach.  ? Prevent migraine headaches.  · Treatment may also include:  ? Having acupuncture.  ? Avoiding foods that bring on migraine headaches.  ? Learning ways to control your body functions (biofeedback).  ? Therapy to help you know and deal with negative thoughts (cognitive behavioral therapy).  Follow these instructions at home:  Medicines  · Take over-the-counter and prescription medicines only as told by your doctor.  · Ask your doctor if the medicine prescribed to you:  ? Requires you to avoid driving or using heavy machinery.  ? Can cause trouble pooping (constipation). You may need to take these steps to prevent or treat trouble pooping:  § Drink enough fluid to keep your pee (urine) pale yellow.  § Take over-the-counter or prescription medicines.  § Eat foods that are high in fiber. These include beans, whole grains, and fresh fruits and vegetables.  § Limit foods that are high in fat and sugar. These include fried or sweet foods.  Lifestyle  · Do not drink alcohol.  · Do not use any products that contain nicotine or tobacco, such as cigarettes, e-cigarettes, and chewing tobacco. If you need help quitting, ask your doctor.  · Get at least 8 hours of sleep every night.  · Limit and deal with stress.  General instructions         · Keep a journal to find out what may bring on your migraine headaches. For example, write down:  ? What you eat and drink.  ? How much sleep you get.  ? Any change in what you eat or drink.  ? Any change in your medicines.  · If you have a migraine headache:  ? Avoid things that make your symptoms worse, such as bright lights.  ? It may help to lie down in a dark, quiet room.  ? Do not drive or use heavy machinery.  ? Ask your doctor what activities are safe for you.  · Keep  all follow-up visits as told by your doctor. This is important.  Contact a doctor if:  · You get a migraine headache that is different or worse than others you have had.  · You have more than 15 headache days in one month.  Get help right away if:  · Your migraine headache gets very bad.  · Your migraine headache lasts longer than 72 hours.  · You have a fever.  · You have a stiff neck.  · You have trouble seeing.  · Your muscles feel weak or like you cannot control them.  · You start to lose your balance a lot.  · You start to have trouble walking.  · You pass out (faint).  · You have a seizure.  Summary  · A migraine headache is a very strong throbbing pain on one side or both sides of your head. These headaches can also cause other symptoms.  · This condition may be treated with medicines and changes to your lifestyle.  · Keep a journal to find out what may bring on your migraine headaches.  · Contact a doctor if you get a migraine headache that is different or worse than others you have had.  · Contact your doctor if you have more than 15 headache days in a month.  This information is not intended to replace advice given to you by your health care provider. Make sure you discuss any questions you have with your health care provider.  Document Released: 09/26/2009 Document Revised: 04/10/2020 Document Reviewed: 01/30/2020  Elsevier Patient Education © 2020 [employer]

## 2022-04-13 ENCOUNTER — TELEPHONE (OUTPATIENT)
Dept: NEUROLOGY | Facility: MEDICAL CENTER | Age: 47
End: 2022-04-13

## 2022-04-13 NOTE — TELEPHONE ENCOUNTER
Ubrelvy 100mg Tablet    PA approved [id number] good 04/13/2022 until 04/13/2023 TC paid copay $0.00 #16/30 DS or #48/90 DS order written for 10/30 notifying liaison Marissa Rai. - 04/13/2022 1:39pm    Aimovig 140mg/ml SOAj    PA approved [id number] good 04/13/2022 until 04/13/2023 TC paid copay $0.00 #1ml/30 DS or $0.00 #3ml/90 DS order written for 1/30 notifying liaison Marissa Rai. - 04/13/2022 1:38pm

## 2022-05-09 ENCOUNTER — TELEPHONE (OUTPATIENT)
Dept: NEUROLOGY | Facility: MEDICAL CENTER | Age: 47
End: 2022-05-09

## 2022-05-09 DIAGNOSIS — G43.719 INTRACTABLE CHRONIC MIGRAINE WITHOUT AURA AND WITHOUT STATUS MIGRAINOSUS: ICD-10-CM

## 2022-05-09 NOTE — TELEPHONE ENCOUNTER
Ubrelvy 100mg Tablet    Request rec'd via [facility], emilee TC via [place], BRYANNA paid copay 0.00 #48/90 DS order written for 10/30 LORI, PA expires 04/13/2023 notifying liaison Marissa Rai. - 05/09/2022 3:21pm

## 2022-07-08 ENCOUNTER — OFFICE VISIT (OUTPATIENT)
Dept: URGENT CARE | Facility: PHYSICIAN GROUP | Age: 47
End: 2022-07-08
Payer: MEDICAID

## 2022-07-08 ENCOUNTER — HOSPITAL ENCOUNTER (OUTPATIENT)
Facility: MEDICAL CENTER | Age: 47
End: 2022-07-08
Attending: NURSE PRACTITIONER
Payer: MEDICAID

## 2022-07-08 VITALS
TEMPERATURE: 98.3 F | WEIGHT: 230 LBS | HEART RATE: 84 BPM | SYSTOLIC BLOOD PRESSURE: 122 MMHG | DIASTOLIC BLOOD PRESSURE: 74 MMHG | OXYGEN SATURATION: 92 % | RESPIRATION RATE: 16 BRPM | BODY MASS INDEX: 32.2 KG/M2 | HEIGHT: 71 IN

## 2022-07-08 DIAGNOSIS — Z87.442 HISTORY OF RENAL STONE: ICD-10-CM

## 2022-07-08 DIAGNOSIS — R10.9 FLANK PAIN: ICD-10-CM

## 2022-07-08 DIAGNOSIS — R30.0 DYSURIA: ICD-10-CM

## 2022-07-08 LAB
APPEARANCE UR: CLEAR
BILIRUB UR STRIP-MCNC: NEGATIVE MG/DL
COLOR UR AUTO: ABNORMAL
GLUCOSE UR STRIP.AUTO-MCNC: NEGATIVE MG/DL
KETONES UR STRIP.AUTO-MCNC: NEGATIVE MG/DL
LEUKOCYTE ESTERASE UR QL STRIP.AUTO: NEGATIVE
NITRITE UR QL STRIP.AUTO: NEGATIVE
PH UR STRIP.AUTO: 6 [PH] (ref 5–8)
PROT UR QL STRIP: NEGATIVE MG/DL
RBC UR QL AUTO: ABNORMAL
SP GR UR STRIP.AUTO: 1.03
UROBILINOGEN UR STRIP-MCNC: 0.2 MG/DL

## 2022-07-08 PROCEDURE — 81002 URINALYSIS NONAUTO W/O SCOPE: CPT | Performed by: NURSE PRACTITIONER

## 2022-07-08 PROCEDURE — 99214 OFFICE O/P EST MOD 30 MIN: CPT | Mod: 25 | Performed by: NURSE PRACTITIONER

## 2022-07-08 PROCEDURE — 87086 URINE CULTURE/COLONY COUNT: CPT

## 2022-07-08 RX ORDER — SERTRALINE HYDROCHLORIDE 100 MG/1
100 TABLET, FILM COATED ORAL DAILY
COMMUNITY
End: 2022-10-07

## 2022-07-08 RX ORDER — GABAPENTIN 300 MG/1
300 CAPSULE ORAL 3 TIMES DAILY
COMMUNITY
End: 2022-07-30

## 2022-07-08 RX ORDER — GRANULES FOR ORAL 3 G/1
3 POWDER ORAL ONCE
Qty: 1 EACH | Refills: 0 | Status: SHIPPED | OUTPATIENT
Start: 2022-07-08 | End: 2022-07-08

## 2022-07-08 RX ORDER — CIPROFLOXACIN 500 MG/1
500 TABLET, FILM COATED ORAL 2 TIMES DAILY
Qty: 14 TABLET | Refills: 0 | Status: SHIPPED | OUTPATIENT
Start: 2022-07-08 | End: 2022-07-08

## 2022-07-08 ASSESSMENT — ENCOUNTER SYMPTOMS
DIZZINESS: 0
VOMITING: 0
SORE THROAT: 0
MYALGIAS: 0
CHILLS: 0
FEVER: 0
SHORTNESS OF BREATH: 0
EYE PAIN: 0
NAUSEA: 0
ABDOMINAL PAIN: 1
FLANK PAIN: 1

## 2022-07-08 NOTE — PROGRESS NOTES
Subjective:   Peter Trimble is a 47 y.o. female who presents for Painful Urination (Has kidney stones )      HPI  Patient is a 47-year-old female presents the urgent care for evaluation of a possible UTI in which she was recently seen for by her PCP at [facility].  Patient states that she was seen Wednesday at [facility] a urinalysis and urine culture was obtained she had a message from her provider stating that she had a UTI and that she was requiring medication however no medication was sent to the pharmacy.  Patient also had recently had a CT obtained indicating a 9mm nonobstructing kidney stone is scheduled to see urology 18th of this month.  She continues to have bilateral flank pain, no nausea or vomiting.  Intermittent chills without fevers.  She believes urine culture was positive for E. coli however is not positive.  Patient does not have the urine culture results with her today.  Review of Systems   Constitutional: Negative for chills and fever.   HENT: Negative for sore throat.    Eyes: Negative for pain.   Respiratory: Negative for shortness of breath.    Cardiovascular: Negative for chest pain.   Gastrointestinal: Positive for abdominal pain. Negative for nausea and vomiting.   Genitourinary: Positive for dysuria, flank pain, frequency and urgency. Negative for hematuria.   Musculoskeletal: Negative for myalgias.   Skin: Negative for rash.   Neurological: Negative for dizziness.       Medications:    • Advair Diskus Aepb  • Aimovig Soaj  • albuterol Aers  • baclofen Tabs  • busPIRone  • ceftriaxone (ROCEPHIN) 1g - lidocaine 1% 3.6 mL for IM use  • cetirizine Tabs  • ciprofloxacin Tabs  • clobetasol  • divalproex Tbec  • ferrous sulfate  • fluconazole Tabs  • fluticasone  • furosemide Tabs  • gabapentin Caps  • hydrOXYzine pamoate Caps  • ipratropium-albuterol  • LORazepam Tabs  • metroNIDAZOLE Tabs  • nystatin  • nystatin Crea  • omeprazole  • ondansetron Tabs  • oxyCODONE immediate  release  • propranolol Tabs  • sertraline Tabs  • traZODone Tabs  • triamcinolone acetonide Crea  • Ubrogepant Tabs  • vitamin D Tabs    Allergies: Imitrex [sumatriptan succinate], Maxalt [rizatriptan benzoate], Phenergan [promethazine hcl], Xanax [alprazolam], Butrans [buprenorphine], Dilaudid [hydromorphone], Doxycycline, Eucalyptus oil, Gabapentin, Lyrica [pregabalin], Morphabond er [renny], Patchouli oil, Phenergan  [promethazine], Sulfa drugs, and Topiramate    Problem List: Peter Trimble does not have any pertinent problems on file.    Surgical History:  Past Surgical History:   Procedure Laterality Date   • ORIF, ANKLE Right 8/7/2016    Procedure: ANKLE ORIF;  Surgeon: Bill Brambila M.D.;  Location: [facility];  Service:    • IRRIGATION & DEBRIDEMENT GENERAL  8/17/2014    Performed by Ezra Wright M.D. at [facility]   • BREAST BIOPSY  5/29/2014    Performed by Negro Hester M.D. at SURGERY SAME DAY [facility]   • EVACUATION OF HEMATOMA  5/2/2013    Performed by Lazaro Connors Jr., M.D. at [facility]   • MAMMOPLASTY REDUCTION  4/29/2013    Performed by Negro Hester M.D. at [facility]   • RECOVERY  1/30/2012    Performed by BENEDICT IR-RECOVERY at [facility] SAME DAY [facility]   • RECOVERY  10/5/2011    Performed by MARIAELENA MCMULLENON at [facility]   • HYSTERECTOMY LAPAROSCOPY      W BSO   • KNEE RECONSTRUCTION     • LAMINOTOMY     • OTHER ORTHOPEDIC SURGERY      maddie. knee scopes   • OTHER ORTHOPEDIC SURGERY      back fusion then hardware removal   • PB EXPLORATION OF SPINAL FUSION     • TN REMOVAL OF OVARY(S)         Past Social Hx: Peter Trimble  reports that she has never smoked. She has never used smokeless tobacco. She reports that she does not drink alcohol and does not use drugs.     Past Family Hx:  Peter Trimble family history includes Alcohol/Drug in her brother, father, maternal grandfather, and  "paternal grandfather; Arthritis in her maternal grandmother, maternal uncle, and paternal grandmother; Genetic Disorder in her paternal aunt; Heart Disease in her father, maternal grandmother, and maternal uncle; Hyperlipidemia in her father, maternal uncle, and mother; Hypertension in her father, maternal uncle, and mother; Psychiatric Illness in her father, maternal grandfather, maternal uncle, and paternal uncle; Stroke in her maternal grandfather.     Problem list, medications, and allergies reviewed by myself today in Epic.     Objective:     /74   Pulse 84   Temp 36.8 °C (98.3 °F) (Temporal)   Resp 16   Ht 1.803 m (5' 11\")   Wt 104 kg (230 lb)   SpO2 92%   BMI 32.08 kg/m²     Physical Exam  Vitals and nursing note reviewed.   Constitutional:       General: She is not in acute distress.     Appearance: Normal appearance. She is well-developed. She is not ill-appearing or toxic-appearing.   HENT:      Head: Normocephalic and atraumatic.      Right Ear: External ear normal.      Left Ear: External ear normal.      Nose: Nose normal.      Mouth/Throat:      Lips: Pink.      Mouth: Mucous membranes are moist.   Eyes:      General: Lids are normal.         Right eye: No discharge.         Left eye: No discharge.      Conjunctiva/sclera: Conjunctivae normal.   Cardiovascular:      Rate and Rhythm: Normal rate.   Pulmonary:      Effort: Pulmonary effort is normal. No accessory muscle usage or respiratory distress.      Breath sounds: Normal breath sounds.   Abdominal:      General: Bowel sounds are normal. There is no distension.      Tenderness: There is no abdominal tenderness. There is right CVA tenderness and left CVA tenderness. There is no guarding or rebound. Negative signs include Mari's sign, Rovsing's sign, McBurney's sign, psoas sign and obturator sign.   Musculoskeletal:         General: Normal range of motion.      Cervical back: Full passive range of motion without pain.   Skin:     " General: Skin is warm and dry.      Coloration: Skin is not pale.   Neurological:      General: No focal deficit present.      Mental Status: She is alert and oriented to person, place, and time. Mental status is at baseline.      Gait: Gait (gait at baseline) normal.   Psychiatric:         Mood and Affect: Mood normal.         Thought Content: Thought content normal.         Judgment: Judgment normal.         Assessment/Plan:     Diagnosis and associated orders:     1. Dysuria  POCT Urinalysis    URINE CULTURE(NEW)    cefTRIAXone (Rocephin) 1 g, lidocaine (XYLOCAINE) 1 % 3.6 mL for IM use    fosfomycin (MONUROL) 3 GM Pack    DISCONTINUED: ciprofloxacin (CIPRO) 500 MG Tab   2. History of renal stone  POCT Urinalysis    URINE CULTURE(NEW)    cefTRIAXone (Rocephin) 1 g, lidocaine (XYLOCAINE) 1 % 3.6 mL for IM use   3. Flank pain          Comments/MDM:     •   UA positive RBCs    Patient is a 47-year-old female present with the stated above, on exam she does have bilateral CVA tenderness, patient was able to pull up her medical records from [facility] on her phone CT was obtained indicating a 9 mm nonobstructing stone.  She also had a message from her medical provider indicating that a prescription for fosfomycin was sent to the pharmacy however patient stating that it has been 2 days and prescription has not been filled.  Patient's vital signs are stable on today's exam, patient is afebrile I will treat her with a dose of Rocephin and refill the prescription of fosfomycin that was initially sent 7/6/22 by her PCP.  Strict ER precautions were discussed at length with patient in which she was in agreement and verbalizing understanding.  We will follow-up with urine culture and change as indicated.  Patient will follow-up with urology  I personally reviewed prior external notes and prior test results pertinent to today's visit.   Discussed management options, risks and benefits, and alternatives to treatment plan  agreed upon.   Red flags discussed and indications to immediately call 911 or present to the Emergency Department.   Supportive care, differential diagnoses, and indications for immediate follow-up discussed with patient.    Patient expresses understanding and agrees to plan. Patient denies any other questions or concerns.           Please note that this dictation was created using voice recognition software. I have made a reasonable attempt to correct obvious errors, but I expect that there are errors of grammar and possibly content that I did not discover before finalizing the note.    This note was electronically signed by Alex DEMPSEY.

## 2022-07-09 DIAGNOSIS — Z87.442 HISTORY OF RENAL STONE: ICD-10-CM

## 2022-07-09 DIAGNOSIS — R30.0 DYSURIA: ICD-10-CM

## 2022-07-11 ENCOUNTER — APPOINTMENT (OUTPATIENT)
Dept: RADIOLOGY | Facility: MEDICAL CENTER | Age: 47
End: 2022-07-11
Attending: NEUROLOGICAL SURGERY
Payer: MEDICAID

## 2022-07-11 LAB
BACTERIA UR CULT: NORMAL
SIGNIFICANT IND 70042: NORMAL
SITE SITE: NORMAL
SOURCE SOURCE: NORMAL

## 2022-07-30 ENCOUNTER — OFFICE VISIT (OUTPATIENT)
Dept: URGENT CARE | Facility: PHYSICIAN GROUP | Age: 47
End: 2022-07-30
Payer: MEDICAID

## 2022-07-30 ENCOUNTER — HOSPITAL ENCOUNTER (OUTPATIENT)
Facility: MEDICAL CENTER | Age: 47
End: 2022-07-30
Attending: FAMILY MEDICINE
Payer: MEDICAID

## 2022-07-30 VITALS
RESPIRATION RATE: 16 BRPM | TEMPERATURE: 97.6 F | HEIGHT: 71 IN | BODY MASS INDEX: 31.78 KG/M2 | HEART RATE: 79 BPM | OXYGEN SATURATION: 93 % | WEIGHT: 227 LBS | SYSTOLIC BLOOD PRESSURE: 118 MMHG | DIASTOLIC BLOOD PRESSURE: 76 MMHG

## 2022-07-30 DIAGNOSIS — N39.0 ACUTE URINARY TRACT INFECTION: ICD-10-CM

## 2022-07-30 DIAGNOSIS — N39.0 CHRONIC UTI (URINARY TRACT INFECTION): ICD-10-CM

## 2022-07-30 LAB
APPEARANCE UR: CLEAR
BILIRUB UR STRIP-MCNC: NEGATIVE MG/DL
COLOR UR AUTO: ABNORMAL
GLUCOSE UR STRIP.AUTO-MCNC: NEGATIVE MG/DL
KETONES UR STRIP.AUTO-MCNC: NEGATIVE MG/DL
LEUKOCYTE ESTERASE UR QL STRIP.AUTO: ABNORMAL
NITRITE UR QL STRIP.AUTO: NEGATIVE
PH UR STRIP.AUTO: 6 [PH] (ref 5–8)
PROT UR QL STRIP: NEGATIVE MG/DL
RBC UR QL AUTO: ABNORMAL
SP GR UR STRIP.AUTO: 1.02
UROBILINOGEN UR STRIP-MCNC: 0.2 MG/DL

## 2022-07-30 PROCEDURE — 87086 URINE CULTURE/COLONY COUNT: CPT

## 2022-07-30 PROCEDURE — 87186 SC STD MICRODIL/AGAR DIL: CPT | Mod: 91

## 2022-07-30 PROCEDURE — 99214 OFFICE O/P EST MOD 30 MIN: CPT | Performed by: FAMILY MEDICINE

## 2022-07-30 PROCEDURE — 81002 URINALYSIS NONAUTO W/O SCOPE: CPT | Performed by: FAMILY MEDICINE

## 2022-07-30 PROCEDURE — 87077 CULTURE AEROBIC IDENTIFY: CPT

## 2022-07-30 RX ORDER — LORAZEPAM 0.5 MG/1
TABLET ORAL
COMMUNITY
Start: 2022-07-03 | End: 2023-04-28

## 2022-07-30 RX ORDER — FLUCONAZOLE 100 MG/1
TABLET ORAL
COMMUNITY
Start: 2022-06-23 | End: 2022-09-17

## 2022-07-30 RX ORDER — ACETAMINOPHEN 160 MG
TABLET,DISINTEGRATING ORAL
COMMUNITY
Start: 2022-06-30 | End: 2022-07-30

## 2022-07-30 RX ORDER — GRANULES FOR ORAL 3 G/1
POWDER ORAL
COMMUNITY
Start: 2022-07-08 | End: 2022-07-30

## 2022-07-30 RX ORDER — MELOXICAM 15 MG/1
15 TABLET ORAL DAILY
COMMUNITY
Start: 2022-06-03 | End: 2022-07-30

## 2022-07-30 RX ORDER — GRANULES FOR ORAL 3 G/1
3 POWDER ORAL ONCE
Qty: 1 EACH | Refills: 0 | Status: SHIPPED | OUTPATIENT
Start: 2022-07-30 | End: 2022-07-30

## 2022-07-30 RX ORDER — METRONIDAZOLE 7.5 MG/G
GEL VAGINAL
COMMUNITY
Start: 2022-07-06 | End: 2022-07-30

## 2022-07-30 RX ORDER — KETOCONAZOLE 20 MG/ML
SHAMPOO TOPICAL
COMMUNITY
Start: 2022-06-07 | End: 2023-09-12

## 2022-07-30 NOTE — PROGRESS NOTES
"Chief Complaint:    Chief Complaint   Patient presents with   • Painful Urination     An burning, has an issue with kidney stones. Is waiting to see        History of Present Illness:    Complicated urological history, including has persisting kidney stone, saw other provider on 22 for possible UTI, visit reviewed. On 22, she was treated with Ceftriaxone 1 gram IM and Fosfomycin 3 grams x 1 dose. Urine culture (22) only grew out: Usual skin deins, but she felt improved with the antibiotic treatment. She feels UTI symptoms returning prompting her visit today. She has not had any other urological intervention since last OV 22.      Past Medical History:    Past Medical History:   Diagnosis Date   • Acute blood loss anemia 2013    -resolved, postop     • Acute renal failure (ARF) ([facility]) 10/5/2011    -resolved (post op )    • Anemia    • Anxiety    • Arthritis     osteoarthritis since 17yo.   • ASTHMA     O2 3liters at HS and prn   • Bipolar affective ([facility])    • Breath shortness    • Cold    • Depression    • Eclampsia complicating pregnancy    • Environmental allergies    • Essential hypertension, malignant 2011   • Fibromyalgia    • GERD (gastroesophageal reflux disease)    • Heart murmur     she denies this hx   • History of pregnancy 10/16/2014        • Hx MRSA infection    • Hyperlipidemia 14    \"off medication\"   • Hypertension    • Kidney stones    • Migraines    • Pain 15    \"my body hurts all the time\", 5-6/10   • Personality disorder ([facility])    • Pneumonia    • PTSD (post-traumatic stress disorder)    • Scalp wound 2014   • Seizure ([facility]) 14    last    • Sleep apnea     has had a sleep study, use O2 at HS   • Snoring    • Stroke ([facility])      reports strokes x5 , and a \"brain bleed\"   • Subarachnoid hemorrhage ([facility]) 2011    -small,  (2nd to HTN)    • Unspecified hemorrhagic conditions     nose-bleeds, bruises easily   • " Unspecified urinary incontinence    • Urinary bladder disorder      Past Surgical History:    Past Surgical History:   Procedure Laterality Date   • ORIF, ANKLE Right 8/7/2016    Procedure: ANKLE ORIF;  Surgeon: Bill Brambila M.D.;  Location: [facility];  Service:    • IRRIGATION & DEBRIDEMENT GENERAL  8/17/2014    Performed by Ezra Wright M.D. at SURGERY [facility]   • BREAST BIOPSY  5/29/2014    Performed by Negro Hester M.D. at SURGERY SAME DAY [facility]   • EVACUATION OF HEMATOMA  5/2/2013    Performed by Lazaro Connors Jr., M.D. at SURGERY [facility]   • MAMMOPLASTY REDUCTION  4/29/2013    Performed by Negro Hester M.D. at SURGERY [facility]   • RECOVERY  1/30/2012    Performed by SURGERY, IR-RECOVERY at SURGERY SAME DAY [facility]   • RECOVERY  10/5/2011    Performed by MARIAELENA MCMULLENONLY at SURGERY [facility]   • HYSTERECTOMY LAPAROSCOPY      W BSO   • KNEE RECONSTRUCTION     • LAMINOTOMY     • OTHER ORTHOPEDIC SURGERY      maddie. knee scopes   • OTHER ORTHOPEDIC SURGERY      back fusion then hardware removal   • PB EXPLORATION OF SPINAL FUSION     • ND REMOVAL OF OVARY(S)       Social History:    Social History     Socioeconomic History   • Marital status:      Spouse name: Not on file   • Number of children: Not on file   • Years of education: Not on file   • Highest education level: Associate degree: occupational, technical, or vocational program   Occupational History   • Not on file   Tobacco Use   • Smoking status: Never Smoker   • Smokeless tobacco: Never Used   Vaping Use   • Vaping Use: Never used   Substance and Sexual Activity   • Alcohol use: No   • Drug use: No   • Sexual activity: Not on file   Other Topics Concern   • Not on file   Social History Narrative   • Not on file     Social Determinants of Health     Financial Resource Strain: Not on file   Food Insecurity: Not on file   Transportation Needs: Not on file   Physical Activity:  Not on file   Stress: Not on file   Social Connections: Not on file   Intimate Partner Violence: Not on file   Housing Stability: Not on file     Family History:    Family History   Problem Relation Age of Onset   • Hypertension Mother    • Hyperlipidemia Mother    • Hypertension Father    • Hyperlipidemia Father    • Heart Disease Father    • Psychiatric Illness Father    • Alcohol/Drug Father    • Alcohol/Drug Brother    • Arthritis Maternal Uncle    • Psychiatric Illness Maternal Uncle    • Heart Disease Maternal Uncle    • Hypertension Maternal Uncle    • Hyperlipidemia Maternal Uncle    • Genetic Disorder Paternal Aunt    • Psychiatric Illness Paternal Uncle    • Arthritis Maternal Grandmother    • Heart Disease Maternal Grandmother    • Alcohol/Drug Maternal Grandfather    • Stroke Maternal Grandfather    • Psychiatric Illness Maternal Grandfather    • Arthritis Paternal Grandmother    • Alcohol/Drug Paternal Grandfather      Medications:    Current Outpatient Medications on File Prior to Visit   Medication Sig Dispense Refill   • diclofenac sodium (VOLTAREN) 1 % Gel      • fluconazole (DIFLUCAN) 100 MG Tab      • ketoconazole (NIZORAL) 2 % shampoo Wash scalp 3 times weekly and allow to sit 3-5 minutes prior to rinsing     • LORazepam (ATIVAN) 0.5 MG Tab Take 1 tablet by mouth once a day as needed     • sertraline (ZOLOFT) 100 MG Tab Take 100 mg by mouth every day.     • Ubrogepant 100 MG Tab Take 100 mg by mouth 2 times a day as needed (pain). 10 Tablet 6   • ADVAIR DISKUS 250-50 MCG/DOSE AEROSOL POWDER, BREATH ACTIVATED      • traZODone (DESYREL) 100 MG Tab      • triamcinolone acetonide (KENALOG) 0.1 % Cream      • Erenumab-aooe (AIMOVIG) 140 MG/ML Solution Auto-injector Inject 140 mg under the skin every 4 weeks. 1 mL 5   • ondansetron (ZOFRAN) 8 MG Tab Take 1 Tablet by mouth 1 time a day as needed for Nausea/Vomiting. 20 Tablet 5   • divalproex (DEPAKOTE) 500 MG Tablet Delayed Response 500mg in AM and  "1000mg in  tablet 2   • oxyCODONE immediate release (ROXICODONE) 10 MG immediate release tablet      • ipratropium-albuterol (DUONEB) 0.5-2.5 (3) MG/3ML nebulizer solution      • hydrOXYzine pamoate (VISTARIL) 50 MG Cap      • busPIRone (BUSPAR) 30 MG tablet      • clobetasol (TEMOVATE) 0.05 % external solution      • omeprazole (PRILOSEC) 20 MG delayed-release capsule Take 20 mg by mouth every day.     • propranolol (INDERAL) 10 MG Tab Take 5 mg by mouth 2 times a day. Indications: High Blood Pressure Disorder     • baclofen (LIORESAL) 10 MG Tab Take 10 mg by mouth 3 times a day as needed (Pain).     • fluticasone (FLONASE) 50 MCG/ACT nasal spray Spray 1 Spray in nose every day.     • nystatin (MYCOSTATIN) 250795 UNIT/GM Cream topical cream Apply 1 g to affected area(s) 2 times a day.     • Cholecalciferol (VITAMIN D) 2000 UNIT Tab Take 4,000 Units by mouth every day.     • albuterol 108 (90 Base) MCG/ACT Aero Soln inhalation aerosol ProAir HFA 90 mcg/actuation aerosol inhaler     • cetirizine (ZYRTEC) 10 MG Tab Take 10 mg by mouth every day.     • ferrous sulfate 325 (65 FE) MG tablet Take 325 mg by mouth every day.       No current facility-administered medications on file prior to visit.     Allergies:    Allergies   Allergen Reactions   • Imitrex [Sumatriptan Succinate]      Had brain bleed   • Maxalt [Rizatriptan Benzoate]      Had brain bleed   • Phenergan [Promethazine Hcl]      \"psychotic reaction\"   • Xanax [Alprazolam]      Sores and dry mouth, many others pt cannot remember   • Butrans [Buprenorphine]    • Dilaudid [Hydromorphone] Rash     Patient states she had rash, hallucinations, unable to urinate.    • Doxycycline    • Eucalyptus Oil    • Gabapentin    • Lyrica [Pregabalin]      \"depression, slept a lot\"   • Morphabond Er [Suly]    • Patchouli Oil Rash     Rash    • Phenergan  [Promethazine]    • Sulfa Drugs    • Topiramate Nausea     Patient states made sick to stomach and dizzy. " "      Vitals:    Vitals:    07/30/22 1221   BP: 118/76   Pulse: 79   Resp: 16   Temp: 36.4 °C (97.6 °F)   TempSrc: Temporal   SpO2: 93%   Weight: 103 kg (227 lb)   Height: 1.803 m (5' 11\")       Physical Exam:    Constitutional: Vital signs reviewed. Appears well-developed and well-nourished. On NC O2. No acute distress.   Eyes: Sclera white, conjunctivae clear.   ENT: External ears normal. Hearing normal.   Neck: Neck supple.   Pulmonary/Chest: Respirations non-labored.   Musculoskeletal: Normal gait. No muscular atrophy or weakness.  Neurological: Alert and oriented to person, place, and time. Muscle tone normal. Coordination normal.   Psychiatric: Normal mood and affect. Behavior is normal. Judgment and thought content normal.       Diagnostics:    POCT Urinalysis  Order: [id number]   Status: Final result     Visible to patient: Ignacia (scheduled for 7/31/2022 10:33 AM)     Next appt: 08/10/2022 at 08:15 AM in Radiology ([facility])     Dx: Urinary symptom or sign     0 Result Notes    Component Ref Range & Units 12:32 PM   (7/30/22) 3 wk ago   (7/8/22) 3 yr ago   (6/20/19) 3 yr ago   (3/15/19) 3 yr ago   (1/5/19) 4 yr ago   (11/24/17) 4 yr ago   (9/23/17)   POC Color Negative dark yellow  dark yellow      Dark yellow  yellow  orange    POC Appearance Negative clear  clear      slightly cloudy  clear  clear    POC Leukocyte Esterase Negative small  negative  Trace  Trace  trace  negative  large    POC Nitrites Negative negative  negative  Neg  Neg  neg  negative  positive    POC Urobiligen Negative (0.2) mg/dL 0.2  0.2  Neg  Neg  0.2  negative  neg    POC Protein Negative mg/dL negative  negative  Neg  Trace  trace  negative  30    POC Urine PH 5.0 - 8.0 6.0  6.0  5.5  7  6.0  7.0  7.5    POC Blood Negative trace  small  Neg  Large  neg  moderate  large    POC Specific Gravity <1.005 - >1.030 1.025  1.030  1.025  1.020  1.025  1.010  1.010    POC Ketones Negative mg/dL negative  negative  Neg  Neg  neg  negative "  neg    POC Bilirubin Negative mg/dL negative  negative  Neg  Neg  neg  negative  neg    POC Glucose Negative mg/dL negative  negative  Neg  Neg  neg  negative  neg    Resulting Agency  [facility]                   Specimen Collected: 22 12:32 PM Last Resulted: 22 12:33 PM             Medical Decision Makin. Chronic UTI (urinary tract infection)  - POCT Urinalysis  - cefTRIAXone (Rocephin) 1 g, lidocaine (XYLOCAINE) 1 % 3.6 mL for IM use  - fosfomycin (MONUROL) 3 GM Pack; Take 3 g by mouth one time for 1 dose.  Dispense: 1 Each; Refill: 0  - URINE CULTURE(NEW); Future      Discussed with her DDX, management options, and risks, benefits, and alternatives to treatment plan agreed upon.    Complicated urological history, including has persisting kidney stone, saw other provider on 22 for possible UTI, visit reviewed. On 22, she was treated with Ceftriaxone 1 gram IM and Fosfomycin 3 grams x 1 dose. Urine culture (22) only grew out: Usual skin denis, but she felt improved with the antibiotic treatment. She feels UTI symptoms returning prompting her visit today. She has not had any other urological intervention since last OV 22.    Urine dipstick: small leukocytes, trace blood.    Offered her same treatment as OV 22 since she improved with those meds. She would like.    Agreeable to medications given and prescribed and send urine for culture.    Advised urine culture result will show in MyChart in a few days.    Will follow-up if needed while waiting for urine culture result.

## 2022-08-01 DIAGNOSIS — N39.0 CHRONIC UTI (URINARY TRACT INFECTION): ICD-10-CM

## 2022-08-01 LAB
BACTERIA UR CULT: ABNORMAL
SIGNIFICANT IND 70042: ABNORMAL
SITE SITE: ABNORMAL
SOURCE SOURCE: ABNORMAL

## 2022-08-01 RX ORDER — NITROFURANTOIN 25; 75 MG/1; MG/1
100 CAPSULE ORAL 2 TIMES DAILY
Qty: 14 CAPSULE | Refills: 0 | Status: SHIPPED | OUTPATIENT
Start: 2022-08-01 | End: 2022-08-08

## 2022-08-03 ENCOUNTER — OFFICE VISIT (OUTPATIENT)
Dept: URGENT CARE | Facility: PHYSICIAN GROUP | Age: 47
End: 2022-08-03
Payer: MEDICAID

## 2022-08-03 ENCOUNTER — HOSPITAL ENCOUNTER (OUTPATIENT)
Facility: MEDICAL CENTER | Age: 47
End: 2022-08-03
Attending: NURSE PRACTITIONER
Payer: MEDICAID

## 2022-08-03 VITALS
HEART RATE: 71 BPM | WEIGHT: 227 LBS | BODY MASS INDEX: 31.78 KG/M2 | HEIGHT: 71 IN | SYSTOLIC BLOOD PRESSURE: 128 MMHG | RESPIRATION RATE: 16 BRPM | TEMPERATURE: 97.9 F | DIASTOLIC BLOOD PRESSURE: 72 MMHG | OXYGEN SATURATION: 92 %

## 2022-08-03 DIAGNOSIS — N89.8 VAGINAL DISCHARGE: ICD-10-CM

## 2022-08-03 DIAGNOSIS — B96.89 BACTERIAL VAGINOSIS: ICD-10-CM

## 2022-08-03 DIAGNOSIS — R30.0 DYSURIA: ICD-10-CM

## 2022-08-03 DIAGNOSIS — N76.0 BACTERIAL VAGINOSIS: ICD-10-CM

## 2022-08-03 DIAGNOSIS — N89.8 VAGINAL FLATUS: ICD-10-CM

## 2022-08-03 LAB
APPEARANCE UR: CLEAR
BILIRUB UR STRIP-MCNC: NEGATIVE MG/DL
CANDIDA DNA VAG QL PROBE+SIG AMP: NEGATIVE
COLOR UR AUTO: YELLOW
G VAGINALIS DNA VAG QL PROBE+SIG AMP: POSITIVE
GLUCOSE UR STRIP.AUTO-MCNC: NEGATIVE MG/DL
KETONES UR STRIP.AUTO-MCNC: NEGATIVE MG/DL
LEUKOCYTE ESTERASE UR QL STRIP.AUTO: NEGATIVE
NITRITE UR QL STRIP.AUTO: NEGATIVE
PH UR STRIP.AUTO: 6 [PH] (ref 5–8)
PROT UR QL STRIP: NEGATIVE MG/DL
RBC UR QL AUTO: NORMAL
SP GR UR STRIP.AUTO: 1.02
T VAGINALIS DNA VAG QL PROBE+SIG AMP: NEGATIVE
UROBILINOGEN UR STRIP-MCNC: 0.2 MG/DL

## 2022-08-03 PROCEDURE — 87480 CANDIDA DNA DIR PROBE: CPT

## 2022-08-03 PROCEDURE — 87660 TRICHOMONAS VAGIN DIR PROBE: CPT

## 2022-08-03 PROCEDURE — 81002 URINALYSIS NONAUTO W/O SCOPE: CPT | Performed by: NURSE PRACTITIONER

## 2022-08-03 PROCEDURE — 99214 OFFICE O/P EST MOD 30 MIN: CPT | Mod: 25 | Performed by: NURSE PRACTITIONER

## 2022-08-03 PROCEDURE — 87510 GARDNER VAG DNA DIR PROBE: CPT

## 2022-08-03 NOTE — PROGRESS NOTES
Subjective     Peter Trimble is a 47 y.o. female who presents with Follow-Up (Was seen here on Saturday given medication an injections for UTI.  Does not feel any better today)            Peter comes in today with a complaint of clear to white vaginal discharge, vaginal flatus, and dysuria.  She has a history of frequent UTI. She was seen 1 month ago with UTI symptoms and was treated with fosfomycin.  Urine culture was unremarkable.  She was seen again 4 days ago with returned dysuria. She had small leuks on UA and was treated with fosfomycin again.  Urine culture came back positive for both Klebsiella pneumoniae and Staphylococcus epidermidis.  Both were sensitive to Nitrofurantoin.  As patient was still having symptoms when notified by urgent care provider, she was prescribed a course of nitrofurantoin. She is on day 2 of treatment with that antibiotic.  She also reports that she gets frequent yeast infections and BV so she has been taking Fluconazole 100 mg daily as well as using metronidazole gel for the past 2 days.  It seems that she gets these meds from her gynecologist for prn use.  She denies any fever, chills, flank pain, nausea, vomiting, pelvic pain or genital rash or lesion.  No suspected STI as she is not sexually active.    Current Outpatient Medications on File Prior to Visit:  nitrofurantoin (MACROBID) 100 MG Cap, Take 1 Capsule by mouth 2 times a day for 7 days., Disp: 14 Capsule, Rfl: 0  diclofenac sodium (VOLTAREN) 1 % Gel, , Disp: , Rfl:   fluconazole (DIFLUCAN) 100 MG Tab, , Disp: , Rfl:   ketoconazole (NIZORAL) 2 % shampoo, Wash scalp 3 times weekly and allow to sit 3-5 minutes prior to rinsing, Disp: , Rfl:   LORazepam (ATIVAN) 0.5 MG Tab, Take 1 tablet by mouth once a day as needed, Disp: , Rfl:   sertraline (ZOLOFT) 100 MG Tab, Take 100 mg by mouth every day., Disp: , Rfl:   Ubrogepant 100 MG Tab, Take 100 mg by mouth 2 times a day as needed (pain)., Disp: 10 Tablet, Rfl: 6  ADVAIR  DISKUS 250-50 MCG/DOSE AEROSOL POWDER, BREATH ACTIVATED, , Disp: , Rfl:   traZODone (DESYREL) 100 MG Tab, , Disp: , Rfl:   triamcinolone acetonide (KENALOG) 0.1 % Cream, , Disp: , Rfl:   Erenumab-aooe (AIMOVIG) 140 MG/ML Solution Auto-injector, Inject 140 mg under the skin every 4 weeks., Disp: 1 mL, Rfl: 5  ondansetron (ZOFRAN) 8 MG Tab, Take 1 Tablet by mouth 1 time a day as needed for Nausea/Vomiting., Disp: 20 Tablet, Rfl: 5  divalproex (DEPAKOTE) 500 MG Tablet Delayed Response, 500mg in AM and 1000mg in PM, Disp: 270 tablet, Rfl: 2  oxyCODONE immediate release (ROXICODONE) 10 MG immediate release tablet, , Disp: , Rfl:   ipratropium-albuterol (DUONEB) 0.5-2.5 (3) MG/3ML nebulizer solution, , Disp: , Rfl:   hydrOXYzine pamoate (VISTARIL) 50 MG Cap, , Disp: , Rfl:   busPIRone (BUSPAR) 30 MG tablet, , Disp: , Rfl:   clobetasol (TEMOVATE) 0.05 % external solution, , Disp: , Rfl:   omeprazole (PRILOSEC) 20 MG delayed-release capsule, Take 20 mg by mouth every day., Disp: , Rfl:   propranolol (INDERAL) 10 MG Tab, Take 5 mg by mouth 2 times a day. Indications: High Blood Pressure Disorder, Disp: , Rfl:   baclofen (LIORESAL) 10 MG Tab, Take 10 mg by mouth 3 times a day as needed (Pain)., Disp: , Rfl:   fluticasone (FLONASE) 50 MCG/ACT nasal spray, Spray 1 Spray in nose every day., Disp: , Rfl:   nystatin (MYCOSTATIN) 024277 UNIT/GM Cream topical cream, Apply 1 g to affected area(s) 2 times a day., Disp: , Rfl:   Cholecalciferol (VITAMIN D) 2000 UNIT Tab, Take 4,000 Units by mouth every day., Disp: , Rfl:   albuterol 108 (90 Base) MCG/ACT Aero Soln inhalation aerosol, ProAir HFA 90 mcg/actuation aerosol inhaler, Disp: , Rfl:   cetirizine (ZYRTEC) 10 MG Tab, Take 10 mg by mouth every day., Disp: , Rfl:   ferrous sulfate 325 (65 FE) MG tablet, Take 325 mg by mouth every day., Disp: , Rfl:     No current facility-administered medications on file prior to visit.        Medications, Allergies, and current problem list  "reviewed today in Epic  ROS           Objective     Blood Pressure 128/72   Pulse 71   Temperature 36.6 °C (97.9 °F) (Temporal)   Respiration 16   Height 1.803 m (5' 11\")   Weight 103 kg (227 lb)   Oxygen Saturation 92% Comment: on 3 lt  Body Mass Index 31.66 kg/m²      Physical Exam  Vitals reviewed.   Constitutional:       General: She is not in acute distress.     Appearance: She is well-developed. She is obese. She is not toxic-appearing or diaphoretic.   Cardiovascular:      Rate and Rhythm: Normal rate and regular rhythm.      Heart sounds: Normal heart sounds. No murmur heard.    No friction rub. No gallop.   Pulmonary:      Effort: Pulmonary effort is normal. No respiratory distress.      Breath sounds: Normal breath sounds. No stridor. No wheezing, rhonchi or rales.   Chest:      Chest wall: No tenderness.   Abdominal:      General: Bowel sounds are normal. There is no distension or abdominal bruit.      Palpations: Abdomen is soft. Abdomen is not rigid. There is no shifting dullness, fluid wave, mass or pulsatile mass.      Tenderness: There is no abdominal tenderness. There is no right CVA tenderness, left CVA tenderness, guarding or rebound. Negative signs include Mari's sign and McBurney's sign.      Hernia: There is no hernia in the left inguinal area or right inguinal area.   Genitourinary:     Labia:         Right: No rash, tenderness, lesion or injury.         Left: No rash, tenderness, lesion or injury.       Vagina: No signs of injury and foreign body. Erythema present. No vaginal discharge, tenderness, bleeding, lesions or prolapsed vaginal walls.      Comments: Mildly erythematous vaginal canal with no genital lesions, rash or discharge noted.  No amine odor.    Lymphadenopathy:      Lower Body: No right inguinal adenopathy. No left inguinal adenopathy.   Skin:     General: Skin is warm and dry.      Findings: No erythema or rash.   Neurological:      Mental Status: She is alert and " oriented to person, place, and time.                    POCT Urinalysis  Order: [id number]   Status: Final result     Visible to patient: Ignacia (scheduled for 8/4/2022  9:56 AM)     Next appt: 08/10/2022 at 08:15 AM in Radiology ([facility])     Dx: Dysuria     0 Result Notes    Component Ref Range & Units 11:55 AM   (8/3/22) 4 d ago   (7/30/22) 3 wk ago   (7/8/22) 3 yr ago   (6/20/19) 3 yr ago   (3/15/19) 3 yr ago   (1/5/19) 4 yr ago   (11/24/17)   POC Color Negative yellow  dark yellow  dark yellow      Dark yellow  yellow    POC Appearance Negative clear  clear  clear      slightly cloudy  clear    POC Leukocyte Esterase Negative negative  small  negative  Trace  Trace  trace  negative    POC Nitrites Negative negative  negative  negative  Neg  Neg  neg  negative    POC Urobiligen Negative (0.2) mg/dL 0.2  0.2  0.2  Neg  Neg  0.2  negative    POC Protein Negative mg/dL negative  negative  negative  Neg  Trace  trace  negative    POC Urine PH 5.0 - 8.0 6.0  6.0  6.0  5.5  7  6.0  7.0    POC Blood Negative trace  trace  small  Neg  Large  neg  moderate    POC Specific Gravity <1.005 - >1.030 1.025  1.025  1.030  1.025  1.020  1.025  1.010    POC Ketones Negative mg/dL negative  negative  negative  Neg  Neg  neg  negative    POC Bilirubin Negative mg/dL negative  negative  negative  Neg  Neg  neg  negative    POC Glucose Negative mg/dL negative  negative  negative  Neg  Neg  neg  negative    Resulting Agency  [facility]                  Specimen Collected: 08/03/22 11:55 AM Last Resulted: 08/03/22 11:56 AM                    Assessment & Plan        1. Vaginal discharge  VAGINAL PATHOGENS DNA PANEL   2. Vaginal flatus     3. Dysuria  POCT Urinalysis     Discussed exam findings with Peter.  In regards to the UTI, she is on day 2 of an appropriate antibiotic and leuks on UA have resolved.  Continue nitrofurantoin as previously prescribed.  Follow up in 5 days if symptoms persist despite  antibiotics, sooner if worse.  ED precautions reviewed.  In regards to vaginal symptoms, etiology unclear.  Will obtain vaginal pathogens testing and treat if indicated.  Differential reviewed including other unknown infection as she seems to frequently use metronidazole gel and fluconazole, possibly when unnecessary versus irritation from the metronidazole without infection.  Follow up with gynecology if symptoms persist.  ED precautions reviewed.  Take a daily probiotic for vaginal and gut denis health.  Maintain adequate hydration.  She verbalized understanding of and agreed with plan of care.       Addendum:  8/4/22 11:05 AM  Phone call to Peter.  Vaginal pathogens positive for bacterial vaginosis.  Clindamycin as prescribed.  Risks and benefits reviewed.  Take a daily probiotic for gut and vaginal denis health.  Follow up with gynecology for any persistent or recurrent symptoms. ED precautions reviewed.  She verbalized understanding of and agreed with plan of care.  1. Bacterial vaginosis  clindamycin (CLEOCIN) 300 MG Cap   2. Vaginal discharge  VAGINAL PATHOGENS DNA PANEL   3. Vaginal flatus     4. Dysuria  POCT Urinalysis

## 2022-08-04 RX ORDER — CLINDAMYCIN HYDROCHLORIDE 300 MG/1
300 CAPSULE ORAL
Qty: 14 CAPSULE | Refills: 0 | Status: SHIPPED | OUTPATIENT
Start: 2022-08-04 | End: 2022-08-11

## 2022-08-10 ENCOUNTER — ANESTHESIA (OUTPATIENT)
Dept: RADIOLOGY | Facility: MEDICAL CENTER | Age: 47
End: 2022-08-10
Payer: MEDICAID

## 2022-08-10 ENCOUNTER — ANESTHESIA EVENT (OUTPATIENT)
Dept: RADIOLOGY | Facility: MEDICAL CENTER | Age: 47
End: 2022-08-10
Payer: MEDICAID

## 2022-08-10 ENCOUNTER — HOSPITAL ENCOUNTER (OUTPATIENT)
Dept: RADIOLOGY | Facility: MEDICAL CENTER | Age: 47
End: 2022-08-10
Attending: NEUROLOGICAL SURGERY
Payer: MEDICAID

## 2022-08-10 VITALS
RESPIRATION RATE: 17 BRPM | HEART RATE: 66 BPM | TEMPERATURE: 97 F | WEIGHT: 227 LBS | DIASTOLIC BLOOD PRESSURE: 64 MMHG | SYSTOLIC BLOOD PRESSURE: 113 MMHG | BODY MASS INDEX: 31.66 KG/M2 | OXYGEN SATURATION: 100 %

## 2022-08-10 DIAGNOSIS — M54.2 CERVICALGIA: ICD-10-CM

## 2022-08-10 PROCEDURE — 700111 HCHG RX REV CODE 636 W/ 250 OVERRIDE (IP): Performed by: ANESTHESIOLOGY

## 2022-08-10 PROCEDURE — 700105 HCHG RX REV CODE 258: Performed by: ANESTHESIOLOGY

## 2022-08-10 PROCEDURE — 72050 X-RAY EXAM NECK SPINE 4/5VWS: CPT

## 2022-08-10 PROCEDURE — 72141 MRI NECK SPINE W/O DYE: CPT

## 2022-08-10 PROCEDURE — 01922 ANES N-INVAS IMG/RADJ THER: CPT | Performed by: ANESTHESIOLOGY

## 2022-08-10 PROCEDURE — 700101 HCHG RX REV CODE 250: Performed by: ANESTHESIOLOGY

## 2022-08-10 RX ORDER — HALOPERIDOL 5 MG/ML
1 INJECTION INTRAMUSCULAR
Status: DISCONTINUED | OUTPATIENT
Start: 2022-08-10 | End: 2022-08-11 | Stop reason: HOSPADM

## 2022-08-10 RX ORDER — OXYCODONE HCL 5 MG/5 ML
10 SOLUTION, ORAL ORAL
Status: DISCONTINUED | OUTPATIENT
Start: 2022-08-10 | End: 2022-08-11 | Stop reason: HOSPADM

## 2022-08-10 RX ORDER — OXYCODONE HCL 5 MG/5 ML
5 SOLUTION, ORAL ORAL
Status: DISCONTINUED | OUTPATIENT
Start: 2022-08-10 | End: 2022-08-11 | Stop reason: HOSPADM

## 2022-08-10 RX ORDER — DEXAMETHASONE SODIUM PHOSPHATE 4 MG/ML
INJECTION, SOLUTION INTRA-ARTICULAR; INTRALESIONAL; INTRAMUSCULAR; INTRAVENOUS; SOFT TISSUE PRN
Status: DISCONTINUED | OUTPATIENT
Start: 2022-08-10 | End: 2022-08-10 | Stop reason: SURG

## 2022-08-10 RX ORDER — SODIUM CHLORIDE, SODIUM LACTATE, POTASSIUM CHLORIDE, CALCIUM CHLORIDE 600; 310; 30; 20 MG/100ML; MG/100ML; MG/100ML; MG/100ML
INJECTION, SOLUTION INTRAVENOUS CONTINUOUS
Status: DISCONTINUED | OUTPATIENT
Start: 2022-08-10 | End: 2022-08-11 | Stop reason: HOSPADM

## 2022-08-10 RX ORDER — SODIUM CHLORIDE, SODIUM LACTATE, POTASSIUM CHLORIDE, CALCIUM CHLORIDE 600; 310; 30; 20 MG/100ML; MG/100ML; MG/100ML; MG/100ML
INJECTION, SOLUTION INTRAVENOUS
Status: DISCONTINUED | OUTPATIENT
Start: 2022-08-10 | End: 2022-08-10 | Stop reason: SURG

## 2022-08-10 RX ORDER — DIPHENHYDRAMINE HYDROCHLORIDE 50 MG/ML
12.5 INJECTION INTRAMUSCULAR; INTRAVENOUS
Status: DISCONTINUED | OUTPATIENT
Start: 2022-08-10 | End: 2022-08-11 | Stop reason: HOSPADM

## 2022-08-10 RX ORDER — ONDANSETRON 2 MG/ML
INJECTION INTRAMUSCULAR; INTRAVENOUS PRN
Status: DISCONTINUED | OUTPATIENT
Start: 2022-08-10 | End: 2022-08-10 | Stop reason: SURG

## 2022-08-10 RX ORDER — ONDANSETRON 2 MG/ML
4 INJECTION INTRAMUSCULAR; INTRAVENOUS ONCE
Status: COMPLETED | OUTPATIENT
Start: 2022-08-10 | End: 2022-08-10

## 2022-08-10 RX ORDER — MEPERIDINE HYDROCHLORIDE 25 MG/ML
12.5 INJECTION INTRAMUSCULAR; INTRAVENOUS; SUBCUTANEOUS
Status: DISCONTINUED | OUTPATIENT
Start: 2022-08-10 | End: 2022-08-11 | Stop reason: HOSPADM

## 2022-08-10 RX ADMIN — PROPOFOL 200 MG: 10 INJECTION, EMULSION INTRAVENOUS at 09:19

## 2022-08-10 RX ADMIN — ONDANSETRON 4 MG: 2 INJECTION INTRAMUSCULAR; INTRAVENOUS at 09:00

## 2022-08-10 RX ADMIN — DEXAMETHASONE SODIUM PHOSPHATE 8 MG: 4 INJECTION, SOLUTION INTRA-ARTICULAR; INTRALESIONAL; INTRAMUSCULAR; INTRAVENOUS; SOFT TISSUE at 09:02

## 2022-08-10 RX ADMIN — EPHEDRINE SULFATE 5 MG: 50 INJECTION INTRAMUSCULAR; INTRAVENOUS; SUBCUTANEOUS at 09:22

## 2022-08-10 RX ADMIN — ONDANSETRON 4 MG: 2 INJECTION INTRAMUSCULAR; INTRAVENOUS at 09:02

## 2022-08-10 RX ADMIN — SODIUM CHLORIDE, POTASSIUM CHLORIDE, SODIUM LACTATE AND CALCIUM CHLORIDE: 600; 310; 30; 20 INJECTION, SOLUTION INTRAVENOUS at 09:02

## 2022-08-10 ASSESSMENT — PAIN SCALES - GENERAL: PAIN_LEVEL: 0

## 2022-08-10 NOTE — ANESTHESIA TIME REPORT
Anesthesia Start and Stop Event Times     Date Time Event    8/10/2022 0846 Ready for Procedure     0902 Anesthesia Start     0943 Anesthesia Stop        Responsible Staff  08/10/22    Name Role Begin End    Chaz Stokes M.D. Anesth 0902 0943        Overtime Reason:  no overtime (within assigned shift)    Comments:

## 2022-08-10 NOTE — ANESTHESIA POSTPROCEDURE EVALUATION
Patient: Peter Trimble    Procedure Summary     Date: 08/10/22 Room / Location: [facility] MRI - [address]    Anesthesia Start: 0902 Anesthesia Stop: 0943    Procedure: MR-CERVICAL SPINE-W/O Diagnosis: Cervicalgia    Scheduled Providers:  Responsible Provider: Chaz Stokes M.D.    Anesthesia Type: general ASA Status: 3          Final Anesthesia Type: general  Last vitals  BP   Blood Pressure: 125/81    Temp   36.4 °C (97.5 °F)    Pulse   70   Resp   18    SpO2   90 %      Anesthesia Post Evaluation    Patient location during evaluation: PACU  Patient participation: complete - patient participated  Level of consciousness: awake and alert  Pain score: 0    Airway patency: patent  Anesthetic complications: no  Cardiovascular status: hemodynamically stable  Respiratory status: acceptable  Hydration status: euvolemic    PONV: none          No notable events documented.     Nurse Pain Score: 3 (NPRS)

## 2022-08-10 NOTE — ANESTHESIA PROCEDURE NOTES
Airway    Date/Time: 8/10/2022 9:19 AM  Performed by: Chaz Stokes M.D.  Authorized by: Chaz Stokes M.D.     Location:  OR  Urgency:  Elective  Indications for Airway Management:  Anesthesia      Spontaneous Ventilation: absent    Sedation Level:  Deep  Preoxygenated: Yes    Final Airway Type:  Supraglottic airway  Final Supraglottic Airway:  Standard LMA    SGA Size:  3  Number of Attempts at Approach:  1

## 2022-08-10 NOTE — ANESTHESIA PREPROCEDURE EVALUATION
Date/Time: 08/10/22 0815    Procedure: MR-CERVICAL SPINE-W/O    Diagnosis: Cervicalgia [M54.2]    Location: [facility] - MRI - [address]          Relevant Problems   ANESTHESIA   (positive) AVA (obstructive sleep apnea), intolerant of CPAP      PULMONARY   (positive) Asthma      NEURO   (positive) Chronic migraine   (positive) Intractable chronic migraine without aura and without status migrainosus   (positive) Medication overuse headache   (positive) Psychogenic nonepileptic seizure      CARDIAC   (positive) Chronic migraine   (positive) HTN (hypertension), benign   (positive) Hypertension   (positive) Intractable chronic migraine without aura and without status migrainosus      GI   (positive) GERD (gastroesophageal reflux disease)         (positive) ZULLY on CKD (acute kidney injury) (HCC)       Physical Exam    Airway   Mallampati: II  TM distance: >3 FB  Neck ROM: full       Cardiovascular - normal exam  Rhythm: regular  Rate: normal  (-) murmur     Dental - normal exam           Pulmonary - normal exam  Breath sounds clear to auscultation     Abdominal    Neurological - normal exam                 Anesthesia Plan    ASA 3       Plan - general       Airway plan will be LMA          Induction: intravenous    Postoperative Plan: Postoperative administration of opioids is intended.    Pertinent diagnostic labs and testing reviewed    Informed Consent:    Anesthetic plan and risks discussed with patient.    Use of blood products discussed with: patient whom consented to blood products.

## 2022-08-10 NOTE — DISCHARGE INSTRUCTIONS
MRI ADULT DISCHARGE INSTRUCTIONS    You have been medicated today for your scan. Please follow the instructions below to ensure your safe recovery. If you have any questions or problems, feel free to call us at [phone] or [phone].     1.   Have someone stay with you to assist you as needed.    2.   Do not drive or operate any mechanical devices.    3.   Do not perform any activity that requires concentration. Make no major decisions over the next 24 hours.     4.   Be careful changing positions from laying down to sitting or standing, as you may become dizzy.     5.   Do not drink alcohol for 48 hours.    6.   There are no restrictions for eating your normal meals. Drink fluids.    7.   You may continue your usual medications for pain, tranquilizers, muscle relaxants or sedatives when awake.     8.   Tomorrow, you may continue your normal daily activities.     9.   Pressure dressing on 10 - 15 minutes. If swelling or bleeding occurs when removed, continue placing direct pressure on injection site for another 5 minutes, or until bleeding stops.     I have been informed of and understand the above discharge instructions.

## 2022-08-10 NOTE — PROGRESS NOTES
Patient to MRI outpatient dept. Patient informed process and plan of care during this visit.  Anesthesiologist Chaz Stokes MD spoke with Patient and discussed provider's plan of care.  Consent obtained.  MRI completed without incident.  Patient tolerated oral fluids once awake, alert, and appropriate.  DC instructions discussed with Patient and .  Questions encouraged and answered.  Defer to Provider for further instruction.  Patient discharged in stable condition to responsible adult.

## 2022-09-15 DIAGNOSIS — G43.719 INTRACTABLE CHRONIC MIGRAINE WITHOUT AURA AND WITHOUT STATUS MIGRAINOSUS: ICD-10-CM

## 2022-09-15 NOTE — TELEPHONE ENCOUNTER
Hello!    I'm helping Peter Trimble transition their medications to [facility] on [address].  Would you please authorize these prescriptions?    Thank you!    Georgina Machuca  Rx Coordinator   [phone]

## 2022-09-16 RX ORDER — ONDANSETRON HYDROCHLORIDE 8 MG/1
8 TABLET, FILM COATED ORAL
Qty: 20 TABLET | Refills: 5 | Status: SHIPPED | OUTPATIENT
Start: 2022-09-16 | End: 2023-10-31 | Stop reason: SDUPTHER

## 2022-09-16 RX ORDER — DIVALPROEX SODIUM 500 MG/1
500 TABLET, DELAYED RELEASE ORAL 3 TIMES DAILY
Qty: 270 TABLET | Refills: 2 | Status: SHIPPED | OUTPATIENT
Start: 2022-09-16 | End: 2023-04-28

## 2022-09-16 RX ORDER — ERENUMAB-AOOE 140 MG/ML
140 INJECTION, SOLUTION SUBCUTANEOUS
Qty: 1 ML | Refills: 5 | Status: SHIPPED | OUTPATIENT
Start: 2022-09-16 | End: 2022-09-22 | Stop reason: SDUPTHER

## 2022-09-17 ENCOUNTER — OFFICE VISIT (OUTPATIENT)
Dept: URGENT CARE | Facility: PHYSICIAN GROUP | Age: 47
End: 2022-09-17
Payer: MEDICAID

## 2022-09-17 VITALS
HEIGHT: 71 IN | SYSTOLIC BLOOD PRESSURE: 128 MMHG | WEIGHT: 217 LBS | TEMPERATURE: 97.3 F | OXYGEN SATURATION: 94 % | BODY MASS INDEX: 30.38 KG/M2 | DIASTOLIC BLOOD PRESSURE: 80 MMHG | HEART RATE: 85 BPM | RESPIRATION RATE: 18 BRPM

## 2022-09-17 DIAGNOSIS — B37.9 YEAST INFECTION: ICD-10-CM

## 2022-09-17 DIAGNOSIS — B37.2 CANDIDAL INTERTRIGO: ICD-10-CM

## 2022-09-17 PROCEDURE — 99214 OFFICE O/P EST MOD 30 MIN: CPT | Performed by: FAMILY MEDICINE

## 2022-09-17 RX ORDER — KETOCONAZOLE 200 MG/1
200 TABLET ORAL DAILY
Qty: 14 TABLET | Refills: 0 | Status: SHIPPED | OUTPATIENT
Start: 2022-09-17 | End: 2022-10-01

## 2022-09-17 RX ORDER — FLUCONAZOLE 150 MG/1
150 TABLET ORAL DAILY
Qty: 7 TABLET | Refills: 0 | Status: CANCELLED | OUTPATIENT
Start: 2022-09-17 | End: 2022-09-24

## 2022-09-17 RX ORDER — CLOTRIMAZOLE AND BETAMETHASONE DIPROPIONATE 10; .64 MG/G; MG/G
CREAM TOPICAL
Qty: 45 G | Refills: 1 | Status: SHIPPED | OUTPATIENT
Start: 2022-09-17 | End: 2022-10-07

## 2022-09-17 NOTE — PROGRESS NOTES
"Chief Complaint:    Chief Complaint   Patient presents with    Vaginitis     X2 weeks yeast infection started in vagina and has gone up to groin, lower abdomen, breast, armpits, neck, head        History of Present Illness:    Feels she has yeast infection in many locations - scalp, right upper clavicle area, under breasts, umbilicus, lower abdomen, vagina, and legs. She reports [facility] recently treated her for this with Fluconazole 100 mg daily x 7 days - she finished med on 9/15/22 and reports it did nothing for the problem. She is requesting something stronger and also a cream for this.      Past Medical History:    Past Medical History:   Diagnosis Date    Acute blood loss anemia 2013    -resolved, postop 2013     Acute renal failure (ARF) ([facility]) 10/5/2011    -resolved (post op )     Anemia     Anxiety     Arthritis     osteoarthritis since 17yo.    ASTHMA     O2 3liters at HS and prn    Bipolar affective ([facility])     Breath shortness     Cold     Depression     Eclampsia complicating pregnancy     Environmental allergies     Essential hypertension, malignant 2011    Fibromyalgia     GERD (gastroesophageal reflux disease)     Heart murmur     she denies this hx    History of pregnancy 10/16/2014         Hx MRSA infection     Hyperlipidemia 14    \"off medication\"    Hypertension     Kidney stones     Migraines     Pain 15    \"my body hurts all the time\", 5-6/10    Personality disorder ([facility])     Pneumonia     PTSD (post-traumatic stress disorder)     Scalp wound 2014    Seizure ([facility]) 14    last     Sleep apnea     has had a sleep study, use O2 at HS    Snoring     Stroke ([facility])      reports strokes x5 , and a \"brain bleed\"    Subarachnoid hemorrhage ([facility]) 2011    -small,  (2nd to HTN)     Unspecified hemorrhagic conditions     nose-bleeds, bruises easily    Unspecified urinary incontinence     Urinary bladder disorder      Past Surgical History:    Past " Surgical History:   Procedure Laterality Date    ORIF, ANKLE Right 8/7/2016    Procedure: ANKLE ORIF;  Surgeon: Bill Brambila M.D.;  Location: SURGERY [facility];  Service:     IRRIGATION & DEBRIDEMENT GENERAL  8/17/2014    Performed by Ezra Wright M.D. at SURGERY [facility]    BREAST BIOPSY  5/29/2014    Performed by Negro Hester M.D. at SURGERY SAME DAY [facility]    EVACUATION OF HEMATOMA  5/2/2013    Performed by Lazaro Connors Jr., M.D. at SURGERY [facility]    MAMMOPLASTY REDUCTION  4/29/2013    Performed by Negro Hester M.D. at SURGERY [facility]    RECOVERY  1/30/2012    Performed by SURGERY, IR-RECOVERY at SURGERY SAME DAY [facility]    RECOVERY  10/5/2011    Performed by SURGERY, RECOVERYONLY at SURGERY [facility]    HYSTERECTOMY LAPAROSCOPY      W BSO    KNEE RECONSTRUCTION      LAMINOTOMY      OTHER ORTHOPEDIC SURGERY      maddie. knee scopes    OTHER ORTHOPEDIC SURGERY      back fusion then hardware removal    PB EXPLORATION OF SPINAL FUSION      NV REMOVAL OF OVARY(S)       Social History:    Social History     Socioeconomic History    Marital status:      Spouse name: Not on file    Number of children: Not on file    Years of education: Not on file    Highest education level: Associate degree: occupational, technical, or vocational program   Occupational History    Not on file   Tobacco Use    Smoking status: Never    Smokeless tobacco: Never   Vaping Use    Vaping Use: Never used   Substance and Sexual Activity    Alcohol use: No    Drug use: No    Sexual activity: Not on file   Other Topics Concern    Not on file   Social History Narrative    Not on file     Social Determinants of Health     Financial Resource Strain: Not on file   Food Insecurity: Not on file   Transportation Needs: Not on file   Physical Activity: Not on file   Stress: Not on file   Social Connections: Not on file   Intimate Partner Violence: Not on file   Housing Stability: Not on  file     Family History:    Family History   Problem Relation Age of Onset    Hypertension Mother     Hyperlipidemia Mother     Hypertension Father     Hyperlipidemia Father     Heart Disease Father     Psychiatric Illness Father     Alcohol/Drug Father     Alcohol/Drug Brother     Arthritis Maternal Uncle     Psychiatric Illness Maternal Uncle     Heart Disease Maternal Uncle     Hypertension Maternal Uncle     Hyperlipidemia Maternal Uncle     Genetic Disorder Paternal Aunt     Psychiatric Illness Paternal Uncle     Arthritis Maternal Grandmother     Heart Disease Maternal Grandmother     Alcohol/Drug Maternal Grandfather     Stroke Maternal Grandfather     Psychiatric Illness Maternal Grandfather     Arthritis Paternal Grandmother     Alcohol/Drug Paternal Grandfather      Medications:    Current Outpatient Medications on File Prior to Visit   Medication Sig Dispense Refill    Erenumab-aooe (AIMOVIG) 140 MG/ML Solution Auto-injector Inject 140 mg under the skin every 4 weeks. 1 mL 5    ondansetron (ZOFRAN) 8 MG Tab Take 1 Tablet by mouth 1 time a day as needed for Nausea/Vomiting. 20 Tablet 5    Ubrogepant 100 MG Tab Take 100 mg by mouth 2 times a day as needed (pain). 10 Tablet 6    divalproex (DEPAKOTE) 500 MG Tablet Delayed Response Take 1 tablet by mouth every morning and take 2 tablets every evening. 270 Tablet 2    diclofenac sodium (VOLTAREN) 1 % Gel       ketoconazole (NIZORAL) 2 % shampoo Wash scalp 3 times weekly and allow to sit 3-5 minutes prior to rinsing      LORazepam (ATIVAN) 0.5 MG Tab Take 1 tablet by mouth once a day as needed      sertraline (ZOLOFT) 100 MG Tab Take 100 mg by mouth every day.      ADVAIR DISKUS 250-50 MCG/DOSE AEROSOL POWDER, BREATH ACTIVATED       traZODone (DESYREL) 100 MG Tab       oxyCODONE immediate release (ROXICODONE) 10 MG immediate release tablet       hydrOXYzine pamoate (VISTARIL) 50 MG Cap       busPIRone (BUSPAR) 30 MG tablet       propranolol (INDERAL) 10 MG Tab  "Take 5 mg by mouth 2 times a day. Indications: High Blood Pressure Disorder      baclofen (LIORESAL) 10 MG Tab Take 10 mg by mouth 3 times a day as needed (Pain).      Cholecalciferol (VITAMIN D) 2000 UNIT Tab Take 4,000 Units by mouth every day.      albuterol 108 (90 Base) MCG/ACT Aero Soln inhalation aerosol ProAir HFA 90 mcg/actuation aerosol inhaler      cetirizine (ZYRTEC) 10 MG Tab Take 10 mg by mouth every day.      ferrous sulfate 325 (65 FE) MG tablet Take 325 mg by mouth every day.       No current facility-administered medications on file prior to visit.     Allergies:    Allergies   Allergen Reactions    Imitrex [Sumatriptan Succinate]      Had brain bleed    Maxalt [Rizatriptan Benzoate]      Had brain bleed    Phenergan [Promethazine Hcl]      \"psychotic reaction\"    Xanax [Alprazolam]      Sores and dry mouth, many others pt cannot remember    Butrans [Buprenorphine]     Dilaudid [Hydromorphone] Rash     Patient states she had rash, hallucinations, unable to urinate.     Doxycycline     Eucalyptus Oil     Gabapentin     Lyrica [Pregabalin]      \"depression, slept a lot\"    Morphabond Er [Suly]     Patchouli Oil Rash     Rash     Phenergan  [Promethazine]     Sulfa Drugs     Topiramate Nausea     Patient states made sick to stomach and dizzy.       Vitals:    Vitals:    09/17/22 1427   BP: 128/80   BP Location: Left arm   Patient Position: Sitting   BP Cuff Size: Adult   Pulse: 85   Resp: 18   Temp: 36.3 °C (97.3 °F)   TempSrc: Temporal   SpO2: 94%   Weight: 98.4 kg (217 lb)   Height: 1.803 m (5' 11\")       Physical Exam:    Constitutional: Vital signs reviewed. Appears well-developed and well-nourished. On NC Oxygen. No acute distress.   Eyes: Sclera white, conjunctivae clear.   ENT: External ears normal. Hearing normal.   Neck: Neck supple.   Pulmonary/Chest: Respirations non-labored.   Neurological: Alert and oriented to person, place, and time. Muscle tone normal. Coordination normal.   Skin: " Erythematous rash under bilateral breasts and on underside of lower abdominal pannus. Erythematous, flaky patch right clavicle region.  Psychiatric: Normal mood and affect. Behavior is normal. Judgment and thought content normal.       Medical Decision Makin. Yeast infection  - ketoconazole (NIZORAL) 200 MG Tab; Take 1 Tablet by mouth every day for 14 days.  Dispense: 14 Tablet; Refill: 0  - clotrimazole-betamethasone (LOTRISONE) 1-0.05 % Cream; APPLY THIN FILM TO RASH UP TO TWICE A DAY ONLY IF NEEDED.  Dispense: 45 g; Refill: 1    2. Candidal intertrigo  - ketoconazole (NIZORAL) 200 MG Tab; Take 1 Tablet by mouth every day for 14 days.  Dispense: 14 Tablet; Refill: 0  - clotrimazole-betamethasone (LOTRISONE) 1-0.05 % Cream; APPLY THIN FILM TO RASH UP TO TWICE A DAY ONLY IF NEEDED.  Dispense: 45 g; Refill: 1       Discussed with her DDX, management options, and risks, benefits, and alternatives to treatment plan agreed upon.    Feels she has yeast infection in many locations - scalp, right upper clavicle area, under breasts, umbilicus, lower abdomen, vagina, and legs. She reports [facility] recently treated her for this with Fluconazole 100 mg daily x 7 days - she finished med on 9/15/22 and reports it did nothing for the problem. She is requesting something stronger and also a cream for this.    Erythematous rash under bilateral breasts and on underside of lower abdominal pannus. Erythematous, flaky patch right clavicle region.    Complicated condition due to no improvement in symptoms despite treatment by [facility] with Fluconazole 100 mg daily x 7 days - she finished med on 9/15/22.    Agreeable to medications prescribed.    Recommended to follow-up with PCP if problem persists.    She will return to urgent care if needed.

## 2022-09-22 DIAGNOSIS — G43.719 INTRACTABLE CHRONIC MIGRAINE WITHOUT AURA AND WITHOUT STATUS MIGRAINOSUS: ICD-10-CM

## 2022-09-22 RX ORDER — ERENUMAB-AOOE 140 MG/ML
140 INJECTION, SOLUTION SUBCUTANEOUS
Qty: 1 ML | Refills: 5 | Status: SHIPPED | OUTPATIENT
Start: 2022-09-22 | End: 2023-03-25 | Stop reason: SDUPTHER

## 2022-09-23 PROCEDURE — RXMED WILLOW AMBULATORY MEDICATION CHARGE: Performed by: PSYCHIATRY & NEUROLOGY

## 2022-09-26 ENCOUNTER — PHARMACY VISIT (OUTPATIENT)
Dept: PHARMACY | Facility: MEDICAL CENTER | Age: 47
End: 2022-09-26
Payer: COMMERCIAL

## 2022-10-03 ENCOUNTER — TELEPHONE (OUTPATIENT)
Dept: NEUROLOGY | Facility: MEDICAL CENTER | Age: 47
End: 2022-10-03
Payer: MEDICAID

## 2022-10-03 NOTE — TELEPHONE ENCOUNTER
Spoke to patient explained her appointment on Friday is the soonest we can get her in. She verbalized understanding and stated she didn't think she could get in any sooner. I advised she should go to UC or ER if her symptoms are getting worse. She verbalized understanding.

## 2022-10-07 ENCOUNTER — OFFICE VISIT (OUTPATIENT)
Dept: NEUROLOGY | Facility: MEDICAL CENTER | Age: 47
End: 2022-10-07
Attending: PSYCHIATRY & NEUROLOGY
Payer: MEDICAID

## 2022-10-07 VITALS
BODY MASS INDEX: 30.8 KG/M2 | SYSTOLIC BLOOD PRESSURE: 116 MMHG | RESPIRATION RATE: 13 BRPM | HEART RATE: 88 BPM | OXYGEN SATURATION: 95 % | HEIGHT: 70 IN | WEIGHT: 215.17 LBS | TEMPERATURE: 97.8 F | DIASTOLIC BLOOD PRESSURE: 90 MMHG

## 2022-10-07 DIAGNOSIS — R41.89 COGNITIVE IMPAIRMENT: Primary | ICD-10-CM

## 2022-10-07 PROCEDURE — 99212 OFFICE O/P EST SF 10 MIN: CPT | Performed by: PSYCHIATRY & NEUROLOGY

## 2022-10-07 PROCEDURE — 99215 OFFICE O/P EST HI 40 MIN: CPT | Performed by: PSYCHIATRY & NEUROLOGY

## 2022-10-07 RX ORDER — PANTOPRAZOLE SODIUM 40 MG/1
TABLET, DELAYED RELEASE ORAL
COMMUNITY
Start: 2022-09-19

## 2022-10-07 RX ORDER — IPRATROPIUM BROMIDE 42 UG/1
SPRAY, METERED NASAL
COMMUNITY
Start: 2022-09-20

## 2022-10-07 RX ORDER — ITRACONAZOLE 100 MG/1
200 CAPSULE ORAL 2 TIMES DAILY
COMMUNITY
Start: 2022-10-01 | End: 2023-04-28

## 2022-10-07 NOTE — Clinical Note
Melissa, I need some help on this patient.  She is quite complex though I suspect a lot of this is psychogenic in nature.  I certainly have a low level suspicion that we are seeing the results of a degenerative cognitive disorder.  She has had these complaints for going on more than 10 years, her work-up has been unremarkable.  I am interested as to what you are thinking once you have completed your evaluations.  Timi

## 2022-10-10 ASSESSMENT — ENCOUNTER SYMPTOMS
HEADACHES: 1
FALLS: 1
TREMORS: 0
MEMORY LOSS: 1
HALLUCINATIONS: 0

## 2022-10-10 NOTE — PROGRESS NOTES
Subjective     Peter Trimble is a 47 y.o. female who presents for follow-up, patient of CAREMN aLma, for follow-up and consultation, with persistent cognitive deficits and memory loss dating back to at least 2014, in which seem to be getting worse subjectively.  I reviewed the electronic health record at length.    CODI Green presents today in a wheelchair, complaining not just difficulty walking and balance difficulties, also longstanding, but cognitive issues that have been present for quite some time, seemingly worsened.  It is mostly a short-term memory issue, she can require frequent reminders though she does remember recent events with cueing.  She misplaces objects very frequently but again finds them, located in not unusual places, and she does recall leaving them where they are found.  She does do her own medications.  She will require reminders a bit more often, but has created some structure in her life.  This has helped.    She lives at home alone, uses a ride service to go shopping.  Physical constraints limit her ability to do some things.  Bowel and bladder control are maintained, incontinence only if she cannot get to the bathroom in time.  She uses her phone for finances, communicating others, etc.  Her phone states her passwords, she can use new applications without issue.  The house is maintained with the help of others.    She sleeps without major issue.  She has never had issues with hallucinations, agitated delirium, etc.  Language has not been distorted, she can keep pace in conversation, denies significant word finding difficulties.  When she is read something, putting it down, she can  where she leaves off.  Multitasking is a little bit more difficult for her.    Symptoms were mentioned back in 2014 when she underwent MRI scan of the brain, I reviewed the images, revealing no significant structural pathology, minimal mild nonspecific atherosclerotic changes only.   "Treated at that time for seizures, she had seen me on a single occasion, EEG was normal and she carried a diagnosis of psychogenic nonepileptic spells moving forward.  These have not recurred.    The balance difficulties have been longstanding as well.  Imaging as above, work-up has been unrevealing.  Blood work including vitamin levels, thyroid function, infectious processes including syphilis and hepatitis, autoimmune diseases, heavy metal exposures, etc. have all proved unremarkable.  CSF studies were obtained in 2011, cytology was negative, basic CHEM profile, cell count and infectious serologies also negative.    Medical history is quite expansive, key issues include bipolar disease with personality disorder, migraine headache, dyslipidemia, GERD, hypertension, no history of CVA, diagnosed neurodegenerative disease, autoimmune disease, CAD, PVD, or diabetes.    No one in the family has a history of dementia.    At present she lives at home alone, getting by with help from others and assistant services.  There is no history of tobacco, alcohol or other substance use.    From my standpoint, she is on no medications, she is on Inderal 5 mg twice daily, trazodone, Ubrelvy 100 mg as needed, Protonix, Vicodin, Vistaril, Feosol, Estrace, vitamin D and calcium, BuSpar, Advair Diskus, Zofran, Aimovig 140 mg every 4 weeks, Depakote 500 mg 3 times daily, the rest as per the electronic health record.      Review of Systems   Constitutional:  Positive for malaise/fatigue.   Musculoskeletal:  Positive for falls.   Neurological:  Positive for headaches. Negative for tremors.   Psychiatric/Behavioral:  Positive for memory loss. Negative for hallucinations and suicidal ideas.    All other systems reviewed and are negative.    Objective     BP (!) 116/90 (BP Location: Left arm, Patient Position: Sitting, BP Cuff Size: Adult)   Pulse 88   Temp 36.6 °C (97.8 °F) (Temporal)   Resp 13   Ht 1.778 m (5' 10\")   Wt 97.6 kg (215 lb " 2.7 oz)   SpO2 95%   BMI 30.87 kg/m²      Physical Exam    She appears in no acute distress.  Vital signs are stable, blood pressure only slightly elevated at 116/90.  There is no malar rash or jaw claudication.  The neck is supple.  Cardiac evaluation is unremarkable.  There is evidence of diffuse bruises and Skin excoriations from multiple falls.     Neurological Exam    She is fully oriented, her MoCA is 25/30, some difficulties with visual-spatial function, attention and also memory encoding though the latter less curtailed given cueing.  Frontal release signs are absent.  She can be a little perseverative, mental processing speed slowed.  There is no apraxia or obvious inattention.    PERRLA/EOMI, visual fields are grossly full, Facial movements are symmetric.  Sensory exam is intact to temperature.  There is no bulbar dysfunction, the tongue and uvula are midline.  Shoulder shrug and head rotation are normal.    Musculoskeletal exam reveals normal tone throughout, there is no tremor or drift.  Strength in the upper extremities is intact, distal lower extremities as well.  Hip stabilizers are weak.  There are no obvious reflex asymmetries, the toes are downgoing bilaterally.    I did not stand her to walk for the purposes of today's exam.  There is no appendicular dystaxia.  Repetitive movements with the feet and hands are normal.    Sensory exam is intact to vibration and temperature.    Assessment & Plan     1. Cognitive impairment  She is having some issues with cognitive function, she certainly does not fit criteria for dementia, but her MoCA suggest the possibility of cognitive impairment.  More in-depth neuropsychological assessments should be done.  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  This is a very long history of cognitive impairment, even if we were to consider a degenerative process.  A lot of the deficits on her MoCA could be explained  purely on the basis of impaired attention.  I am not sure what to make of the gait difficulties which she has been having, she is certainly not suffering from Parkinson's disease, nor do I believe NPH or other significant intracranial structural pathologies.  Spinal fluid analysis in the past proved unremarkable, though it is unclear whether this was done at the time she had begun to complain of memory loss.  Imaging was done at the time of memory loss complaint, but I will repeat the image for thoroughness.    For now we will continue her present regimen, I am certainly not a physician to treat until we have a better answer.  We will let her know about results as they come in, otherwise we will follow-up after a full neuropsychological evaluation has been completed.     - Referral for Psychological Testing  - MR-BRAIN-W/O; Future  - EKG; Future  - CBC WITH DIFFERENTIAL; Future  - Comp Metabolic Panel; Future  - PT AND PTT    Time: 40 minutes in total spent in patient care including precharting, record review, discussion with healthcare staff and documentation.  This includes face-to-face time for exam, review, discussion, as well as counseling and coordinating care.

## 2022-10-21 ENCOUNTER — TELEPHONE (OUTPATIENT)
Dept: NEUROLOGY | Facility: MEDICAL CENTER | Age: 47
End: 2022-10-21
Payer: MEDICAID

## 2022-10-24 ENCOUNTER — TELEPHONE (OUTPATIENT)
Dept: NEUROLOGY | Facility: MEDICAL CENTER | Age: 47
End: 2022-10-24
Payer: MEDICAID

## 2022-10-24 NOTE — TELEPHONE ENCOUNTER
Hi, can you have Dr Meza send a request to [facility] [phone] for a ekg prior to my brain mri? I already did bloodwork at [facility], did you get it?     Thanks !  Peter

## 2022-11-23 ENCOUNTER — APPOINTMENT (OUTPATIENT)
Dept: RADIOLOGY | Facility: MEDICAL CENTER | Age: 47
End: 2022-11-23
Attending: PSYCHIATRY & NEUROLOGY
Payer: MEDICAID

## 2022-12-21 ENCOUNTER — APPOINTMENT (OUTPATIENT)
Dept: RADIOLOGY | Facility: MEDICAL CENTER | Age: 47
End: 2022-12-21
Attending: PSYCHIATRY & NEUROLOGY
Payer: MEDICAID

## 2023-01-31 DIAGNOSIS — R41.89 COGNITIVE IMPAIRMENT: ICD-10-CM

## 2023-03-01 ENCOUNTER — APPOINTMENT (OUTPATIENT)
Dept: NEUROLOGY | Facility: MEDICAL CENTER | Age: 48
End: 2023-03-01
Attending: CLINICAL NEUROPSYCHOLOGIST
Payer: MEDICAID

## 2023-03-15 ENCOUNTER — ANESTHESIA (OUTPATIENT)
Dept: RADIOLOGY | Facility: MEDICAL CENTER | Age: 48
End: 2023-03-15
Payer: MEDICAID

## 2023-03-15 ENCOUNTER — HOSPITAL ENCOUNTER (OUTPATIENT)
Dept: RADIOLOGY | Facility: MEDICAL CENTER | Age: 48
End: 2023-03-15
Attending: PSYCHIATRY & NEUROLOGY
Payer: MEDICAID

## 2023-03-15 ENCOUNTER — ANESTHESIA EVENT (OUTPATIENT)
Dept: RADIOLOGY | Facility: MEDICAL CENTER | Age: 48
End: 2023-03-15
Payer: MEDICAID

## 2023-03-15 VITALS
HEIGHT: 71 IN | BODY MASS INDEX: 29.63 KG/M2 | HEART RATE: 75 BPM | WEIGHT: 211.64 LBS | TEMPERATURE: 97.4 F | OXYGEN SATURATION: 99 % | DIASTOLIC BLOOD PRESSURE: 78 MMHG | SYSTOLIC BLOOD PRESSURE: 132 MMHG | RESPIRATION RATE: 17 BRPM

## 2023-03-15 DIAGNOSIS — R41.89 COGNITIVE IMPAIRMENT: ICD-10-CM

## 2023-03-15 PROCEDURE — 700111 HCHG RX REV CODE 636 W/ 250 OVERRIDE (IP)

## 2023-03-15 PROCEDURE — 700101 HCHG RX REV CODE 250

## 2023-03-15 PROCEDURE — 01922 ANES N-INVAS IMG/RADJ THER: CPT | Performed by: ANESTHESIOLOGY

## 2023-03-15 PROCEDURE — 4410588 MR-BRAIN-W/O

## 2023-03-15 ASSESSMENT — FIBROSIS 4 INDEX
FIB4 SCORE: 1.13
FIB4 SCORE: 1.13

## 2023-03-15 NOTE — ANESTHESIA POSTPROCEDURE EVALUATION
Patient: Peter Trimble    Procedure Summary     Date: 03/15/23 Room / Location: [address]    Anesthesia Start: 1256 Anesthesia Stop: 1338    Procedure: MR-BRAIN-W/O Diagnosis: Cognitive impairment    Scheduled Providers:  Responsible Provider: Caesar Membreno M.D.    Anesthesia Type: general ASA Status: 3          Final Anesthesia Type: general  Last vitals  BP   Blood Pressure: 126/74    Temp        Pulse   65   Resp   16    SpO2   98 %      Anesthesia Post Evaluation    Patient location during evaluation: PACU  Patient participation: complete - patient participated  Level of consciousness: sleepy but conscious    Airway patency: patent  Anesthetic complications: no  Cardiovascular status: hemodynamically stable  Respiratory status: acceptable  Hydration status: euvolemic    PONV: none          No notable events documented.     Nurse Pain Score: 0 (NPRS)

## 2023-03-15 NOTE — DISCHARGE INSTRUCTIONS
MRI ADULT DISCHARGE INSTRUCTIONS    You have been medicated today for your scan. Please follow the instructions below to ensure your safe recovery. If you have any questions or problems, feel free to call us at [phone] or [phone].     1.   Have someone stay with you to assist you as needed.    2.   Do not drive or operate any mechanical devices.    3.   Do not perform any activity that requires concentration. Make no major decisions over the next 24 hours.     4.   Be careful changing positions from laying down to sitting or standing, as you may become dizzy.     5.   Do not drink alcohol for 48 hours.    6.   There are no restrictions for eating your normal meals. Drink fluids.    7.   You may continue your usual medications for pain, tranquilizers, muscle relaxants or sedatives when awake.     8.   Tomorrow, you may continue your normal daily activities.     9.   Pressure dressing on 10 - 15 minutes. If swelling or bleeding occurs when removed, continue placing direct pressure on injection site for another 5 minutes, or until bleeding stops.     I have been informed of and understand the above discharge instructions.

## 2023-03-15 NOTE — ANESTHESIA PREPROCEDURE EVALUATION
Date/Time: 03/15/23 1315    Procedure: MR-BRAIN-W/O    Diagnosis: Cognitive impairment [R41.89]    Location: [facility] - MRI - [address]          Relevant Problems   ANESTHESIA   (positive) AVA (obstructive sleep apnea), intolerant of CPAP      PULMONARY   (positive) Asthma      NEURO   (positive) Chronic migraine   (positive) Intractable chronic migraine without aura and without status migrainosus   (positive) Medication overuse headache   (positive) Psychogenic nonepileptic seizure      CARDIAC   (positive) Chronic migraine   (positive) HTN (hypertension), benign   (positive) Hypertension   (positive) Intractable chronic migraine without aura and without status migrainosus      GI   (positive) GERD (gastroesophageal reflux disease)         (positive) ZULLY on CKD (acute kidney injury) (HCC)      Other   (positive) Oxygen dependent, since 2011       Physical Exam    Airway   Mallampati: II  TM distance: >3 FB  Neck ROM: full       Cardiovascular - normal exam  Rhythm: regular  Rate: normal  (-) murmur     Dental - normal exam           Pulmonary - normal exam  Breath sounds clear to auscultation     Abdominal    Neurological - normal exam                 Anesthesia Plan    ASA 3   ASA physical status 3 criteria: CVA or TIA - history (> 3 months)    Plan - general       Airway plan will be LMA          Induction: intravenous      Pertinent diagnostic labs and testing reviewed    Informed Consent:    Anesthetic plan and risks discussed with patient.

## 2023-03-15 NOTE — PROGRESS NOTES
Patient to MRI outpatient dept. Patient informed process and plan of care during this visit.  Anesthesiologist Dr Membreno spoke with Patient and discussed provider's plan of care.  Consent obtained.  MRI completed without incident.  Patient tolerated diet and activities once awake, alert, and appropriate.  DC instructions discussed with Patient and .  Questions encouraged and answered.  Defer to Provider for further instruction.  Patient discharged in stable condition to responsible adult.

## 2023-03-15 NOTE — ANESTHESIA TIME REPORT
Anesthesia Start and Stop Event Times     Date Time Event    3/15/2023 1234 Ready for Procedure     1256 Anesthesia Start     1338 Anesthesia Stop        Responsible Staff  03/15/23    Name Role Begin End    Caesar Membreno M.D. Anesth 1256 1338        Overtime Reason:  no overtime (within assigned shift)    Comments:

## 2023-03-25 DIAGNOSIS — G43.719 INTRACTABLE CHRONIC MIGRAINE WITHOUT AURA AND WITHOUT STATUS MIGRAINOSUS: ICD-10-CM

## 2023-03-27 ENCOUNTER — TELEPHONE (OUTPATIENT)
Dept: NEUROLOGY | Facility: MEDICAL CENTER | Age: 48
End: 2023-03-27

## 2023-03-27 DIAGNOSIS — G43.719 INTRACTABLE CHRONIC MIGRAINE WITHOUT AURA AND WITHOUT STATUS MIGRAINOSUS: ICD-10-CM

## 2023-03-27 RX ORDER — ERENUMAB-AOOE 140 MG/ML
140 INJECTION, SOLUTION SUBCUTANEOUS
Qty: 1 ML | Refills: 5 | Status: SHIPPED | OUTPATIENT
Start: 2023-03-27 | End: 2023-03-27 | Stop reason: SDUPTHER

## 2023-03-27 RX ORDER — ERENUMAB-AOOE 140 MG/ML
140 INJECTION, SOLUTION SUBCUTANEOUS
Qty: 1 ML | Refills: 5 | Status: SHIPPED | OUTPATIENT
Start: 2023-03-27 | End: 2023-04-13 | Stop reason: SDUPTHER

## 2023-03-27 NOTE — TELEPHONE ENCOUNTER
Aimovig 140mg/ml SOAJ    Request rec'd via PO, ARIN Landa expires 04/13/2023 - TC paid copay $0.00 #1ml/30 DS Max 34 DS notifying liaison Marissa Rai or Outreach. - 03/27/2023 3:51pm

## 2023-04-13 DIAGNOSIS — G43.719 INTRACTABLE CHRONIC MIGRAINE WITHOUT AURA AND WITHOUT STATUS MIGRAINOSUS: ICD-10-CM

## 2023-04-13 DIAGNOSIS — R41.89 COGNITIVE IMPAIRMENT: ICD-10-CM

## 2023-04-13 RX ORDER — ERENUMAB-AOOE 140 MG/ML
140 INJECTION, SOLUTION SUBCUTANEOUS
Qty: 1 ML | Refills: 5 | Status: SHIPPED | OUTPATIENT
Start: 2023-04-13 | End: 2023-04-14 | Stop reason: SDUPTHER

## 2023-04-13 RX ORDER — UBROGEPANT 100 MG/1
TABLET ORAL
Qty: 10 TABLET | Refills: 3 | Status: SHIPPED | OUTPATIENT
Start: 2023-04-13 | End: 2023-04-14 | Stop reason: SDUPTHER

## 2023-04-14 ENCOUNTER — TELEPHONE (OUTPATIENT)
Dept: NEUROLOGY | Facility: MEDICAL CENTER | Age: 48
End: 2023-04-14
Payer: MEDICAID

## 2023-04-14 DIAGNOSIS — G43.719 INTRACTABLE CHRONIC MIGRAINE WITHOUT AURA AND WITHOUT STATUS MIGRAINOSUS: ICD-10-CM

## 2023-04-14 RX ORDER — ERENUMAB-AOOE 140 MG/ML
140 INJECTION, SOLUTION SUBCUTANEOUS
Qty: 1 ML | Refills: 5 | Status: SHIPPED | OUTPATIENT
Start: 2023-04-14 | End: 2023-04-18 | Stop reason: SDUPTHER

## 2023-04-14 RX ORDER — UBROGEPANT 100 MG/1
TABLET ORAL
Qty: 10 TABLET | Refills: 3 | Status: SHIPPED | OUTPATIENT
Start: 2023-04-14 | End: 2023-04-18 | Stop reason: SDUPTHER

## 2023-04-14 NOTE — TELEPHONE ENCOUNTER
VOICEMAIL  1. Caller Name: Peter                       Call Back Number: [phone]    2. Message: Peter called and wanted refills on her Aimovig and Ubrelvy and she wanted to know about her referral to Dr Hernandez. She was a little upset and felt like she was getting the run around.     3. Patient approves office to leave a detailed voicemail/AbraRestohart message: N\A    DR HENRY SENT THOSE REFILLS DIRECTLY IN TO THE [facility] IN [place]. HE ALSO PUT THE REFERRAL IN FOR DR HERNANDEZ AND I WILL LET THE PATIENT KNOW AS SOON AS IT IS READY TO BE SCHEDULED.

## 2023-04-14 NOTE — TELEPHONE ENCOUNTER
Received request via: Patient    Was the patient seen in the last year in this department? Yes    Does the patient have an active prescription (recently filled or refills available) for medication(s) requested? Yes.     Does the patient have long term Plus and need 100 day supply (blood pressure, diabetes and cholesterol meds only)? Medication is not for cholesterol, blood pressure or diabetes

## 2023-04-18 DIAGNOSIS — G43.719 INTRACTABLE CHRONIC MIGRAINE WITHOUT AURA AND WITHOUT STATUS MIGRAINOSUS: ICD-10-CM

## 2023-04-18 RX ORDER — UBROGEPANT 100 MG/1
TABLET ORAL
Qty: 10 TABLET | Refills: 3 | Status: SHIPPED | OUTPATIENT
Start: 2023-04-18 | End: 2023-10-06

## 2023-04-18 RX ORDER — ERENUMAB-AOOE 140 MG/ML
140 INJECTION, SOLUTION SUBCUTANEOUS
Qty: 1 ML | Refills: 5 | Status: SHIPPED | OUTPATIENT
Start: 2023-04-18 | End: 2023-09-12

## 2023-04-19 ENCOUNTER — RESEARCH ENCOUNTER (OUTPATIENT)
Dept: RESEARCH | Facility: WORKSITE | Age: 48
End: 2023-04-19
Payer: MEDICAID

## 2023-04-19 ENCOUNTER — TELEPHONE (OUTPATIENT)
Dept: NEUROLOGY | Facility: MEDICAL CENTER | Age: 48
End: 2023-04-19
Payer: MEDICAID

## 2023-04-19 NOTE — TELEPHONE ENCOUNTER
Aimovig 140mg/ml SOAJ    RTS - LF 04/17/2023 Other pharmacy next available refill on or after 05/10/2023  +  - 04/19/2023 7:38AM    Ubrelvy 100mg Tablet    RTS - LF 04/17/2023 Other pharmacy next available refill on or after 05/11/2023  +  - 04/19/2023 7:35AM

## 2023-04-28 ENCOUNTER — TELEPHONE (OUTPATIENT)
Dept: NEUROLOGY | Facility: MEDICAL CENTER | Age: 48
End: 2023-04-28
Payer: MEDICAID

## 2023-04-28 NOTE — TELEPHONE ENCOUNTER
NEUROLOGY PATIENT PRE-VISIT PLANNING     Patient was NOT contacted to complete PVP.    Patient Appointment is scheduled as: Established Patient     Is visit type and length scheduled correctly? Yes    [facility] in Patient Demographics? Yes    3.   Is referral attached to visit? Yes    4. Were records received from referring provider? Yes    4. Patient was NOT contacted to have someone accompany them to visit.     5. Is this appointment scheduled as a Hospital Follow-Up?  Yes, patient was in hospital on 01/01/23    6. Does the patient require any pre procedure or post procedure follow up? No    7. If any orders were placed at last visit or intended to be done for this visit do we have Results/Consult Notes? Yes  Labs - Labs ordered, completed on 01/31/23 and results are in chart.  Imaging - Recent images in Epic.   Referrals - A referral was placed for Neuropsych testing it is not known if patient completed. A referral was also placed for an EKG.     8. If patient appointment is for Botox - is order pended for provider? N/A

## 2023-05-11 NOTE — RESEARCH NOTE
[employer] [place] [employer] enrollment complete. Saliva sample collected onsite. FARIAS Identified consented and enrolled.

## 2023-05-23 ENCOUNTER — TELEPHONE (OUTPATIENT)
Dept: NEUROLOGY | Facility: MEDICAL CENTER | Age: 48
End: 2023-05-23
Payer: MEDICAID

## 2023-08-23 NOTE — TELEPHONE ENCOUNTER
Aimovig 70mg/ml Soln Auto-Injector    PA submitted and approved until 01/13/2023 however TC rejecting as DUR Dup Ingreient/Therapy which means it was probably filled elsewhere.  - 01/18/2022 8:26am    Chief Complaint:  Sue Cardona is here today for   Chief Complaint   Patient presents with   • Gyn Exam   .    Medications: medications verified and updated  Refills needed today?  NO  Patient would like communication of their results via:Cell Phone:   Telephone Information:   Mobile [phone]     Okay to leave a message containing results? Yes     No LMP recorded. (Menstrual status: Other).      If your visit includes an exam today, would you like an assistant to be present in the room during that time? NO

## 2023-08-29 ENCOUNTER — APPOINTMENT (OUTPATIENT)
Dept: OPHTHALMOLOGY | Facility: MEDICAL CENTER | Age: 48
End: 2023-08-29
Payer: MEDICAID

## 2023-09-12 ENCOUNTER — OFFICE VISIT (OUTPATIENT)
Dept: NEUROLOGY | Facility: MEDICAL CENTER | Age: 48
End: 2023-09-12
Attending: PSYCHIATRY & NEUROLOGY
Payer: MEDICAID

## 2023-09-12 VITALS
BODY MASS INDEX: 30.3 KG/M2 | HEIGHT: 69 IN | OXYGEN SATURATION: 96 % | HEART RATE: 76 BPM | DIASTOLIC BLOOD PRESSURE: 64 MMHG | TEMPERATURE: 98.4 F | SYSTOLIC BLOOD PRESSURE: 100 MMHG | WEIGHT: 204.59 LBS

## 2023-09-12 DIAGNOSIS — R41.89 COGNITIVE IMPAIRMENT: ICD-10-CM

## 2023-09-12 DIAGNOSIS — G43.719 INTRACTABLE CHRONIC MIGRAINE WITHOUT AURA AND WITHOUT STATUS MIGRAINOSUS: ICD-10-CM

## 2023-09-12 PROCEDURE — 3074F SYST BP LT 130 MM HG: CPT | Performed by: PSYCHIATRY & NEUROLOGY

## 2023-09-12 PROCEDURE — 3078F DIAST BP <80 MM HG: CPT | Performed by: PSYCHIATRY & NEUROLOGY

## 2023-09-12 PROCEDURE — 99213 OFFICE O/P EST LOW 20 MIN: CPT | Performed by: PSYCHIATRY & NEUROLOGY

## 2023-09-12 PROCEDURE — 99212 OFFICE O/P EST SF 10 MIN: CPT | Performed by: PSYCHIATRY & NEUROLOGY

## 2023-09-12 RX ORDER — ESTRADIOL 0.1 MG/G
CREAM VAGINAL
COMMUNITY

## 2023-09-12 RX ORDER — FLUTICASONE PROPIONATE AND SALMETEROL 250; 50 UG/1; UG/1
POWDER RESPIRATORY (INHALATION)
COMMUNITY
End: 2024-02-12

## 2023-09-12 RX ORDER — ONDANSETRON 2 MG/ML
INJECTION INTRAMUSCULAR; INTRAVENOUS
COMMUNITY
End: 2023-11-15

## 2023-09-12 RX ORDER — POTASSIUM &MAGNESIUM ASPARTATE 250-250 MG
CAPSULE ORAL
COMMUNITY
Start: 2023-04-01 | End: 2023-11-15

## 2023-09-12 RX ORDER — LASMIDITAN 100 MG/1
TABLET ORAL
Qty: 12 TABLET | Refills: 0 | Status: SHIPPED | OUTPATIENT
Start: 2023-09-12 | End: 2023-10-06

## 2023-09-12 RX ORDER — PHENYLEPHRINE HCL 10 MG/1
TABLET, FILM COATED ORAL
COMMUNITY
Start: 2023-04-01

## 2023-09-12 RX ORDER — ATOGEPANT 60 MG/1
60 TABLET ORAL DAILY
Qty: 12 TABLET | Refills: 0 | COMMUNITY
Start: 2023-09-12 | End: 2023-09-25

## 2023-09-12 RX ORDER — FLUTICASONE PROPIONATE AND SALMETEROL 500; 50 UG/1; UG/1
POWDER RESPIRATORY (INHALATION)
COMMUNITY

## 2023-09-12 ASSESSMENT — FIBROSIS 4 INDEX: FIB4 SCORE: 1.15

## 2023-09-12 ASSESSMENT — ENCOUNTER SYMPTOMS
PHOTOPHOBIA: 1
HEADACHES: 1

## 2023-09-12 NOTE — PROGRESS NOTES
"Subjective     Peter Trimble is a 48 y.o. female who presents for for follow-up, with a history of now intractable migraine without aura and mild cognitive impairment.     HPI    Peter states that the headaches over the last several months have continued to worsen, she had fairly good control with Aimovig 140 mg injections every 4 weeks until this time.  There is no clear reason why this was happening, but she states she is now having headaches every day, severe migraine several days in a week.  Even Ubrelvy is no longer providing the benefit she had in the past.  Zofran at least does help nausea but obviously does nothing for the headaches themselves.    She is compliant with the Aimovig itself, injection technique is always been observed.  She has been on Inderal 10 mg twice daily from a mood standpoint, but her blood pressures have been going down, they are getting her off the drug, she is now on 5 mg twice daily.    Her work-up for cognitive impairment has been ongoing, MRI scan of the brain did reveal nonspecific white matter changes, but neuropsychological testing is still pending.  Her last scheduled appointment from March 1 had to be canceled because of some personal issues.    Medical, surgical and family histories are reviewed, there are no new drug allergies.  She is on Aimovig 140 mg injections every 4 weeks, Ubrelvy 100 mg as needed, Zofran 8 mg as needed, Inderal 5 mg twice daily, her inhalers, continuous oxygen therapy, 65 FE, Estrace, and vitamin.    Review of Systems   Constitutional:  Positive for malaise/fatigue.   Eyes:  Positive for photophobia.   Neurological:  Positive for headaches.   All other systems reviewed and are negative.    Objective     /64 (BP Location: Right arm, Patient Position: Sitting, BP Cuff Size: Large adult)   Pulse 76   Temp 36.9 °C (98.4 °F) (Temporal)   Ht 1.753 m (5' 9\")   Wt 92.8 kg (204 lb 9.4 oz)   SpO2 96%   BMI 30.21 kg/m²      Physical " Exam    She appears in no acute distress, though she is complaining of severe migraine headache.  Vital signs are stable.  She complains of photophobia and malaise, yet the lights are on in the room without clear discomfort.  There is no malar rash or jaw claudication.  Her neck is supple.  Cardiac evaluation is unremarkable.     Neurological Exam    Formal neurologic examination was deferred for today.  Time was limited given the patient's late arrival.    Assessment & Plan     1. Intractable chronic migraine without aura and without status migrainosus  Aimovig will be discontinued, Qulipta 60 mg tablets are provided as samples, taken every day as a maintenance therapy.  I would expect to see benefit being seen within the first 24-48 hours.  For rescue, Reyvow 100 mg tablets are prescribed, this can be safely taken with Zofran.  The drug can make people a little dizzy and sleepy, she was warned of this.  We will follow-up in the next matter of months, this time with a 40-minute appointment to review work-up both for cognitive symptomatology as well as the headaches.  We will maintain contact via [employer] in the interim about her medications for her headaches.    - Atogepant (QULIPTA) 60 MG Tab; Take 60 mg by mouth every day.  Dispense: 12 Tablet; Refill: 0  - Lasmiditan Succinate (REYVOW) 100 MG Tab; 1 tab at headache onset; repeat once in 24 hours as needed  Dispense: 12 Tablet; Refill: 0    Time: 20 minutes in total spent on patient care including pre-charting, record review, discussion with healthcare staff and documentation.  This includes face-to-face time for exam, review, discussion, as well as counseling and coordinating care.

## 2023-09-12 NOTE — Clinical Note
Reena, this is the patient we had texted about earlier who needs an appointment rescheduled for her neuropsych evaluation with Dr. Meyers.  Thanks.  Timi

## 2023-09-14 ENCOUNTER — TELEPHONE (OUTPATIENT)
Dept: NEUROLOGY | Facility: MEDICAL CENTER | Age: 48
End: 2023-09-14
Payer: MEDICAID

## 2023-09-14 NOTE — TELEPHONE ENCOUNTER
Please advise: Pt called and stated that she has a pending PA for her Reyvow RX  and wanted to see if its in the process of being sent back; I let her know that I would contact the PA coordinator! Thank you in advance!

## 2023-09-22 ENCOUNTER — TELEPHONE (OUTPATIENT)
Dept: NEUROLOGY | Facility: MEDICAL CENTER | Age: 48
End: 2023-09-22
Payer: MEDICAID

## 2023-09-22 NOTE — TELEPHONE ENCOUNTER
Prior Authorization for Reyvow 100 MG Tabs has been approved for a quantity of 8 , day supply 30 - Max 34 DS    Prior Authorization reference number: N/A  Insurance: [place] Medicaid  Effective dates: 09/20/2023 - 09/21/2024  Copay: $0.00     Is patient eligible to fill with [facility]? Yes    Next Steps: The Patients copay is less than $5.00. Will contact the patient to determine choice of pharmacy, if applicable.

## 2023-09-22 NOTE — TELEPHONE ENCOUNTER
Prior Authorization for Reyvow 100 MG Tabs (Quantity: 8, Days: 30) has been submitted via Cover My Meds: Key ([id number])    Insurance: [place] Medicaid [employer]    Will follow up in 24-48 business hours.

## 2023-10-06 DIAGNOSIS — G43.719 INTRACTABLE CHRONIC MIGRAINE WITHOUT AURA AND WITHOUT STATUS MIGRAINOSUS: ICD-10-CM

## 2023-10-06 RX ORDER — 0.9 % SODIUM CHLORIDE 0.9 %
10 VIAL (ML) INJECTION PRN
Status: CANCELLED | OUTPATIENT
Start: 2023-10-06

## 2023-10-06 RX ORDER — BUTALBITAL, ACETAMINOPHEN AND CAFFEINE 300; 40; 50 MG/1; MG/1; MG/1
1-2 CAPSULE ORAL EVERY 4 HOURS PRN
Qty: 30 CAPSULE | Refills: 0 | Status: SHIPPED | OUTPATIENT
Start: 2023-10-06 | End: 2023-11-27

## 2023-10-06 RX ORDER — EPINEPHRINE 1 MG/ML(1)
0.5 AMPUL (ML) INJECTION PRN
Status: CANCELLED | OUTPATIENT
Start: 2023-10-06

## 2023-10-06 RX ORDER — METHYLPREDNISOLONE SODIUM SUCCINATE 125 MG/2ML
125 INJECTION, POWDER, LYOPHILIZED, FOR SOLUTION INTRAMUSCULAR; INTRAVENOUS PRN
Status: CANCELLED | OUTPATIENT
Start: 2023-10-06

## 2023-10-06 RX ORDER — SODIUM CHLORIDE 9 MG/ML
INJECTION, SOLUTION INTRAVENOUS CONTINUOUS
Status: CANCELLED | OUTPATIENT
Start: 2023-10-06

## 2023-10-06 RX ORDER — 0.9 % SODIUM CHLORIDE 0.9 %
VIAL (ML) INJECTION PRN
Status: CANCELLED | OUTPATIENT
Start: 2023-10-06

## 2023-10-06 RX ORDER — DIPHENHYDRAMINE HYDROCHLORIDE 50 MG/ML
50 INJECTION INTRAMUSCULAR; INTRAVENOUS PRN
Status: CANCELLED | OUTPATIENT
Start: 2023-10-06

## 2023-10-06 RX ORDER — 0.9 % SODIUM CHLORIDE 0.9 %
3 VIAL (ML) INJECTION PRN
Status: CANCELLED | OUTPATIENT
Start: 2023-10-06

## 2023-10-07 NOTE — PROGRESS NOTES
Patient has been dealing with refractory migraine since her last office visit 1 month ago.  Vyepti IV infusions will be prescribed.  The rationale was reviewed with the patient via email.  A brief trial of Fioricet was also offered, the patient has failed almost all medication regimens both from a preventative as well as a rescue standpoint.

## 2023-10-16 ENCOUNTER — TELEPHONE (OUTPATIENT)
Dept: NEUROLOGY | Facility: MEDICAL CENTER | Age: 48
End: 2023-10-16

## 2023-10-31 DIAGNOSIS — G43.719 INTRACTABLE CHRONIC MIGRAINE WITHOUT AURA AND WITHOUT STATUS MIGRAINOSUS: ICD-10-CM

## 2023-10-31 RX ORDER — ONDANSETRON HYDROCHLORIDE 8 MG/1
8 TABLET, FILM COATED ORAL
Qty: 20 TABLET | Refills: 6 | Status: SHIPPED | OUTPATIENT
Start: 2023-10-31 | End: 2024-10-30

## 2023-11-15 ENCOUNTER — APPOINTMENT (OUTPATIENT)
Dept: OPHTHALMOLOGY | Facility: MEDICAL CENTER | Age: 48
End: 2023-11-15
Payer: MEDICAID

## 2023-11-15 ENCOUNTER — OUTPATIENT INFUSION SERVICES (OUTPATIENT)
Dept: ONCOLOGY | Facility: MEDICAL CENTER | Age: 48
End: 2023-11-15
Attending: PSYCHIATRY & NEUROLOGY
Payer: MEDICAID

## 2023-11-15 ENCOUNTER — HOSPITAL ENCOUNTER (EMERGENCY)
Facility: MEDICAL CENTER | Age: 48
End: 2023-11-15
Attending: EMERGENCY MEDICINE
Payer: MEDICAID

## 2023-11-15 VITALS
WEIGHT: 204 LBS | BODY MASS INDEX: 30.21 KG/M2 | SYSTOLIC BLOOD PRESSURE: 127 MMHG | TEMPERATURE: 97.5 F | DIASTOLIC BLOOD PRESSURE: 78 MMHG | RESPIRATION RATE: 16 BRPM | HEART RATE: 79 BPM | OXYGEN SATURATION: 97 % | HEIGHT: 69 IN

## 2023-11-15 VITALS
WEIGHT: 204.81 LBS | OXYGEN SATURATION: 83 % | RESPIRATION RATE: 16 BRPM | BODY MASS INDEX: 30.33 KG/M2 | HEART RATE: 75 BPM | DIASTOLIC BLOOD PRESSURE: 77 MMHG | SYSTOLIC BLOOD PRESSURE: 115 MMHG | HEIGHT: 69 IN | TEMPERATURE: 97.7 F

## 2023-11-15 DIAGNOSIS — T78.40XA ALLERGIC REACTION, INITIAL ENCOUNTER: ICD-10-CM

## 2023-11-15 DIAGNOSIS — N76.0 BACTERIAL VAGINITIS: ICD-10-CM

## 2023-11-15 DIAGNOSIS — G43.719 INTRACTABLE CHRONIC MIGRAINE WITHOUT AURA AND WITHOUT STATUS MIGRAINOSUS: ICD-10-CM

## 2023-11-15 DIAGNOSIS — B96.89 BACTERIAL VAGINITIS: ICD-10-CM

## 2023-11-15 DIAGNOSIS — R41.89 COGNITIVE IMPAIRMENT: ICD-10-CM

## 2023-11-15 LAB
ALBUMIN SERPL BCP-MCNC: 4.1 G/DL (ref 3.2–4.9)
ALBUMIN/GLOB SERPL: 1.5 G/DL
ALP SERPL-CCNC: 69 U/L (ref 30–99)
ALT SERPL-CCNC: 14 U/L (ref 2–50)
ANION GAP SERPL CALC-SCNC: 11 MMOL/L (ref 7–16)
APPEARANCE UR: CLEAR
AST SERPL-CCNC: 19 U/L (ref 12–45)
BACTERIA #/AREA URNS HPF: NEGATIVE /HPF
BASOPHILS # BLD AUTO: 0.9 % (ref 0–1.8)
BASOPHILS # BLD: 0.06 K/UL (ref 0–0.12)
BILIRUB SERPL-MCNC: 0.3 MG/DL (ref 0.1–1.5)
BILIRUB UR QL STRIP.AUTO: NEGATIVE
BUN SERPL-MCNC: 21 MG/DL (ref 8–22)
CALCIUM ALBUM COR SERPL-MCNC: 9.2 MG/DL (ref 8.5–10.5)
CALCIUM SERPL-MCNC: 9.3 MG/DL (ref 8.5–10.5)
CANDIDA DNA VAG QL PROBE+SIG AMP: NEGATIVE
CHLORIDE SERPL-SCNC: 101 MMOL/L (ref 96–112)
CO2 SERPL-SCNC: 29 MMOL/L (ref 20–33)
COLOR UR: YELLOW
CREAT SERPL-MCNC: 0.83 MG/DL (ref 0.5–1.4)
EKG IMPRESSION: NORMAL
EOSINOPHIL # BLD AUTO: 0.07 K/UL (ref 0–0.51)
EOSINOPHIL NFR BLD: 1.1 % (ref 0–6.9)
EPI CELLS #/AREA URNS HPF: ABNORMAL /HPF
ERYTHROCYTE [DISTWIDTH] IN BLOOD BY AUTOMATED COUNT: 42.1 FL (ref 35.9–50)
G VAGINALIS DNA VAG QL PROBE+SIG AMP: POSITIVE
GFR SERPLBLD CREATININE-BSD FMLA CKD-EPI: 87 ML/MIN/1.73 M 2
GLOBULIN SER CALC-MCNC: 2.7 G/DL (ref 1.9–3.5)
GLUCOSE SERPL-MCNC: 83 MG/DL (ref 65–99)
GLUCOSE UR STRIP.AUTO-MCNC: NEGATIVE MG/DL
HCT VFR BLD AUTO: 40.8 % (ref 37–47)
HGB BLD-MCNC: 13.8 G/DL (ref 12–16)
HYALINE CASTS #/AREA URNS LPF: ABNORMAL /LPF
IMM GRANULOCYTES # BLD AUTO: 0.02 K/UL (ref 0–0.11)
IMM GRANULOCYTES NFR BLD AUTO: 0.3 % (ref 0–0.9)
KETONES UR STRIP.AUTO-MCNC: NEGATIVE MG/DL
LEUKOCYTE ESTERASE UR QL STRIP.AUTO: ABNORMAL
LYMPHOCYTES # BLD AUTO: 2.67 K/UL (ref 1–4.8)
LYMPHOCYTES NFR BLD: 41.7 % (ref 22–41)
MCH RBC QN AUTO: 31.9 PG (ref 27–33)
MCHC RBC AUTO-ENTMCNC: 33.8 G/DL (ref 32.2–35.5)
MCV RBC AUTO: 94.2 FL (ref 81.4–97.8)
MICRO URNS: ABNORMAL
MONOCYTES # BLD AUTO: 0.49 K/UL (ref 0–0.85)
MONOCYTES NFR BLD AUTO: 7.6 % (ref 0–13.4)
NEUTROPHILS # BLD AUTO: 3.1 K/UL (ref 1.82–7.42)
NEUTROPHILS NFR BLD: 48.4 % (ref 44–72)
NITRITE UR QL STRIP.AUTO: NEGATIVE
NRBC # BLD AUTO: 0 K/UL
NRBC BLD-RTO: 0 /100 WBC (ref 0–0.2)
PH UR STRIP.AUTO: 6.5 [PH] (ref 5–8)
PLATELET # BLD AUTO: 265 K/UL (ref 164–446)
PMV BLD AUTO: 10.5 FL (ref 9–12.9)
POTASSIUM SERPL-SCNC: 4.2 MMOL/L (ref 3.6–5.5)
PROT SERPL-MCNC: 6.8 G/DL (ref 6–8.2)
PROT UR QL STRIP: NEGATIVE MG/DL
RBC # BLD AUTO: 4.33 M/UL (ref 4.2–5.4)
RBC # URNS HPF: ABNORMAL /HPF
RBC UR QL AUTO: NEGATIVE
SODIUM SERPL-SCNC: 141 MMOL/L (ref 135–145)
SP GR UR STRIP.AUTO: 1.01
T VAGINALIS DNA VAG QL PROBE+SIG AMP: NEGATIVE
UROBILINOGEN UR STRIP.AUTO-MCNC: 0.2 MG/DL
WBC # BLD AUTO: 6.4 K/UL (ref 4.8–10.8)
WBC #/AREA URNS HPF: ABNORMAL /HPF

## 2023-11-15 PROCEDURE — 700101 HCHG RX REV CODE 250: Mod: UD | Performed by: EMERGENCY MEDICINE

## 2023-11-15 PROCEDURE — 96372 THER/PROPH/DIAG INJ SC/IM: CPT | Mod: XU

## 2023-11-15 PROCEDURE — 700102 HCHG RX REV CODE 250 W/ 637 OVERRIDE(OP): Mod: UD | Performed by: EMERGENCY MEDICINE

## 2023-11-15 PROCEDURE — 85025 COMPLETE CBC W/AUTO DIFF WBC: CPT

## 2023-11-15 PROCEDURE — 700111 HCHG RX REV CODE 636 W/ 250 OVERRIDE (IP): Performed by: PSYCHIATRY & NEUROLOGY

## 2023-11-15 PROCEDURE — 87480 CANDIDA DNA DIR PROBE: CPT

## 2023-11-15 PROCEDURE — 87510 GARDNER VAG DNA DIR PROBE: CPT

## 2023-11-15 PROCEDURE — 700105 HCHG RX REV CODE 258: Mod: UD | Performed by: PSYCHIATRY & NEUROLOGY

## 2023-11-15 PROCEDURE — 93005 ELECTROCARDIOGRAM TRACING: CPT | Performed by: EMERGENCY MEDICINE

## 2023-11-15 PROCEDURE — 80053 COMPREHEN METABOLIC PANEL: CPT

## 2023-11-15 PROCEDURE — 96365 THER/PROPH/DIAG IV INF INIT: CPT

## 2023-11-15 PROCEDURE — 99284 EMERGENCY DEPT VISIT MOD MDM: CPT

## 2023-11-15 PROCEDURE — 96375 TX/PRO/DX INJ NEW DRUG ADDON: CPT

## 2023-11-15 PROCEDURE — 87660 TRICHOMONAS VAGIN DIR PROBE: CPT

## 2023-11-15 PROCEDURE — 81001 URINALYSIS AUTO W/SCOPE: CPT

## 2023-11-15 PROCEDURE — 94640 AIRWAY INHALATION TREATMENT: CPT

## 2023-11-15 PROCEDURE — A9270 NON-COVERED ITEM OR SERVICE: HCPCS | Mod: UD | Performed by: EMERGENCY MEDICINE

## 2023-11-15 RX ORDER — SODIUM CHLORIDE 9 MG/ML
INJECTION, SOLUTION INTRAVENOUS CONTINUOUS
OUTPATIENT
Start: 2024-02-07

## 2023-11-15 RX ORDER — 0.9 % SODIUM CHLORIDE 0.9 %
10 VIAL (ML) INJECTION PRN
OUTPATIENT
Start: 2024-02-07

## 2023-11-15 RX ORDER — DIPHENHYDRAMINE HYDROCHLORIDE 50 MG/ML
50 INJECTION INTRAMUSCULAR; INTRAVENOUS PRN
OUTPATIENT
Start: 2024-02-07

## 2023-11-15 RX ORDER — PREDNISONE 20 MG/1
40 TABLET ORAL DAILY
Qty: 6 TABLET | Refills: 0 | Status: SHIPPED | OUTPATIENT
Start: 2023-11-15 | End: 2023-11-18

## 2023-11-15 RX ORDER — FAMOTIDINE 20 MG/1
20 TABLET, FILM COATED ORAL ONCE
Status: COMPLETED | OUTPATIENT
Start: 2023-11-15 | End: 2023-11-15

## 2023-11-15 RX ORDER — TRAZODONE HYDROCHLORIDE 100 MG/1
200 TABLET ORAL NIGHTLY
COMMUNITY

## 2023-11-15 RX ORDER — 0.9 % SODIUM CHLORIDE 0.9 %
3 VIAL (ML) INJECTION PRN
OUTPATIENT
Start: 2024-02-07

## 2023-11-15 RX ORDER — METHYLPREDNISOLONE SODIUM SUCCINATE 125 MG/2ML
125 INJECTION, POWDER, LYOPHILIZED, FOR SOLUTION INTRAMUSCULAR; INTRAVENOUS PRN
Status: COMPLETED | OUTPATIENT
Start: 2023-11-15 | End: 2023-11-15

## 2023-11-15 RX ORDER — DOCUSATE SODIUM 250 MG
250 CAPSULE ORAL 2 TIMES DAILY PRN
COMMUNITY

## 2023-11-15 RX ORDER — SODIUM CHLORIDE 9 MG/ML
INJECTION, SOLUTION INTRAVENOUS CONTINUOUS
Status: DISCONTINUED | OUTPATIENT
Start: 2023-11-15 | End: 2023-11-15 | Stop reason: HOSPADM

## 2023-11-15 RX ORDER — EPINEPHRINE 1 MG/ML(1)
0.5 AMPUL (ML) INJECTION PRN
OUTPATIENT
Start: 2024-02-07

## 2023-11-15 RX ORDER — METHYLPREDNISOLONE SODIUM SUCCINATE 125 MG/2ML
125 INJECTION, POWDER, LYOPHILIZED, FOR SOLUTION INTRAMUSCULAR; INTRAVENOUS PRN
OUTPATIENT
Start: 2024-02-07

## 2023-11-15 RX ORDER — METRONIDAZOLE 500 MG/1
500 TABLET ORAL 2 TIMES DAILY
Qty: 14 TABLET | Refills: 0 | Status: ACTIVE | OUTPATIENT
Start: 2023-11-15 | End: 2023-11-22

## 2023-11-15 RX ORDER — 0.9 % SODIUM CHLORIDE 0.9 %
VIAL (ML) INJECTION PRN
OUTPATIENT
Start: 2024-02-07

## 2023-11-15 RX ORDER — NALDEMEDINE 0.2 MG/1
1 TABLET ORAL EVERY MORNING
COMMUNITY

## 2023-11-15 RX ORDER — DIPHENHYDRAMINE HYDROCHLORIDE 50 MG/ML
50 INJECTION INTRAMUSCULAR; INTRAVENOUS PRN
Status: COMPLETED | OUTPATIENT
Start: 2023-11-15 | End: 2023-11-15

## 2023-11-15 RX ORDER — DIVALPROEX SODIUM 500 MG/1
500 TABLET, DELAYED RELEASE ORAL 3 TIMES DAILY
COMMUNITY

## 2023-11-15 RX ORDER — SENNOSIDES 8.6 MG
650 CAPSULE ORAL 2 TIMES DAILY
COMMUNITY

## 2023-11-15 RX ORDER — BACLOFEN 10 MG/1
20 TABLET ORAL 2 TIMES DAILY
COMMUNITY

## 2023-11-15 RX ORDER — EPINEPHRINE 1 MG/ML(1)
0.5 AMPUL (ML) INJECTION PRN
Status: COMPLETED | OUTPATIENT
Start: 2023-11-15 | End: 2023-11-15

## 2023-11-15 RX ORDER — LORAZEPAM 0.5 MG/1
0.5 TABLET ORAL EVERY 8 HOURS PRN
COMMUNITY
End: 2024-02-12

## 2023-11-15 RX ADMIN — DIPHENHYDRAMINE HYDROCHLORIDE 50 MG: 50 INJECTION, SOLUTION INTRAMUSCULAR; INTRAVENOUS at 11:16

## 2023-11-15 RX ADMIN — EPINEPHRINE 0.5 MG: 1 INJECTION INTRAMUSCULAR; INTRAVENOUS; SUBCUTANEOUS at 11:40

## 2023-11-15 RX ADMIN — FAMOTIDINE 20 MG: 20 TABLET, FILM COATED ORAL at 12:37

## 2023-11-15 RX ADMIN — METHYLPREDNISOLONE SODIUM SUCCINATE 125 MG: 125 INJECTION, POWDER, FOR SOLUTION INTRAMUSCULAR; INTRAVENOUS at 11:16

## 2023-11-15 RX ADMIN — EPTINEZUMAB-JJMR 100 MG: 100 INJECTION INTRAVENOUS at 10:47

## 2023-11-15 RX ADMIN — SODIUM CHLORIDE: 9 INJECTION, SOLUTION INTRAVENOUS at 11:16

## 2023-11-15 RX ADMIN — ALBUTEROL SULFATE 2.5 MG: 2.5 SOLUTION RESPIRATORY (INHALATION) at 12:23

## 2023-11-15 ASSESSMENT — FIBROSIS 4 INDEX
FIB4 SCORE: 1.15
FIB4 SCORE: 0.9

## 2023-11-15 ASSESSMENT — PAIN DESCRIPTION - PAIN TYPE: TYPE: CHRONIC PAIN

## 2023-11-15 NOTE — LETTER
11/17/2023               Peter Trimble  [address]  [place] [address]        Dear Peter (MR#[id number])    This letter is sent in regards to your recent visit to the [facility] Emergency Department on 11/15/2023. During the visit, tests were performed to assist the physician in your medical diagnosis. A review of your tests requires that we notify you of the following:    Your vaginal culture was POSITIVE for a bacteria called Gardnerella Vaginalis. The antibiotic prescribed for you (metronidazole) should be active to treat this bacteria. It is important that you continue taking your antibiotic until it is finished.     Please feel free to contact me at the number below if you have any questions or concerns. Thank you for your cooperation in the matter.    Sincerely,  ED Culture Follow-Up Staff  Rell Goins, PharmD    [facility], Emergency Department  [address] [place] [id number] [phone] (ED Culture Line)

## 2023-11-15 NOTE — ED TRIAGE NOTES
Chief Complaint   Patient presents with    Allergic Reaction     Patient transfer code assist from the transfusion center. Patient stated 1st dose of Vyelti today and then developed itching, throat swelling, and SOB. Patient was given IM EPI 0.5mg, 50 mg benadryl 125mg solumedrol. Patient was given 6L o2 PTA base line 02 2 L. Patient on 2L during triage.

## 2023-11-15 NOTE — PROGRESS NOTES
Peter presented into [facility] for Vyepti for migraines. Pt oriented to the unit, plan of care reviewed and provided an opportunity for questions. PIV started to left hand, had positive blood return and flushed briskly. Labs drawn as ordered per patient's request for Dr. Meza. Approximately 15 minutes into the Vyepti infusion, the patient began experiencing what she described as ear swelling, itchy rash and some throat tightness. The Vyepti infusion was immediately stopped. VS were stable. Benadryl and Solumedrol were given. About ten minutes later, the patient started coughing and complaining of worsening shortness of breath at which time a Code Assist was called and IM epinephrine was given. Dr. Meza was contacted via Voalte. A message was left at that time. Pt was taken to the ER by the Rapid Response team.

## 2023-11-15 NOTE — ED PROVIDER NOTES
ER Provider Note    Scribed for Shiv Gill M.d. by Jose Luis Butler. 11/15/2023  12:05 PM    Primary Care Provider: Pcp Pt States None    CHIEF COMPLAINT  Chief Complaint   Patient presents with    Allergic Reaction     Patient transfer code assist from the transfusion center. Patient stated 1st dose of Vyelti today and then developed itching, throat swelling, and SOB. Patient was given IM EPI 0.5mg, 50 mg benadryl 125mg solumedrol. Patient was given 6L o2 PTA base line 02 2 L. Patient on 2L during triage.         EXTERNAL RECORDS REVIEWED  Outpatient Notes shows that the patient was seen in September 2023 for intractable migraine. She started her first dose of the medication today.     HPI/ROS  LIMITATION TO HISTORY   Select: : None  OUTSIDE HISTORIAN(S):  None    Peter Trimble is a 48 y.o. female who presents to the ED complaining of an allergic reaction onset prior to arrival. Patient has a history of migraines, and was seen earlier in September for it. At that time, she was prescribed Vyelti, and took her first dose of the medication today. She then went to the transfusion center here at [facility]. Patient notes that 15 minutes after arriving there, she began experiencing itchiness throughout her body, along with associated eye redness, throat swelling, chest pain, and shortness of breath. She was given Epinephrine 0.5 mg, Benadryl 50 mg, and Solumedrol 125 mg for her symptoms. She was also placed on 6 liters of oxygen, and was then brought here to the ED by hospital personnel. Currently in the ED, she reports feeling improved after onset, however, her symptoms have still not resolved. She is currently on 2 liters of oxygen. Denies any nausea, vomiting, or abdominal pain. No alleviating or exacerbating factors reported. Patient is on 2 liters of baseline oxygen.     PAST MEDICAL HISTORY  Past Medical History:   Diagnosis Date    Acute blood loss anemia 5/2/2013    -resolved, postop 2013      "Acute renal failure (ARF) ([facility]) 10/5/2011    -resolved (post op )     Anemia     Anxiety     Arthritis     osteoarthritis since 15yo.    ASTHMA     O2 3liters at HS and prn    Bipolar affective ([facility])     Breath shortness     Cold     Depression     Eclampsia complicating pregnancy     Environmental allergies     Essential hypertension, malignant 2011    Fibromyalgia     GERD (gastroesophageal reflux disease)     Heart murmur     she denies this hx    History of pregnancy 10/16/2014         Hx MRSA infection     Hyperlipidemia 13    \"off medication\"    Hypertension     Kidney stones     Migraines     Pain 15    \"my body hurts all the time\", 5-6/10    Personality disorder ([facility])     Pneumonia     PTSD (post-traumatic stress disorder)     Scalp wound 2014    Seizure ([facility]) 14    last     Sleep apnea     has had a sleep study, use O2 at HS    Snoring     Stroke ([facility])      reports strokes x5 , and a \"brain bleed\"    Subarachnoid hemorrhage ([facility]) 2011    -small,  (2nd to HTN)     Unspecified hemorrhagic conditions     nose-bleeds, bruises easily    Unspecified urinary incontinence     Urinary bladder disorder        SURGICAL HISTORY  Past Surgical History:   Procedure Laterality Date    ORIF, ANKLE Right 2016    Procedure: ANKLE ORIF;  Surgeon: Bill Brambila M.D.;  Location: [facility];  Service:     IRRIGATION & DEBRIDEMENT GENERAL  2014    Performed by Ezra Wright M.D. at SURGERY [facility]    BREAST BIOPSY  2014    Performed by Negro Hester M.D. at SURGERY SAME DAY [facility]    EVACUATION OF HEMATOMA  2013    Performed by Lazaro Connors Jr., M.D. at SURGERY [facility]    MAMMOPLASTY REDUCTION  2013    Performed by Negro Hester M.D. at [facility]    RECOVERY  2012    Performed by SURGERY, IR-RECOVERY at SURGERY SAME DAY [facility]    RECOVERY  10/5/2011    Performed by SURGERY, " RECOVERYONLY at SURGERY [facility]    HYSTERECTOMY LAPAROSCOPY      W BSO    KNEE RECONSTRUCTION      LAMINOTOMY      OTHER ORTHOPEDIC SURGERY      maddie. knee scopes    OTHER ORTHOPEDIC SURGERY      back fusion then hardware removal    PB EXPLORATION OF SPINAL FUSION      AK REMOVAL OF OVARY(S)         FAMILY HISTORY  Family History   Problem Relation Age of Onset    Hypertension Mother     Hyperlipidemia Mother     Hypertension Father     Hyperlipidemia Father     Heart Disease Father     Psychiatric Illness Father     Alcohol/Drug Father     Alcohol/Drug Brother     Arthritis Maternal Uncle     Psychiatric Illness Maternal Uncle     Heart Disease Maternal Uncle     Hypertension Maternal Uncle     Hyperlipidemia Maternal Uncle     Genetic Disorder Paternal Aunt     Psychiatric Illness Paternal Uncle     Arthritis Maternal Grandmother     Heart Disease Maternal Grandmother     Alcohol/Drug Maternal Grandfather     Stroke Maternal Grandfather     Psychiatric Illness Maternal Grandfather     Arthritis Paternal Grandmother     Alcohol/Drug Paternal Grandfather        SOCIAL HISTORY   reports that she has never smoked. She has never used smokeless tobacco. She reports that she does not drink alcohol and does not use drugs.    CURRENT MEDICATIONS  Previous Medications    ACETAMINOPHEN (TYLENOL) 650 MG CR TABLET    Take 650 mg by mouth 2 times a day.    ALBUTEROL 108 (90 BASE) MCG/ACT AERO SOLN INHALATION AEROSOL    ProAir HFA 90 mcg/actuation aerosol inhaler    BACLOFEN (LIORESAL) 10 MG TAB    Take 20 mg by mouth 2 times a day.    BUPROPION & DIET MANAGE PROD PO        CRANBERRY 1000 MG CAP        DIVALPROEX (DEPAKOTE) 500 MG TABLET DELAYED RESPONSE    Take 500 mg by mouth 3 times a day.    DOCUSATE SODIUM (COLACE) 250 MG CAPSULE    Take 250 mg by mouth 2 times a day as needed.    ESTRADIOL (ESTRACE) 0.1 MG/GM VAGINAL CREAM        FERROUS SULFATE 325 (65 FE) MG TABLET    Take 325 mg by mouth every day.     "FLUTICASONE-SALMETEROL (ADVAIR DISKUS) 250-50 MCG/ACT AEROSOL POWDER, BREATH ACTIVATED        FLUTICASONE-SALMETEROL (ADVAIR DISKUS) 500-50 MCG/ACT AEROSOL POWDER, BREATH ACTIVATED        FOLIC ACID-B12-B6-ARGININE PO        IPRATROPIUM (ATROVENT) 0.06 % SOLUTION        LORAZEPAM (ATIVAN) 0.5 MG TAB    Take 0.5 mg by mouth every 8 hours as needed for Anxiety.    MULTIPLE VITAMINS-MINERALS (MULTIVITAMIN ADULT EXTRA C PO)        MUPIROCIN (BACTROBAN) 2 % OINTMENT        NALDEMEDINE TOSYLATE (SYMPROIC) 0.2 MG TAB    Take 1 Tablet by mouth every morning.    ONDANSETRON (ZOFRAN) 8 MG TAB    Take 1 Tablet by mouth 1 time a day as needed for Nausea/Vomiting.    PANTOPRAZOLE (PROTONIX) 40 MG TABLET DELAYED RESPONSE        PHENYLEPHRINE (NASAL DECONGESTANT PE) 10 MG TAB        PROPRANOLOL (INDERAL) 10 MG TAB    Take 5 mg by mouth 2 times a day. Indications: High Blood Pressure Disorder    TIOTROPIUM BROMIDE MONOHYDRATE 1.25 MCG/ACT AERO SOLN    Inahle 2 puffs by mouth one time per day    TRAZODONE (DESYREL) 100 MG TAB    Take 200 mg by mouth every evening.       ALLERGIES  Dilaudid [hydromorphone], Doxycycline, Gabapentin, Imitrex [sumatriptan succinate], Maxalt [rizatriptan benzoate], Phenergan [promethazine hcl], Butrans [buprenorphine], Cefdinir, Duloxetine hcl, Eucalyptus oil, Lyrica [pregabalin], Morphabond er [renny], Paroxetine, Patchouli oil, Phenergan  [promethazine], Topiramate, Buprenorphine-naloxone, Fentanyl, Kiwi extract, and Rhubarb    PHYSICAL EXAM  BP (!) 179/86   Pulse (!) 52   Temp 36.1 °C (97 °F) (Temporal)   Resp 18   Ht 1.753 m (5' 9\")   Wt 92.5 kg (204 lb)   SpO2 94%   BMI 30.13 kg/m²   Constitutional: Well developed, Well nourished, Mild distress.   HENT: Normocephalic, Atraumatic, Oropharynx moist, No oral exudates. No tongue or throat swelling.   Eyes: Conjunctiva normal, No discharge.   Neck: Supple, No stridor   Cardiovascular: Normal heart rate, Normal rhythm, No murmurs, equal pulses. "   Pulmonary: Normal breath sounds, No respiratory distress, No wheezing, No rales, No rhonchi.  Chest: No chest wall tenderness or deformity.   Abdomen:Soft, No tenderness, No masses, no rebound, no guarding.   Skin: Warm, Dry, No erythema, No rash.   Neurologic: Alert & oriented x 3, Normal motor function,  No focal deficits noted.   Psychiatric: Affect normal, Judgment normal, Mood normal.       DIAGNOSTIC STUDIES    Labs:   Results for orders placed or performed during the hospital encounter of 11/15/23   URINALYSIS   Result Value Ref Range    Color Yellow     Character Clear     Specific Gravity 1.008 <1.035    Ph 6.5 5.0 - 8.0    Glucose Negative Negative mg/dL    Ketones Negative Negative mg/dL    Protein Negative Negative mg/dL    Bilirubin Negative Negative    Urobilinogen, Urine 0.2 Negative    Nitrite Negative Negative    Leukocyte Esterase Trace (A) Negative    Occult Blood Negative Negative    Micro Urine Req Microscopic    VAGINAL PATHOGENS DNA PANEL   Result Value Ref Range    Candida species DNA Probe Negative Negative    Trichamonas vaginalis DNA Probe Negative Negative    Gardnerella vaginalis DNA Probe POSITIVE (A) Negative   URINE MICROSCOPIC (W/UA)   Result Value Ref Range    WBC 0-2 /hpf    RBC 2-5 (A) /hpf    Bacteria Negative None /hpf    Epithelial Cells Few /hpf    Hyaline Cast 0-2 /lpf   EKG   Result Value Ref Range    Report       [facility] Emergency Dept.    Test Date:  2023-11-15  Pt Name:    LEE SCHNEIDER                Department: ER  MRN:        [id number]                      Room:        16  Gender:     Female                       Technician: [id number]  :        [id number]                   Requested By:MARIS JOHNSON  Order #:    [id number]                    Reading MD: MARIS JOHNSON MD    Measurements  Intervals                                Axis  Rate:       61                           P:          0  WI:         0                             QRS:        23  QRSD:       98                           T:          14  QT:         417  QTc:        420    Interpretive Statements  SInsus rhythm, rate of 61, normal axis, no st elevation  Borderline T abnormalities, anterior leads  Compared to ECG 10/24/2019 19:03:51  Prolonged QT interval no longer present  T-wave abnormality still present  Electronically Signed On 11- 18:48:27 PST by MARIS JOHNSON MD          EKG:   I have independently interpreted this EKG above       COURSE & MEDICAL DECISION MAKING     ED Observation Status? Yes; I am placing the patient in to an observation status due to a diagnostic uncertainty as well as therapeutic intensity. Patient placed in observation status at 12:13 PM, 11/15/2023.     Observation plan is as follows: We will manage their symptoms with medication    Upon Reevaluation, the patient's condition has: Improved; and will be discharged.    Patient discharged from ED Observation status at 5:11 PM (Time) 11/15/2023  (Date).     INITIAL ASSESSMENT, COURSE AND PLAN  Care Narrative:     12:13 PM - Patient was seen and evaluated at bedside. Patient presents to the ED for a possible allergic reaction. Patient had her first dose of her migraine medication today, and began experiencing allergic reaction symptoms soon after. Airway is patent. After my exam, I discussed with the patient the plan of care, which includes treating the patient with medication for their symptoms, as well as obtaining an EKG. Patient understands and verbalizes agreement to plan of care. Patient will be treated with Proventil 2.5 mg and Pepcid 20 mg for her symptoms.     1:20 PM - Patient was reevaluated at bedside. Patient reports that her breathing has improved with the Proventil. Discussed EKG results with the patient and informed them that we will continue to monitor them until four hours past the epinephrine administration time.      3:02 PM - Nurse informs me that the patient is  complaining of vaginal itching with dysuria. Ordered UA for further evaluation.     5:11 PM - Patient was reevaluated at bedside. Discussed lab results with the patient. She has no further questions or concerns at this time. I then informed the patient of my plan for discharge, which includes strict return precautions for any new or worsening symptoms. Patient understands and verbalizes agreement to plan of care. Patient is comfortable going home at this time.        HTN/IDDM FOLLOW UP:  The patient has known hypertension and is being followed by their primary care doctor     PROBLEM LIST  Patient presents after what sounds like an allergic reaction while receiving a transfusion.  On arrival here patient's symptoms had significantly improved.  She was observed here for several hours and had no rebound symptoms.  She did complain of some vaginal discomfort and does appear to have bacterial vaginitis.  We will start her on Flagyl for this.    DISPOSITION AND DISCUSSIONS  I have discussed management of the patient with the following physicians and STEPHY's:  None    Discussion of management with other QHP or appropriate source(s): None     Escalation of care considered, and ultimately not performed: acute inpatient care management, however at this time, the patient is most appropriate for outpatient management.    Barriers to care at this time, including but not limited to: Patient does not have established PCP.     Decision tools and prescription drugs considered including, but not limited to:  Prednisone to help with her symptoms .    Patient will be discharged home.    FOLLOW UP:  Timi Meza M.D.  [address] [place] [id number]  [phone]    Schedule an appointment as soon as possible for a visit         OUTPATIENT MEDICATIONS:  Discharge Medication List as of 11/15/2023  5:41 PM        START taking these medications    Details   predniSONE (DELTASONE) 20 MG Tab Take 2 Tablets by mouth every  day for 3 days., Disp-6 Tablet, R-0, Normal            Flagyl 500 mg tab 1 p.o. twice daily 7 days  FINAL DIAGNOSIS  1. Allergic reaction, initial encounter    2. Bacterial vaginitis         Jose Luis DE LA ROSA (Scribe), am scribing for, and in the presence of, LUKASZ Ma*.    Electronically signed by: Jose Luis Butler (Scribe), 11/15/2023    Shiv DE LA ROSA M.* personally performed the services described in this documentation, as scribed by Jose Luis Butler in my presence, and it is both accurate and complete.      The note accurately reflects work and decisions made by me.  Shiv Gill M.D.  11/15/2023  6:49 PM

## 2023-11-15 NOTE — ED NOTES
Bedside report received from EDWIN Queen. Fall risk precautions in place. Call bell in reach. Pt on 2L O2 by nasal cannula (baseline) all lines traced. IV flushing. POC discussed with pt.

## 2023-11-15 NOTE — ED NOTES
RT at bedside. Pt no longer has rash to chest (reports she had one earlier), only complaint is mild difficulty swallowing/fullness in throat that is improving.

## 2023-11-15 NOTE — ED NOTES
Pt assisted to BR. Upon returning to room, pt c/o burning with urination x 1 day, itching to vaginal area & pain to R low back. UA & wet prep ordered - pt to self swab. Pt encouraged to drink water & collect UA.

## 2023-11-16 NOTE — DISCHARGE INSTRUCTIONS
Return the emergency department if you have difficulty breathing, rash, or swelling in the mouth lips or tongue.  You can use Benadryl at home.  If you have your rash reappear.  Take the prednisone daily for the next 3 days.

## 2023-11-16 NOTE — ED NOTES
Patient discharged home per ERP.  Discharge teaching and education discussed with patient. POC discussed.   Patient verbalized understanding of discharge teaching and education. No other questions at this time.     RX for prednisone sent to pt's pharmacy.   PIV removed.     VSS. Patient alert and oriented. Patient arranged ride for self.

## 2023-11-17 NOTE — ED NOTES
"ED Positive Culture Follow-up/Notification Note:    Date: 11/17/23     Patient seen in the ED on 11/15/2023 for evaluation of evaluation of allergic reaction. Per ED provider note, \"Patient has a history of migraines, and was seen earlier in September for it. At that time, she was prescribed Vyelti, and took her first dose of the medication today. She then went to the transfusion center here at [facility]. Patient notes that 15 minutes after arriving there, she began experiencing itchiness throughout her body, along with associated eye redness, throat swelling, chest pain, and shortness of breath. She was given Epinephrine 0.5 mg, Benadryl 50 mg, and Solumedrol 125 mg for her symptoms. She was also placed on 6 liters of oxygen, and was then brought here to the ED by hospital personnel. Currently in the ED, she reports feeling improved after onset, however, her symptoms have still not resolved. She is currently on 2 liters of oxygen. Denies any nausea, vomiting, or abdominal pain. No alleviating or exacerbating factors reported. Patient is on 2 liters of baseline oxygen.\"  Patient reported some vaginal itching and dysuria as well. Patient given metronidazole for suspected bacterial vaginosis  1. Allergic reaction, initial encounter    2. Bacterial vaginitis       Discharge Medication List as of 11/15/2023  5:41 PM        START taking these medications    Details   predniSONE (DELTASONE) 20 MG Tab Take 2 Tablets by mouth every day for 3 days., Disp-6 Tablet, R-0, Normal             Allergies: Dilaudid [hydromorphone], Doxycycline, Gabapentin, Imitrex [sumatriptan succinate], Maxalt [rizatriptan benzoate], Phenergan [promethazine hcl], Butrans [buprenorphine], Cefdinir, Duloxetine hcl, Eucalyptus oil, Lyrica [pregabalin], Morphabond er [renny], Paroxetine, Patchouli oil, Phenergan  [promethazine], Topiramate, Buprenorphine-naloxone, Fentanyl, Kiwi extract, and Rhubarb     Vitals:    11/15/23 1502 11/15/23 1600 11/15/23 " 1602 11/15/23 1702   BP: 114/79  115/76 127/78   Pulse: 86  85 79   Resp: 16 16 16    Temp:    36.4 °C (97.5 °F)   TempSrc:    Temporal   SpO2: 95%  97% 97%   Weight:       Height:           Final cultures:   Results       Procedure Component Value Units Date/Time    URINALYSIS [[id number]]  (Abnormal) Collected: 11/15/23 1541    Order Status: Completed Updated: 11/15/23 1619     Color Yellow     Character Clear     Specific Gravity 1.008     Ph 6.5     Glucose Negative mg/dL      Ketones Negative mg/dL      Protein Negative mg/dL      Bilirubin Negative     Urobilinogen, Urine 0.2     Nitrite Negative     Leukocyte Esterase Trace     Occult Blood Negative     Micro Urine Req Microscopic    URINALYSIS (UA) [[id number]] Collected: 11/15/23 0000    Order Status: Canceled Specimen: Urine     WET PREP [[id number]] Collected: 11/15/23 0000    Order Status: Canceled Specimen: Vaginal             Plan:   Vaginal swab positive for Gardnerella vaginalis. Patient was given a prescription for metronidazole.  Appropriate antibiotic therapy prescribed. No changes required based upon culture result.  Sent letter to patient to notify of positive culture result and encourage compliance with prescribed antibiotics.     Rell Goins, PharmD

## 2023-11-24 DIAGNOSIS — G43.719 INTRACTABLE CHRONIC MIGRAINE WITHOUT AURA AND WITHOUT STATUS MIGRAINOSUS: ICD-10-CM

## 2023-11-27 RX ORDER — BUTALBITAL, ACETAMINOPHEN AND CAFFEINE 300; 40; 50 MG/1; MG/1; MG/1
1-2 CAPSULE ORAL EVERY 4 HOURS PRN
Qty: 30 CAPSULE | Refills: 0 | Status: SHIPPED | OUTPATIENT
Start: 2023-11-27 | End: 2023-12-28

## 2023-11-27 NOTE — TELEPHONE ENCOUNTER
Received request via: Pharmacy    Was the patient seen in the last year in this department? Yes   Date of last office visit 9/12/23     Per last Neurology Office Visit, when was the date of next follow up visit set for?   6 mths                         Date of office visit follow up request 3/12/24     Does the patient have an upcoming appointment? Yes   If yes, when? 1/16/24    Does the patient have an active prescription (recently filled or refills available) for medication(s) requested? No    Does the patient have [facility] and need 100 day supply (blood pressure, diabetes and cholesterol meds only)? Medication is not for cholesterol, blood pressure or diabetes

## 2023-12-06 ENCOUNTER — APPOINTMENT (OUTPATIENT)
Dept: RADIOLOGY | Facility: MEDICAL CENTER | Age: 48
End: 2023-12-06
Attending: NURSE PRACTITIONER
Payer: MEDICAID

## 2024-01-16 ENCOUNTER — APPOINTMENT (OUTPATIENT)
Dept: NEUROLOGY | Facility: MEDICAL CENTER | Age: 49
End: 2024-01-16
Attending: PSYCHIATRY & NEUROLOGY
Payer: MEDICAID

## 2024-01-23 ENCOUNTER — APPOINTMENT (OUTPATIENT)
Dept: OPHTHALMOLOGY | Facility: MEDICAL CENTER | Age: 49
End: 2024-01-23
Payer: MEDICAID

## 2024-02-07 ENCOUNTER — TELEPHONE (OUTPATIENT)
Dept: CARDIOLOGY | Facility: PHYSICIAN GROUP | Age: 49
End: 2024-02-07
Payer: MEDICAID

## 2024-02-07 NOTE — TELEPHONE ENCOUNTER
Attempted to reach pt regarding prev card history, per automated  pt number not in service. Unable to talk to pt

## 2024-02-07 NOTE — TELEPHONE ENCOUNTER
Caller: Peter Trimble     Topic/issue: Returning call for NP Chart prep.  Patient saw a previous cardiologist at [place] [address] [address], patient's most recent labs would be at [facility] or [facility] from November.  She had an EKG in November, she had an Echo 2023 at [facility]     Callback Number: [phone]    Thank You   Jyothi RAMOS

## 2024-02-12 ENCOUNTER — OFFICE VISIT (OUTPATIENT)
Dept: CARDIOLOGY | Facility: PHYSICIAN GROUP | Age: 49
End: 2024-02-12
Payer: MEDICAID

## 2024-02-12 VITALS
HEIGHT: 68 IN | BODY MASS INDEX: 30.46 KG/M2 | RESPIRATION RATE: 12 BRPM | SYSTOLIC BLOOD PRESSURE: 98 MMHG | WEIGHT: 201 LBS | DIASTOLIC BLOOD PRESSURE: 80 MMHG | HEART RATE: 56 BPM | OXYGEN SATURATION: 91 %

## 2024-02-12 DIAGNOSIS — R00.2 PALPITATIONS: ICD-10-CM

## 2024-02-12 PROCEDURE — 3074F SYST BP LT 130 MM HG: CPT | Performed by: INTERNAL MEDICINE

## 2024-02-12 PROCEDURE — 99203 OFFICE O/P NEW LOW 30 MIN: CPT | Performed by: INTERNAL MEDICINE

## 2024-02-12 PROCEDURE — 3079F DIAST BP 80-89 MM HG: CPT | Performed by: INTERNAL MEDICINE

## 2024-02-12 ASSESSMENT — FIBROSIS 4 INDEX: FIB4 SCORE: 0.9

## 2024-02-12 NOTE — PROGRESS NOTES
"Chief Complaint   Patient presents with    New Patient     Dx: Nonrheumatic mitral (valve) insufficiency       Subjective     Peter Trimble is a 48 y.o. female who presents today  in consultation from Tyrell Pacheco for evaluation of heart health with a history of with labile BP report of eclampisia     Pulmonary suggested may have a cardiac source of dyspnea and lightheadedness    Back in  she had subarachnoid hemorrhage and features of vasculitis    She has been hospitalized many times and has very labile blood pressures    Currently blood pressure is normal and she is only on propranolol    She follows with multiple medical specialists    Over the years her EKGs have shown nonspecific repolarization changes in the anterior leads including her last EKG within the year    She had an echocardiogram in 2022 here at [place] which was normal this is similar to her echocardiogram in 2019 at some point she was informed that she had mitral regurgitation and a murmur      Past Medical History:   Diagnosis Date    Acute blood loss anemia 2013    -resolved, postop      Acute renal failure (ARF) ([facility]) 10/5/2011    -resolved (post op )     Anemia     Anxiety     Arthritis     osteoarthritis since 17yo.    ASTHMA     O2 3liters at HS and prn    Bipolar affective ([facility])     Breath shortness     Cold     Depression     Eclampsia complicating pregnancy     Environmental allergies     Essential hypertension, malignant 2011    Fibromyalgia     GERD (gastroesophageal reflux disease)     Heart murmur     she denies this hx    History of pregnancy 10/16/2014         Hx MRSA infection     Hyperlipidemia 13    \"off medication\"    Hypertension     Kidney stones     Migraines     Pain 14    \"my body hurts all the time\", 5-6/10    Personality disorder ([facility])     Pneumonia     PTSD (post-traumatic stress disorder)     Scalp wound 2014    Seizure ([facility]) 14    last     " "Sleep apnea     has had a sleep study, use O2 at     Snoring     Stroke ([facility]) 2011     reports strokes x5 , and a \"brain bleed\"    Subarachnoid hemorrhage ([facility]) 9/28/2011    -small, 2011 (2nd to HTN)     Unspecified hemorrhagic conditions     nose-bleeds, bruises easily    Unspecified urinary incontinence     Urinary bladder disorder      Past Surgical History:   Procedure Laterality Date    ORIF, ANKLE Right 8/7/2016    Procedure: ANKLE ORIF;  Surgeon: Bill Brambila M.D.;  Location: SURGERY [facility];  Service:     IRRIGATION & DEBRIDEMENT GENERAL  8/17/2014    Performed by Ezra Wright M.D. at SURGERY [facility]    BREAST BIOPSY  5/29/2014    Performed by Negro Hester M.D. at SURGERY SAME DAY [facility]    EVACUATION OF HEMATOMA  5/2/2013    Performed by Lazaro Connors Jr., M.D. at SURGERY [facility]    MAMMOPLASTY REDUCTION  4/29/2013    Performed by Negro Hester M.D. at [facility]    RECOVERY  1/30/2012    Performed by SURGERY, IR-RECOVERY at SURGERY SAME DAY [facility]    RECOVERY  10/5/2011    Performed by SURGERY, RECOVERYONLY at SURGERY [facility]    HYSTERECTOMY LAPAROSCOPY      W BSO    KNEE RECONSTRUCTION      LAMINOTOMY      OTHER ORTHOPEDIC SURGERY      maddie. knee scopes    OTHER ORTHOPEDIC SURGERY      back fusion then hardware removal    PB EXPLORATION OF SPINAL FUSION      [place] REMOVAL OF OVARY(S)       Family History   Problem Relation Age of Onset    Hypertension Mother     Hyperlipidemia Mother     Hypertension Father     Hyperlipidemia Father     Heart Disease Father     Psychiatric Illness Father     Alcohol/Drug Father     Alcohol/Drug Brother     Arthritis Maternal Uncle     Psychiatric Illness Maternal Uncle     Heart Disease Maternal Uncle     Hypertension Maternal Uncle     Hyperlipidemia Maternal Uncle     Genetic Disorder Paternal Aunt     Psychiatric Illness Paternal Uncle     Arthritis Maternal Grandmother     Heart Disease Maternal " Grandmother     Alcohol/Drug Maternal Grandfather     Stroke Maternal Grandfather     Psychiatric Illness Maternal Grandfather     Arthritis Paternal Grandmother     Alcohol/Drug Paternal Grandfather      Social History     Socioeconomic History    Marital status:      Spouse name: Not on file    Number of children: Not on file    Years of education: Not on file    Highest education level: Associate degree: occupational, technical, or vocational program   Occupational History    Not on file   Tobacco Use    Smoking status: Never    Smokeless tobacco: Never   Vaping Use    Vaping Use: Never used   Substance and Sexual Activity    Alcohol use: No    Drug use: No    Sexual activity: Not on file   Other Topics Concern    Not on file   Social History Narrative    Not on file     Social Determinants of Health     Financial Resource Strain: High Risk (9/9/2020)    Overall Financial Resource Strain (CARDIA)     Difficulty of Paying Living Expenses: Very hard   Food Insecurity: Food Insecurity Present (9/9/2020)    Hunger Vital Sign     Worried About Running Out of Food in the Last Year: Often true     Ran Out of Food in the Last Year: Sometimes true   Transportation Needs: Unmet Transportation Needs (10/29/2020)    PRAPARE - Transportation     Lack of Transportation (Medical): Not on file     Lack of Transportation (Non-Medical): Yes   Physical Activity: Inactive (9/9/2020)    Exercise Vital Sign     Days of Exercise per Week: 0 days     Minutes of Exercise per Session: 0 min   Stress: Stress Concern Present (9/9/2020)    [employer] [place] [employer] - Occupational Stress Questionnaire     Feeling of Stress : Rather much   Social Connections: Socially Isolated (9/9/2020)    Social Connection and Isolation Panel [NHANES]     Frequency of Communication with Friends and Family: More than three times a week     Frequency of Social Gatherings with Friends and Family: Never     Attends Voodoo Services:  "Never     Active Member of Clubs or Organizations: No     Attends Club or Organization Meetings: Patient refused     Marital Status:    Intimate Partner Violence: Not on file   Housing Stability: High Risk (10/29/2020)    Housing Stability Vital Sign     Unable to Pay for Housing in the Last Year: Yes     Number of Places Lived in the Last Year: 1     Unstable Housing in the Last Year: No     Allergies   Allergen Reactions    Dilaudid [Hydromorphone] Rash     Patient states she had rash, hallucinations, unable to urinate.     Doxycycline Unspecified     GI Upset    Eptinezumab-Jjmr Anaphylaxis, Itching, Palpitations, Shortness of Breath and Swelling    Gabapentin Unspecified     Shock to her body    Imitrex [Sumatriptan Succinate]      Had brain bleed    Maxalt [Rizatriptan Benzoate]      Had brain bleed    Phenergan [Promethazine Hcl]      \"psychotic reaction\"    Butrans [Buprenorphine] Unspecified     Pateint does not rememeber    Cefdinir Unspecified     Yeast infection rash    Duloxetine Hcl Unspecified     Other reaction(s): GI Upset    Eucalyptus Oil Rash     rash    Lumateperone Unspecified     Extreme fatigue    Lyrica [Pregabalin] Unspecified     \"depression, slept a lot\"    Morphabond Er [Suly] Unspecified     Downiness     Paroxetine Unspecified     Other reaction(s): GI Upset    Patchouli Oil Rash     Rash     Phenergan  [Promethazine] Unspecified     Feel like she has to get out of the stituation    Topiramate Nausea     Patient states made sick to stomach and dizzy.    Buprenorphine-Naloxone Anaphylaxis, Anxiety, Itching, Nausea, Shortness of Breath and Swelling    Fentanyl Shortness of Breath and Unspecified    Kiwi Extract     Rhubarb      Outpatient Encounter Medications as of 2/12/2024   Medication Sig Dispense Refill    Cranberry-Cholecalciferol (SUPER CRANBERRY/VITAMIN D3 PO) Take  by mouth. CRANBERRY W/D MANNOSE      acetaminophen (TYLENOL) 650 MG CR tablet Take 650 mg by mouth 2 times " a day.      baclofen (LIORESAL) 10 MG Tab Take 20 mg by mouth 2 times a day.      divalproex (DEPAKOTE) 500 MG Tablet Delayed Response Take 500 mg by mouth 3 times a day.      traZODone (DESYREL) 100 MG Tab Take 200 mg by mouth every evening.      docusate sodium (COLACE) 250 MG capsule Take 250 mg by mouth 2 times a day as needed.      Naldemedine Tosylate (SYMPROIC) 0.2 MG Tab Take 1 Tablet by mouth every morning.      ondansetron (ZOFRAN) 8 MG Tab Take 1 Tablet by mouth 1 time a day as needed for Nausea/Vomiting. 20 Tablet 6    Multiple Vitamins-Minerals (MULTIVITAMIN ADULT EXTRA C PO)       phenylephrine (NASAL DECONGESTANT PE) 10 MG Tab       FOLIC ACID-B12-B6-ARGININE PO       estradiol (ESTRACE) 0.1 MG/GM vaginal cream       mupirocin (BACTROBAN) 2 % Ointment       fluticasone-salmeterol (ADVAIR DISKUS) 500-50 MCG/ACT AEROSOL POWDER, BREATH ACTIVATED       BUPROPION & DIET MANAGE PROD PO       ipratropium (ATROVENT) 0.06 % Solution       pantoprazole (PROTONIX) 40 MG Tablet Delayed Response       Tiotropium Bromide Monohydrate 1.25 MCG/ACT Aero Soln [place] 2 puffs by mouth one time per day 4 g 10    propranolol (INDERAL) 10 MG Tab Take 5 mg by mouth 2 times a day. Indications: High Blood Pressure Disorder      albuterol 108 (90 Base) MCG/ACT Aero Soln inhalation aerosol ProAir HFA 90 mcg/actuation aerosol inhaler      ferrous sulfate 325 (65 FE) MG tablet Take 325 mg by mouth every day.      [DISCONTINUED] LORazepam (ATIVAN) 0.5 MG Tab Take 0.5 mg by mouth every 8 hours as needed for Anxiety.      [DISCONTINUED] Cranberry 1000 MG Cap       [DISCONTINUED] fluticasone-salmeterol (ADVAIR DISKUS) 250-50 MCG/ACT AEROSOL POWDER, BREATH ACTIVATED  (Patient not taking: Reported on 11/15/2023)       No facility-administered encounter medications on file as of 2/12/2024.     ROS           Objective     BP 98/80 (BP Location: Left arm, Patient Position: Sitting, BP Cuff Size: Large adult)   Pulse (!) 56   Resp 12    "Ht 1.727 m (5' 8\")   Wt 91.2 kg (201 lb)   SpO2 91%   PF (!) 2 L/min   BMI 30.56 kg/m²     Physical Exam  Constitutional:       General: She is not in acute distress.     Appearance: She is not diaphoretic.   Eyes:      General: No scleral icterus.  Neck:      Vascular: No JVD.   Cardiovascular:      Rate and Rhythm: Normal rate.      Heart sounds: Normal heart sounds. No murmur heard.     No friction rub. No gallop.   Pulmonary:      Effort: No respiratory distress.      Breath sounds: No wheezing or rales.   Abdominal:      General: Bowel sounds are normal.      Palpations: Abdomen is soft.   Musculoskeletal:      Right lower leg: No edema.      Left lower leg: No edema.   Skin:     Findings: No rash.   Neurological:      Mental Status: She is alert. Mental status is at baseline.   Psychiatric:         Mood and Affect: Mood normal.            We reviewed in person the most recent labs  Recent Results (from the past 5040 hour(s))   CBC WITH DIFFERENTIAL    Collection Time: 11/15/23 10:33 AM   Result Value Ref Range    WBC 6.4 4.8 - 10.8 K/uL    RBC 4.33 4.20 - 5.40 M/uL    Hemoglobin 13.8 12.0 - 16.0 g/dL    Hematocrit 40.8 37.0 - 47.0 %    MCV 94.2 81.4 - 97.8 fL    MCH 31.9 27.0 - 33.0 pg    MCHC 33.8 32.2 - 35.5 g/dL    RDW 42.1 35.9 - 50.0 fL    Platelet Count 265 164 - 446 K/uL    MPV 10.5 9.0 - 12.9 fL    Neutrophils-Polys 48.40 44.00 - 72.00 %    Lymphocytes 41.70 (H) 22.00 - 41.00 %    Monocytes 7.60 0.00 - 13.40 %    Eosinophils 1.10 0.00 - 6.90 %    Basophils 0.90 0.00 - 1.80 %    Immature Granulocytes 0.30 0.00 - 0.90 %    Nucleated RBC 0.00 0.00 - 0.20 /100 WBC    Neutrophils (Absolute) 3.10 1.82 - 7.42 K/uL    Lymphs (Absolute) 2.67 1.00 - 4.80 K/uL    Monos (Absolute) 0.49 0.00 - 0.85 K/uL    Eos (Absolute) 0.07 0.00 - 0.51 K/uL    Baso (Absolute) 0.06 0.00 - 0.12 K/uL    Immature Granulocytes (abs) 0.02 0.00 - 0.11 K/uL    NRBC (Absolute) 0.00 K/uL   Comp Metabolic Panel    Collection Time: " 11/15/23 10:33 AM   Result Value Ref Range    Sodium 141 135 - 145 mmol/L    Potassium 4.2 3.6 - 5.5 mmol/L    Chloride 101 96 - 112 mmol/L    Co2 29 20 - 33 mmol/L    Anion Gap 11.0 7.0 - 16.0    Glucose 83 65 - 99 mg/dL    Bun 21 8 - 22 mg/dL    Creatinine 0.83 0.50 - 1.40 mg/dL    Calcium 9.3 8.5 - 10.5 mg/dL    Correct Calcium 9.2 8.5 - 10.5 mg/dL    AST(SGOT) 19 12 - 45 U/L    ALT(SGPT) 14 2 - 50 U/L    Alkaline Phosphatase 69 30 - 99 U/L    Total Bilirubin 0.3 0.1 - 1.5 mg/dL    Albumin 4.1 3.2 - 4.9 g/dL    Total Protein 6.8 6.0 - 8.2 g/dL    Globulin 2.7 1.9 - 3.5 g/dL    A-G Ratio 1.5 g/dL   ESTIMATED GFR    Collection Time: 11/15/23 10:33 AM   Result Value Ref Range    GFR (CKD-EPI) 87 >60 mL/min/1.73 m 2   EKG    Collection Time: 11/15/23 12:19 PM   Result Value Ref Range    Report       [facility]    Test Date:  2023-11-15  Pt Name:    LEE SCHNEIDER                Department: ER  MRN:        [id number]                      Room:       [facility]  Gender:     Female                       Technician: [id number]  :        [id number]                   Requested By:MARIS JOHNSON  Order #:    [id number]                    Reading MD: MARIS JOHNSON MD    Measurements  Intervals                                Axis  Rate:       61                           P:          0  OR:         0                            QRS:        23  QRSD:       98                           T:          14  QT:         417  QTc:        420    Interpretive Statements  SInsus rhythm, rate of 61, normal axis, no st elevation  Borderline T abnormalities, anterior leads  Compared to ECG 10/24/2019 19:03:51  Prolonged QT interval no longer present  T-wave abnormality still present  Electronically Signed On 11- 18:48:27 PST by MARIS JOHNSON MD     URINALYSIS    Collection Time: 11/15/23  3:41 PM   Result Value Ref Range    Color Yellow     Character Clear     Specific Gravity 1.008 <1.035     Ph 6.5 5.0 - 8.0    Glucose Negative Negative mg/dL    Ketones Negative Negative mg/dL    Protein Negative Negative mg/dL    Bilirubin Negative Negative    Urobilinogen, Urine 0.2 Negative    Nitrite Negative Negative    Leukocyte Esterase Trace (A) Negative    Occult Blood Negative Negative    Micro Urine Req Microscopic    VAGINAL PATHOGENS DNA PANEL    Collection Time: 11/15/23  3:41 PM   Result Value Ref Range    Candida species DNA Probe Negative Negative    Trichamonas vaginalis DNA Probe Negative Negative    Gardnerella vaginalis DNA Probe POSITIVE (A) Negative   URINE MICROSCOPIC (W/UA)    Collection Time: 11/15/23  3:41 PM   Result Value Ref Range    WBC 0-2 /hpf    RBC 2-5 (A) /hpf    Bacteria Negative None /hpf    Epithelial Cells Few /hpf    Hyaline Cast 0-2 /lpf   COLOGUARD COLON CANCER SCREENING    Collection Time: 12/14/23 12:00 PM   Result Value Ref Range    Noninvasive colorectal cancer DNA and occult blood screening in Stool Negative Negative     Echo in 2022 is normal trace MR      Assessment & Plan     1. Palpitations  Cardiac Event Monitor          Medical Decision Making: Today's Assessment/Status/Plan:        It was my pleasure to meet with Ms. Trimble.    Blood pressure is well controlled.  She will continue to monitor and eat hearty healthy diet.    Given her history of eclampsia and labile blood pressures with low HDL would be reasonable to start statin therapy sooner in life for cardiovascular reasons perhaps at age 56 but sooner if neurology feels it is important    We will do an event monitor to see if she has arrhythmia as a source of her lightheadedness and dyspnea otherwise heart testing has been normal.    We will follow up with Ms. Trimble on the results of the testing with [employer] or over the phone. We will determine further follow-up from there.    Ms. Trimble does not require regular cardiology follow up, I have advised her to call our office or e-mail using my [employer] if  needed.    It is my pleasure to participate in the care of Ms. Trimble.  Please do not hesitate to contact me with questions or concerns.    Jeff Toney MD PhD Merged with [facility]  Cardiologist [facility]    Please note that this dictation was created using voice recognition software. There may be errors I did not discover before finalizing the note.

## 2024-02-20 ENCOUNTER — NON-PROVIDER VISIT (OUTPATIENT)
Dept: CARDIOLOGY | Facility: MEDICAL CENTER | Age: 49
End: 2024-02-20
Attending: INTERNAL MEDICINE
Payer: MEDICAID

## 2024-02-20 DIAGNOSIS — R00.2 PALPITATIONS: ICD-10-CM

## 2024-02-20 NOTE — PROGRESS NOTES
Monitor activated on 02/20/2024    Home enrollment completed in the 30 day [employer] rVitaOT heart monitor per Jeff Toney MD.  Monitor will be shipped to patient via [employer], Baseline Pending, EOS Pending.   90

## 2024-03-12 ENCOUNTER — APPOINTMENT (OUTPATIENT)
Dept: OPHTHALMOLOGY | Facility: MEDICAL CENTER | Age: 49
End: 2024-03-12
Payer: MEDICAID

## 2024-03-29 ENCOUNTER — TELEPHONE (OUTPATIENT)
Dept: CARDIOLOGY | Facility: MEDICAL CENTER | Age: 49
End: 2024-03-29
Payer: MEDICAID

## 2024-08-21 ENCOUNTER — APPOINTMENT (OUTPATIENT)
Dept: NEUROLOGY | Facility: MEDICAL CENTER | Age: 49
End: 2024-08-21
Attending: CLINICAL NEUROPSYCHOLOGIST
Payer: MEDICAID

## 2024-10-02 DIAGNOSIS — Z00.6 CLINICAL TRIAL PARTICIPANT: ICD-10-CM

## 2024-10-08 ENCOUNTER — RESEARCH ENCOUNTER (OUTPATIENT)
Dept: RESEARCH | Facility: MEDICAL CENTER | Age: 49
End: 2024-10-08
Payer: MEDICAID

## 2024-11-05 ENCOUNTER — APPOINTMENT (OUTPATIENT)
Dept: LAB | Facility: MEDICAL CENTER | Age: 49
End: 2024-11-05
Attending: FAMILY MEDICINE
Payer: MEDICAID

## 2025-03-13 ENCOUNTER — OFFICE VISIT (OUTPATIENT)
Dept: CARDIOLOGY | Facility: PHYSICIAN GROUP | Age: 50
End: 2025-03-13
Payer: MEDICAID

## 2025-03-13 VITALS
BODY MASS INDEX: 31.1 KG/M2 | DIASTOLIC BLOOD PRESSURE: 62 MMHG | RESPIRATION RATE: 14 BRPM | HEIGHT: 69 IN | SYSTOLIC BLOOD PRESSURE: 102 MMHG | WEIGHT: 210 LBS | OXYGEN SATURATION: 94 % | HEART RATE: 83 BPM

## 2025-03-13 DIAGNOSIS — I10 HYPERTENSION, UNSPECIFIED TYPE: ICD-10-CM

## 2025-03-13 DIAGNOSIS — G47.33 OSA (OBSTRUCTIVE SLEEP APNEA): ICD-10-CM

## 2025-03-13 DIAGNOSIS — Z99.81 DEPENDENCE ON SUPPLEMENTAL OXYGEN: ICD-10-CM

## 2025-03-13 DIAGNOSIS — E78.5 DYSLIPIDEMIA: Chronic | ICD-10-CM

## 2025-03-13 DIAGNOSIS — K21.9 GASTROESOPHAGEAL REFLUX DISEASE WITHOUT ESOPHAGITIS: ICD-10-CM

## 2025-03-13 DIAGNOSIS — J45.909 MILD ASTHMA WITHOUT COMPLICATION, UNSPECIFIED WHETHER PERSISTENT: ICD-10-CM

## 2025-03-13 PROBLEM — G44.40 MEDICATION OVERUSE HEADACHE: Status: RESOLVED | Noted: 2018-10-30 | Resolved: 2025-03-13

## 2025-03-13 PROBLEM — L02.92 FURUNCULOSIS: Status: RESOLVED | Noted: 2019-09-11 | Resolved: 2025-03-13

## 2025-03-13 PROBLEM — E87.8 ELECTROLYTE ABNORMALITY: Status: RESOLVED | Noted: 2019-09-12 | Resolved: 2025-03-13

## 2025-03-13 PROBLEM — E83.42 HYPOMAGNESEMIA: Status: RESOLVED | Noted: 2019-10-25 | Resolved: 2025-03-13

## 2025-03-13 PROBLEM — R29.6 REPEATED FALLS: Status: RESOLVED | Noted: 2019-12-07 | Resolved: 2025-03-13

## 2025-03-13 PROBLEM — R31.9 HEMATURIA: Status: RESOLVED | Noted: 2019-10-24 | Resolved: 2025-03-13

## 2025-03-13 PROBLEM — R41.89 COGNITIVE IMPAIRMENT: Status: RESOLVED | Noted: 2023-09-12 | Resolved: 2025-03-13

## 2025-03-13 PROBLEM — N17.9 AKI (ACUTE KIDNEY INJURY) (HCC): Status: RESOLVED | Noted: 2019-10-25 | Resolved: 2025-03-13

## 2025-03-13 PROBLEM — B37.49 GENITAL CANDIDIASIS: Status: RESOLVED | Noted: 2019-10-25 | Resolved: 2025-03-13

## 2025-03-13 PROBLEM — E87.0 HYPERNATREMIA: Status: RESOLVED | Noted: 2019-12-08 | Resolved: 2025-03-13

## 2025-03-13 PROBLEM — Z79.899 POLYPHARMACY: Status: RESOLVED | Noted: 2019-12-07 | Resolved: 2025-03-13

## 2025-03-13 PROCEDURE — 3074F SYST BP LT 130 MM HG: CPT | Performed by: NURSE PRACTITIONER

## 2025-03-13 PROCEDURE — 3078F DIAST BP <80 MM HG: CPT | Performed by: NURSE PRACTITIONER

## 2025-03-13 PROCEDURE — 99214 OFFICE O/P EST MOD 30 MIN: CPT | Performed by: NURSE PRACTITIONER

## 2025-03-13 RX ORDER — LINACLOTIDE 145 UG/1
CAPSULE, GELATIN COATED ORAL
COMMUNITY
Start: 2025-02-28

## 2025-03-13 ASSESSMENT — ENCOUNTER SYMPTOMS
SEIZURES: 1
PND: 0
COUGH: 0
DIZZINESS: 0
CHILLS: 0
SHORTNESS OF BREATH: 1
ORTHOPNEA: 0
MYALGIAS: 0
LOSS OF CONSCIOUSNESS: 0
BRUISES/BLEEDS EASILY: 0
FEVER: 0
INSOMNIA: 0
HEADACHES: 0
PALPITATIONS: 0
NAUSEA: 0
ABDOMINAL PAIN: 0

## 2025-03-13 ASSESSMENT — FIBROSIS 4 INDEX: FIB4 SCORE: 0.92

## 2025-03-13 NOTE — ASSESSMENT & PLAN NOTE
September 2022: Echocardiogram with normal LV size, LVEF 50-55%. Normal RA, LA and RV. Mild TR. RVSP 22mmHg.
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
## 2025-05-12 DIAGNOSIS — I10 HYPERTENSION, UNSPECIFIED TYPE: ICD-10-CM

## 2025-05-12 DIAGNOSIS — Z99.81 DEPENDENCE ON SUPPLEMENTAL OXYGEN: ICD-10-CM

## 2025-05-12 DIAGNOSIS — E78.5 DYSLIPIDEMIA: Chronic | ICD-10-CM

## 2025-05-13 ENCOUNTER — RESULTS FOLLOW-UP (OUTPATIENT)
Dept: CARDIOLOGY | Facility: MEDICAL CENTER | Age: 50
End: 2025-05-13
Payer: MEDICAID

## 2025-05-15 NOTE — TELEPHONE ENCOUNTER
----- Message from Nurse Coty AVINA R.N. sent at 5/13/2025  5:09 PM PDT -----    ----- Message -----  From: DONAVAN Dutta  Sent: 5/13/2025   3:52 PM PDT  To: Coty Olivo R.N.    Please let patient know that echo is stable from 9/2022:  Normal heart size and LVEF  Only MILD MR and TR, not worrisome- we will continue to monitor.    She needed clearance for EGD and colonoscopy - she can proceed (not high risk).  Thanks, AB

## 2025-05-15 NOTE — TELEPHONE ENCOUNTER
MA 's  - Please see AB response for clearance. Unsure where this clearance needs to go, please reach out to patient if clearance not in chart. Thank you!

## 2025-05-16 ENCOUNTER — TELEPHONE (OUTPATIENT)
Dept: CARDIOLOGY | Facility: MEDICAL CENTER | Age: 50
End: 2025-05-16
Payer: MEDICAID

## 2025-05-16 NOTE — TELEPHONE ENCOUNTER
"Last OV: 2025  Proposed Surgery: Colonoscopy/EGD  Surgery Date: TBD  Requesting Office Name: [facility]  Fax Number: [phone]  Preference of Location (default is surgery center unless specified by Cardiologist or STEPHY)  Prior Clearance Addressed: No    Is this a general clearance? YES   Anticoags/Antiplatelets: Other NA  Anticoags/Antiplatelet managed by Cardiology? YES    Outstanding Cardiac Imaging : No  Ablation, Cardioversion, Stent, Cardiac Devices, Catheterization, Watchman: No  TAVR/Valve, Mitral Clip, Watchman (including open heart),: N/A   Recent Cardiac Hospitalization: No            When: N/A  History (cardiac history): Past Medical History[1]        Is this a dental clearance? NO  Ablation, Cardioversion, Watchman, Stents, Cath, Devices within the last 3 months? No   If yes- Send dental wait letter, do not forward to provider for review.     TAVR / Valve, Mitral clip within the last 6 months? No  If yes- Send dental wait letter, do not forward to provider for review.     If completing a general clearance, continue per protocol.           Surgical Clearance Letter Sent: YES   **Scan clearance request letter into [employer].**         [1]   Past Medical History:  Diagnosis Date    Anemia     Anxiety     Arthritis     Osteoarthritis since 17yo.    ASTHMA     O2 3liters at HS and prn    Bipolar affective (HCC)     Breath shortness     Depression     Eclampsia complicating pregnancy     Environmental allergies     Fibromyalgia     GERD (gastroesophageal reflux disease)     History of pregnancy 10/16/2014         Hx MRSA infection     Hyperlipidemia     Hypertension 2025    Echocardiogram with normal LV size, LVEF 55-60%. Normal RA, LA and RV. Mild MR, mild TR. RVSP 22mmHg.    Kidney stones     Migraines     Pain 2014    \"my body hurts all the time\", 5-6/10    Personality disorder (HCC)     PTSD (post-traumatic stress disorder)     Seizure ([facility]CC) 2014    last     Sleep apnea     has had a " "sleep study, use O2 at HS    Snoring     Stroke ([facility]) 01/01/2011     reports strokes x5 , and a \"brain bleed\"    Subarachnoid hemorrhage ([facility]) 09/28/2011    -small, 2011 (2nd to HTN)     Unspecified hemorrhagic conditions     nose-bleeds, bruises easily    Unspecified urinary incontinence     Urinary bladder disorder      "

## 2025-05-16 NOTE — LETTER
PROCEDURE/SURGERY CLEARANCE FORM      Encounter Date: 5/16/2025    Patient: Peter Trimble  YOB: 1975    CARDIOLOGIST:  Lori DEMPSEY.   REFERRING DOCTOR:  No ref. provider found    The procedure/surgery: Colonoscopy/EGD    PROCEDURE/SURGERY CLEARANCE FORM    Date: 5/16/2025   Patient Name: Peter Trimble    Dear Surgeon or Proceduralist,      Thank you for your request for cardiac stratification of our mutual patient Peter Trimble 1975. We have reviewed their [facility] records; and to the best of our understanding this patient has not had stenting, ablation, watchman, cardiothoracic surgery or hospitalization for cardiovascular reasons in the past 6 months.  Peter Trimble has been seen within the past 15 months and is considered to have non-modifiable cardiac risk for this low-risk procedure/surgery. They may proceed from a cardiovascular standpoint and may hold their antiplatelet/anticoagulation as briefly as possible. Please have patient resume this medication when hemodynamically stable to do so.     Aspirin or Prasugrel   - hold 7 days prior to procedure/surgery, resume when hemodynamically stable      Clopidrogrel or Ticagrelor  - hold 7 days for all neurological procedures, hold 5 days prior to all other procedure/surgery,  resume when hemodynamically stable     Warfarin - hold 7 days for all neurological procedures, hold 5 days prior to all other procedure/surgery and coordinate with [facility] ([phone]) INR testing and dose management.      Pradaxa/Xarelto/Eliquis/Savesya - hold 1 day prior to procedure for low bleeding risk procedure, 2 days for high bleeding risk procedure, or consider holding 3 days or longer for patients with reduced kidney function (CrCl <30mL/min) or spinal/cranial surgeries/procedures.      If they have a mechanical heart valve, please coordinate with [facility] ([phone]) the proper management of  their anticoagulant in the periprocedural or perioperative period.      Some patients have higher risk for cardiovascular complications or holding medication. If our patient has had prior complications of holding antiplatelet or anticoagulants in the past and we have seen them after these events, we have addressed these concerns with the patient. They are at an unknown degree of increased risk for recurrent complication.  You may hold anticoagulation/antiplatelets for the procedure or surgery if the benefits of the procedure or surgery outweigh this nonmodifiable risk.      If Peter Trimble 1975 has new symptoms of heart failure decompensation, unstable arrythmia, or angina please reach out and we will assess the patient.      If you have other patient-specific concerns, please feel free to reach out to the patient's cardiologist directly at [phone].     Thank you,       [facility]                                                 Electronically signed       MD Signature    Lori DEMPSEY.

## 2025-05-19 NOTE — TELEPHONE ENCOUNTER
----- Message from Medical Assistant [place] Ass't sent at 5/15/2025  4:10 PM PDT -----    ----- Message -----  From: Lillian Jarrett R.N.  Sent: 5/15/2025  10:36 AM PDT  To: Jose Martin Jones/Constantin    ----- Message from Lillian Jarrett R.N. sent at 5/15/2025 10:36 AM PDT -----      ----- Message -----  From: Coty Olivo R.N.  Sent: 5/13/2025   5:10 PM PDT  To: Mahogany Bill R.N.      ----- Message -----  From: DONAVAN Dutta  Sent: 5/13/2025   3:52 PM PDT  To: Coty Olivo R.N.    Please let patient know that echo is stable from 9/2022:  Normal heart size and LVEF  Only MILD MR and TR, not worrisome- we will continue to monitor.    She needed clearance for EGD and colonoscopy - she can proceed (not high risk).  Thanks, AB

## 2025-05-21 ENCOUNTER — APPOINTMENT (OUTPATIENT)
Dept: BEHAVIORAL HEALTH | Facility: CLINIC | Age: 50
End: 2025-05-21
Payer: MEDICAID

## 2025-05-23 ENCOUNTER — APPOINTMENT (OUTPATIENT)
Dept: ADMISSIONS | Facility: MEDICAL CENTER | Age: 50
End: 2025-05-23
Attending: PODIATRIST
Payer: MEDICAID

## 2025-05-24 NOTE — ED NOTES
Pt. Being discharged at this time, pt. Provided with discharge AVS and teaching, pt. Utilized a wheelchair is being wheeled to ED exit. Pt. Denies questions or needs at this time.   hide

## 2025-05-28 ENCOUNTER — PRE-ADMISSION TESTING (OUTPATIENT)
Dept: ADMISSIONS | Facility: MEDICAL CENTER | Age: 50
End: 2025-05-28
Attending: PODIATRIST
Payer: MEDICAID

## 2025-05-28 VITALS — HEIGHT: 69 IN | BODY MASS INDEX: 31.01 KG/M2

## 2025-05-28 DIAGNOSIS — Z01.812 PRE-OPERATIVE LABORATORY EXAMINATION: Primary | ICD-10-CM

## 2025-05-28 DIAGNOSIS — Z01.810 PRE-OPERATIVE CARDIOVASCULAR EXAMINATION: ICD-10-CM

## 2025-05-28 RX ORDER — SENNOSIDES 8.6 MG/1
8.6 TABLET ORAL 2 TIMES DAILY
COMMUNITY

## 2025-05-28 RX ORDER — HYDROXYZINE HYDROCHLORIDE 25 MG/1
25 TABLET, FILM COATED ORAL PRN
COMMUNITY

## 2025-05-28 RX ORDER — OXYCODONE HYDROCHLORIDE 10 MG/1
10 TABLET ORAL 3 TIMES DAILY
COMMUNITY

## 2025-05-28 RX ORDER — CLOBETASOL PROPIONATE 0.5 MG/G
OINTMENT TOPICAL PRN
COMMUNITY

## 2025-05-28 RX ORDER — PANTOPRAZOLE SODIUM 20 MG
20 TABLET, DELAYED RELEASE (ENTERIC COATED) ORAL 2 TIMES DAILY
COMMUNITY

## 2025-05-28 RX ORDER — LORATADINE 10 MG/1
10 TABLET ORAL 2 TIMES DAILY
COMMUNITY
Start: 2023-02-01

## 2025-05-28 RX ORDER — DIVALPROEX SODIUM 500 MG/1
500 TABLET, DELAYED RELEASE ORAL 2 TIMES DAILY
COMMUNITY

## 2025-05-28 RX ORDER — TRAZODONE HYDROCHLORIDE 100 MG/1
150 TABLET ORAL NIGHTLY
COMMUNITY

## 2025-05-28 RX ORDER — CLONAZEPAM 0.5 MG/1
TABLET ORAL
COMMUNITY

## 2025-05-28 RX ORDER — BUPROPION HYDROCHLORIDE 100 MG/1
100 TABLET ORAL 4 TIMES DAILY
COMMUNITY

## 2025-05-28 RX ORDER — TRIAMCINOLONE ACETONIDE 1 MG/G
CREAM TOPICAL PRN
COMMUNITY

## 2025-05-28 RX ORDER — BACLOFEN 20 MG/1
20 TABLET ORAL 3 TIMES DAILY
COMMUNITY
Start: 2022-09-12

## 2025-05-28 RX ORDER — PROPRANOLOL HCL 20 MG
20 TABLET ORAL 2 TIMES DAILY
COMMUNITY

## 2025-05-28 RX ORDER — ONDANSETRON 8 MG/1
8 TABLET, FILM COATED ORAL PRN
COMMUNITY

## 2025-05-28 RX ORDER — ITRACONAZOLE 100 MG/1
CAPSULE ORAL
COMMUNITY

## 2025-05-28 RX ORDER — CLINDAMYCIN PHOSPHATE 11.9 MG/ML
SOLUTION TOPICAL
COMMUNITY
Start: 2024-09-03 | End: 2025-08-29

## 2025-05-28 RX ORDER — GUAIFENESIN 400 MG/1
400 TABLET ORAL 2 TIMES DAILY
COMMUNITY

## 2025-05-28 NOTE — PREPROCEDURE INSTRUCTIONS
PreAdmit Telephone Appointment: Reviewed the Preparing for your procedure handout with patient. Patient instructed per pharmacy guidelines regarding taking, holding or contacting provider for instructions on regularly prescribed medications before surgery.    Confirmed with patient where to check in morning of surgery.    Testing to be done at [facility] per patient preference from now until 6/10. Orders to be faxed to facility and emailed to patient.

## 2025-05-28 NOTE — PREADMIT AVS NOTE
Current Medications   Medication Instructions    baclofen (LIORESAL) 20 MG tablet Continue taking medication as prescribed, including morning of procedure     loratadine (CLARITIN) 10 MG Tab Continue taking medication as prescribed, including morning of procedure     NON SPECIFIED D-[place], FOLIC ACID B-12,  Stop 7 days before surgery    Probiotic Product (PROBIOTIC PO) Stop 7 days before surgery or Follow instructions from surgeon or specialist.    clobetasol (TEMOVATE) 0.05 % Ointment Hold medication day of procedure    ZINC GLUCONATE PO Stop 7 days before surgery    MAGNESIUM CITRATE PO Stop 7 days before surgery    clindamycin (CLEOCIN) 1 % Solution Hold medication day of procedure    CHLOROPHYLL PO Stop 7 days before surgery    CHELATED IRON PO Stop 7 days before surgery or Follow instructions from surgeon or specialist.    divalproex (DEPAKOTE) 500 MG Tablet Delayed Response Continue taking medication as prescribed, including morning of procedure     traZODone (DESYREL) 100 MG Tab Continue taking as prescribed.    hydrOXYzine HCl (ATARAX) 25 MG Tab As needed medication, may take if needed, including morning of procedure     Guaifenesin 400 MG Tab As needed medication, may take if needed, including morning of procedure     propranolol (INDERAL) 20 MG Tab Continue taking medication as prescribed, including morning of procedure     sennosides (SENOKOT) 8.6 MG Tab Continue taking as prescribed.    Vitamin D-Vitamin K (VITAMIN K2-VITAMIN D3 PO) Stop 7 days before surgery    oxyCODONE immediate release (ROXICODONE) 10 MG immediate release tablet Continue taking medication as prescribed, including morning of procedure     buPROPion (WELLBUTRIN) 100 MG Tab Continue taking medication as prescribed, including morning of procedure     Thiamine HCl (VITAMIN B-1 PO) Stop 7 days before surgery    itraconazole (SPORONAX) 100 MG Cap Follow instructions from surgeon or specialist.    triamcinolone acetonide (KENALOG) 0.1 %  Cream Hold medication day of procedure    clonazePAM (KLONOPIN) 0.5 MG Tab As needed medication, may take if needed, including morning of procedure     ondansetron (ZOFRAN) 8 MG Tab As needed medication, may take if needed, including morning of procedure     PROTONIX 20 MG tablet Continue taking medication as prescribed, including morning of procedure     acetaminophen (TYLENOL) 650 MG CR tablet As needed medication, may take if needed, including morning of procedure     docusate sodium (COLACE) 250 MG capsule Continue taking as prescribed.    Naldemedine Tosylate (SYMPROIC) 0.2 MG Tab Follow instructions from surgeon or specialist.    phenylephrine (NASAL DECONGESTANT PE) 10 MG Tab As needed medication, may take if needed, including morning of procedure     estradiol (ESTRACE) 0.1 MG/GM vaginal cream Hold medication day of procedure    mupirocin (BACTROBAN) 2 % Ointment Hold medication day of procedure    fluticasone-salmeterol (ADVAIR DISKUS) 500-50 MCG/ACT AEROSOL POWDER, BREATH ACTIVATED Continue taking medication as prescribed, including morning of procedure     Tiotropium Bromide Monohydrate 1.25 MCG/ACT Aero Soln Continue taking medication as prescribed, including morning of procedure     albuterol 108 (90 Base) MCG/ACT Aero Soln inhalation aerosol As needed medication, may take if needed, including morning of procedure

## 2025-06-23 ENCOUNTER — APPOINTMENT (OUTPATIENT)
Dept: BEHAVIORAL HEALTH | Facility: CLINIC | Age: 50
End: 2025-06-23
Payer: MEDICAID

## 2025-07-26 ENCOUNTER — APPOINTMENT (OUTPATIENT)
Dept: RADIOLOGY | Facility: MEDICAL CENTER | Age: 50
End: 2025-07-26
Attending: STUDENT IN AN ORGANIZED HEALTH CARE EDUCATION/TRAINING PROGRAM
Payer: MEDICAID